# Patient Record
Sex: MALE | Race: BLACK OR AFRICAN AMERICAN | Employment: OTHER | URBAN - METROPOLITAN AREA
[De-identification: names, ages, dates, MRNs, and addresses within clinical notes are randomized per-mention and may not be internally consistent; named-entity substitution may affect disease eponyms.]

---

## 2021-02-13 ENCOUNTER — HOSPITAL ENCOUNTER (EMERGENCY)
Age: 66
Discharge: HOME | End: 2021-02-13
Attending: EMERGENCY MEDICINE | Admitting: EMERGENCY MEDICINE
Payer: MEDICARE

## 2021-02-13 ENCOUNTER — APPOINTMENT (OUTPATIENT)
Dept: GENERAL RADIOLOGY | Age: 66
End: 2021-02-13
Attending: PHYSICIAN ASSISTANT
Payer: MEDICARE

## 2021-02-13 VITALS
TEMPERATURE: 98.3 F | HEIGHT: 68 IN | OXYGEN SATURATION: 98 % | HEART RATE: 92 BPM | DIASTOLIC BLOOD PRESSURE: 112 MMHG | RESPIRATION RATE: 18 BRPM | SYSTOLIC BLOOD PRESSURE: 157 MMHG | WEIGHT: 165 LBS | BODY MASS INDEX: 25.01 KG/M2

## 2021-02-13 DIAGNOSIS — S22.32XA CLOSED FRACTURE OF ONE RIB OF LEFT SIDE, INITIAL ENCOUNTER: Primary | ICD-10-CM

## 2021-02-13 DIAGNOSIS — S20.212A CONTUSION OF LEFT CHEST WALL, INITIAL ENCOUNTER: ICD-10-CM

## 2021-02-13 DIAGNOSIS — R73.9 HYPERGLYCEMIA: ICD-10-CM

## 2021-02-13 DIAGNOSIS — I10 HYPERTENSION, UNSPECIFIED TYPE: ICD-10-CM

## 2021-02-13 LAB
ALBUMIN SERPL-MCNC: 3.6 G/DL (ref 3.5–5)
ALBUMIN/GLOB SERPL: 0.8 {RATIO} (ref 1.1–2.2)
ALP SERPL-CCNC: 129 U/L (ref 45–117)
ALT SERPL-CCNC: 26 U/L (ref 12–78)
ANION GAP SERPL CALC-SCNC: 9 MMOL/L (ref 5–15)
AST SERPL-CCNC: 16 U/L (ref 15–37)
BASOPHILS # BLD: 0.1 K/UL (ref 0–0.1)
BASOPHILS NFR BLD: 1 % (ref 0–1)
BILIRUB SERPL-MCNC: 0.5 MG/DL (ref 0.2–1)
BUN SERPL-MCNC: 9 MG/DL (ref 6–20)
BUN/CREAT SERPL: 7 (ref 12–20)
CALCIUM SERPL-MCNC: 9.7 MG/DL (ref 8.5–10.1)
CHLORIDE SERPL-SCNC: 102 MMOL/L (ref 97–108)
CO2 SERPL-SCNC: 29 MMOL/L (ref 21–32)
CREAT SERPL-MCNC: 1.28 MG/DL (ref 0.7–1.3)
DIFFERENTIAL METHOD BLD: ABNORMAL
EOSINOPHIL # BLD: 0 K/UL (ref 0–0.4)
EOSINOPHIL NFR BLD: 0 % (ref 0–7)
ERYTHROCYTE [DISTWIDTH] IN BLOOD BY AUTOMATED COUNT: 13.2 % (ref 11.5–14.5)
GLOBULIN SER CALC-MCNC: 4.6 G/DL (ref 2–4)
GLUCOSE SERPL-MCNC: 227 MG/DL (ref 65–100)
HCT VFR BLD AUTO: 51.8 % (ref 36.6–50.3)
HGB BLD-MCNC: 17.7 G/DL (ref 12.1–17)
IMM GRANULOCYTES # BLD AUTO: 0 K/UL (ref 0–0.04)
IMM GRANULOCYTES NFR BLD AUTO: 0 % (ref 0–0.5)
LYMPHOCYTES # BLD: 1 K/UL (ref 0.8–3.5)
LYMPHOCYTES NFR BLD: 10 % (ref 12–49)
MCH RBC QN AUTO: 31.2 PG (ref 26–34)
MCHC RBC AUTO-ENTMCNC: 34.2 G/DL (ref 30–36.5)
MCV RBC AUTO: 91.2 FL (ref 80–99)
MONOCYTES # BLD: 0.4 K/UL (ref 0–1)
MONOCYTES NFR BLD: 4 % (ref 5–13)
NEUTS SEG # BLD: 9 K/UL (ref 1.8–8)
NEUTS SEG NFR BLD: 85 % (ref 32–75)
NRBC # BLD: 0 K/UL (ref 0–0.01)
NRBC BLD-RTO: 0 PER 100 WBC
PLATELET # BLD AUTO: 261 K/UL (ref 150–400)
PMV BLD AUTO: 11.4 FL (ref 8.9–12.9)
POTASSIUM SERPL-SCNC: 3.9 MMOL/L (ref 3.5–5.1)
PROT SERPL-MCNC: 8.2 G/DL (ref 6.4–8.2)
RBC # BLD AUTO: 5.68 M/UL (ref 4.1–5.7)
SODIUM SERPL-SCNC: 140 MMOL/L (ref 136–145)
TROPONIN I SERPL-MCNC: 0.05 NG/ML
TROPONIN I SERPL-MCNC: 0.06 NG/ML
WBC # BLD AUTO: 10.5 K/UL (ref 4.1–11.1)

## 2021-02-13 PROCEDURE — 77030027138 HC INCENT SPIROMETER -A

## 2021-02-13 PROCEDURE — 36415 COLL VENOUS BLD VENIPUNCTURE: CPT

## 2021-02-13 PROCEDURE — 99285 EMERGENCY DEPT VISIT HI MDM: CPT

## 2021-02-13 PROCEDURE — 93005 ELECTROCARDIOGRAM TRACING: CPT

## 2021-02-13 PROCEDURE — 85025 COMPLETE CBC W/AUTO DIFF WBC: CPT

## 2021-02-13 PROCEDURE — 71101 X-RAY EXAM UNILAT RIBS/CHEST: CPT

## 2021-02-13 PROCEDURE — 74011250637 HC RX REV CODE- 250/637: Performed by: PHYSICIAN ASSISTANT

## 2021-02-13 PROCEDURE — 80053 COMPREHEN METABOLIC PANEL: CPT

## 2021-02-13 PROCEDURE — 84484 ASSAY OF TROPONIN QUANT: CPT

## 2021-02-13 RX ORDER — HYDROCODONE BITARTRATE AND ACETAMINOPHEN 5; 325 MG/1; MG/1
1 TABLET ORAL
Status: COMPLETED | OUTPATIENT
Start: 2021-02-13 | End: 2021-02-13

## 2021-02-13 RX ORDER — ASPIRIN 325 MG
325 TABLET ORAL
Status: COMPLETED | OUTPATIENT
Start: 2021-02-13 | End: 2021-02-13

## 2021-02-13 RX ADMIN — HYDROCODONE BITARTRATE AND ACETAMINOPHEN 1 TABLET: 5; 325 TABLET ORAL at 13:03

## 2021-02-13 RX ADMIN — ASPIRIN 325 MG: 325 TABLET ORAL at 15:31

## 2021-02-13 NOTE — PROGRESS NOTES
CM was consulted and opened case to establish pcp and get a cardiology follow up. Offices are closed today. Updated RN that CM will follow up with patient on Monday.      Renetta Brantley, MELCHOR/MELISSA  [phone]

## 2021-02-13 NOTE — ED NOTES
Patient here with c/o fall with pain to left side chest and ribs. Patient states that he slipped on some ice and snow and when he landed on the ground that he landed on his left side. Patient states concern, saying \"I had pain right over where my heart is! \"  Patient denies LOC with fall. Patient denies SOB, denies dizziness or lightheadedness. Patient states that he had one other fall within the last 12 months, stating that during the summer he was mowing grass in the heat and got too hot but states that he felt better after he sat down cooled off and drank some water, with no injury. Emergency Department Nursing Plan of Care       The Nursing Plan of Care is developed from the Nursing assessment and Emergency Department Attending provider initial evaluation. The plan of care may be reviewed in the ED Provider note.     The Plan of Care was developed with the following considerations:   Patient / Family readiness to learn indicated by:verbalized understanding  Persons(s) to be included in education: patient  Barriers to Learning/Limitations:No    Signed     Oz Ambrocio RN    2/13/2021   1:23 PM

## 2021-02-15 ENCOUNTER — HOSPITAL ENCOUNTER (EMERGENCY)
Age: 66
Discharge: HOME | End: 2021-02-15
Attending: EMERGENCY MEDICINE
Payer: MEDICARE

## 2021-02-15 VITALS
DIASTOLIC BLOOD PRESSURE: 88 MMHG | BODY MASS INDEX: 25.01 KG/M2 | HEART RATE: 82 BPM | OXYGEN SATURATION: 98 % | SYSTOLIC BLOOD PRESSURE: 187 MMHG | TEMPERATURE: 98.7 F | WEIGHT: 165 LBS | HEIGHT: 68 IN | RESPIRATION RATE: 16 BRPM

## 2021-02-15 DIAGNOSIS — S22.32XS CLOSED FRACTURE OF ONE RIB OF LEFT SIDE, SEQUELA: Primary | ICD-10-CM

## 2021-02-15 LAB
ATRIAL RATE: 84 BPM
ATRIAL RATE: 88 BPM
CALCULATED P AXIS, ECG09: 66 DEGREES
CALCULATED P AXIS, ECG09: 66 DEGREES
CALCULATED R AXIS, ECG10: 56 DEGREES
CALCULATED R AXIS, ECG10: 63 DEGREES
CALCULATED T AXIS, ECG11: -142 DEGREES
CALCULATED T AXIS, ECG11: -175 DEGREES
DIAGNOSIS, 93000: NORMAL
DIAGNOSIS, 93000: NORMAL
P-R INTERVAL, ECG05: 166 MS
P-R INTERVAL, ECG05: 166 MS
Q-T INTERVAL, ECG07: 360 MS
Q-T INTERVAL, ECG07: 372 MS
QRS DURATION, ECG06: 76 MS
QRS DURATION, ECG06: 80 MS
QTC CALCULATION (BEZET), ECG08: 435 MS
QTC CALCULATION (BEZET), ECG08: 439 MS
VENTRICULAR RATE, ECG03: 84 BPM
VENTRICULAR RATE, ECG03: 88 BPM

## 2021-02-15 PROCEDURE — 74011250637 HC RX REV CODE- 250/637: Performed by: PHYSICIAN ASSISTANT

## 2021-02-15 PROCEDURE — 99283 EMERGENCY DEPT VISIT LOW MDM: CPT

## 2021-02-15 RX ORDER — IBUPROFEN 800 MG/1
800 TABLET ORAL
Qty: 20 TAB | Refills: 0 | Status: SHIPPED | OUTPATIENT
Start: 2021-02-15 | End: 2021-02-22

## 2021-02-15 RX ORDER — HYDROCODONE BITARTRATE AND ACETAMINOPHEN 5; 325 MG/1; MG/1
1 TABLET ORAL
Status: COMPLETED | OUTPATIENT
Start: 2021-02-15 | End: 2021-02-15

## 2021-02-15 RX ORDER — HYDROCODONE BITARTRATE AND ACETAMINOPHEN 5; 325 MG/1; MG/1
1 TABLET ORAL
Qty: 12 TAB | Refills: 0 | Status: SHIPPED | OUTPATIENT
Start: 2021-02-15 | End: 2021-02-18

## 2021-02-15 RX ADMIN — HYDROCODONE BITARTRATE AND ACETAMINOPHEN 1 TABLET: 5; 325 TABLET ORAL at 09:57

## 2021-02-15 NOTE — ED TRIAGE NOTES
Pt evaluated here for fractured left rib 2 days ago, reports they did not give him any pain medication at discharge.

## 2021-02-15 NOTE — ED PROVIDER NOTES
EMERGENCY DEPARTMENT HISTORY AND PHYSICAL EXAM    Date: 2/15/2021  Patient Name: Charu Nelson. History of Presenting Illness     Chief Complaint   Patient presents with    Rib Injury     left         History Provided By: Patient    HPI: Charu Rincon is a 76 y.o. male with No significant past medical history who presents with continued left-sided rib and chest pain. Patient was seen here 2 days ago after a fall on the ice and diagnosed with a left rib fracture. Patient was not given any pain medicine upon discharge and states that he is having continued pain worsening with cough and deep inspiration. Patient denies any new symptoms just continued pain. There are no alleviating factors reported. PCP: None    Current Outpatient Medications   Medication Sig Dispense Refill    HYDROcodone-acetaminophen (Norco) 5-325 mg per tablet Take 1 Tab by mouth every six (6) hours as needed for Pain for up to 3 days. Max Daily Amount: 4 Tabs. 12 Tab 0    ibuprofen (MOTRIN) 800 mg tablet Take 1 Tab by mouth every eight (8) hours as needed for Pain for up to 7 days. 20 Tab 0       Past History     Past Medical History:  History reviewed. No pertinent past medical history. Past Surgical History:  No past surgical history on file. Family History:  History reviewed. No pertinent family history. Social History:  Social History     Tobacco Use    Smoking status: Current Every Day Smoker    Smokeless tobacco: Never Used   Substance Use Topics    Alcohol use: Not Currently    Drug use: Never       Allergies:  No Known Allergies      Review of Systems   Review of Systems   Constitutional: Negative for chills and fever. Respiratory: Positive for cough. Negative for shortness of breath, wheezing and stridor. Cardiovascular: Positive for chest pain ( rib pain). Gastrointestinal: Negative for nausea and vomiting. Allergic/Immunologic: Negative for immunocompromised state.    Neurological: Negative for speech difficulty and weakness. All other systems reviewed and are negative. Physical Exam     Vitals:    02/15/21 0947   BP: (!) 187/88   Pulse: 82   Resp: 16   Temp: 98.7 °F (37.1 °C)   SpO2: 98%   Weight: 74.8 kg (165 lb)   Height: 5' 8\" (1.727 m)     Physical Exam  Vitals signs and nursing note reviewed. Constitutional:       General: He is not in acute distress. Appearance: He is well-developed. HENT:      Head: Normocephalic and atraumatic. Mouth/Throat:      Pharynx: No oropharyngeal exudate. Eyes:      Conjunctiva/sclera: Conjunctivae normal.   Cardiovascular:      Rate and Rhythm: Normal rate and regular rhythm. Heart sounds: Normal heart sounds. Pulmonary:      Effort: Pulmonary effort is normal. No respiratory distress. Breath sounds: Normal breath sounds. No wheezing or rales. Chest:      Chest wall: Tenderness (along L chest wall and rib cage) present. Musculoskeletal: Normal range of motion. Skin:     General: Skin is warm and dry. Neurological:      Mental Status: He is alert and oriented to person, place, and time. Diagnostic Study Results     Labs -   No results found for this or any previous visit (from the past 12 hour(s)). Radiologic Studies -   No orders to display     CT Results  (Last 48 hours)    None        CXR Results  (Last 48 hours)    None            Medical Decision Making   I am the first provider for this patient. I reviewed the vital signs, available nursing notes, past medical history, past surgical history, family history and social history. Vital Signs-Reviewed the patient's vital signs. Records Reviewed: Nursing Notes, Old Medical Records and Previous Radiology Studies    Disposition:  Discharged    DISCHARGE NOTE:   9:50 AM      Care plan outlined and precautions discussed. Patient has no new complaints, changes, or physical findings. All medications were reviewed with the patient; will d/c home.  All of pt's questions and concerns were addressed. Patient was instructed and agrees to follow up with PCP as needed, as well as to return to the ED upon further deterioration. Patient is ready to go home. Follow-up Information     Follow up With Specialties Details Why Contact Info    [facility]  In 1 week As needed [address], [id number] [address] [place] [id number] [phone]    [facility] EMERGENCY DEPT Emergency Medicine  If symptoms worsen Nixon Maldonado          Current Discharge Medication List      START taking these medications    Details   HYDROcodone-acetaminophen (Norco) 5-325 mg per tablet Take 1 Tab by mouth every six (6) hours as needed for Pain for up to 3 days. Max Daily Amount: 4 Tabs. Qty: 12 Tab, Refills: 0    Associated Diagnoses: Closed fracture of one rib of left side, sequela      ibuprofen (MOTRIN) 800 mg tablet Take 1 Tab by mouth every eight (8) hours as needed for Pain for up to 7 days. Qty: 20 Tab, Refills: 0             Provider Notes (Medical Decision Making):   Patient presents with recently diagnosed with rib fracture but not given any pain meds upon discharge. Patient has no new complaints just continued pain so we will give pain medicine now in the ED as well as send some to the pharmacy for relief at home. Procedures:  Procedures    Please note that this dictation was completed with Dragon, computer voice recognition software. Quite often unanticipated grammatical, syntax, homophones, and other interpretive errors are inadvertently transcribed by the computer software. Please disregard these errors. Additionally, please excuse any errors that have escaped final proofreading. Diagnosis     Clinical Impression:   1.  Closed fracture of one rib of left side, sequela

## 2021-02-25 ENCOUNTER — OFFICE VISIT (OUTPATIENT)
Dept: INTERNAL MEDICINE CLINIC | Age: 66
End: 2021-02-25
Payer: MEDICARE

## 2021-02-25 VITALS
WEIGHT: 161 LBS | DIASTOLIC BLOOD PRESSURE: 98 MMHG | OXYGEN SATURATION: 98 % | HEIGHT: 68 IN | TEMPERATURE: 98.5 F | RESPIRATION RATE: 18 BRPM | BODY MASS INDEX: 24.4 KG/M2 | HEART RATE: 89 BPM | SYSTOLIC BLOOD PRESSURE: 198 MMHG

## 2021-02-25 DIAGNOSIS — I10 ESSENTIAL HYPERTENSION: ICD-10-CM

## 2021-02-25 DIAGNOSIS — E11.65 TYPE 2 DIABETES MELLITUS WITH HYPERGLYCEMIA, WITHOUT LONG-TERM CURRENT USE OF INSULIN (HCC): ICD-10-CM

## 2021-02-25 DIAGNOSIS — S22.32XA CLOSED FRACTURE OF ONE RIB OF LEFT SIDE, INITIAL ENCOUNTER: ICD-10-CM

## 2021-02-25 DIAGNOSIS — R35.1 NOCTURIA: ICD-10-CM

## 2021-02-25 DIAGNOSIS — Z11.59 SCREENING FOR VIRAL DISEASE: ICD-10-CM

## 2021-02-25 DIAGNOSIS — Z12.11 COLON CANCER SCREENING: ICD-10-CM

## 2021-02-25 DIAGNOSIS — Z23 ENCOUNTER FOR IMMUNIZATION: ICD-10-CM

## 2021-02-25 DIAGNOSIS — Z76.89 ESTABLISHING CARE WITH NEW DOCTOR, ENCOUNTER FOR: Primary | ICD-10-CM

## 2021-02-25 DIAGNOSIS — Z23 NEEDS FLU SHOT: ICD-10-CM

## 2021-02-25 LAB — HBA1C MFR BLD HPLC: 9.2 %

## 2021-02-25 PROCEDURE — 83036 HEMOGLOBIN GLYCOSYLATED A1C: CPT | Performed by: NURSE PRACTITIONER

## 2021-02-25 PROCEDURE — G8432 DEP SCR NOT DOC, RNG: HCPCS | Performed by: NURSE PRACTITIONER

## 2021-02-25 PROCEDURE — 2022F DILAT RTA XM EVC RTNOPTHY: CPT | Performed by: NURSE PRACTITIONER

## 2021-02-25 PROCEDURE — 90732 PPSV23 VACC 2 YRS+ SUBQ/IM: CPT | Performed by: NURSE PRACTITIONER

## 2021-02-25 PROCEDURE — G8420 CALC BMI NORM PARAMETERS: HCPCS | Performed by: NURSE PRACTITIONER

## 2021-02-25 PROCEDURE — G8536 NO DOC ELDER MAL SCRN: HCPCS | Performed by: NURSE PRACTITIONER

## 2021-02-25 PROCEDURE — 99204 OFFICE O/P NEW MOD 45 MIN: CPT | Performed by: NURSE PRACTITIONER

## 2021-02-25 PROCEDURE — G8753 SYS BP > OR = 140: HCPCS | Performed by: NURSE PRACTITIONER

## 2021-02-25 PROCEDURE — 90694 VACC AIIV4 NO PRSRV 0.5ML IM: CPT | Performed by: NURSE PRACTITIONER

## 2021-02-25 PROCEDURE — 1101F PT FALLS ASSESS-DOCD LE1/YR: CPT | Performed by: NURSE PRACTITIONER

## 2021-02-25 PROCEDURE — 3046F HEMOGLOBIN A1C LEVEL >9.0%: CPT | Performed by: NURSE PRACTITIONER

## 2021-02-25 PROCEDURE — G8427 DOCREV CUR MEDS BY ELIG CLIN: HCPCS | Performed by: NURSE PRACTITIONER

## 2021-02-25 PROCEDURE — 3017F COLORECTAL CA SCREEN DOC REV: CPT | Performed by: NURSE PRACTITIONER

## 2021-02-25 PROCEDURE — G0008 ADMIN INFLUENZA VIRUS VAC: HCPCS | Performed by: NURSE PRACTITIONER

## 2021-02-25 PROCEDURE — G0009 ADMIN PNEUMOCOCCAL VACCINE: HCPCS | Performed by: NURSE PRACTITIONER

## 2021-02-25 PROCEDURE — G8755 DIAS BP > OR = 90: HCPCS | Performed by: NURSE PRACTITIONER

## 2021-02-25 RX ORDER — METFORMIN HYDROCHLORIDE 500 MG/1
500 TABLET ORAL 2 TIMES DAILY WITH MEALS
Qty: 60 TAB | Refills: 0 | Status: SHIPPED | OUTPATIENT
Start: 2021-02-25 | End: 2021-03-18 | Stop reason: SDUPTHER

## 2021-02-25 RX ORDER — HYDROCODONE BITARTRATE AND ACETAMINOPHEN 5; 325 MG/1; MG/1
TABLET ORAL
COMMUNITY
End: 2021-02-25 | Stop reason: SDUPTHER

## 2021-02-25 RX ORDER — LOSARTAN POTASSIUM 50 MG/1
50 TABLET ORAL DAILY
Qty: 90 TAB | Refills: 0 | Status: SHIPPED | OUTPATIENT
Start: 2021-02-25 | End: 2021-03-18 | Stop reason: SDUPTHER

## 2021-02-25 RX ORDER — IBUPROFEN 800 MG/1
TABLET ORAL
COMMUNITY
End: 2021-02-25 | Stop reason: SDUPTHER

## 2021-02-25 RX ORDER — IBUPROFEN 800 MG/1
800 TABLET ORAL
Qty: 30 TAB | Refills: 0 | Status: SHIPPED | OUTPATIENT
Start: 2021-02-25 | End: 2021-03-18 | Stop reason: SDUPTHER

## 2021-02-25 RX ORDER — IBUPROFEN 800 MG/1
TABLET ORAL
Status: CANCELLED | OUTPATIENT
Start: 2021-02-25

## 2021-02-25 RX ORDER — HYDROCODONE BITARTRATE AND ACETAMINOPHEN 5; 325 MG/1; MG/1
1 TABLET ORAL
Qty: 6 TAB | Refills: 0 | Status: SHIPPED | OUTPATIENT
Start: 2021-02-25 | End: 2021-02-28

## 2021-02-25 RX ORDER — HYDROCODONE BITARTRATE AND ACETAMINOPHEN 5; 325 MG/1; MG/1
TABLET ORAL
Status: CANCELLED | OUTPATIENT
Start: 2021-02-25

## 2021-02-25 NOTE — PROGRESS NOTES
Chief Complaint   Patient presents with    ED Follow-up     [facility], rib injury, HTN    Medication Refill       1. Have you been to the ER, urgent care clinic since your last visit? Hospitalized since your last visit? Yes When: 02/13/2021 Where: [facility] Reason for visit: HTN, rib fracture    2. Have you seen or consulted any other health care providers outside of the [address] [place] [address] since your last visit? Include any pap smears or colon screening.  No

## 2021-02-25 NOTE — PATIENT INSTRUCTIONS
DASH Diet: Care Instructions  Your Care Instructions    The DASH diet is an eating plan that can help lower your blood pressure. DASH stands for Dietary Approaches to Stop Hypertension. Hypertension is high blood pressure. The DASH diet focuses on eating foods that are high in calcium, potassium, and magnesium. These nutrients can lower blood pressure. The foods that are highest in these nutrients are fruits, vegetables, low-fat dairy products, nuts, seeds, and legumes. But taking calcium, potassium, and magnesium supplements instead of eating foods that are high in those nutrients does not have the same effect. The DASH diet also includes whole grains, fish, and poultry. The DASH diet is one of several lifestyle changes your doctor may recommend to lower your high blood pressure. Your doctor may also want you to decrease the amount of sodium in your diet. Lowering sodium while following the DASH diet can lower blood pressure even further than just the DASH diet alone. Follow-up care is a key part of your treatment and safety. Be sure to make and go to all appointments, and call your doctor if you are having problems. It's also a good idea to know your test results and keep a list of the medicines you take. How can you care for yourself at home? Following the DASH diet  · Eat 4 to 5 servings of fruit each day. A serving is 1 medium-sized piece of fruit, ½ cup chopped or canned fruit, 1/4 cup dried fruit, or 4 ounces (½ cup) of fruit juice. Choose fruit more often than fruit juice. · Eat 4 to 5 servings of vegetables each day. A serving is 1 cup of lettuce or raw leafy vegetables, ½ cup of chopped or cooked vegetables, or 4 ounces (½ cup) of vegetable juice. Choose vegetables more often than vegetable juice. · Get 2 to 3 servings of low-fat and fat-free dairy each day. A serving is 8 ounces of milk, 1 cup of yogurt, or 1 ½ ounces of cheese. · Eat 6 to 8 servings of grains each day.  A serving is 1 slice of bread, 1 ounce of dry cereal, or ½ cup of cooked rice, pasta, or cooked cereal. Try to choose whole-grain products as much as possible. · Limit lean meat, poultry, and fish to 2 servings each day. A serving is 3 ounces, about the size of a deck of cards. · Eat 4 to 5 servings of nuts, seeds, and legumes (cooked dried beans, lentils, and split peas) each week. A serving is 1/3 cup of nuts, 2 tablespoons of seeds, or ½ cup of cooked beans or peas. · Limit fats and oils to 2 to 3 servings each day. A serving is 1 teaspoon of vegetable oil or 2 tablespoons of salad dressing. · Limit sweets and added sugars to 5 servings or less a week. A serving is 1 tablespoon jelly or jam, ½ cup sorbet, or 1 cup of lemonade. · Eat less than 2,300 milligrams (mg) of sodium a day. If you limit your sodium to 1,500 mg a day, you can lower your blood pressure even more. Tips for success  · Start small. Do not try to make dramatic changes to your diet all at once. You might feel that you are missing out on your favorite foods and then be more likely to not follow the plan. Make small changes, and stick with them. Once those changes become habit, add a few more changes. · Try some of the following:  ? Make it a goal to eat a fruit or vegetable at every meal and at snacks. This will make it easy to get the recommended amount of fruits and vegetables each day. ? Try yogurt topped with fruit and nuts for a snack or healthy dessert. ? Add lettuce, tomato, cucumber, and onion to sandwiches. ? Combine a ready-made pizza crust with low-fat mozzarella cheese and lots of vegetable toppings. Try using tomatoes, squash, spinach, broccoli, carrots, cauliflower, and onions. ? Have a variety of cut-up vegetables with a low-fat dip as an appetizer instead of chips and dip. ? Sprinkle sunflower seeds or chopped almonds over salads. Or try adding chopped walnuts or almonds to cooked vegetables.   ? Try some vegetarian meals using beans and peas. Add garbanzo or kidney beans to salads. Make burritos and tacos with mashed salcedo beans or black beans. Where can you learn more? Go to http://www.[employer].com/  Enter H967 in the search box to learn more about \"DASH Diet: Care Instructions. \"  Current as of: December 16, 2019               Content Version: 12.6  © 0361-3441 [employer]. Care instructions adapted under license by [employer] (which disclaims liability or warranty for this information). If you have questions about a medical condition or this instruction, always ask your healthcare professional. [address] any warranty or liability for your use of this information. Nutrition Tips for Diabetes: After Your Visit  Your Care Instructions  A healthy diet is important to manage diabetes. It helps you lose weight (if you need to) and keep it off. It gives you the nutrition and energy your body needs and helps prevent heart disease. But a diet for diabetes does not mean that you have to eat special foods. You can eat what your family eats, including occasional sweets and other favorites. But you do have to pay attention to how often you eat and how much you eat of certain foods. The right plan for you will give you meals that help you keep your blood sugar at healthy levels. Try to eat a variety of foods and to spread carbohydrate throughout the day. Carbohydrate raises blood sugar higher and more quickly than any other nutrient does. Carbohydrate is found in sugar, breads and cereals, fruit, starchy vegetables such as potatoes and corn, and milk and yogurt. You may want to work with a dietitian or diabetes educator to help you plan meals and snacks. A dietitian or diabetes educator also can help you lose weight if that is one of your goals. The following tips can help you enjoy your meals and stay healthy. Follow-up care is a key part of your treatment and safety.  Be sure to make and go to all appointments, and call your doctor if you are having problems. Its also a good idea to know your test results and keep a list of the medicines you take. How can you care for yourself at home? · Learn which foods have carbohydrate and how much carbohydrate to eat. A dietitian or diabetes educator can help you learn to keep track of how much carbohydrate you eat. · Spread carbohydrate throughout the day. Eat some carbohydrate at all meals, but do not eat too much at any one time. · Plan meals to include food from all the food groups. These are the food groups and some example portion sizes:  ¨ Grains: 1 slice of bread (1 ounce), ½ cup of cooked cereal, and 1/3 cup of cooked pasta or rice. These have about 15 grams of carbohydrate in a serving. Choose whole grains such as whole wheat bread or crackers, oatmeal, and brown rice more often than refined grains. ¨ Fruit: 1 small fresh fruit, such as an apple or orange; ½ of a banana; ½ cup of chopped, cooked, or canned fruit; ½ cup of fruit juice; 1 cup of melon or raspberries; and 2 tablespoons of dried fruit. These have about 15 grams of carbohydrate in a serving. ¨ Dairy: 1 cup of nonfat or low-fat milk and 2/3 cup of plain yogurt. These have about 15 grams of carbohydrate in a serving. ¨ Protein foods: Beef, chicken, turkey, fish, eggs, tofu, cheese, cottage cheese, and peanut butter. A serving size of meat is 3 ounces, which is about the size of a deck of cards. Examples of meat substitute serving sizes (equal to 1 ounce of meat) are 1/4 cup of cottage cheese, 1 egg, 1 tablespoon of peanut butter, and ½ cup of tofu. These have very little or no carbohydrate per serving. ¨ Vegetables: Starchy vegetables such as ½ cup of cooked dried beans, peas, potatoes, or corn have about 15 grams of carbohydrate.  Nonstarchy vegetables have very little carbohydrate, such as 1 cup of raw leafy vegetables (such as spinach), ½ cup of other vegetables (cooked or chopped), and 3/4 cup of vegetable juice. · Use the plate format to plan meals. It is a good, quick way to make sure that you have a balanced meal. It also helps you spread carbohydrate throughout the day. You divide your plate by types of foods. Put vegetables on half the plate, meat or meat substitutes on one-quarter of the plate, and a grain or starchy vegetable (such as brown rice or a potato) in the final quarter of the plate. To this you can add a small piece of fruit and 1 cup of milk or yogurt, depending on how much carbohydrate you are supposed to eat at a meal.  · Talk to your dietitian or diabetes educator about ways to add limited amounts of sweets into your meal plan. You can eat these foods now and then, as long as you include the amount of carbohydrate they have in your daily carbohydrate allowance. · If you drink alcohol, limit it to no more than 1 drink a day for women and 2 drinks a day for men. If you are pregnant, no amount of alcohol is known to be safe. · Protein, fat, and fiber do not raise blood sugar as much as carbohydrate does. If you eat a lot of these nutrients in a meal, your blood sugar will rise more slowly than it would otherwise. · Limit saturated fats, such as those from meat and dairy products. Try to replace it with monounsaturated fat, such as olive oil. This is a healthier choice because people who have diabetes are at higher-than-average risk of heart disease. But use a modest amount of olive oil. A tablespoon of olive oil has 14 grams of fat and 120 calories. · Exercise lowers blood sugar. If you take insulin by shots or pump, you can use less than you would if you were not exercising. Keep in mind that timing matters. If you exercise within 1 hour after a meal, your body may need less insulin for that meal than it would if you exercised 3 hours after the meal. Test your blood sugar to find out how exercise affects your need for insulin.   · Exercise on most days of the week. Aim for at least 30 minutes. Exercise helps you stay at a healthy weight and helps your body use insulin. Walking is an easy way to get exercise. Gradually increase the amount you walk every day. You also may want to swim, bike, or do other activities. When you eat out  · Learn to estimate the serving sizes of foods that have carbohydrate. If you measure food at home, it will be easier to estimate the amount in a serving of restaurant food. · If the meal you order has too much carbohydrate (such as potatoes, corn, or baked beans), ask to have a low-carbohydrate food instead. Ask for a salad or green vegetables. · If you use insulin, check your blood sugar before and after eating out to help you plan how much to eat in the future. · If you eat more carbohydrate at a meal than you had planned, take a walk or do other exercise. This will help lower your blood sugar. Where can you learn more? Go to [employer]  Enter V468 in the search box to learn more about \"Nutrition Tips for Diabetes: After Your Visit. \"   © 0445-5377 [employer]. Care instructions adapted under license by [facility] (which disclaims liability or warranty for this information). This care instruction is for use with your licensed healthcare professional. If you have questions about a medical condition or this instruction, always ask your healthcare professional. [address] any warranty or liability for your use of this information. Content Version: 07.6.986923; Current as of: June 4, 2014              Vaccine Information Statement    Influenza (Flu) Vaccine (Inactivated or Recombinant): What You Need to Know    Many Vaccine Information Statements are available in Vietnamese and other languages. See www.immunize.org/vis  Hojas de información sobre vacunas están disponibles en español y en muchos otros idiomas. Visite www.immunize.org/vis    1.  Why get vaccinated? Influenza vaccine can prevent influenza (flu). Flu is a contagious disease that spreads around the [place] every year, usually between October and May. Anyone can get the flu, but it is more dangerous for some people. Infants and young children, people 72years of age and older, pregnant women, and people with certain health conditions or a weakened immune system are at greatest risk of flu complications. Pneumonia, bronchitis, sinus infections and ear infections are examples of flu-related complications. If you have a medical condition, such as heart disease, cancer or diabetes, flu can make it worse. Flu can cause fever and chills, sore throat, muscle aches, fatigue, cough, headache, and runny or stuffy nose. Some people may have vomiting and diarrhea, though this is more common in children than adults. Each year thousands of people in the [facility] die from flu, and many more are hospitalized. Flu vaccine prevents millions of illnesses and flu-related visits to the doctor each year. 2. Influenza vaccines     CDC recommends everyone 10months of age and older get vaccinated every flu season. Children 6 months through 6years of age may need 2 doses during a single flu season. Everyone else needs only 1 dose each flu season. It takes about 2 weeks for protection to develop after vaccination. There are many flu viruses, and they are always changing. Each year a new flu vaccine is made to protect against three or four viruses that are likely to cause disease in the upcoming flu season. Even when the vaccine doesnt exactly match these viruses, it may still provide some protection. Influenza vaccine does not cause flu. Influenza vaccine may be given at the same time as other vaccines.     3. Talk with your health care provider    Tell your vaccine provider if the person getting the vaccine:   Has had an allergic reaction after a previous dose of influenza vaccine, or has any severe, life-threatening allergies.  Has ever had Guillain-Barré Syndrome (also called GBS). In some cases, your health care provider may decide to postpone influenza vaccination to a future visit. People with minor illnesses, such as a cold, may be vaccinated. People who are moderately or severely ill should usually wait until they recover before getting influenza vaccine. Your health care provider can give you more information. 4. Risks of a reaction     Soreness, redness, and swelling where shot is given, fever, muscle aches, and headache can happen after influenza vaccine.  There may be a very small increased risk of Guillain-Barré Syndrome (GBS) after inactivated influenza vaccine (the flu shot). Rexene Sinks children who get the flu shot along with pneumococcal vaccine (PCV13), and/or DTaP vaccine at the same time might be slightly more likely to have a seizure caused by fever. Tell your health care provider if a child who is getting flu vaccine has ever had a seizure. People sometimes faint after medical procedures, including vaccination. Tell your provider if you feel dizzy or have vision changes or ringing in the ears. As with any medicine, there is a very remote chance of a vaccine causing a severe allergic reaction, other serious injury, or death. 5. What if there is a serious problem? An allergic reaction could occur after the vaccinated person leaves the clinic. If you see signs of a severe allergic reaction (hives, swelling of the face and throat, difficulty breathing, a fast heartbeat, dizziness, or weakness), call 9-1-1 and get the person to the nearest hospital.    For other signs that concern you, call your health care provider. Adverse reactions should be reported to the Vaccine Adverse Event Reporting System (VAERS). Your health care provider will usually file this report, or you can do it yourself. Visit the VAERS website at www.vaers. hhs.gov or call 0-047-218-849-491-9935. [facility] is only for reporting reactions, and [facility] staff do not give medical advice. 6. The National Vaccine Injury Compensation Program    The [facility] Vaccine Injury Compensation Program (VICP) is a federal program that was created to compensate people who may have been injured by certain vaccines. Visit the VICP website at www.[facility].gov/vaccinecompensation or call 1-597.317.9087 to learn about the program and about filing a claim. There is a time limit to file a claim for compensation. 7. How can I learn more?  Ask your health care provider.  Call your local or state health department.  Contact the Centers for Disease Control and Prevention (CDC):  - Call 4-146.766.7754 (1-800-CDC-INFO) or  - Visit CDCs influenza website at www.cdc.gov/flu    Vaccine Information Statement (Interim)  Inactivated Influenza Vaccine   8/15/2019  [id number]   Department of Health and Human Services  Centers for Disease Control and Prevention    Office Use Only      Vaccine Information Statement    Pneumococcal Polysaccharide Vaccine (PPSV23): What You Need to Know    Many Vaccine Information Statements are available in [place] and other languages. See www.immunize.org/vis  Hojas de información sobre vacunas están disponibles en español y en muchos otros idiomas. Visite www.immunize.org/vis    1. Why get vaccinated? Pneumococcal polysaccharide vaccine (PPSV23) can prevent pneumococcal disease. Pneumococcal disease refers to any illness caused by pneumococcal bacteria. These bacteria can cause many types of illnesses, including pneumonia, which is an infection of the lungs. Pneumococcal bacteria are one of the most common causes of pneumonia.       Besides pneumonia, pneumococcal bacteria can also cause:   Ear infections   Sinus infections   Meningitis (infection of the tissue covering the brain and spinal cord)   Bacteremia (bloodstream infection)    Anyone can get pneumococcal disease, but children under 3years of age, people with certain medical conditions, adults 72 years or older, and cigarette smokers are at the highest risk. Most pneumococcal infections are mild. However, some can result in long-term problems, such as brain damage or hearing loss. Meningitis, bacteremia, and pneumonia caused by pneumococcal disease can be fatal.     2. PPSV23     PPSV23 protects against 23 types of bacteria that cause pneumococcal disease. PPSV23 is recommended for:   All adults 72 years or older,   Anyone 2 years or older with certain medical conditions that can lead to an increased risk for pneumococcal disease. Most people need only one dose of PPSV23. A second dose of PPSV23, and another type of pneumococcal vaccine called PCV13, are recommended for certain high-risk groups. Your health care provider can give you more information. People 65 years or older should get a dose of PPSV23 even if they have already gotten one or more doses of the vaccine before they turned 72.    3. Talk with your health care provider    Tell your vaccine provider if the person getting the vaccine:   Has had an allergic reaction after a previous dose of PPSV23, or has any severe, life-threatening allergies. In some cases, your health care provider may decide to postpone PPSV23 vaccination to a future visit. People with minor illnesses, such as a cold, may be vaccinated. People who are moderately or severely ill should usually wait until they recover before getting PPSV23. Your health care provider can give you more information. 4. Risks of a vaccine reaction     Redness or pain where the shot is given, feeling tired, fever, or muscle aches can happen after PPSV23. People sometimes faint after medical procedures, including vaccination. Tell your provider if you feel dizzy or have vision changes or ringing in the ears.     As with any medicine, there is a very remote chance of a vaccine causing a severe allergic reaction, other serious injury, or death. 5. What if there is a serious problem? An allergic reaction could occur after the vaccinated person leaves the clinic. If you see signs of a severe allergic reaction (hives, swelling of the face and throat, difficulty breathing, a fast heartbeat, dizziness, or weakness), call 9-1-1 and get the person to the nearest hospital.    For other signs that concern you, call your health care provider. Adverse reactions should be reported to the Vaccine Adverse Event Reporting System (VAERS). Your health care provider will usually file this report, or you can do it yourself. Visit the VAERS website at www.vaers. [facility].gov or call 9-905.769.1099. VAERS is only for reporting reactions, and VAERS staff do not give medical advice. 6. How can I learn more?  Ask your health care provider.  Call your local or state health department.    Contact the Centers for Disease Control and Prevention (CDC):  - Call 7-415.665.4546 (1-800-CDC-INFO) or  - Visit CDCs website at www.cdc.gov/vaccines    Vaccine Information Statement   PPSV23   10/30/2019    [facility] and [facility] for Disease Control and Prevention    Office Use Only

## 2021-02-25 NOTE — PROGRESS NOTES
Subjective: (As above and below)     Chief Complaint   Patient presents with    ED Follow-up     [address], rib injury, HTN    Medication Refill     Mary Alvarez is a 76y.o. year old male who presents to [facility] care and ED fu    Has not had a check up in many years    ED fu:  Presented to ED on   He slipped down icy steps    IMPRESSION  Left fifth rib fracture. He reports some improvement but still residual pain. No sob    Elevated glucose on ED lab work  Has polyuria but no other s/s  No known hx of DM  Diet is poor    HTN: no known hx, asymptomatic    Smoker: 1/2 PPD, not ready to quit    Colonoscopy: never done, no known fam hx of colon ca  No bowel concerns      Reviewed PmHx, RxHx, FmHx, SocHx, AllgHx and updated in chart. History reviewed. No pertinent family history. History reviewed. No pertinent past medical history. Social History     Socioeconomic History    Marital status:      Spouse name: Not on file    Number of children: Not on file    Years of education: Not on file    Highest education level: Not on file   Tobacco Use    Smoking status: Current Every Day Smoker     Packs/day: 0.50     Types: Cigarettes    Smokeless tobacco: Never Used   Substance and Sexual Activity    Alcohol use: Not Currently    Drug use: Never    Sexual activity: Not Currently     Partners: Female          Current Outpatient Medications   Medication Sig    losartan (COZAAR) 50 mg tablet Take 1 Tab by mouth daily. For blood pressure    ibuprofen (MOTRIN) 800 mg tablet Take 1 Tab by mouth every eight (8) hours as needed for Pain. With food    HYDROcodone-acetaminophen (NORCO) 5-325 mg per tablet Take 1 Tab by mouth two (2) times daily as needed for Pain for up to 3 days. Max Daily Amount: 2 Tabs. For severe pain only     No current facility-administered medications for this visit. Review of Systems:   Constitutional:    Negative for fever and chills, negative diaphoresis.    HEENT: Negative for neck pain and stiffness. Eyes:                  Negative for visual disturbance, itching, redness or discharge. Respiratory:        Negative for cough and shortness of breath. Cardiovascular:  Negative for chest pain and palpitations. Gastrointestinal: Negative for nausea, vomiting, abdominal pain, diarrhea or constipation. Genitourinary:     Negative for dysuria and frequency. +void 2-3 x per night no other prostatic s/s  Musculoskeletal: Negative for falls, tenderness and swelling. Skin:                    Negative for rash, masses or lesions. Neurological:       Negative for dizzyness, seizure, loss of consciousness, weakness and numbness. Objective:     Vitals:    02/25/21 1347   BP: (!) 198/98   Pulse: 89   Resp: 18   Temp: 98.5 °F (36.9 °C)   TempSrc: Temporal   SpO2: 98%   Weight: 161 lb (73 kg)   Height: 5' 8\" (1.727 m)       Results for orders placed or performed during the hospital encounter of 02/13/21   TROPONIN I   Result Value Ref Range    Troponin-I, Qt. 0.05 (H) <0.05 ng/mL   CBC WITH AUTOMATED DIFF   Result Value Ref Range    WBC 10.5 4.1 - 11.1 K/uL    RBC 5.68 4.10 - 5.70 M/uL    HGB 17.7 (H) 12.1 - 17.0 g/dL    HCT 51.8 (H) 36.6 - 50.3 %    MCV 91.2 80.0 - 99.0 FL    MCH 31.2 26.0 - 34.0 PG    MCHC 34.2 30.0 - 36.5 g/dL    RDW 13.2 11.5 - 14.5 %    PLATELET 704 733 - 877 K/uL    MPV 11.4 8.9 - 12.9 FL    NRBC 0.0 0  WBC    ABSOLUTE NRBC 0.00 0.00 - 0.01 K/uL    NEUTROPHILS 85 (H) 32 - 75 %    LYMPHOCYTES 10 (L) 12 - 49 %    MONOCYTES 4 (L) 5 - 13 %    EOSINOPHILS 0 0 - 7 %    BASOPHILS 1 0 - 1 %    IMMATURE GRANULOCYTES 0 0.0 - 0.5 %    ABS. NEUTROPHILS 9.0 (H) 1.8 - 8.0 K/UL    ABS. LYMPHOCYTES 1.0 0.8 - 3.5 K/UL    ABS. MONOCYTES 0.4 0.0 - 1.0 K/UL    ABS. EOSINOPHILS 0.0 0.0 - 0.4 K/UL    ABS. BASOPHILS 0.1 0.0 - 0.1 K/UL    ABS. IMM.  GRANS. 0.0 0.00 - 0.04 K/UL    DF AUTOMATED     METABOLIC PANEL, COMPREHENSIVE   Result Value Ref Range    Sodium 140 136 - 145 mmol/L    Potassium 3.9 3.5 - 5.1 mmol/L    Chloride 102 97 - 108 mmol/L    CO2 29 21 - 32 mmol/L    Anion gap 9 5 - 15 mmol/L    Glucose 227 (H) 65 - 100 mg/dL    BUN 9 6 - 20 MG/DL    Creatinine 1.28 0.70 - 1.30 MG/DL    BUN/Creatinine ratio 7 (L) 12 - 20      GFR est AA >60 >60 ml/min/1.73m2    GFR est non-AA 56 (L) >60 ml/min/1.73m2    Calcium 9.7 8.5 - 10.1 MG/DL    Bilirubin, total 0.5 0.2 - 1.0 MG/DL    ALT (SGPT) 26 12 - 78 U/L    AST (SGOT) 16 15 - 37 U/L    Alk.  phosphatase 129 (H) 45 - 117 U/L    Protein, total 8.2 6.4 - 8.2 g/dL    Albumin 3.6 3.5 - 5.0 g/dL    Globulin 4.6 (H) 2.0 - 4.0 g/dL    A-G Ratio 0.8 (L) 1.1 - 2.2     TROPONIN I   Result Value Ref Range    Troponin-I, Qt. 0.06 (H) <0.05 ng/mL   EKG, 12 LEAD, INITIAL   Result Value Ref Range    Ventricular Rate 88 BPM    Atrial Rate 88 BPM    P-R Interval 166 ms    QRS Duration 76 ms    Q-T Interval 360 ms    QTC Calculation (Bezet) 435 ms    Calculated P Axis 66 degrees    Calculated R Axis 56 degrees    Calculated T Axis -175 degrees    Diagnosis       Normal sinus rhythm  Right atrial enlargement  Left ventricular hypertrophy with repolarization abnormality  Abnormal ECG  No previous ECGs available  Confirmed by Marie Chou M.D., Klever Santos ([id number]) on 2/15/2021 7:32:29 AM     EKG, 12 LEAD, SUBSEQUENT   Result Value Ref Range    Ventricular Rate 84 BPM    Atrial Rate 84 BPM    P-R Interval 166 ms    QRS Duration 80 ms    Q-T Interval 372 ms    QTC Calculation (Bezet) 439 ms    Calculated P Axis 66 degrees    Calculated R Axis 63 degrees    Calculated T Axis -142 degrees    Diagnosis       Sinus rhythm with premature atrial complexes  Left ventricular hypertrophy with repolarization abnormality  Abnormal ECG  When compared with ECG of 13-FEB-2021 12:04,  MANUAL COMPARISON REQUIRED, DATA IS UNCONFIRMED  Confirmed by Marie Chou M.D., Klever Santos ([id number]) on 2/15/2021 7:34:09 AM       Results for orders placed or performed in visit on 02/25/21   AMB POC HEMOGLOBIN A1C   Result Value Ref Range    Hemoglobin A1c (POC) 9.2 %       Gen: Oriented to person, place and time and well-developed, well-nourished and in no distress. HEENT:    Head: normocephalic and atraumatic. Eyes:  EOM are normal. Pupils equal and round. Neck:  Normal range of motion. Neck supple. Cardiovascular: normal rate, regular rhythm and normal heart sounds. Pulmonary/Chest:  Effort normal and breath sounds normal.  No respiratory distress. No wheezes, no rales. Abdominal: soft, normal  bowel sounds. Musculoskeletal:  No edema, no tenderness. No calf tenderness or edema. Neurological:  Alert, oriented to person, place and time. Skin: skin is warm and dry. Assessment/ Plan:     Follow-up and Dispositions    · Return in about 3 weeks (around 3/18/2021) for bp w/ me. New dx of HTN and DM2 discussed diet, meds  Needs further reinforcement ,close fu  Will increase metformin at next visit        1. Establishing care with new doctor, encounter for      2. Essential hypertension  -losartan 50mg  - LIPID PANEL    3. Type 2 diabetes mellitus with hyperglycemia, without long-term current use of insulin (HCC)    - AMB POC HEMOGLOBIN A1C  - LIPID PANEL  - metFORMIN (GLUCOPHAGE) 500 mg tablet; Take 1 Tab by mouth two (2) times daily (with meals). For sugar  Dispense: 60 Tab; Refill: 0    4. Nocturia    - PSA W/ REFLX FREE PSA    5. Encounter for immunization    - PNEUMOCOCCAL POLYSACCHARIDE VACCINE, 23-VALENT, ADULT OR IMMUNOSUPPRESSED PT DOSE,    6. Colon cancer screening  Discussed test  - COLOGUARD TEST (FECAL DNA COLORECTAL CANCER SCREENING)    7. Closed fracture of one rib of left side, initial encounter  [place] norco for SEVERE pain, discussed bracing rib while coughing etc  - ibuprofen (MOTRIN) 800 mg tablet; Take 1 Tab by mouth every eight (8) hours as needed for Pain. With food  Dispense: 30 Tab;  Refill: 0  - HYDROcodone-acetaminophen (NORCO) 5-325 mg per tablet; Take 1 Tab by mouth two (2) times daily as needed for Pain for up to 3 days. Max Daily Amount: 2 Tabs. For severe pain only  Dispense: 6 Tab; Refill: 0    8. Screening for viral disease    - HEPATITIS C QT BY PCR WITH REFLEX GENOTYPE    9. Needs flu shot    - FLU (FLUAD QUAD INFLUENZA VACCINE,QUAD,ADJUVANTED)        I have discussed the diagnosis with the patient and the intended plan as seen in the above orders. The patient has received an after-visit summary and questions were answered concerning future plans. Pt conveyed understanding of plan. Medication Side Effects and Warnings were discussed with patient: yes  Patient Labs were reviewed: yes  Patient Past Records were reviewed:  yes    Gregorio Gabriel.  Tae Machado NP

## 2021-02-26 PROBLEM — I10 ESSENTIAL HYPERTENSION: Status: ACTIVE | Noted: 2021-02-26

## 2021-02-26 PROBLEM — E11.9 TYPE 2 DIABETES MELLITUS, WITHOUT LONG-TERM CURRENT USE OF INSULIN (HCC): Status: ACTIVE | Noted: 2021-02-26

## 2021-03-11 ENCOUNTER — TELEPHONE (OUTPATIENT)
Dept: INTERNAL MEDICINE CLINIC | Age: 66
End: 2021-03-11

## 2021-03-11 DIAGNOSIS — R19.5 POSITIVE COLORECTAL CANCER SCREENING USING COLOGUARD TEST: Primary | ICD-10-CM

## 2021-03-11 NOTE — TELEPHONE ENCOUNTER
concepcion   Reviewed cologuard results and importance of following up for colonoscopy  Pt verb understanding  Request GI referral mailed

## 2021-03-18 ENCOUNTER — OFFICE VISIT (OUTPATIENT)
Dept: INTERNAL MEDICINE CLINIC | Age: 66
End: 2021-03-18
Payer: MEDICARE

## 2021-03-18 VITALS
SYSTOLIC BLOOD PRESSURE: 190 MMHG | HEIGHT: 68 IN | RESPIRATION RATE: 18 BRPM | BODY MASS INDEX: 24.86 KG/M2 | HEART RATE: 78 BPM | DIASTOLIC BLOOD PRESSURE: 97 MMHG | TEMPERATURE: 98.4 F | WEIGHT: 164 LBS | OXYGEN SATURATION: 98 %

## 2021-03-18 DIAGNOSIS — R19.5 POSITIVE COLORECTAL CANCER SCREENING USING COLOGUARD TEST: ICD-10-CM

## 2021-03-18 DIAGNOSIS — I10 ESSENTIAL HYPERTENSION: Primary | ICD-10-CM

## 2021-03-18 DIAGNOSIS — E11.65 TYPE 2 DIABETES MELLITUS WITH HYPERGLYCEMIA, WITHOUT LONG-TERM CURRENT USE OF INSULIN (HCC): ICD-10-CM

## 2021-03-18 DIAGNOSIS — S22.32XA CLOSED FRACTURE OF ONE RIB OF LEFT SIDE, INITIAL ENCOUNTER: ICD-10-CM

## 2021-03-18 DIAGNOSIS — R31.9 HEMATURIA, UNSPECIFIED TYPE: ICD-10-CM

## 2021-03-18 LAB
ALBUMIN UR QL STRIP: 150 MG/L
BILIRUB UR QL STRIP: NEGATIVE
CREATININE, URINE POC: 200 MG/DL
GLUCOSE POC: 160 MG/DL
GLUCOSE UR-MCNC: NEGATIVE MG/DL
KETONES P FAST UR STRIP-MCNC: NEGATIVE MG/DL
MICROALBUMIN/CREAT RATIO POC: >300 MG/G
PH UR STRIP: 6.5 [PH] (ref 4.6–8)
PROT UR QL STRIP: NORMAL
SP GR UR STRIP: 1.03 (ref 1–1.03)
UA UROBILINOGEN AMB POC: NORMAL (ref 0.2–1)
URINALYSIS CLARITY POC: CLEAR
URINALYSIS COLOR POC: YELLOW
URINE BLOOD POC: NORMAL
URINE LEUKOCYTES POC: NEGATIVE
URINE NITRITES POC: NEGATIVE

## 2021-03-18 PROCEDURE — G8432 DEP SCR NOT DOC, RNG: HCPCS | Performed by: NURSE PRACTITIONER

## 2021-03-18 PROCEDURE — G8420 CALC BMI NORM PARAMETERS: HCPCS | Performed by: NURSE PRACTITIONER

## 2021-03-18 PROCEDURE — 3046F HEMOGLOBIN A1C LEVEL >9.0%: CPT | Performed by: NURSE PRACTITIONER

## 2021-03-18 PROCEDURE — 82044 UR ALBUMIN SEMIQUANTITATIVE: CPT | Performed by: NURSE PRACTITIONER

## 2021-03-18 PROCEDURE — 3017F COLORECTAL CA SCREEN DOC REV: CPT | Performed by: NURSE PRACTITIONER

## 2021-03-18 PROCEDURE — 99214 OFFICE O/P EST MOD 30 MIN: CPT | Performed by: NURSE PRACTITIONER

## 2021-03-18 PROCEDURE — G8755 DIAS BP > OR = 90: HCPCS | Performed by: NURSE PRACTITIONER

## 2021-03-18 PROCEDURE — 2022F DILAT RTA XM EVC RTNOPTHY: CPT | Performed by: NURSE PRACTITIONER

## 2021-03-18 PROCEDURE — 81003 URINALYSIS AUTO W/O SCOPE: CPT | Performed by: NURSE PRACTITIONER

## 2021-03-18 PROCEDURE — G8753 SYS BP > OR = 140: HCPCS | Performed by: NURSE PRACTITIONER

## 2021-03-18 PROCEDURE — 1101F PT FALLS ASSESS-DOCD LE1/YR: CPT | Performed by: NURSE PRACTITIONER

## 2021-03-18 PROCEDURE — 82962 GLUCOSE BLOOD TEST: CPT | Performed by: NURSE PRACTITIONER

## 2021-03-18 PROCEDURE — G8427 DOCREV CUR MEDS BY ELIG CLIN: HCPCS | Performed by: NURSE PRACTITIONER

## 2021-03-18 PROCEDURE — G8536 NO DOC ELDER MAL SCRN: HCPCS | Performed by: NURSE PRACTITIONER

## 2021-03-18 RX ORDER — METFORMIN HYDROCHLORIDE 1000 MG/1
1000 TABLET ORAL 2 TIMES DAILY WITH MEALS
Qty: 180 TAB | Refills: 0 | Status: ON HOLD | OUTPATIENT
Start: 2021-03-18 | End: 2021-05-14

## 2021-03-18 RX ORDER — LOSARTAN POTASSIUM 100 MG/1
100 TABLET ORAL DAILY
Qty: 90 TAB | Refills: 0 | Status: SHIPPED | OUTPATIENT
Start: 2021-03-18 | End: 2021-06-02

## 2021-03-18 RX ORDER — AMLODIPINE BESYLATE 10 MG/1
10 TABLET ORAL DAILY
Qty: 90 TAB | Refills: 0 | Status: SHIPPED | OUTPATIENT
Start: 2021-03-18 | End: 2021-06-02

## 2021-03-18 RX ORDER — IBUPROFEN 800 MG/1
800 TABLET ORAL
Qty: 30 TAB | Refills: 0 | Status: ON HOLD | OUTPATIENT
Start: 2021-03-18 | End: 2021-05-14

## 2021-03-18 NOTE — PROGRESS NOTES
Subjective: (As above and below)     Chief Complaint   Patient presents with    Follow-up     3 week BP     Guille Arroyo. is a 76y.o. year old male who presents for     Hypertension ROS:  taking medications as instructed, no medication side effects noted, no TIAs, no chest pain on exertion, no dyspnea on exertion, no swelling of ankles  Diet remains poor- ate chick-samira-a yesterday -friend chicken sandwich and nuggets    BP Readings from Last 3 Encounters:   03/18/21 (!) 190/97   02/25/21 (!) 198/98   02/15/21 (!) 187/88     Diabetic Review of Systems - medication compliance: noncompliant some of the time, diabetic diet compliance non compliant most of the time, home glucose monitoring: is not performed. Him and his wife go out to eat fast food often  There seems to be a learning barrier about diet, healthy choices    Positive cologuard: he has not yet called GI    Smoking: continues,  Not ready to quit    Recent rib fracture: feeling almost all better      Reviewed PmHx, RxHx, FmHx, SocHx, AllgHx and updated in chart. History reviewed. No pertinent family history. History reviewed. No pertinent past medical history. Social History     Socioeconomic History    Marital status:      Spouse name: Not on file    Number of children: Not on file    Years of education: Not on file    Highest education level: Not on file   Tobacco Use    Smoking status: Current Every Day Smoker     Packs/day: 0.50     Types: Cigarettes    Smokeless tobacco: Never Used   Substance and Sexual Activity    Alcohol use: Not Currently    Drug use: Never    Sexual activity: Not Currently     Partners: Female          Current Outpatient Medications   Medication Sig    ibuprofen (MOTRIN) 800 mg tablet Take 1 Tab by mouth every eight (8) hours as needed for Pain. With food    losartan (COZAAR) 100 mg tablet Take 1 Tab by mouth daily. For blood pressure    amLODIPine (NORVASC) 10 mg tablet Take 1 Tab by mouth daily. For blood pressure    metFORMIN (GLUCOPHAGE) 1,000 mg tablet Take 1 Tab by mouth two (2) times daily (with meals). For sugar     No current facility-administered medications for this visit. Review of Systems:   Constitutional:    Negative for fever and chills, negative diaphoresis. HEENT:              Negative for neck pain and stiffness. Eyes:                  Negative for visual disturbance, itching, redness or discharge. Respiratory:        Negative for cough and shortness of breath. Cardiovascular:  Negative for chest pain and palpitations. Gastrointestinal: Negative for nausea, vomiting, abdominal pain, diarrhea or constipation. Genitourinary:     Negative for dysuria and frequency. Musculoskeletal: Negative for falls, tenderness and swelling. Skin:                    Negative for rash, masses or lesions. Neurological:       Negative for dizzyness, seizure, loss of consciousness, weakness and numbness.      Objective:     Vitals:    03/18/21 0940   BP: (!) 190/97   Pulse: 78   Resp: 18   Temp: 98.4 °F (36.9 °C)   TempSrc: Temporal   SpO2: 98%   Weight: 164 lb (74.4 kg)   Height: 5' 8\" (1.727 m)     Results for orders placed or performed in visit on 03/18/21   AMB POC GLUCOSE BLOOD, BY GLUCOSE MONITORING DEVICE   Result Value Ref Range    Glucose  mg/dL   AMB POC URINALYSIS DIP STICK AUTO W/O MICRO   Result Value Ref Range    Color (UA POC) Yellow     Clarity (UA POC) Clear     Glucose (UA POC) Negative Negative    Bilirubin (UA POC) Negative Negative    Ketones (UA POC) Negative Negative    Specific gravity (UA POC) 1.030 1.001 - 1.035    Blood (UA POC) 2+ Negative    pH (UA POC) 6.5 4.6 - 8.0    Protein (UA POC) 3+ Negative    Urobilinogen (UA POC) 1 mg/dL 0.2 - 1    Nitrites (UA POC) Negative Negative    Leukocyte esterase (UA POC) Negative Negative   AMB POC URINE, MICROALBUMIN, SEMIQUANT (3 RESULTS)   Result Value Ref Range    ALBUMIN, URINE  Negative mg/L    CREATININE, URINE  mg/dL    Microalbumin/creat ratio (POC) >300 <30 MG/G           Physical Examination: General appearance - alert, well appearing, and in no distress  Mental status - alert, oriented to person, place, and time, normal mood, behavior, speech, dress, motor activity, and thought processes  Chest - clear to auscultation, no wheezes, rales or rhonchi, symmetric air entry  Heart - normal rate, regular rhythm, normal S1, S2, no murmurs, rubs, clicks or gallops  Extremities - no pedal edema noted      Assessment/ Plan:     Follow-up and Dispositions    · Return in about 3 weeks (around 4/8/2021) for bp w/ me. Next visit: repeat   1. Closed fracture of one rib of left side, initial encounter    - ibuprofen (MOTRIN) 800 mg tablet; Take 1 Tab by mouth every eight (8) hours as needed for Pain. With food  Dispense: 30 Tab; Refill: 0    2. Essential hypertension  med increase  - AMB POC URINALYSIS DIP STICK AUTO W/O MICRO  - losartan (COZAAR) 100 mg tablet; Take 1 Tab by mouth daily. For blood pressure  Dispense: 90 Tab; Refill: 0    3. Type 2 diabetes mellitus with hyperglycemia, without long-term current use of insulin (HCC)  Discussed diet  - AMB POC GLUCOSE BLOOD, BY GLUCOSE MONITORING DEVICE  - AMB POC URINE, MICROALBUMIN, SEMIQUANT (3 RESULTS)  - metFORMIN (GLUCOPHAGE) 1,000 mg tablet; Take 1 Tab by mouth two (2) times daily (with meals). For sugar  Dispense: 180 Tab; Refill: 0  - REFERRAL TO OPTOMETRY    4. Positive colorectal cancer screening using Cologuard test  Gave him # again for GI discussed importance of follow up on this    5. Hematuria, unspecified type  Repeat at next visit  - CULTURE, URINE; Future        I have discussed the diagnosis with the patient and the intended plan as seen in the above orders. The patient has received an after-visit summary and questions were answered concerning future plans. Pt conveyed understanding of plan.       Medication Side Effects and Warnings were discussed with patient: yes  Patient Labs were reviewed: yes  Patient Past Records were reviewed:  yes    Jose M Cuba.  Poonam Love NP

## 2021-03-18 NOTE — PROGRESS NOTES
Chief Complaint   Patient presents with    Follow-up     3 week BP       1. Have you been to the ER, urgent care clinic since your last visit? Hospitalized since your last visit? No    2. Have you seen or consulted any other health care providers outside of the [address] [place] [address] since your last visit? Include any pap smears or colon screening.  No

## 2021-03-20 LAB
BACTERIA SPEC CULT: NORMAL
SERVICE CMNT-IMP: NORMAL

## 2021-04-20 ENCOUNTER — TELEPHONE (OUTPATIENT)
Dept: INTERNAL MEDICINE CLINIC | Age: 66
End: 2021-04-20

## 2021-04-20 ENCOUNTER — OFFICE VISIT (OUTPATIENT)
Dept: INTERNAL MEDICINE CLINIC | Age: 66
End: 2021-04-20
Payer: MEDICARE

## 2021-04-20 VITALS
TEMPERATURE: 98.7 F | HEART RATE: 93 BPM | BODY MASS INDEX: 23.49 KG/M2 | DIASTOLIC BLOOD PRESSURE: 92 MMHG | OXYGEN SATURATION: 99 % | HEIGHT: 68 IN | SYSTOLIC BLOOD PRESSURE: 176 MMHG | WEIGHT: 155 LBS | RESPIRATION RATE: 18 BRPM

## 2021-04-20 DIAGNOSIS — R35.1 NOCTURIA: ICD-10-CM

## 2021-04-20 DIAGNOSIS — E11.65 TYPE 2 DIABETES MELLITUS WITH HYPERGLYCEMIA, WITHOUT LONG-TERM CURRENT USE OF INSULIN (HCC): ICD-10-CM

## 2021-04-20 DIAGNOSIS — Z11.59 SCREENING FOR VIRAL DISEASE: ICD-10-CM

## 2021-04-20 DIAGNOSIS — I10 ESSENTIAL HYPERTENSION: ICD-10-CM

## 2021-04-20 DIAGNOSIS — Z00.00 MEDICARE ANNUAL WELLNESS VISIT, SUBSEQUENT: Primary | ICD-10-CM

## 2021-04-20 DIAGNOSIS — F17.200 SMOKER: ICD-10-CM

## 2021-04-20 DIAGNOSIS — E78.2 MIXED HYPERLIPIDEMIA: ICD-10-CM

## 2021-04-20 LAB
CHOLEST SERPL-MCNC: 216 MG/DL
GLUCOSE POC: 154 MG/DL
HBA1C MFR BLD HPLC: 7.4 %
HDLC SERPL-MCNC: 44 MG/DL
LDL CHOLESTEROL POC: 143 MG/DL
NON-HDL GOAL (POC): 172
TCHOL/HDL RATIO (POC): 3.3
TRIGL SERPL-MCNC: 149 MG/DL

## 2021-04-20 PROCEDURE — 83036 HEMOGLOBIN GLYCOSYLATED A1C: CPT | Performed by: NURSE PRACTITIONER

## 2021-04-20 PROCEDURE — G0439 PPPS, SUBSEQ VISIT: HCPCS | Performed by: NURSE PRACTITIONER

## 2021-04-20 PROCEDURE — 80061 LIPID PANEL: CPT | Performed by: NURSE PRACTITIONER

## 2021-04-20 PROCEDURE — 99214 OFFICE O/P EST MOD 30 MIN: CPT | Performed by: NURSE PRACTITIONER

## 2021-04-20 PROCEDURE — G8753 SYS BP > OR = 140: HCPCS | Performed by: NURSE PRACTITIONER

## 2021-04-20 PROCEDURE — G8427 DOCREV CUR MEDS BY ELIG CLIN: HCPCS | Performed by: NURSE PRACTITIONER

## 2021-04-20 PROCEDURE — G8432 DEP SCR NOT DOC, RNG: HCPCS | Performed by: NURSE PRACTITIONER

## 2021-04-20 PROCEDURE — G8536 NO DOC ELDER MAL SCRN: HCPCS | Performed by: NURSE PRACTITIONER

## 2021-04-20 PROCEDURE — 82962 GLUCOSE BLOOD TEST: CPT | Performed by: NURSE PRACTITIONER

## 2021-04-20 PROCEDURE — G8420 CALC BMI NORM PARAMETERS: HCPCS | Performed by: NURSE PRACTITIONER

## 2021-04-20 PROCEDURE — 2022F DILAT RTA XM EVC RTNOPTHY: CPT | Performed by: NURSE PRACTITIONER

## 2021-04-20 PROCEDURE — 3017F COLORECTAL CA SCREEN DOC REV: CPT | Performed by: NURSE PRACTITIONER

## 2021-04-20 PROCEDURE — G8755 DIAS BP > OR = 90: HCPCS | Performed by: NURSE PRACTITIONER

## 2021-04-20 PROCEDURE — 1101F PT FALLS ASSESS-DOCD LE1/YR: CPT | Performed by: NURSE PRACTITIONER

## 2021-04-20 RX ORDER — ATORVASTATIN CALCIUM 40 MG/1
40 TABLET, FILM COATED ORAL
Qty: 90 TAB | Refills: 1 | Status: SHIPPED | OUTPATIENT
Start: 2021-04-20 | End: 2021-05-18

## 2021-04-20 NOTE — PROGRESS NOTES
Subjective: (As above and below)     Chief Complaint   Patient presents with   [facility]. is a 76y.o. year old male who presents for AWV and    Diabetic Review of Systems - medication compliance: compliant all of the time, diabetic diet compliance: noncompliant much of the time, home glucose monitoring: is not performed. He drinks sprite, fried chicken, hi-c  Due for eye exam    Hypertension ROS: he thinks he is only one BP meds  no medication side effects noted, no TIAs, no chest pain on exertion, no dyspnea on exertion, no swelling of ankles      BP Readings from Last 3 Encounters:   04/20/21 (!) 176/92   03/18/21 (!) 190/97   02/25/21 (!) 198/98       Smoker: continues, not ready to quit        Reviewed PmHx, RxHx, FmHx, SocHx, AllgHx and updated in chart. History reviewed. No pertinent family history. History reviewed. No pertinent past medical history. Social History     Socioeconomic History    Marital status:      Spouse name: Not on file    Number of children: Not on file    Years of education: Not on file    Highest education level: Not on file   Tobacco Use    Smoking status: Current Every Day Smoker     Packs/day: 0.50     Types: Cigarettes    Smokeless tobacco: Never Used   Substance and Sexual Activity    Alcohol use: Not Currently    Drug use: Never    Sexual activity: Not Currently     Partners: Female          Current Outpatient Medications   Medication Sig    ibuprofen (MOTRIN) 800 mg tablet Take 1 Tab by mouth every eight (8) hours as needed for Pain. With food    losartan (COZAAR) 100 mg tablet Take 1 Tab by mouth daily. For blood pressure    amLODIPine (NORVASC) 10 mg tablet Take 1 Tab by mouth daily. For blood pressure    metFORMIN (GLUCOPHAGE) 1,000 mg tablet Take 1 Tab by mouth two (2) times daily (with meals). For sugar     No current facility-administered medications for this visit.         Review of Systems:   Constitutional:    Negative for fever and chills, negative diaphoresis. HEENT:              Negative for neck pain and stiffness. Eyes:                  Negative for visual disturbance, itching, redness or discharge. Respiratory:        Negative for cough and shortness of breath. Cardiovascular:  Negative for chest pain and palpitations. Gastrointestinal: Negative for nausea, vomiting, abdominal pain, diarrhea or constipation. Genitourinary:     Negative for dysuria and frequency. Musculoskeletal: Negative for falls, tenderness and swelling. Skin:                    Negative for rash, masses or lesions. Neurological:       Negative for dizzyness, seizure, loss of consciousness, weakness and numbness. Objective:     Vitals:    04/20/21 1025   BP: (!) 176/92   Pulse: 93   Resp: 18   Temp: 98.7 °F (37.1 °C)   TempSrc: Temporal   SpO2: 99%   Weight: 155 lb (70.3 kg)   Height: 5' 8\" (1.727 m)       Results for orders placed or performed in visit on 04/20/21   AMB POC GLUCOSE BLOOD, BY GLUCOSE MONITORING DEVICE   Result Value Ref Range    Glucose  MG/DL   AMB POC HEMOGLOBIN A1C   Result Value Ref Range    Hemoglobin A1c (POC) 7.4 %   AMB POC LIPID PROFILE   Result Value Ref Range    Cholesterol (POC) 216     Triglycerides (POC) 149     HDL Cholesterol (POC) 44     LDL Cholesterol (POC) 143 MG/DL    Non-HDL Goal (POC) 172     TChol/HDL Ratio (POC) 3.3        Gen: Oriented to person, place and time and well-developed, well-nourished and in no distress. HEENT:    Head: normocephalic and atraumatic. Eyes:  EOM are normal. Pupils equal and round. Neck:  Normal range of motion. Neck supple. Cardiovascular: normal rate, regular rhythm and normal heart sounds. Pulmonary/Chest:  Effort normal and breath sounds normal.  No respiratory distress. No wheezes, no rales. Abdominal: soft, normal  bowel sounds. Musculoskeletal:  No edema, no tenderness. No calf tenderness or edema.    Neurological:  Alert, oriented to person, place and time. Skin: skin is warm and dry. Assessment/ Plan:       1. Medicare annual wellness visit, subsequent      2. Type 2 diabetes mellitus with hyperglycemia, without long-term current use of insulin ([facility] [address]  Improving! Discussed simple diet changes to work on  [employer] statin  - AMB POC GLUCOSE BLOOD, BY GLUCOSE MONITORING DEVICE  - AMB POC HEMOGLOBIN A1C  - AMB POC LIPID PROFILE  -  DIABETES FOOT EXAM  - REFERRAL TO OPTOMETRY  - METABOLIC PANEL, BASIC; Future    3. Essential hypertension  Uncontrolled, he does not know if he has all meds  - METABOLIC PANEL, BASIC; Future    4. Smoker  Discussed importance of quitting    5. Screening for viral disease    - HEPATITIS C QT BY PCR WITH REFLEX GENOTYPE; Future    6. Nocturia    - PSA W/ REFLX FREE PSA; Future        I have discussed the diagnosis with the patient and the intended plan as seen in the above orders. The patient has received an after-visit summary and questions were answered concerning future plans. Pt conveyed understanding of plan. Medication Side Effects and Warnings were discussed with patient: yes  Patient Labs were reviewed: yes  Patient Past Records were reviewed:  yes    Ananya Ingram. Corinne Rollins NP     This is the Subsequent Medicare Annual Wellness Exam, performed 12 months or more after the Initial AWV or the last Subsequent AWV    I have reviewed the patient's medical history in detail and updated the computerized patient record. Assessment/Plan   Education and counseling provided:  Are appropriate based on today's review and evaluation    1. Medicare annual wellness visit, subsequent  2. Type 2 diabetes mellitus with hyperglycemia, without long-term current use of insulin ([facility])  -     AMB POC GLUCOSE BLOOD, BY GLUCOSE MONITORING DEVICE  -     AMB POC HEMOGLOBIN A1C  -     AMB POC LIPID PROFILE  -      DIABETES FOOT EXAM  3. Essential hypertension  4.  Smoker       Depression Risk Factor Screening     3 most recent PHQ Screens 2/25/2021   Little interest or pleasure in doing things Several days   Feeling down, depressed, irritable, or hopeless Several days   Total Score PHQ 2 2       Alcohol Risk Screen    Do you average more than 1 drink per night or more than 7 drinks a week: No    In the past three months have you have had more than 4 drinks containing alcohol on one occasion: No        Functional Ability and Level of Safety    Hearing: Hearing is good. Activities of Daily Living: The home contains: no safety equipment. Patient does total self care      Ambulation: with no difficulty     Fall Risk:  Fall Risk Assessment, last 12 mths 2/25/2021   Able to walk? Yes   Fall in past 12 months? 1   Do you feel unsteady? 1   Are you worried about falling 1   Is the gait abnormal? 1   Number of falls in past 12 months 2   Fall with injury? 1      Abuse Screen:  Patient is not abused       Cognitive Screening    Has your family/caregiver stated any concerns about your memory: no     Cognitive Screening: Normal - based on H&P    Health Maintenance Due     Health Maintenance Due   Topic Date Due    Hepatitis C Screening  Never done    Eye Exam Retinal or Dilated  Never done    Lipid Screen  Never done    COVID-19 Vaccine (1) Never done    DTaP/Tdap/Td series (1 - Tdap) Never done    Shingrix Vaccine Age 50> (1 of 2) Never done    Medicare Yearly Exam  Never done       Patient Care Team   Patient Care Team:  Latosha Dee, NP as PCP - General (Nurse Practitioner)  Latosha Dee NP as PCP - [facility] Empaneled Provider    History     Patient Active Problem List   Diagnosis Code    Type 2 diabetes mellitus, without long-term current use of insulin ([facility] [address] E11.9    Essential hypertension I10    Positive colorectal cancer screening using Cologuard test R19.5     History reviewed. No pertinent past medical history. History reviewed. No pertinent surgical history.   Current Outpatient Medications Medication Sig Dispense Refill    ibuprofen (MOTRIN) 800 mg tablet Take 1 Tab by mouth every eight (8) hours as needed for Pain. With food 30 Tab 0    losartan (COZAAR) 100 mg tablet Take 1 Tab by mouth daily. For blood pressure 90 Tab 0    amLODIPine (NORVASC) 10 mg tablet Take 1 Tab by mouth daily. For blood pressure 90 Tab 0    metFORMIN (GLUCOPHAGE) 1,000 mg tablet Take 1 Tab by mouth two (2) times daily (with meals). For sugar 180 Tab 0     No Known Allergies    History reviewed. No pertinent family history. Social History     Tobacco Use    Smoking status: Current Every Day Smoker     Packs/day: 0.50     Types: Cigarettes    Smokeless tobacco: Never Used   Substance Use Topics    Alcohol use: Not Currently         Isatu Amin, NP

## 2021-04-20 NOTE — Clinical Note
Please call him to verify he has both amlodipine and losartan  Also after he left I saw his cholesterol which is quite high so a cholesterol med was also sent

## 2021-04-20 NOTE — PROGRESS NOTES
Chief Complaint   Patient presents with    Follow-up       1. Have you been to the ER, urgent care clinic since your last visit? Hospitalized since your last visit? No    2. Have you seen or consulted any other health care providers outside of the [address] [place] [address] since your last visit? Include any pap smears or colon screening.  No

## 2021-04-21 NOTE — TELEPHONE ENCOUNTER
Jacqueline Rahman., NP/telephone  Received:  Today  Message Contents   Coco Campa, Clair [facility] Front [facility]             General Message/Vendor Calls     Caller's first and last name:       Reason for call: Requested a call back       Callback required yes/no and why: Yes       Best contact number(s): [phone]       Details to clarify the request: Patient stated he was told by the nurse to call back with the name of the medication he's taken Mariano and Potassium, Losartan, Besylate.           Clair Campa

## 2021-04-21 NOTE — TELEPHONE ENCOUNTER
Pt states he found medications and he does have right meds but he is in bed and unable to read them off to me at this time. Pt states he will call back when he has assistance.

## 2021-04-23 RX ORDER — HYDROCHLOROTHIAZIDE 12.5 MG/1
12.5 TABLET ORAL DAILY
Qty: 90 TAB | Refills: 0 | Status: SHIPPED | OUTPATIENT
Start: 2021-04-23 | End: 2021-06-02

## 2021-05-11 ENCOUNTER — CLINICAL SUPPORT (OUTPATIENT)
Dept: INTERNAL MEDICINE CLINIC | Age: 66
End: 2021-05-11

## 2021-05-11 VITALS
HEIGHT: 68 IN | OXYGEN SATURATION: 100 % | RESPIRATION RATE: 18 BRPM | WEIGHT: 151 LBS | DIASTOLIC BLOOD PRESSURE: 83 MMHG | HEART RATE: 79 BPM | SYSTOLIC BLOOD PRESSURE: 160 MMHG | TEMPERATURE: 98.3 F | BODY MASS INDEX: 22.88 KG/M2

## 2021-05-11 DIAGNOSIS — Z01.30 BP CHECK: Primary | ICD-10-CM

## 2021-05-11 NOTE — PROGRESS NOTES
Chief Complaint   Patient presents with    Blood Pressure Check       Visit Vitals  BP (!) 160/83 (BP 1 Location: Left arm, BP Patient Position: Sitting, BP Cuff Size: Adult)   Pulse 79   Temp 98.3 °F (36.8 °C) (Temporal)   Resp 18   Ht 5' 8\" (1.727 m)   Wt 151 lb (68.5 kg)   SpO2 100%   BMI 22.96 kg/m²       Provider notified. Pt was not taking Losartan, pt will start losartan in addition to amlodipine and hctz f/u in 4 weeks.

## 2021-05-13 ENCOUNTER — APPOINTMENT (OUTPATIENT)
Dept: CT IMAGING | Age: 66
DRG: 065 | End: 2021-05-13
Attending: NURSE PRACTITIONER
Payer: MEDICARE

## 2021-05-13 ENCOUNTER — TELEPHONE (OUTPATIENT)
Dept: INTERNAL MEDICINE CLINIC | Age: 66
End: 2021-05-13

## 2021-05-13 ENCOUNTER — HOSPITAL ENCOUNTER (INPATIENT)
Age: 66
LOS: 2 days | Discharge: NURSING, REHAB OR LONG-TERM CARE | DRG: 065 | End: 2021-05-18
Attending: EMERGENCY MEDICINE | Admitting: STUDENT IN AN ORGANIZED HEALTH CARE EDUCATION/TRAINING PROGRAM
Payer: MEDICARE

## 2021-05-13 DIAGNOSIS — I10 HYPERTENSION, UNSPECIFIED TYPE: ICD-10-CM

## 2021-05-13 DIAGNOSIS — R29.898 WEAKNESS OF RIGHT LOWER EXTREMITY: ICD-10-CM

## 2021-05-13 DIAGNOSIS — M54.16 LUMBAR RADICULOPATHY: Primary | ICD-10-CM

## 2021-05-13 PROBLEM — G45.9 TIA (TRANSIENT ISCHEMIC ATTACK): Status: ACTIVE | Noted: 2021-05-13

## 2021-05-13 LAB
ALBUMIN SERPL-MCNC: 3 G/DL (ref 3.5–5)
ALBUMIN/GLOB SERPL: 0.7 {RATIO} (ref 1.1–2.2)
ALP SERPL-CCNC: 110 U/L (ref 45–117)
ALT SERPL-CCNC: 20 U/L (ref 12–78)
ANION GAP SERPL CALC-SCNC: 7 MMOL/L (ref 5–15)
APPEARANCE UR: CLEAR
AST SERPL-CCNC: 18 U/L (ref 15–37)
BACTERIA URNS QL MICRO: NEGATIVE /HPF
BASOPHILS # BLD: 0.1 K/UL (ref 0–0.1)
BASOPHILS NFR BLD: 1 % (ref 0–1)
BILIRUB SERPL-MCNC: 0.4 MG/DL (ref 0.2–1)
BILIRUB UR QL: NEGATIVE
BUN SERPL-MCNC: 15 MG/DL (ref 6–20)
BUN/CREAT SERPL: 10 (ref 12–20)
CALCIUM SERPL-MCNC: 8.9 MG/DL (ref 8.5–10.1)
CHLORIDE SERPL-SCNC: 100 MMOL/L (ref 97–108)
CK SERPL-CCNC: 123 U/L (ref 39–308)
CO2 SERPL-SCNC: 32 MMOL/L (ref 21–32)
COLOR UR: ABNORMAL
CREAT SERPL-MCNC: 1.56 MG/DL (ref 0.7–1.3)
D DIMER PPP FEU-MCNC: 0.37 MG/L FEU (ref 0–0.65)
DIFFERENTIAL METHOD BLD: NORMAL
EOSINOPHIL # BLD: 0.1 K/UL (ref 0–0.4)
EOSINOPHIL NFR BLD: 2 % (ref 0–7)
EPITH CASTS URNS QL MICRO: ABNORMAL /LPF
ERYTHROCYTE [DISTWIDTH] IN BLOOD BY AUTOMATED COUNT: 12.5 % (ref 11.5–14.5)
EST. AVERAGE GLUCOSE BLD GHB EST-MCNC: 140 MG/DL
GLOBULIN SER CALC-MCNC: 4.3 G/DL (ref 2–4)
GLUCOSE BLD STRIP.AUTO-MCNC: 120 MG/DL (ref 65–117)
GLUCOSE BLD STRIP.AUTO-MCNC: 147 MG/DL (ref 65–117)
GLUCOSE SERPL-MCNC: 148 MG/DL (ref 65–100)
GLUCOSE UR STRIP.AUTO-MCNC: NEGATIVE MG/DL
HBA1C MFR BLD: 6.5 % (ref 4–5.6)
HCT VFR BLD AUTO: 39.8 % (ref 36.6–50.3)
HGB BLD-MCNC: 14.2 G/DL (ref 12.1–17)
HGB UR QL STRIP: ABNORMAL
IMM GRANULOCYTES # BLD AUTO: 0 K/UL (ref 0–0.04)
IMM GRANULOCYTES NFR BLD AUTO: 0 % (ref 0–0.5)
KETONES UR QL STRIP.AUTO: NEGATIVE MG/DL
LEUKOCYTE ESTERASE UR QL STRIP.AUTO: NEGATIVE
LYMPHOCYTES # BLD: 2.4 K/UL (ref 0.8–3.5)
LYMPHOCYTES NFR BLD: 35 % (ref 12–49)
MAGNESIUM SERPL-MCNC: 1.7 MG/DL (ref 1.6–2.4)
MCH RBC QN AUTO: 31.8 PG (ref 26–34)
MCHC RBC AUTO-ENTMCNC: 35.7 G/DL (ref 30–36.5)
MCV RBC AUTO: 89 FL (ref 80–99)
MONOCYTES # BLD: 0.5 K/UL (ref 0–1)
MONOCYTES NFR BLD: 7 % (ref 5–13)
NEUTS SEG # BLD: 3.8 K/UL (ref 1.8–8)
NEUTS SEG NFR BLD: 55 % (ref 32–75)
NITRITE UR QL STRIP.AUTO: NEGATIVE
NRBC # BLD: 0 K/UL (ref 0–0.01)
NRBC BLD-RTO: 0 PER 100 WBC
PH UR STRIP: 5.5 [PH] (ref 5–8)
PLATELET # BLD AUTO: 300 K/UL (ref 150–400)
PMV BLD AUTO: 10.6 FL (ref 8.9–12.9)
POTASSIUM SERPL-SCNC: 3 MMOL/L (ref 3.5–5.1)
PROT SERPL-MCNC: 7.3 G/DL (ref 6.4–8.2)
PROT UR STRIP-MCNC: 100 MG/DL
RBC # BLD AUTO: 4.47 M/UL (ref 4.1–5.7)
RBC #/AREA URNS HPF: ABNORMAL /HPF (ref 0–5)
SERVICE CMNT-IMP: ABNORMAL
SERVICE CMNT-IMP: ABNORMAL
SODIUM SERPL-SCNC: 139 MMOL/L (ref 136–145)
SP GR UR REFRACTOMETRY: 1.01 (ref 1–1.03)
TROPONIN I SERPL-MCNC: <0.05 NG/ML
TROPONIN I SERPL-MCNC: <0.05 NG/ML
TSH SERPL DL<=0.05 MIU/L-ACNC: 1.5 UIU/ML (ref 0.36–3.74)
UA: UC IF INDICATED,UAUC: ABNORMAL
UROBILINOGEN UR QL STRIP.AUTO: 1 EU/DL (ref 0.2–1)
WBC # BLD AUTO: 6.9 K/UL (ref 4.1–11.1)
WBC URNS QL MICRO: ABNORMAL /HPF (ref 0–4)

## 2021-05-13 PROCEDURE — 70450 CT HEAD/BRAIN W/O DYE: CPT

## 2021-05-13 PROCEDURE — 74011250636 HC RX REV CODE- 250/636: Performed by: STUDENT IN AN ORGANIZED HEALTH CARE EDUCATION/TRAINING PROGRAM

## 2021-05-13 PROCEDURE — 81001 URINALYSIS AUTO W/SCOPE: CPT

## 2021-05-13 PROCEDURE — 84443 ASSAY THYROID STIM HORMONE: CPT

## 2021-05-13 PROCEDURE — 82962 GLUCOSE BLOOD TEST: CPT

## 2021-05-13 PROCEDURE — 72131 CT LUMBAR SPINE W/O DYE: CPT

## 2021-05-13 PROCEDURE — 99218 HC RM OBSERVATION: CPT

## 2021-05-13 PROCEDURE — 82746 ASSAY OF FOLIC ACID SERUM: CPT

## 2021-05-13 PROCEDURE — 74011250637 HC RX REV CODE- 250/637: Performed by: NURSE PRACTITIONER

## 2021-05-13 PROCEDURE — 74011250637 HC RX REV CODE- 250/637: Performed by: INTERNAL MEDICINE

## 2021-05-13 PROCEDURE — 85025 COMPLETE CBC W/AUTO DIFF WBC: CPT

## 2021-05-13 PROCEDURE — 93005 ELECTROCARDIOGRAM TRACING: CPT

## 2021-05-13 PROCEDURE — 74011250637 HC RX REV CODE- 250/637: Performed by: STUDENT IN AN ORGANIZED HEALTH CARE EDUCATION/TRAINING PROGRAM

## 2021-05-13 PROCEDURE — 36415 COLL VENOUS BLD VENIPUNCTURE: CPT

## 2021-05-13 PROCEDURE — 99285 EMERGENCY DEPT VISIT HI MDM: CPT

## 2021-05-13 PROCEDURE — 85379 FIBRIN DEGRADATION QUANT: CPT

## 2021-05-13 PROCEDURE — 82607 VITAMIN B-12: CPT

## 2021-05-13 PROCEDURE — 83735 ASSAY OF MAGNESIUM: CPT

## 2021-05-13 PROCEDURE — 83036 HEMOGLOBIN GLYCOSYLATED A1C: CPT

## 2021-05-13 PROCEDURE — 84484 ASSAY OF TROPONIN QUANT: CPT

## 2021-05-13 PROCEDURE — 82550 ASSAY OF CK (CPK): CPT

## 2021-05-13 PROCEDURE — 80053 COMPREHEN METABOLIC PANEL: CPT

## 2021-05-13 RX ORDER — SODIUM CHLORIDE 0.9 % (FLUSH) 0.9 %
5-40 SYRINGE (ML) INJECTION AS NEEDED
Status: DISCONTINUED | OUTPATIENT
Start: 2021-05-13 | End: 2021-05-18 | Stop reason: HOSPADM

## 2021-05-13 RX ORDER — ACETAMINOPHEN 325 MG/1
650 TABLET ORAL
Status: DISCONTINUED | OUTPATIENT
Start: 2021-05-13 | End: 2021-05-18 | Stop reason: HOSPADM

## 2021-05-13 RX ORDER — ACETAMINOPHEN 650 MG/1
650 SUPPOSITORY RECTAL
Status: DISCONTINUED | OUTPATIENT
Start: 2021-05-13 | End: 2021-05-18 | Stop reason: HOSPADM

## 2021-05-13 RX ORDER — POTASSIUM CHLORIDE 750 MG/1
40 TABLET, FILM COATED, EXTENDED RELEASE ORAL
Status: COMPLETED | OUTPATIENT
Start: 2021-05-13 | End: 2021-05-13

## 2021-05-13 RX ORDER — GUAIFENESIN 100 MG/5ML
81 LIQUID (ML) ORAL DAILY
Status: DISCONTINUED | OUTPATIENT
Start: 2021-05-14 | End: 2021-05-13

## 2021-05-13 RX ORDER — SODIUM CHLORIDE 9 MG/ML
75 INJECTION, SOLUTION INTRAVENOUS CONTINUOUS
Status: DISPENSED | OUTPATIENT
Start: 2021-05-13 | End: 2021-05-14

## 2021-05-13 RX ORDER — GUAIFENESIN 100 MG/5ML
162 LIQUID (ML) ORAL
Status: COMPLETED | OUTPATIENT
Start: 2021-05-13 | End: 2021-05-13

## 2021-05-13 RX ORDER — DEXTROSE 50 % IN WATER (D50W) INTRAVENOUS SYRINGE
25-50 AS NEEDED
Status: DISCONTINUED | OUTPATIENT
Start: 2021-05-13 | End: 2021-05-18 | Stop reason: HOSPADM

## 2021-05-13 RX ORDER — ENOXAPARIN SODIUM 100 MG/ML
40 INJECTION SUBCUTANEOUS DAILY
Status: DISCONTINUED | OUTPATIENT
Start: 2021-05-14 | End: 2021-05-18 | Stop reason: HOSPADM

## 2021-05-13 RX ORDER — ACETAMINOPHEN 650 MG/1
650 SUPPOSITORY RECTAL
Status: DISCONTINUED | OUTPATIENT
Start: 2021-05-13 | End: 2021-05-13

## 2021-05-13 RX ORDER — INSULIN LISPRO 100 [IU]/ML
1-10 INJECTION, SOLUTION INTRAVENOUS; SUBCUTANEOUS
Status: DISCONTINUED | OUTPATIENT
Start: 2021-05-13 | End: 2021-05-18 | Stop reason: HOSPADM

## 2021-05-13 RX ORDER — PROMETHAZINE HYDROCHLORIDE 25 MG/1
12.5 TABLET ORAL
Status: DISCONTINUED | OUTPATIENT
Start: 2021-05-13 | End: 2021-05-18 | Stop reason: HOSPADM

## 2021-05-13 RX ORDER — MAGNESIUM SULFATE 100 %
4 CRYSTALS MISCELLANEOUS AS NEEDED
Status: DISCONTINUED | OUTPATIENT
Start: 2021-05-13 | End: 2021-05-18 | Stop reason: HOSPADM

## 2021-05-13 RX ORDER — ONDANSETRON 2 MG/ML
4 INJECTION INTRAMUSCULAR; INTRAVENOUS
Status: DISCONTINUED | OUTPATIENT
Start: 2021-05-13 | End: 2021-05-18 | Stop reason: HOSPADM

## 2021-05-13 RX ORDER — GUAIFENESIN 100 MG/5ML
81 LIQUID (ML) ORAL DAILY
Status: DISCONTINUED | OUTPATIENT
Start: 2021-05-13 | End: 2021-05-13

## 2021-05-13 RX ORDER — POLYETHYLENE GLYCOL 3350 17 G/17G
17 POWDER, FOR SOLUTION ORAL DAILY PRN
Status: DISCONTINUED | OUTPATIENT
Start: 2021-05-13 | End: 2021-05-18 | Stop reason: HOSPADM

## 2021-05-13 RX ORDER — ATORVASTATIN CALCIUM 40 MG/1
40 TABLET, FILM COATED ORAL
Status: DISCONTINUED | OUTPATIENT
Start: 2021-05-13 | End: 2021-05-14

## 2021-05-13 RX ORDER — SODIUM CHLORIDE 0.9 % (FLUSH) 0.9 %
5-40 SYRINGE (ML) INJECTION EVERY 8 HOURS
Status: DISCONTINUED | OUTPATIENT
Start: 2021-05-13 | End: 2021-05-18 | Stop reason: HOSPADM

## 2021-05-13 RX ORDER — ENOXAPARIN SODIUM 100 MG/ML
40 INJECTION SUBCUTANEOUS EVERY 24 HOURS
Status: DISCONTINUED | OUTPATIENT
Start: 2021-05-13 | End: 2021-05-13

## 2021-05-13 RX ORDER — GUAIFENESIN 100 MG/5ML
81 LIQUID (ML) ORAL DAILY
Status: DISCONTINUED | OUTPATIENT
Start: 2021-05-14 | End: 2021-05-18 | Stop reason: HOSPADM

## 2021-05-13 RX ORDER — ACETAMINOPHEN 325 MG/1
650 TABLET ORAL
Status: DISCONTINUED | OUTPATIENT
Start: 2021-05-13 | End: 2021-05-13

## 2021-05-13 RX ADMIN — ACETAMINOPHEN 650 MG: 325 TABLET ORAL at 21:41

## 2021-05-13 RX ADMIN — Medication 10 ML: at 21:42

## 2021-05-13 RX ADMIN — POTASSIUM CHLORIDE 40 MEQ: 750 TABLET, EXTENDED RELEASE ORAL at 17:24

## 2021-05-13 RX ADMIN — ASPIRIN 81 MG CHEWABLE TABLET 162 MG: 81 TABLET CHEWABLE at 20:16

## 2021-05-13 RX ADMIN — SODIUM CHLORIDE 75 ML/HR: 9 INJECTION, SOLUTION INTRAVENOUS at 19:42

## 2021-05-13 RX ADMIN — Medication 10 ML: at 19:42

## 2021-05-13 RX ADMIN — ATORVASTATIN CALCIUM 40 MG: 40 TABLET, FILM COATED ORAL at 21:41

## 2021-05-13 NOTE — TELEPHONE ENCOUNTER
Pt wants a return call . He is having trouble with his bowels, right hand weakness and back pain.   Pt # [phone]

## 2021-05-13 NOTE — ED NOTES
Attempted to call report on pt.  Informed that accepting RN is in another pt room and will call back

## 2021-05-13 NOTE — TELEPHONE ENCOUNTER
Informed pt of provider's response, pt understood.  Pt will be going to [facility]

## 2021-05-13 NOTE — ED TRIAGE NOTES
C\o increased balance issues with difficulty ambulating since Wednesday with increased back pain. Pt reports right hand numbness as well. Pt has equal  and a steady gait.

## 2021-05-13 NOTE — ED PROVIDER NOTES
EMERGENCY DEPARTMENT HISTORY AND PHYSICAL EXAM      Date: 5/13/2021  Patient Name: Cesar Galdamez. History of Presenting Illness     Chief Complaint   Patient presents with    Back Pain    Hand Pain       History Provided By: Patient      Additional History (Context): Cesar Rivera is a 76 y.o. male with HTN, DM, hypercholesterolemia. who presents with back and hand pain. Onset yesterday morning. Patient states he noticed numbness and tingling in his right hand in addition to bilateral feet. Patient reports a heavy sensation to right upper and right lower extremity. Patient also reports symptoms associated with feeling off balance. Reports lower back pain with radiation into right hip and buttocks. Denies bowel or urinary incontinence. Denies history of fall, back injury, arthritis, spinal stenosis or herniated disc. Reports symptoms worsen and informed by family member today to come for evaluation. Symptoms have now been present greater than 24 hours. Denies history of stroke. Denies slurred speech, facial changes, headache, nausea, vomiting, chest pain.     PCP: Gibson Ganser., MIKAELA    Current Facility-Administered Medications   Medication Dose Route Frequency Provider Last Rate Last Admin    sodium chloride (NS) flush 5-40 mL  5-40 mL IntraVENous Q8H Mohamud Little MD   10 mL at 05/13/21 1942    sodium chloride (NS) flush 5-40 mL  5-40 mL IntraVENous PRN Adelia Bailey MD        polyethylene glycol (MIRALAX) packet 17 g  17 g Oral DAILY PRN Adelia Bailey MD        promethazine (PHENERGAN) tablet 12.5 mg  12.5 mg Oral Q6H PRN Adelia Bailey MD        Or    [facility] 4 mg  4 mg IntraVENous Q6H PRN Adelia Bailey MD        [START ON 5/14/2021] enoxaparin (LOVENOX) injection 40 mg  40 mg SubCUTAneous DAILY Adelia Bailey MD        acetaminophen (TYLENOL) tablet 650 mg  650 mg Oral Q4H PRN Adelia Bailey MD        Or    acetaminophen (TYLENOL) solution 650 mg  650 mg Per NG tube Q4H PRN Washington Blake MD        Or   [employer] acetaminophen (TYLENOL) suppository 650 mg  650 mg Rectal Q4H PRN Washington Blake MD        0.9% sodium chloride infusion  75 mL/hr IntraVENous CONTINUOUS Washington Blake MD 75 mL/hr at 05/13/21 1942 75 mL/hr at 05/13/21 1942    glucose chewable tablet 16 g  4 Tab Oral PRN Washington Blake MD        dextrose (D50W) injection syrg 12.5-25 g  25-50 mL IntraVENous PRN Washington Blake MD        glucagon (GLUCAGEN) injection 1 mg  1 mg IntraMUSCular PRN Washington Blake MD        insulin lispro (HUMALOG) injection 1-10 Units  1-10 Units SubCUTAneous AC&HS Washington Blake MD        atorvastatin (LIPITOR) tablet 40 mg  40 mg Oral QHS Washington Blake MD        aspirin chewable tablet 162 mg  162 mg Oral NOW Skyla Robledo MD        [START ON 5/14/2021] aspirin chewable tablet 81 mg  81 mg Oral DAILY Skyla Robledo MD           Past History     Past Medical History:  Past Medical History:   Diagnosis Date    Diabetes ([address]     Fractured rib 03/2021    from a fall per pt on 5/11/2021    High cholesterol     Hypertension     per pt on 5/11/2021       Past Surgical History:  Past Surgical History:   Procedure Laterality Date    HX ENDOSCOPY      per pt on 5/11/2021       Family History:  History reviewed. No pertinent family history. Social History:  Social History     Tobacco Use    Smoking status: Current Every Day Smoker     Packs/day: 0.50     Types: Cigarettes    Smokeless tobacco: Never Used   Substance Use Topics    Alcohol use: Yes     Alcohol/week: 1.0 standard drinks     Types: 1 Cans of beer per week     Comment: rarely    Drug use: Not Currently       Allergies:  No Known Allergies      Review of Systems   Review of Systems   Constitutional: Negative for chills and fever. HENT: Negative for congestion, rhinorrhea and sore throat. Respiratory: Negative for cough and shortness of breath.     Cardiovascular: Negative for chest pain and leg swelling. Gastrointestinal: Negative for abdominal pain, constipation, diarrhea, nausea and vomiting. Genitourinary: Negative for dysuria, frequency and urgency. Musculoskeletal: Positive for arthralgias and back pain. Negative for gait problem, joint swelling and neck pain. Skin: Negative for wound. Neurological: Positive for dizziness, weakness and numbness. Negative for tremors, syncope, facial asymmetry and headaches. All other systems reviewed and are negative. Physical Exam     Vitals:    05/13/21 1730 05/13/21 1800 05/13/21 1846 05/13/21 1936   BP: (!) 135/59 (!) 147/66 139/64 130/65   Pulse: 67 65 70 66   Resp: 14 15 16 16   Temp:   98.2 °F (36.8 °C) 98.3 °F (36.8 °C)   SpO2: 100% 97% 100% 97%   Weight:       Height:         Physical Exam  Vitals signs and nursing note reviewed. Constitutional:       General: He is not in acute distress. Appearance: He is well-developed. He is not ill-appearing. HENT:      Head: Normocephalic and atraumatic. Right Ear: Tympanic membrane, ear canal and external ear normal.      Left Ear: Tympanic membrane, ear canal and external ear normal.      Nose: Nose normal.      Mouth/Throat:      Mouth: Mucous membranes are moist.      Pharynx: Oropharynx is clear. No oropharyngeal exudate or posterior oropharyngeal erythema. Eyes:      Extraocular Movements: Extraocular movements intact. Conjunctiva/sclera: Conjunctivae normal.      Pupils: Pupils are equal, round, and reactive to light. Neck:      Musculoskeletal: Normal range of motion and neck supple. Cardiovascular:      Rate and Rhythm: Normal rate and regular rhythm. Pulses: Normal pulses. Heart sounds: Normal heart sounds. Pulmonary:      Effort: Pulmonary effort is normal.      Breath sounds: Normal breath sounds. Abdominal:      General: Bowel sounds are normal. There is no distension. Palpations: Abdomen is soft.       Tenderness: There is no abdominal tenderness. There is no guarding or rebound. Musculoskeletal:      Right wrist: Normal.      Left wrist: Normal.      Cervical back: Normal.      Thoracic back: Normal.      Lumbar back: He exhibits decreased range of motion, tenderness and pain. He exhibits no swelling and no deformity. Back:       Right hand: Normal.      Left hand: Normal.   Lymphadenopathy:      Cervical: No cervical adenopathy. Skin:     General: Skin is warm and dry. Neurological:      Mental Status: He is alert and oriented to person, place, and time. GCS: GCS eye subscore is 4. GCS verbal subscore is 5. GCS motor subscore is 6. Cranial Nerves: Cranial nerves are intact. No cranial nerve deficit. Sensory: Sensation is intact. Motor: Pronator drift (RUE and RLE ) present. No tremor, atrophy, abnormal muscle tone or seizure activity. Coordination: Coordination is intact. Gait: Gait is intact. Psychiatric:         Thought Content:  Thought content normal.           Diagnostic Study Results     Labs -     Recent Results (from the past 12 hour(s))   EKG, 12 LEAD, INITIAL    Collection Time: 05/13/21  3:07 PM   Result Value Ref Range    Ventricular Rate 72 BPM    Atrial Rate 72 BPM    P-R Interval 170 ms    QRS Duration 82 ms    Q-T Interval 378 ms    QTC Calculation (Bezet) 413 ms    Calculated P Axis 70 degrees    Calculated R Axis 65 degrees    Calculated T Axis -152 degrees    Diagnosis       Normal sinus rhythm  Left ventricular hypertrophy with repolarization abnormality  Abnormal ECG  When compared with ECG of 13-FEB-2021 16:08,  premature atrial complexes are no longer present     GLUCOSE, POC    Collection Time: 05/13/21  3:09 PM   Result Value Ref Range    Glucose (POC) 147 (H) 65 - 117 mg/dL    Performed by [facility] EDT    METABOLIC PANEL, COMPREHENSIVE    Collection Time: 05/13/21  3:13 PM   Result Value Ref Range    Sodium 139 136 - 145 mmol/L    Potassium 3.0 (L) 3.5 - 5.1 mmol/L    Chloride 100 97 - 108 mmol/L    CO2 32 21 - 32 mmol/L    Anion gap 7 5 - 15 mmol/L    Glucose 148 (H) 65 - 100 mg/dL    BUN 15 6 - 20 MG/DL    Creatinine 1.56 (H) 0.70 - 1.30 MG/DL    BUN/Creatinine ratio 10 (L) 12 - 20      GFR est AA 54 (L) >60 ml/min/1.73m2    GFR est non-AA 45 (L) >60 ml/min/1.73m2    Calcium 8.9 8.5 - 10.1 MG/DL    Bilirubin, total 0.4 0.2 - 1.0 MG/DL    ALT (SGPT) 20 12 - 78 U/L    AST (SGOT) 18 15 - 37 U/L    Alk. phosphatase 110 45 - 117 U/L    Protein, total 7.3 6.4 - 8.2 g/dL    Albumin 3.0 (L) 3.5 - 5.0 g/dL    Globulin 4.3 (H) 2.0 - 4.0 g/dL    A-G Ratio 0.7 (L) 1.1 - 2.2     CBC WITH AUTOMATED DIFF    Collection Time: 05/13/21  3:13 PM   Result Value Ref Range    WBC 6.9 4.1 - 11.1 K/uL    RBC 4.47 4.10 - 5.70 M/uL    HGB 14.2 12.1 - 17.0 g/dL    HCT 39.8 36.6 - 50.3 %    MCV 89.0 80.0 - 99.0 FL    MCH 31.8 26.0 - 34.0 PG    MCHC 35.7 30.0 - 36.5 g/dL    RDW 12.5 11.5 - 14.5 %    PLATELET 091 284 - 246 K/uL    MPV 10.6 8.9 - 12.9 FL    NRBC 0.0 0  WBC    ABSOLUTE NRBC 0.00 0.00 - 0.01 K/uL    NEUTROPHILS 55 32 - 75 %    LYMPHOCYTES 35 12 - 49 %    MONOCYTES 7 5 - 13 %    EOSINOPHILS 2 0 - 7 %    BASOPHILS 1 0 - 1 %    IMMATURE GRANULOCYTES 0 0.0 - 0.5 %    ABS. NEUTROPHILS 3.8 1.8 - 8.0 K/UL    ABS. LYMPHOCYTES 2.4 0.8 - 3.5 K/UL    ABS. MONOCYTES 0.5 0.0 - 1.0 K/UL    ABS. EOSINOPHILS 0.1 0.0 - 0.4 K/UL    ABS. BASOPHILS 0.1 0.0 - 0.1 K/UL    ABS. IMM.  GRANS. 0.0 0.00 - 0.04 K/UL    DF AUTOMATED     D DIMER    Collection Time: 05/13/21  3:13 PM   Result Value Ref Range    D-dimer 0.37 0.00 - 0.65 mg/L FEU   TROPONIN I    Collection Time: 05/13/21  3:13 PM   Result Value Ref Range    Troponin-I, Qt. <0.05 <0.05 ng/mL   CK W/ REFLX CKMB    Collection Time: 05/13/21  3:13 PM   Result Value Ref Range     39 - 308 U/L   MAGNESIUM    Collection Time: 05/13/21  3:13 PM   Result Value Ref Range    Magnesium 1.7 1.6 - 2.4 mg/dL   TSH 3RD GENERATION    Collection Time: 05/13/21  3:13 PM   Result Value Ref Range    TSH 1.50 0.36 - 3.74 uIU/mL   URINALYSIS W/ REFLEX CULTURE    Collection Time: 05/13/21  6:29 PM    Specimen: Urine   Result Value Ref Range    Color YELLOW/STRAW      Appearance CLEAR CLEAR      Specific gravity 1.015 1.003 - 1.030      pH (UA) 5.5 5.0 - 8.0      Protein 100 (A) NEG mg/dL    Glucose Negative NEG mg/dL    Ketone Negative NEG mg/dL    Bilirubin Negative NEG      Blood TRACE (A) NEG      Urobilinogen 1.0 0.2 - 1.0 EU/dL    Nitrites Negative NEG      Leukocyte Esterase Negative NEG      WBC 0-4 0 - 4 /hpf    RBC 0-5 0 - 5 /hpf    Epithelial cells FEW FEW /lpf    Bacteria Negative NEG /hpf    UA:UC IF INDICATED CULTURE NOT INDICATED BY UA RESULT CNI     EKG, 12 LEAD, SUBSEQUENT    Collection Time: 05/13/21  7:14 PM   Result Value Ref Range    Ventricular Rate 68 BPM    Atrial Rate 68 BPM    P-R Interval 166 ms    QRS Duration 84 ms    Q-T Interval 400 ms    QTC Calculation (Bezet) 425 ms    Calculated P Axis 53 degrees    Calculated R Axis 17 degrees    Calculated T Axis 148 degrees    Diagnosis       Normal sinus rhythm  Left ventricular hypertrophy with repolarization abnormality  Cannot rule out Inferior infarct , new  Anterior injury pattern  ** ACUTE MI **  When compared with ECG of 13-MAY-2021 15:07,  Acute Inferior infarct is now present     TROPONIN I    Collection Time: 05/13/21  7:16 PM   Result Value Ref Range    Troponin-I, Qt. <0.05 <0.05 ng/mL       Radiologic Studies -   CT SPINE LUMB WO CONT   Final Result   No significant findings. Disc bulges L3-S1 with disc space narrowing   L4-5. Multilevel facet arthropathy. CT HEAD WO CONT   Final Result   There are slight changes small vessel disease periventricular white matter which   are age-indeterminate on the left and could be further assessed with MR as is   clinically indicated. . No definite hemorrhage or mass is identified. Suresh Angry       MRI BRAIN WO CONT    (Results Pending)   MRI LUMB SPINE WO CONT    (Results Pending)     CT Results  (Last 48 hours)               05/13/21 1602  CT SPINE LUMB WO CONT Final result    Impression:  No significant findings. Disc bulges L3-S1 with disc space narrowing   L4-5. Multilevel facet arthropathy. Narrative:  EXAM: CT SPINE LUMB WO CONT       INDICATION: low back pain with neuropathy       COMPARISON: None. TECHNIQUE:   Unenhanced multislice helical CT of the lumbar spine was performed   in the axial plane. Coronal and sagittal reconstructions were obtained. CT   dose reduction was achieved through use of a standardized protocol tailored for   this examination and automatic exposure control for dose modulation. FINDINGS:               T12-L1: The spinal canal and neural foramina are widely patent. L1-L2: The spinal canal and neural foramina are widely patent. L2-L3: The spinal canal and neural foramina are widely patent. L3-L4: Mild diffuse disc bulge and mild ligamentum flavum hypertrophy. Right   greater than left facet arthropathy. Mild left foraminal narrowing. L4-L5: Mild diffuse disc bulge and disc space narrowing. L5-S1: Right facet arthropathy. Mild diffuse disc bulge. 05/13/21 1601  CT HEAD WO CONT Final result    Impression:  There are slight changes small vessel disease periventricular white matter which   are age-indeterminate on the left and could be further assessed with MR as is   clinically indicated. . No definite hemorrhage or mass is identified. .       Narrative:  EXAM: CT HEAD WO CONT       INDICATION: Extremity weakness and balance issues       COMPARISON: None. CONTRAST: None. TECHNIQUE: Unenhanced CT of the head was performed using 5 mm images. Brain and   bone windows were generated. Coronal and sagittal reformats.  CT dose reduction   was achieved through use of a standardized protocol tailored for this   examination and automatic exposure control for dose modulation. FINDINGS:   The ventricles and sulci are normal in size, shape and configuration. . There is   no significant white matter disease. There is no intracranial hemorrhage,   extra-axial collection, or mass effect. The basilar cisterns are open. No CT   evidence of acute infarct. The ventricles and sulci are within normal limits. There are slight changes small vessel disease periventricular white matter. No   hemorrhage mass or acute infarction is identified. There is a right pelvic nodule posteriorly most likely a sebaceous cyst.       The bone windows demonstrate no abnormalities. There is mucosal thickening right   sphenoid sinus. CXR Results  (Last 48 hours)    None            Medical Decision Making   I am the first provider for this patient. I reviewed the vital signs, available nursing notes, past medical history, past surgical history, family history and social history. Vital Signs-Reviewed the patient's vital signs. Records Reviewed: Nursing Notes, Old Medical Records, Previous Radiology Studies and Previous Laboratory Studies     77-year-old male with complaints of paresthesias in addition to being off balance exhibiting pronator drift and weakness to the right upper and right lower extremity. There is tenderness to the left lumbar spine and paraspinous region. High suspicion for possible CVA versus TIA. Due to symptom onset greater than 24 hours codeNeuro not activated. However will obtain labs in addition to CT of head. ED Course:   ED Course as of May 13 1958   Thu May 13, 2021   1630 Discussed case with neurologist Dr Yvette Guzman whom agrees with admission for further evaluation of extremity weakness with MRI/MRA and he will follow up during inpatient stay. Discussed case with hospitalist Dr Odette De Los Santos whom will assess pt for admission. Discussed plan with pt and his family.      [NA]      ED Course User Index  [NA] Ashlie Cronin NP Disposition:  Admit     Follow-up Information    None         Current Discharge Medication List          Provider Notes (Medical Decision Making):   DDX: CVA, TIA, lumbar radiculopathy, herniated disc, spinal stenosis         Procedures:  Procedures    Core Measures:    Critical Care Time:   CRITICAL CARE NOTE :    7:58 PM      IMPENDING DETERIORATION -CNS    ASSOCIATED RISK FACTORS - CNS Decompensation    MANAGEMENT- Bedside Assessment    INTERPRETATION -  CT Scan, ECG and Blood Pressure    INTERVENTIONS - Neurologic interventions     CASE REVIEW - Hospitalist/Intensivist    TREATMENT RESPONSE -Stable    PERFORMED BY - Self        NOTES   :      I have spent 60 minutes of critical care time involved in lab review, consultations with specialist, family decision- making, bedside attention and documentation. During this entire length of time I was immediately available to the patient . Ashlie Vicente NP                                                Diagnosis     Clinical Impression:   1. Lumbar radiculopathy    2. Weakness of right lower extremity    3.  Hypertension, unspecified type

## 2021-05-13 NOTE — PROGRESS NOTES
Lissett Yancey) Consult Dr. Bettye Vogel for diabetic diet  1910) Consult Dr. Bettye Vogel, ST elevation on 5 lead. Order for troponin and repeat EKG  1825) Message to tele-hospitalist- pt ST elevation in V1 and V2 lead. Pt not reporting any chest pain. Drawing second troponin now  1920) Bedside shift change report given to Mary Ann Sarabia RN (oncoming nurse) by Pema Price RN (offgoing nurse). Report included the following information SBAR, Kardex, MAR, Recent Results and Cardiac Rhythm NSR, ST elevation. Pt R leg and R arm drift. Pt previous L eye blurriness  1946) Discuss with tele-hospitalist Dr. Lois Mckeon; order to consult cardiology, order for aspirin  2008) Discuss with tele-hospitalist Dr. Lois Mckeon, cardiologist notified.

## 2021-05-13 NOTE — ED NOTES
TRANSFER - OUT REPORT:    Verbal report given to 2[address] SONG Deras RN(name) on [address]  being transferred to Med Surg(unit) for routine progression of care       Report consisted of patients Situation, Background, Assessment and   Recommendations(SBAR). Information from the following report(s) SBAR and ED Summary was reviewed with the receiving nurse. Lines:   Peripheral IV 05/13/21 Left Antecubital (Active)   Site Assessment Clean, dry, & intact 05/13/21 1512   Phlebitis Assessment 0 05/13/21 1512   Infiltration Assessment 0 05/13/21 1512   Dressing Type Tape;Transparent 05/13/21 1512   Hub Color/Line Status Pink;Flushed;Patent 05/13/21 1512   Action Taken Blood drawn 05/13/21 1512        Opportunity for questions and clarification was provided.       Patient transported with:   Registered Nurse

## 2021-05-13 NOTE — TELEPHONE ENCOUNTER
Pt c/o low back pain, right hand weakness, and numbness at the bottom of feet. Pt states symptoms started yesterday but has became worse this morning. Pt also reports shooting pain in feet.

## 2021-05-13 NOTE — H&P
Hospitalist Admission Note    NAME: Sabrina Amado. :  1955   MRN:  [id number]   Room Number: XV36/11  @ [facility]     Date/Time:  2021 5:21 PM    Patient PCP: Pia Keating., NP  ______________________________________________________________________  Given the patient's current clinical presentation, I have a high level of concern for decompensation if discharged from the emergency department. Complex decision making was performed, which includes reviewing the patient's available past medical records, laboratory results, and x-ray films. My assessment of this patient's clinical condition and my plan of care is as follows. Assessment / Plan: Active Problems:    TIA (transient ischemic attack) (2021)      # Gait instability   # LE weakness w/ tingling   -symptoms > 24 hours per patient   - suspect TIA vs subacute stroke  - CTH neg for acute change   - CT spine w/ Disc bulges L3-S1 with disc space narrowing L4-5. Multilevel facet arthropathy. -EKG reviewed independently normal sinus rhythm  -tele admission  -permissive HTN  -ASA  -High dose statin  -Fasting lipid panel, Hba1c and TSH   -neurology consulted  -echo, MRI head and carotid US   -dysphagia screening  - PT/OT eval  - Check B12  - MRI lumbar spine    # T2 DM   - A1c 7.4 -    - Metformin at home   - Lispro correctional scale, FSG AC HS  - Consistent carb diet, hypoglycemia protocol.      # Hypokalemia   - k 3.0   - repleted and recheck in the am     # Hypertension   - Amlodipine, HCTZ and losartan at home   - Hold in the setting of permissive HTN     # HENRRY   - Scr 1.56 ( baseline 1.17)   - IV hydration   - I/O   - avoid nephrotoxic medication   - renally dose medication     #Tobacco use  -Half pack a day for 16 years  - Patient was counseled extensively on the need to abstain from tobacco, its addictive tendencies, its deleterious effects on the lungs, cardiovascular  as well as its financial & social sequelae            Body mass index is 23.11 kg/m². Code Status: Full   Surrogate Decision Maker:  Ailyn Fernandez Spouse [phone]         DVT Prophylaxis: Lovenox  GI Prophylaxis: not indicated  Baseline:  Walks by himself         Subjective:   CHIEF COMPLAINT: LE weakness     HISTORY OF PRESENT ILLNESS:     Mart Officer is a 76 y.o.  male with PMH of above mention problem who presents to ED with c/o  Unsteady gait and LE weakness / tingling since this am. Patient was seen normally yesterday. Patient further stated he has been having progressive sharp pain in bilateral lower extremity for couple of months and has been progressively getting worse. Patient states that he had a history of fall and feels like that since then he has been getting more weak in his lower extremity. Patient denied any saddle anesthesia bowel or bladder incontinence. Patient was smoking half pack a day for past 10 years. Denied any difficulty with speech vision upper extremity    We were asked to admit for work up and evaluation of the above problems. Past Medical History:   Diagnosis Date    Diabetes ([facility] [address]     Fractured rib 03/2021    from a fall per pt on 5/11/2021    High cholesterol     Hypertension     per pt on 5/11/2021        Past Surgical History:   Procedure Laterality Date    HX ENDOSCOPY      per pt on 5/11/2021       Social History     Tobacco Use    Smoking status: Current Every Day Smoker     Packs/day: 0.50     Types: Cigarettes    Smokeless tobacco: Never Used   Substance Use Topics    Alcohol use: Yes     Alcohol/week: 1.0 standard drinks     Types: 1 Cans of beer per week     Comment: rarely        History reviewed. No pertinent family history. No Known Allergies     Prior to Admission medications    Medication Sig Start Date End Date Taking? Authorizing Provider   hydroCHLOROthiazide (HYDRODIURIL) 12.5 mg tablet Take 1 Tab by mouth daily.  For blood pressure 4/23/21   Marianne HAMMOND NP   atorvastatin (LIPITOR) 40 mg tablet Take 1 Tab by mouth nightly. For cholesterol 4/20/21   Marianne HAMMOND NP   ibuprofen (MOTRIN) 800 mg tablet Take 1 Tab by mouth every eight (8) hours as needed for Pain. With food 3/18/21   Scottie Liriano NP   losartan (COZAAR) 100 mg tablet Take 1 Tab by mouth daily. For blood pressure 3/18/21   Marianne HAMMOND NP   amLODIPine (NORVASC) 10 mg tablet Take 1 Tab by mouth daily. For blood pressure 3/18/21   Marianne HAMMOND NP   metFORMIN (GLUCOPHAGE) 1,000 mg tablet Take 1 Tab by mouth two (2) times daily (with meals). For sugar 3/18/21   Scottie Liriano NP       REVIEW OF SYSTEMS:     I am not able to complete the review of systems because:    The patient is intubated and sedated    The patient has altered mental status due to his acute medical problems    The patient has baseline aphasia from prior stroke(s)    The patient has baseline dementia and is not reliable historian    The patient is in acute medical distress and unable to provide information           Total of 12 systems reviewed as follows:       POSITIVE= underlined text  Negative = text not underlined  General:  fever, chills, sweats, generalized weakness, weight loss/gain,      loss of appetite   Eyes:    blurred vision, eye pain, loss of vision, double vision  ENT:    rhinorrhea, pharyngitis   Respiratory:   cough, sputum production, SOB, ARELLANO, wheezing, pleuritic pain   Cardiology:   chest pain, palpitations, orthopnea, PND, edema, syncope   Gastrointestinal:  abdominal pain , N/V, diarrhea, dysphagia, constipation, bleeding   Genitourinary:  frequency, urgency, dysuria, hematuria, incontinence   Muskuloskeletal :  arthralgia, myalgia, back pain  Hematology:  easy bruising, nose or gum bleeding, lymphadenopathy   Dermatological: rash, ulceration, pruritis, color change / jaundice  Endocrine:   hot flashes or polydipsia   Neurological: headache, dizziness, confusion, focal weakness, paresthesia,     Speech difficulties, memory loss, gait difficulty  Psychological: Feelings of anxiety, depression, agitation    Objective:   VITALS:    Visit Vitals  BP (!) 145/69   Pulse 71   Temp 97.4 °F (36.3 °C)   Resp 17   Ht 5' 8\" (1.727 m)   Wt 68.9 kg (152 lb)   SpO2 99%   BMI 23.11 kg/m²       PHYSICAL EXAM:    General:    Alert, cooperative, no distress, appears stated age. HEENT: Atraumatic, anicteric sclerae, pink conjunctivae     No oral ulcers, mucosa moist, throat clear, dentition fair  Neck:  Supple, symmetrical,  thyroid: non tender  Lungs:   Clear to auscultation bilaterally. No Wheezing or Rhonchi. No rales. Chest wall:  No tenderness  No Accessory muscle use. Heart:   Regular  rhythm,  No  murmur   No edema  Abdomen:   Soft, non-tender. Not distended. Bowel sounds normal  Extremities: No cyanosis. No clubbing,      Skin turgor normal, Capillary refill normal, Radial dial pulse 2+  Skin:     Not pale. Not Jaundiced  No rashes   Psych:  Good insight. Not depressed. Not anxious or agitated. Neurologic: EOMs intact. No facial asymmetry. No aphasia or slurred speech. B// UE 5/5 and UE b/l 4/5 , Sensation grossly intact.  Alert and oriented X 4.     ______________________________________________________________________    Care Plan discussed with:  Patient/Family    Expected  Disposition:  SNF/LTC  ________________________________________________________________________  TOTAL TIME:  28 Minutes    Critical Care Provided     Minutes non procedure based      Comments    x Reviewed previous records   >50% of visit spent in counseling and coordination of care x Discussion with patient and/or family and questions answered       ________________________________________________________________________  Signed: Antonia Navarrete, MD    Procedures: see electronic medical records for all procedures/Xrays and details which were not copied into this note but were reviewed prior to creation of Plan. LAB DATA REVIEWED:    Recent Results (from the past 24 hour(s))   EKG, 12 LEAD, INITIAL    Collection Time: 05/13/21  3:07 PM   Result Value Ref Range    Ventricular Rate 72 BPM    Atrial Rate 72 BPM    P-R Interval 170 ms    QRS Duration 82 ms    Q-T Interval 378 ms    QTC Calculation (Bezet) 413 ms    Calculated P Axis 70 degrees    Calculated R Axis 65 degrees    Calculated T Axis -152 degrees    Diagnosis       Normal sinus rhythm  Left ventricular hypertrophy with repolarization abnormality  Abnormal ECG  When compared with ECG of 13-FEB-2021 16:08,  premature atrial complexes are no longer present     GLUCOSE, POC    Collection Time: 05/13/21  3:09 PM   Result Value Ref Range    Glucose (POC) 147 (H) 65 - 117 mg/dL    Performed by Kevin BELTRÁN    METABOLIC PANEL, COMPREHENSIVE    Collection Time: 05/13/21  3:13 PM   Result Value Ref Range    Sodium 139 136 - 145 mmol/L    Potassium 3.0 (L) 3.5 - 5.1 mmol/L    Chloride 100 97 - 108 mmol/L    CO2 32 21 - 32 mmol/L    Anion gap 7 5 - 15 mmol/L    Glucose 148 (H) 65 - 100 mg/dL    BUN 15 6 - 20 MG/DL    Creatinine 1.56 (H) 0.70 - 1.30 MG/DL    BUN/Creatinine ratio 10 (L) 12 - 20      GFR est AA 54 (L) >60 ml/min/1.73m2    GFR est non-AA 45 (L) >60 ml/min/1.73m2    Calcium 8.9 8.5 - 10.1 MG/DL    Bilirubin, total 0.4 0.2 - 1.0 MG/DL    ALT (SGPT) 20 12 - 78 U/L    AST (SGOT) 18 15 - 37 U/L    Alk.  phosphatase 110 45 - 117 U/L    Protein, total 7.3 6.4 - 8.2 g/dL    Albumin 3.0 (L) 3.5 - 5.0 g/dL    Globulin 4.3 (H) 2.0 - 4.0 g/dL    A-G Ratio 0.7 (L) 1.1 - 2.2     CBC WITH AUTOMATED DIFF    Collection Time: 05/13/21  3:13 PM   Result Value Ref Range    WBC 6.9 4.1 - 11.1 K/uL    RBC 4.47 4.10 - 5.70 M/uL    HGB 14.2 12.1 - 17.0 g/dL    HCT 39.8 36.6 - 50.3 %    MCV 89.0 80.0 - 99.0 FL    MCH 31.8 26.0 - 34.0 PG    MCHC 35.7 30.0 - 36.5 g/dL    RDW 12.5 11.5 - 14.5 %    PLATELET 315 976 - 213 K/uL    MPV 10.6 8.9 - 12.9 FL    NRBC 0.0 0  WBC    ABSOLUTE NRBC 0.00 0.00 - 0.01 K/uL    NEUTROPHILS 55 32 - 75 %    LYMPHOCYTES 35 12 - 49 %    MONOCYTES 7 5 - 13 %    EOSINOPHILS 2 0 - 7 %    BASOPHILS 1 0 - 1 %    IMMATURE GRANULOCYTES 0 0.0 - 0.5 %    ABS. NEUTROPHILS 3.8 1.8 - 8.0 K/UL    ABS. LYMPHOCYTES 2.4 0.8 - 3.5 K/UL    ABS. MONOCYTES 0.5 0.0 - 1.0 K/UL    ABS. EOSINOPHILS 0.1 0.0 - 0.4 K/UL    ABS. BASOPHILS 0.1 0.0 - 0.1 K/UL    ABS. IMM.  GRANS. 0.0 0.00 - 0.04 K/UL    DF AUTOMATED     D DIMER    Collection Time: 05/13/21  3:13 PM   Result Value Ref Range    D-dimer 0.37 0.00 - 0.65 mg/L FEU   TROPONIN I    Collection Time: 05/13/21  3:13 PM   Result Value Ref Range    Troponin-I, Qt. <0.05 <0.05 ng/mL   CK W/ REFLX CKMB    Collection Time: 05/13/21  3:13 PM   Result Value Ref Range     39 - 308 U/L

## 2021-05-13 NOTE — ED NOTES
Informed that accepting RN was ready for report. When this RN called back, informed that RN is off the floor and will call back.

## 2021-05-13 NOTE — PROGRESS NOTES
Advance Care Planning (ACP) Provider Note - Comprehensive     Date of ACP Conversation: 05/13/21  Persons included in Conversation:  patient and family  Length of ACP Conversation in minutes:  16 minutes    Authorized Decision Maker (if patient is incapable of making informed decisions): This person is:    Prabha Childs [phone]             General ACP for ALL Patients with Decision Making Capacity:   Importance of advance care planning, including choosing a healthcare agent to communicate patient's healthcare decisions if patient lost the ability to make decisions, such as after a sudden illness or accident  Understanding of the healthcare agent role was assessed and information provided  Exploration of values, goals, and preferences if recovery is not expected, even with continued medical treatment in the event of: Imminent death  Severe, permanent brain injury        For Serious or Chronic Illness:  Understanding of medical condition    Understanding of CPR, goals and expected outcomes, benefits and burdens discussed. Information on CPR success rates provided (e.g. for CPR in hospital, survival to d/c at two weeks is 22%, for chronically ill or elderly/frail survival is less than 3%); Individual asked to communicate understanding of information in his/her own words.     Interventions Provided:  Recommended completion of Advance Directive form after review of ACP materials and conversation with prospective healthcare agent

## 2021-05-13 NOTE — ED NOTES
Pt presents to ED ambulatory with unsteady gait complaining of generalized weakness and being more off balance than usual. Pt and family member at bedside report that symptoms started early yesterday morning. Pt denies changes in bowel or bladder habits. Pt reports his hand and feet feel \"heavy. \" Pt is alert and oriented x 4, RR even and unlabored, skin is warm and dry. Assessment completed and pt updated on plan of care. Call bell in reach. Emergency Department Nursing Plan of Care       The Nursing Plan of Care is developed from the Nursing assessment and Emergency Department Attending provider initial evaluation. The plan of care may be reviewed in the ED Provider note.     The Plan of Care was developed with the following considerations:   Patient / Family readiness to learn indicated by:verbalized understanding  Persons(s) to be included in education: patient  Barriers to Learning/Limitations:No    Signed     Sammy Galindo RN    5/13/2021   4:08 PM

## 2021-05-13 NOTE — PROGRESS NOTES
.. TRANSFER - IN REPORT:    Verbal report received from Ramy Harper RN(name) on [facility].  being received from ED (unit) for routine progression of care      Report consisted of patients Situation, Background, Assessment and   Recommendations(SBAR). Information from the following report(s) SBAR, Kardex, MAR, Accordion and Recent Results was reviewed with the receiving nurse. Opportunity for questions and clarification was provided. Assessment completed upon patients arrival to unit and care assumed.

## 2021-05-14 ENCOUNTER — APPOINTMENT (OUTPATIENT)
Dept: NON INVASIVE DIAGNOSTICS | Age: 66
DRG: 065 | End: 2021-05-14
Attending: STUDENT IN AN ORGANIZED HEALTH CARE EDUCATION/TRAINING PROGRAM
Payer: MEDICARE

## 2021-05-14 ENCOUNTER — TELEPHONE (OUTPATIENT)
Dept: NEUROLOGY | Age: 66
End: 2021-05-14

## 2021-05-14 ENCOUNTER — APPOINTMENT (OUTPATIENT)
Dept: VASCULAR SURGERY | Age: 66
DRG: 065 | End: 2021-05-14
Attending: STUDENT IN AN ORGANIZED HEALTH CARE EDUCATION/TRAINING PROGRAM
Payer: MEDICARE

## 2021-05-14 ENCOUNTER — APPOINTMENT (OUTPATIENT)
Dept: MRI IMAGING | Age: 66
DRG: 065 | End: 2021-05-14
Attending: STUDENT IN AN ORGANIZED HEALTH CARE EDUCATION/TRAINING PROGRAM
Payer: MEDICARE

## 2021-05-14 LAB
ANION GAP SERPL CALC-SCNC: 8 MMOL/L (ref 5–15)
ATRIAL RATE: 68 BPM
ATRIAL RATE: 72 BPM
AV R PG: 74.54 MMHG
BUN SERPL-MCNC: 16 MG/DL (ref 6–20)
BUN/CREAT SERPL: 12 (ref 12–20)
CALCIUM SERPL-MCNC: 8.4 MG/DL (ref 8.5–10.1)
CALCULATED P AXIS, ECG09: 53 DEGREES
CALCULATED P AXIS, ECG09: 70 DEGREES
CALCULATED R AXIS, ECG10: 17 DEGREES
CALCULATED R AXIS, ECG10: 65 DEGREES
CALCULATED T AXIS, ECG11: -152 DEGREES
CALCULATED T AXIS, ECG11: 148 DEGREES
CHLORIDE SERPL-SCNC: 106 MMOL/L (ref 97–108)
CHOLEST SERPL-MCNC: 113 MG/DL
CK SERPL-CCNC: 118 U/L (ref 39–308)
CO2 SERPL-SCNC: 28 MMOL/L (ref 21–32)
CREAT SERPL-MCNC: 1.35 MG/DL (ref 0.7–1.3)
DIAGNOSIS, 93000: NORMAL
DIAGNOSIS, 93000: NORMAL
ECHO AO ROOT DIAM: 2.42 CM
ECHO AR MAX VEL PISA: 431.67 CM/S
ECHO AV AREA PLAN: 1.82 CM2
ECHO AV REGURGITANT PHT: 492.48 MS
ECHO EST RA PRESSURE: 5 MMHG
ECHO LA AREA 4C: 19.26 CM2
ECHO LA MAJOR AXIS: 3.17 CM
ECHO LA MINOR AXIS: 1.74 CM
ECHO LA VOL 4C: 51.02 ML (ref 18–58)
ECHO LA VOLUME INDEX A4C: 28 ML/M2 (ref 16–28)
ECHO LV EDV A4C: 157.5 ML
ECHO LV EDV INDEX A4C: 86.4 ML/M2
ECHO LV EJECTION FRACTION A4C: 72 PERCENT
ECHO LV ESV A4C: 44.34 ML
ECHO LV ESV INDEX A4C: 24.3 ML/M2
ECHO LV INTERNAL DIMENSION DIASTOLIC: 5.13 CM (ref 4.2–5.9)
ECHO LV INTERNAL DIMENSION SYSTOLIC: 3.57 CM
ECHO LV IVSD: 1.1 CM (ref 0.6–1)
ECHO LV MASS 2D: 249.2 G (ref 88–224)
ECHO LV MASS INDEX 2D: 136.7 G/M2 (ref 49–115)
ECHO LV POSTERIOR WALL DIASTOLIC: 1.34 CM (ref 0.6–1)
ECHO LVOT DIAM: 1.84 CM
ECHO LVOT PEAK GRADIENT: 3.26 MMHG
ECHO LVOT PEAK VELOCITY: 90.28 CM/S
ECHO MV A VELOCITY: 65.15 CM/S
ECHO MV AREA PHT: 2.65 CM2
ECHO MV AREA PHT: 5.2 CM2
ECHO MV AREA PLAN: 3.59 CM2
ECHO MV E DECELERATION TIME (DT): 145.8 MS
ECHO MV E VELOCITY: 36.53 CM/S
ECHO MV E/A RATIO: 0.56
ECHO MV MAX VELOCITY: 84.24 CM/S
ECHO MV MEAN GRADIENT: 1.05 MMHG
ECHO MV PEAK GRADIENT: 2.84 MMHG
ECHO MV PRESSURE HALF TIME (PHT): 42.28 MS
ECHO MV PRESSURE HALF TIME (PHT): 82.94 MS
ECHO MV VTI: 35.04 CM
ECHO PV PEAK INSTANTANEOUS GRADIENT SYSTOLIC: 3.45 MMHG
ECHO PV REGURGITANT MAX VELOCITY: 92.89 CM/S
ECHO RA AREA 4C: 13.73 CM2
ECHO RIGHT VENTRICULAR SYSTOLIC PRESSURE (RVSP): 24.34 MMHG
ECHO TV REGURGITANT MAX VELOCITY: 218.99 CM/S
ECHO TV REGURGITANT MAX VELOCITY: 496.76 CM/S
ECHO TV REGURGITANT PEAK GRADIENT: 19.34 MMHG
EST. AVERAGE GLUCOSE BLD GHB EST-MCNC: 143 MG/DL
FOLATE SERPL-MCNC: 8.2 NG/ML (ref 5–21)
GLUCOSE BLD STRIP.AUTO-MCNC: 108 MG/DL (ref 65–117)
GLUCOSE BLD STRIP.AUTO-MCNC: 112 MG/DL (ref 65–117)
GLUCOSE BLD STRIP.AUTO-MCNC: 143 MG/DL (ref 65–117)
GLUCOSE BLD STRIP.AUTO-MCNC: 95 MG/DL (ref 65–117)
GLUCOSE SERPL-MCNC: 88 MG/DL (ref 65–100)
HBA1C MFR BLD: 6.6 % (ref 4–5.6)
HDLC SERPL-MCNC: 38 MG/DL
HDLC SERPL: 3 {RATIO} (ref 0–5)
LDLC SERPL CALC-MCNC: 58.4 MG/DL (ref 0–100)
LEFT CCA DIST DIAS: 23 CM/S
LEFT CCA DIST SYS: 120.6 CM/S
LEFT CCA PROX DIAS: 30 CM/S
LEFT CCA PROX SYS: 164.7 CM/S
LEFT ECA DIAS: 11.4 CM/S
LEFT ECA SYS: 104.3 CM/S
LEFT ICA DIST DIAS: 20.7 CM/S
LEFT ICA DIST SYS: 53.1 CM/S
LEFT ICA MID DIAS: 18.6 CM/S
LEFT ICA MID SYS: 60.6 CM/S
LEFT ICA PROX DIAS: 18 CM/S
LEFT ICA PROX SYS: 55.5 CM/S
LEFT ICA/CCA SYS: 0.5
P-R INTERVAL, ECG05: 166 MS
P-R INTERVAL, ECG05: 170 MS
POTASSIUM SERPL-SCNC: 3.2 MMOL/L (ref 3.5–5.1)
Q-T INTERVAL, ECG07: 378 MS
Q-T INTERVAL, ECG07: 400 MS
QRS DURATION, ECG06: 82 MS
QRS DURATION, ECG06: 84 MS
QTC CALCULATION (BEZET), ECG08: 413 MS
QTC CALCULATION (BEZET), ECG08: 425 MS
RIGHT CCA DIST DIAS: 14.4 CM/S
RIGHT CCA DIST SYS: 116.2 CM/S
RIGHT CCA PROX DIAS: 15.9 CM/S
RIGHT CCA PROX SYS: 119.1 CM/S
RIGHT ECA DIAS: 10.9 CM/S
RIGHT ECA SYS: 167 CM/S
RIGHT ICA DIST DIAS: 16.4 CM/S
RIGHT ICA DIST SYS: 59.2 CM/S
RIGHT ICA MID DIAS: 24.1 CM/S
RIGHT ICA MID SYS: 77.9 CM/S
RIGHT ICA PROX DIAS: 18.6 CM/S
RIGHT ICA PROX SYS: 69.1 CM/S
RIGHT ICA/CCA SYS: 0.7
RIGHT VERTEBRAL DIAS: 9.8 CM/S
RIGHT VERTEBRAL SYS: 68.1 CM/S
SERVICE CMNT-IMP: ABNORMAL
SERVICE CMNT-IMP: NORMAL
SODIUM SERPL-SCNC: 142 MMOL/L (ref 136–145)
TRIGL SERPL-MCNC: 83 MG/DL (ref ?–150)
TROPONIN I SERPL-MCNC: <0.05 NG/ML
TROPONIN I SERPL-MCNC: <0.05 NG/ML
VENTRICULAR RATE, ECG03: 68 BPM
VENTRICULAR RATE, ECG03: 72 BPM
VIT B12 SERPL-MCNC: 340 PG/ML (ref 193–986)
VLDLC SERPL CALC-MCNC: 16.6 MG/DL

## 2021-05-14 PROCEDURE — 80048 BASIC METABOLIC PNL TOTAL CA: CPT

## 2021-05-14 PROCEDURE — 82085 ASSAY OF ALDOLASE: CPT

## 2021-05-14 PROCEDURE — 82962 GLUCOSE BLOOD TEST: CPT

## 2021-05-14 PROCEDURE — 96372 THER/PROPH/DIAG INJ SC/IM: CPT

## 2021-05-14 PROCEDURE — 97161 PT EVAL LOW COMPLEX 20 MIN: CPT | Performed by: PHYSICAL THERAPIST

## 2021-05-14 PROCEDURE — 86038 ANTINUCLEAR ANTIBODIES: CPT

## 2021-05-14 PROCEDURE — 36415 COLL VENOUS BLD VENIPUNCTURE: CPT

## 2021-05-14 PROCEDURE — 93880 EXTRACRANIAL BILAT STUDY: CPT

## 2021-05-14 PROCEDURE — 70551 MRI BRAIN STEM W/O DYE: CPT

## 2021-05-14 PROCEDURE — 74011250636 HC RX REV CODE- 250/636: Performed by: STUDENT IN AN ORGANIZED HEALTH CARE EDUCATION/TRAINING PROGRAM

## 2021-05-14 PROCEDURE — 82306 VITAMIN D 25 HYDROXY: CPT

## 2021-05-14 PROCEDURE — 82164 ANGIOTENSIN I ENZYME TEST: CPT

## 2021-05-14 PROCEDURE — 82550 ASSAY OF CK (CPK): CPT

## 2021-05-14 PROCEDURE — 99218 HC RM OBSERVATION: CPT

## 2021-05-14 PROCEDURE — 96374 THER/PROPH/DIAG INJ IV PUSH: CPT

## 2021-05-14 PROCEDURE — 97116 GAIT TRAINING THERAPY: CPT | Performed by: PHYSICAL THERAPIST

## 2021-05-14 PROCEDURE — 74011636637 HC RX REV CODE- 636/637: Performed by: STUDENT IN AN ORGANIZED HEALTH CARE EDUCATION/TRAINING PROGRAM

## 2021-05-14 PROCEDURE — 80061 LIPID PANEL: CPT

## 2021-05-14 PROCEDURE — 83036 HEMOGLOBIN GLYCOSYLATED A1C: CPT

## 2021-05-14 PROCEDURE — 86592 SYPHILIS TEST NON-TREP QUAL: CPT

## 2021-05-14 PROCEDURE — 74011250637 HC RX REV CODE- 250/637: Performed by: INTERNAL MEDICINE

## 2021-05-14 PROCEDURE — 99223 1ST HOSP IP/OBS HIGH 75: CPT | Performed by: PSYCHIATRY & NEUROLOGY

## 2021-05-14 PROCEDURE — 92523 SPEECH SOUND LANG COMPREHEN: CPT | Performed by: SPEECH-LANGUAGE PATHOLOGIST

## 2021-05-14 PROCEDURE — 72148 MRI LUMBAR SPINE W/O DYE: CPT

## 2021-05-14 PROCEDURE — 84484 ASSAY OF TROPONIN QUANT: CPT

## 2021-05-14 PROCEDURE — 74011250637 HC RX REV CODE- 250/637: Performed by: STUDENT IN AN ORGANIZED HEALTH CARE EDUCATION/TRAINING PROGRAM

## 2021-05-14 RX ORDER — LANOLIN ALCOHOL/MO/W.PET/CERES
400 CREAM (GRAM) TOPICAL
Status: COMPLETED | OUTPATIENT
Start: 2021-05-14 | End: 2021-05-14

## 2021-05-14 RX ORDER — AMLODIPINE BESYLATE 5 MG/1
10 TABLET ORAL DAILY
Status: DISCONTINUED | OUTPATIENT
Start: 2021-05-14 | End: 2021-05-18 | Stop reason: HOSPADM

## 2021-05-14 RX ORDER — SODIUM CHLORIDE 0.9 % (FLUSH) 0.9 %
10 SYRINGE (ML) INJECTION AS NEEDED
Status: DISCONTINUED | OUTPATIENT
Start: 2021-05-14 | End: 2021-05-18 | Stop reason: HOSPADM

## 2021-05-14 RX ORDER — ATORVASTATIN CALCIUM 40 MG/1
80 TABLET, FILM COATED ORAL
Status: DISCONTINUED | OUTPATIENT
Start: 2021-05-14 | End: 2021-05-18 | Stop reason: HOSPADM

## 2021-05-14 RX ORDER — METFORMIN HYDROCHLORIDE 1000 MG/1
1000 TABLET ORAL DAILY
COMMUNITY
End: 2021-06-02

## 2021-05-14 RX ADMIN — POTASSIUM BICARBONATE 40 MEQ: 782 TABLET, EFFERVESCENT ORAL at 13:58

## 2021-05-14 RX ADMIN — Medication 10 ML: at 12:45

## 2021-05-14 RX ADMIN — Medication 10 ML: at 12:47

## 2021-05-14 RX ADMIN — AMLODIPINE BESYLATE 10 MG: 5 TABLET ORAL at 15:52

## 2021-05-14 RX ADMIN — Medication 10 ML: at 12:35

## 2021-05-14 RX ADMIN — ENOXAPARIN SODIUM 40 MG: 40 INJECTION SUBCUTANEOUS at 08:33

## 2021-05-14 RX ADMIN — Medication 400 MG: at 13:58

## 2021-05-14 RX ADMIN — POLYETHYLENE GLYCOL 3350 17 G: 17 POWDER, FOR SOLUTION ORAL at 21:04

## 2021-05-14 RX ADMIN — Medication 10 ML: at 04:51

## 2021-05-14 RX ADMIN — Medication 10 ML: at 21:04

## 2021-05-14 RX ADMIN — Medication 10 ML: at 14:00

## 2021-05-14 RX ADMIN — ATORVASTATIN CALCIUM 80 MG: 40 TABLET, FILM COATED ORAL at 21:04

## 2021-05-14 RX ADMIN — ASPIRIN 81 MG CHEWABLE TABLET 81 MG: 81 TABLET CHEWABLE at 08:33

## 2021-05-14 RX ADMIN — INSULIN LISPRO 2 UNITS: 100 INJECTION, SOLUTION INTRAVENOUS; SUBCUTANEOUS at 17:26

## 2021-05-14 RX ADMIN — Medication 10 ML: at 08:10

## 2021-05-14 NOTE — TELEPHONE ENCOUNTER
Please see this patient at some point today during your rounds.   Thanks, appointment made with Dr. Eligio Sosa in two weeks

## 2021-05-14 NOTE — PROGRESS NOTES
Pt up to the BR. Very unsteady gait and dragging his left foot. And pt also requested to stop IV fluids for few hours to sleep . Pt eating and drinking fine. Will restart in few hours.

## 2021-05-14 NOTE — PROGRESS NOTES
Tele Cross cover     Pt newly admitted to 208 for stroke rule-out, is showing ST elevation in V1 and V2. New EKG done, and drawing new troponin now. Pt does not report any chest pain Any recommended orders? Plan , chart was reviewed , case was discussed with the nurse , patient denies any chest pain , EKG show ? ST changes V1,V2 , ASA was order , cardiology was consulted, check serial troponin .

## 2021-05-14 NOTE — PROGRESS NOTES
Consulted Cardiology and neuro as per Dr. Chi Cornell. EKG and trponin performed by [facility].

## 2021-05-14 NOTE — PROGRESS NOTES
@08:02, Dr. Maryam Astorga was contacted via [employer] for a consult and called back to tell Hospitalist can call him on his cell phone if they have questions. And message was relayed to Dr. Radha Miller.

## 2021-05-14 NOTE — PROGRESS NOTES
CM assisting for the purpose of follow-up appointments only. Neurology: 5/27/21 @ 0900. PCP: 6/14/21 @ 0900. MELISSA Dejesus to finalize discharge planning.     [address] [place] [address]  [phone]

## 2021-05-14 NOTE — PROGRESS NOTES
Problem: Communication Impaired (Adult)  Goal: *Acute Goals and Plan of Care (Insert Text)  Description: Speech Therapy Goals  Initiated 5/14/2021  WITHIN 7 DAYS, PATIENT WILL  1. Complete confrontation naming 90% correct and within 5 seconds per items  2. Complete divergent tasks 8 items/minute  3. Learn 2 strategies for fluency and demo 1 with min cues    Outcome: Progressing Towards Goal       SPEECH LANGUAGE PATHOLOGY EVALUATION  Patient: Irene Cartagena (67 y.o. male)  Date: 5/14/2021  Primary Diagnosis: TIA (transient ischemic attack) [G45.9]        Precautions:       ASSESSMENT :  Based on the objective data described below, the patient presents with mild anomia, mild dysfluency, ? Some apraxia errors after TIA. He reports dysfluency  is new and that his family reports slurring though he is less aware of it than she is. He is pleasant and motivated. Inconsistent awareness of errors    Patient will benefit from skilled intervention to address the above impairments. Patients rehabilitation potential is considered to be Good     PLAN :  Recommendations and Planned Interventions:  SLP tx as above both here and at next level of care  Frequency/Duration: Patient will be followed by speech-language pathology 2 times a week to address goals. Discharge Recommendations: Outpatient and To Be Determined     SUBJECTIVE:   Patient stated My wife says it sounds different.     OBJECTIVE:     Past Medical History:   Diagnosis Date    Diabetes ([address]     Fractured rib 03/2021    from a fall per pt on 5/11/2021    High cholesterol     Hypertension     per pt on 5/11/2021     Past Surgical History:   Procedure Laterality Date    HX ENDOSCOPY      per pt on 5/11/2021     Prior Level of Function/Home Situation:   Home Situation  Home Environment: Private residence  One/Two Story Residence: One story  Living Alone: No  Mental Status:  Neurologic State: Alert  Orientation Level: Oriented X4  Cognition: Follows commands  Perception: Appears intact  Perseveration: No perseveration noted  Safety/Judgement: Awareness of environment  Motor Speech:  Oral-Motor Structure/Motor Speech  Face: No impairment  Apraxic Characteristics: Abnormal prosodic features;Marked difficulty initiating speech; Awareness of errors(dysfluency)  Dysarthric Characteristics: Blended word boundaries  Intelligibility: Impaired  Word Intelligibility (%): 90 %  Sentence Intelligibility (%): 80 %  Language Comprehension and Expression:  Auditory Comprehension  Auditory Impairment: No (no difficulty with complex yes/no or 3 step commands)  Verbal Expression  Initiation: No impairment  Automatic Speech Task: No impairment  Repetition: No impairment(no wrd errors but some dysfluency noted)  Naming: Impaired(Able to name items 90% but needed extra time for about 33% of these. Also did not always use most common word for items, like \"fabric top\" for shirt.)  Confrontation (%): 80 %(but needed extra time)  Divergent (%): (50)  Naming cueing type: Verbal  Sentence Completion: No impairment  Sentence Formulation: (dysfluencies, hesitation, but able to express basic needs)  Sentence forumlation cueing type: (extra time)  Conversation: (dysfluency which is new per patinet)  Speech Characteristics: Circumlocutions  Effective Techniques: Provide extra time;Remove environmental distractions  Overall Impairment: Mild                                                    NOMS: expression 5    Pain:  Pain Scale 1: Numeric (0 - 10)  Pain Intensity 1: 0       After treatment:   Patient left in no apparent distress in bed and Call bell within reach    COMMUNICATION/EDUCATION:   Patient was educated regarding his deficit(s) of aphasia as this relates to his diagnosis of TIA. He demonstrated Good understanding as evidenced by his responses.     The patient's plan of care including recommendations, planned interventions, and recommended diet changes were discussed with: Registered nurse. Patient/family agree to work toward stated goals and plan of care.     Thank you for this referral.  Aneudy Cruz SLP  Time Calculation: 14 mins

## 2021-05-14 NOTE — PROGRESS NOTES
Hospitalist Progress Note    NAME: Reena Diaz. :  1955   MRN:  [id number]   Room Number:  837/50  @ [facility]       Interim Hospital Summary: 76 y.o. male whom presented on 2021 with      Assessment / Plan:      #Acute left parietal white matter infarct   # Gait instability d/t No 1   # LE weakness w/ tingling No 1  -symptoms > 24 hours per patient   [facility] score 4 ( not a candidate for DAPT as in point trial [facility] was < 3)   - CTH neg for acute change   - CT spine w/ Disc bulges L3-S1 with disc space narrowing L4-5. Multilevel facet arthropathy  - MRI Acute left parietal white matter infarct. No hemorrhage or mass effect.  Moderate nonspecific white matter changes, remote right parietal white matter  Infarcts.  - MRI lumbar spine: Mild multilevel degenerative change with no more than mild foraminal narrowing as detailed by level above  -EKG reviewed independently normal sinus rhythm  -tele admission  -ASA 81 mg   -High dose statin  - LDL 58 , A1c 6.6 and TSH 1.50   -neurology consulted  - TTE w/o shunt   - US carotid < 50 % stenosis B/l   - PT/OT evaling  -  B12 340     # EKG changes in telemetry    - D/w Dr Davion Macdonald Cardiology, No Changes in 12 lead  - Trop neg   - No further w/u needed at this time      # T2 DM   - A1c 7.4 -    - Metformin at home   - Lispro correctional scale, FSG AC HS  - Consistent carb diet, hypoglycemia protocol.      # Hypokalemia   - k 3.0 > 3.2   - replete     # Hypertension   - Amlodipine, HCTZ and losartan at home   - Hold in the setting of permissive HTN      # HENRRY resolving   - Scr 1.56 > 1.35 ( baseline 1.17)   - IV hydration   - I/O   - avoid nephrotoxic medication   - renally dose medication      #Tobacco use  -Half pack a day for 16 years  - Patient was counseled extensively on the need to abstain from tobacco, its addictive tendencies, its deleterious effects on the lungs, cardiovascular  as well as its financial & social sequelae                 Body mass index is 23.11 kg/m². Code Status: Full   Surrogate Decision Maker:  Shaquille Maldonado Spouse [phone]            DVT Prophylaxis: Lovenox  GI Prophylaxis: not indicated  Baseline:  Walks by himself            Subjective:     Chief Complaint / Reason for Physician Visit  Slurred speech w/ R side weakness   Discussed with RN events overnight. Review of Systems:  No fevers, chills, appetite change, cough, sputum production, shortness of breath, dyspnea on exertion, nausea, vomitting, diarrhea, constipation, chest pain, leg edema, abdominal pain, joint pain, rash, itching. Tolerating PT/OT. Tolerating diet. Objective:     VITALS:   Last 24hrs VS reviewed since prior progress note. Most recent are:  Patient Vitals for the past 24 hrs:   Temp Pulse Resp BP SpO2   05/14/21 0759 97.7 °F (36.5 °C) (!) 59 16 (!) 140/63 100 %   05/14/21 0447 98 °F (36.7 °C) 65 18 131/62 96 %   05/14/21 0059 97.8 °F (36.6 °C) 68 16 124/63 95 %   05/13/21 1936 98.3 °F (36.8 °C) 66 16 130/65 97 %   05/13/21 1846 98.2 °F (36.8 °C) 70 16 139/64 100 %   05/13/21 1800 -- 65 15 (!) 147/66 97 %   05/13/21 1730 -- 67 14 (!) 135/59 100 %   05/13/21 1700 -- 71 17 (!) 145/69 99 %   05/13/21 1630 -- 67 16 (!) 129/57 98 %   05/13/21 1600 -- -- -- (!) 139/56 --   05/13/21 1430 97.4 °F (36.3 °C) 88 18 116/75 100 %     No intake or output data in the 24 hours ending 05/14/21 0925     PHYSICAL EXAM:  General: WD, WN. Alert, cooperative, no acute distress    EENT:  EOMI. Anicteric sclerae. MMM  Resp:  CTA bilaterally, no wheezing or rales. No accessory muscle use  CV:  Regular  rhythm,  normal S1/S2, no murmurs rubs gallops, No edema  GI:  Soft, Non distended, Non tender. +Bowel sounds  Neurologic:  Alert and oriented X 3, some slurring speech, RUE/ RLE 4/5 and LUE/LLE 5/ 5  Psych:   Good insight. Not anxious nor agitated  Skin:  No rashes.   No jaundice    Reviewed most current lab test results and cultures YES  Reviewed most current radiology test results   YES  Review and summation of old records today    NO  Reviewed patient's current orders and MAR    YES  PMH/SH reviewed - no change compared to H&P  ________________________________________________________________________  Care Plan discussed with:    Comments   Patient x    Family      RN x    Care Manager x    Consultant  x Neurology                     x Multidiciplinary team rounds were held today with , nursing, pharmacist and clinical coordinator. Patient's plan of care was discussed; medications were reviewed and discharge planning was addressed. ________________________________________________________________________  Total NON critical care TIME:  25   Minutes    Total CRITICAL CARE TIME Spent:   Minutes non procedure based      Comments   >50% of visit spent in counseling and coordination of care x    ________________________________________________________________________  Tricia Chadwick MD     Procedures: see electronic medical records for all procedures/Xrays and details which were not copied into this note but were reviewed prior to creation of Plan. LABS:  I reviewed today's most current labs and imaging studies.   Pertinent labs include:  Recent Labs     05/13/21  1513   WBC 6.9   HGB 14.2   HCT 39.8        Recent Labs     05/14/21  0602 05/13/21  1513    139   K 3.2* 3.0*    100   CO2 28 32   GLU 88 148*   BUN 16 15   CREA 1.35* 1.56*   CA 8.4* 8.9   MG  --  1.7   ALB  --  3.0*   TBILI  --  0.4   ALT  --  20       Signed: Tricia Chadwick MD

## 2021-05-14 NOTE — CONSULTS
NEUROLOGY CONSULTATION    DATE OF CONSULTATION: 5/14/2021    CONSULTED BY: ER provider-Ashlie Vicente    REASON FOR CONSULT: CVA      HISTORY OF PRESENT ILLNESS  Kamron Serna is a 76 y.o. right handed black male with history of diabetes mellitus, dyslipidemia, hypertension, status post fall with rib fracture on 5/11/2021, who presented to the emergency room with right-sided weakness numbness and tingling sensation of the right side of the body and bilateral lower extremity. Patient says he woke up on the day of presentation and noticed that his right side was weak and he was having difficulty walking, the numbness and tingling sensation of the lower extremity appear to get worse. On questioning patient, he says he has been having numbness and tingling sensation of the lower extremity and his leg has been somewhat unsteady. He says he had to come to the hospital because of the weakness of the right upper extremity with numbness and tingling sensation. He denies headaches, dysphagia, or odynophagia. Patient denies loss of consciousness.   Initial CT scan of the head obtained reviewed was unremarkable for any bleed or acute event  ROS   Review of Systems - General ROS: positive for  - fatigue, night sweats, sleep disturbance, and weight loss  Psychological ROS: positive for - anxiety and sleep disturbances  Ophthalmic ROS: positive for - blurry vision and decreased vision  ENT ROS: positive for - tinnitus, vertigo, and visual changes  Allergy and Immunology ROS: negative  Hematological and Lymphatic ROS: negative  Endocrine ROS: negative  Respiratory ROS: no cough, shortness of breath, or wheezing  Cardiovascular ROS: no chest pain or dyspnea on exertion  Gastrointestinal ROS: no abdominal pain, change in bowel habits, or black or bloody stools  Genito-Urinary ROS: no dysuria, trouble voiding, or hematuria  Musculoskeletal ROS: negative  positive for - gait disturbance, joint pain, and muscular weakness  Neurological ROS: positive for - dizziness, gait disturbance, impaired coordination/balance, numbness/tingling, visual changes, and weakness  Dermatological ROS: negative     PMH  Past Medical History:   Diagnosis Date    Diabetes ([address]     Fractured rib 03/2021    from a fall per pt on 5/11/2021    High cholesterol     Hypertension     per pt on 5/11/2021       [facility]  Social History     Socioeconomic History    Marital status:      Spouse name: Not on file    Number of children: Not on file    Years of education: Not on file    Highest education level: Not on file   Tobacco Use    Smoking status: Current Every Day Smoker     Packs/day: 0.50     Types: Cigarettes    Smokeless tobacco: Never Used   Substance and Sexual Activity    Alcohol use: Yes     Alcohol/week: 1.0 standard drinks     Types: 1 Cans of beer per week     Comment: rarely    Drug use: Not Currently    Sexual activity: Not Currently     Partners: Female     Birth control/protection: None       FH  History reviewed. No pertinent family history.     ALLERGIES  No Known Allergies    CURRENT MEDS  Current Facility-Administered Medications   Medication Dose Route Frequency Provider Last Rate Last Admin    potassium bicarb-citric acid (EFFER-K) tablet 40 mEq  40 mEq Oral NOW Paul Watters MD        magnesium oxide (MAG-OX) tablet 400 mg  400 mg Oral NOW Paul Watters MD        sodium chloride (NS) flush 5-40 mL  5-40 mL IntraVENous Q8H Mohamud Spicer MD   10 mL at 05/14/21 0810    sodium chloride (NS) flush 5-40 mL  5-40 mL IntraVENous PRN Paul Watters MD        polyethylene glycol (MIRALAX) packet 17 g  17 g Oral DAILY PRN Paul Watters MD        promethazine (PHENERGAN) tablet 12.5 mg  12.5 mg Oral Q6H PRN Paul Watters MD        Or    ondansetron (ZOFRAN) injection 4 mg  4 mg IntraVENous Q6H PRN Paul Watters MD        enoxaparin (LOVENOX) injection 40 mg  40 mg SubCUTAneous DAILY Paul Watters MD   40 mg at 05/14/21 0695    acetaminophen (TYLENOL) tablet 650 mg  650 mg Oral Q4H PRN Damaris Vizcaino MD   650 mg at 05/13/21 2141    Or    acetaminophen (TYLENOL) solution 650 mg  650 mg Per NG tube Q4H PRN Damaris Vizcaino MD        Or    acetaminophen (TYLENOL) suppository 650 mg  650 mg Rectal Q4H PRN Damaris Vizcaino MD        0.9% sodium chloride infusion  75 mL/hr IntraVENous CONTINUOUS Damaris Vizcaino MD 75 mL/hr at 05/14/21 0451 75 mL/hr at 05/14/21 0451    glucose chewable tablet 16 g  4 Tab Oral PRN Damaris Vizcaino MD        dextrose (D50W) injection syrg 12.5-25 g  25-50 mL IntraVENous PRN Damaris Vizcaino MD        glucagon (GLUCAGEN) injection 1 mg  1 mg IntraMUSCular PRN Damaris Vizcaino MD        insulin lispro (HUMALOG) injection 1-10 Units  1-10 Units SubCUTAneous AC&HS Damaris Vizcaino MD   Stopped at 05/13/21 2200    atorvastatin (LIPITOR) tablet 40 mg  40 mg Oral QHS Damaris Vizcaino MD   40 mg at 05/13/21 2141    aspirin chewable tablet 81 mg  81 mg Oral DAILY Mauri Clemons MD   81 mg at 05/14/21 0833       ROS  As per HPI      CLINICAL DATA REVIEW  IMAGING: (I personally reviewed these images in PACS )    LABS  Recent Results (from the past 48 hour(s))   EKG, 12 LEAD, INITIAL    Collection Time: 05/13/21  3:07 PM   Result Value Ref Range    Ventricular Rate 72 BPM    Atrial Rate 72 BPM    P-R Interval 170 ms    QRS Duration 82 ms    Q-T Interval 378 ms    QTC Calculation (Bezet) 413 ms    Calculated P Axis 70 degrees    Calculated R Axis 65 degrees    Calculated T Axis -152 degrees    Diagnosis       Normal sinus rhythm  Left ventricular hypertrophy with repolarization abnormality  Abnormal ECG  When compared with ECG of 13-FEB-2021 16:08,  premature atrial complexes are no longer present  Confirmed by Piper Johnson M.D., Les Beau ([id number]) on 5/14/2021 8:13:08 AM     GLUCOSE, POC    Collection Time: 05/13/21  3:09 PM   Result Value Ref Range    Glucose (POC) 147 (H) 65 - 117 mg/dL    Performed by Corazon Brower EDT    METABOLIC PANEL, COMPREHENSIVE    Collection Time: 05/13/21  3:13 PM   Result Value Ref Range    Sodium 139 136 - 145 mmol/L    Potassium 3.0 (L) 3.5 - 5.1 mmol/L    Chloride 100 97 - 108 mmol/L    CO2 32 21 - 32 mmol/L    Anion gap 7 5 - 15 mmol/L    Glucose 148 (H) 65 - 100 mg/dL    BUN 15 6 - 20 MG/DL    Creatinine 1.56 (H) 0.70 - 1.30 MG/DL    BUN/Creatinine ratio 10 (L) 12 - 20      GFR est AA 54 (L) >60 ml/min/1.73m2    GFR est non-AA 45 (L) >60 ml/min/1.73m2    Calcium 8.9 8.5 - 10.1 MG/DL    Bilirubin, total 0.4 0.2 - 1.0 MG/DL    ALT (SGPT) 20 12 - 78 U/L    AST (SGOT) 18 15 - 37 U/L    Alk. phosphatase 110 45 - 117 U/L    Protein, total 7.3 6.4 - 8.2 g/dL    Albumin 3.0 (L) 3.5 - 5.0 g/dL    Globulin 4.3 (H) 2.0 - 4.0 g/dL    A-G Ratio 0.7 (L) 1.1 - 2.2     CBC WITH AUTOMATED DIFF    Collection Time: 05/13/21  3:13 PM   Result Value Ref Range    WBC 6.9 4.1 - 11.1 K/uL    RBC 4.47 4.10 - 5.70 M/uL    HGB 14.2 12.1 - 17.0 g/dL    HCT 39.8 36.6 - 50.3 %    MCV 89.0 80.0 - 99.0 FL    MCH 31.8 26.0 - 34.0 PG    MCHC 35.7 30.0 - 36.5 g/dL    RDW 12.5 11.5 - 14.5 %    PLATELET 216 373 - 223 K/uL    MPV 10.6 8.9 - 12.9 FL    NRBC 0.0 0  WBC    ABSOLUTE NRBC 0.00 0.00 - 0.01 K/uL    NEUTROPHILS 55 32 - 75 %    LYMPHOCYTES 35 12 - 49 %    MONOCYTES 7 5 - 13 %    EOSINOPHILS 2 0 - 7 %    BASOPHILS 1 0 - 1 %    IMMATURE GRANULOCYTES 0 0.0 - 0.5 %    ABS. NEUTROPHILS 3.8 1.8 - 8.0 K/UL    ABS. LYMPHOCYTES 2.4 0.8 - 3.5 K/UL    ABS. MONOCYTES 0.5 0.0 - 1.0 K/UL    ABS. EOSINOPHILS 0.1 0.0 - 0.4 K/UL    ABS. BASOPHILS 0.1 0.0 - 0.1 K/UL    ABS. IMM.  GRANS. 0.0 0.00 - 0.04 K/UL    DF AUTOMATED     D DIMER    Collection Time: 05/13/21  3:13 PM   Result Value Ref Range    D-dimer 0.37 0.00 - 0.65 mg/L FEU   TROPONIN I    Collection Time: 05/13/21  3:13 PM   Result Value Ref Range    Troponin-I, Qt. <0.05 <0.05 ng/mL   CK W/ REFLX CKMB    Collection Time: 05/13/21  3:13 PM   Result Value Ref Range     39 - 308 U/L   HEMOGLOBIN A1C WITH EAG    Collection Time: 05/13/21  3:13 PM   Result Value Ref Range    Hemoglobin A1c 6.5 (H) 4.0 - 5.6 %    Est. average glucose 140 mg/dL   MAGNESIUM    Collection Time: 05/13/21  3:13 PM   Result Value Ref Range    Magnesium 1.7 1.6 - 2.4 mg/dL   VITAMIN B12    Collection Time: 05/13/21  3:13 PM   Result Value Ref Range    Vitamin B12 340 193 - 986 pg/mL   FOLATE    Collection Time: 05/13/21  3:13 PM   Result Value Ref Range    Folate 8.2 5.0 - 21.0 ng/mL   TSH 3RD GENERATION    Collection Time: 05/13/21  3:13 PM   Result Value Ref Range    TSH 1.50 0.36 - 3.74 uIU/mL   URINALYSIS W/ REFLEX CULTURE    Collection Time: 05/13/21  6:29 PM    Specimen: Urine   Result Value Ref Range    Color YELLOW/STRAW      Appearance CLEAR CLEAR      Specific gravity 1.015 1.003 - 1.030      pH (UA) 5.5 5.0 - 8.0      Protein 100 (A) NEG mg/dL    Glucose Negative NEG mg/dL    Ketone Negative NEG mg/dL    Bilirubin Negative NEG      Blood TRACE (A) NEG      Urobilinogen 1.0 0.2 - 1.0 EU/dL    Nitrites Negative NEG      Leukocyte Esterase Negative NEG      WBC 0-4 0 - 4 /hpf    RBC 0-5 0 - 5 /hpf    Epithelial cells FEW FEW /lpf    Bacteria Negative NEG /hpf    UA:UC IF INDICATED CULTURE NOT INDICATED BY UA RESULT CNI     EKG, 12 LEAD, SUBSEQUENT    Collection Time: 05/13/21  7:14 PM   Result Value Ref Range    Ventricular Rate 68 BPM    Atrial Rate 68 BPM    P-R Interval 166 ms    QRS Duration 84 ms    Q-T Interval 400 ms    QTC Calculation (Bezet) 425 ms    Calculated P Axis 53 degrees    Calculated R Axis 17 degrees    Calculated T Axis 148 degrees    Diagnosis       Normal sinus rhythm  Left ventricular hypertrophy with repolarization abnormality  When compared with ECG of 13-MAY-2021 15:07,    Confirmed by Albert Robertson M.D., Abelardo Angles ([id number]) on 5/14/2021 8:16:17 AM     TROPONIN I    Collection Time: 05/13/21  7:16 PM   Result Value Ref Range    Troponin-I, Qt. <0.05 <0.05 ng/mL   GLUCOSE, POC Collection Time: 05/13/21  9:31 PM   Result Value Ref Range    Glucose (POC) 120 (H) 65 - 117 mg/dL    Performed by Aram Macon (PCT)    TROPONIN I    Collection Time: 05/14/21 12:40 AM   Result Value Ref Range    Troponin-I, Qt. <0.05 <0.05 ng/mL   LIPID PANEL    Collection Time: 05/14/21  6:02 AM   Result Value Ref Range    Cholesterol, total 113 <200 MG/DL    Triglyceride 83 <150 MG/DL    HDL Cholesterol 38 MG/DL    LDL, calculated 58.4 0 - 100 MG/DL    VLDL, calculated 16.6 MG/DL    CHOL/HDL Ratio 3.0 0.0 - 5.0     TROPONIN I    Collection Time: 05/14/21  6:02 AM   Result Value Ref Range    Troponin-I, Qt. <0.05 <5.86 ng/mL   METABOLIC PANEL, BASIC    Collection Time: 05/14/21  6:02 AM   Result Value Ref Range    Sodium 142 136 - 145 mmol/L    Potassium 3.2 (L) 3.5 - 5.1 mmol/L    Chloride 106 97 - 108 mmol/L    CO2 28 21 - 32 mmol/L    Anion gap 8 5 - 15 mmol/L    Glucose 88 65 - 100 mg/dL    BUN 16 6 - 20 MG/DL    Creatinine 1.35 (H) 0.70 - 1.30 MG/DL    BUN/Creatinine ratio 12 12 - 20      GFR est AA >60 >60 ml/min/1.73m2    GFR est non-AA 53 (L) >60 ml/min/1.73m2    Calcium 8.4 (L) 8.5 - 10.1 MG/DL   GLUCOSE, POC    Collection Time: 05/14/21  7:39 AM   Result Value Ref Range    Glucose (POC) 112 65 - 117 mg/dL    Performed by [address]  Visit Vitals  BP (!) 140/63   Pulse (!) 59   Temp 97.7 °F (36.5 °C)   Resp 16   Ht 5' 8\" (1.727 m)   Wt 152 lb 11.2 oz (69.3 kg) Comment: pt wearing cloths and shoes   SpO2 100%   BMI 23.22 kg/m²     General:  Alert, cooperative, no distress. Head:  Normocephalic, without obvious abnormality, atraumatic. Eyes:  Conjunctivae/corneas clear. Pupils equal, round, reactive to light. Extraocular movements intact, VFF, NO papilledema   Lungs:  Heart:   Non labored breathing  Regular rate and rhythm, no carotid bruits   Abdomen:   Soft, non-distended   Extremities: Extremities normal, atraumatic, no cyanosis or edema.    Pulses: 2+ and symmetric all extremities. Skin: Skin color, texture, turgor normal. No rashes or lesions. Neurologic:  Gen: Attention normal             Language: naming, repetition, fluency normal             Memory: intact recent and remote memory  Cranial Nerves:  I: smell Not tested   II: visual fields Full to confrontation   II: pupils Equal, round, reactive to light   II: optic disc No papilledema   III,VII: ptosis none   III,IV,VI: extraocular muscles  Full ROM   V: mastication normal   V: facial light touch sensation  normal   VII: facial muscle function   symmetric   VIII: hearing symmetric   IX: soft palate elevation  normal   XI: trapezius strength  5/5   XI: sternocleidomastoid strength 5/5   XI: neck flexion strength  5/5   XII: tongue  midline     Motor: normal bulk and tone, no tremor              Strength: 4/5 right upper and lower extremity, 4/5 left lower extremity, 5-/5 left upper extremity. Sensory: Decreased sensation to LT, PP, vibration, and temperature right upper and lower extremity, left lower extremity  Coordination: FTN and HTS abnormal Rhomberg positive  Gait: Unsteady gait including tandem   Reflexes: 2+ throughout  Plantar: Mute     IMPRESSION:  This is a 69-year-old right-handed black man who is being evaluated for right-sided weakness and numbness and tingling sensation  We are dealing with most likely CVA involving the subcortical  Paresthesia  Neuropathy  Gait disorder      RECOMMENDATIONS:  MRI of the brain without gadolinium  MRA neck and brain  Aspirin 325 mg p.o. daily  Lipitor 20 mg p.o. nightly  TTE  Telemetry  Permissive HTN (SBP<180/<100)  Stroke labs (HgbA1c, TSH, lipid panel)  VTE prophylaxis:   Discussed the risk factors for stroke including cardiac, metabolic, genetic, endocrine and life style. Discussed the importance of identifying these risk factors and addressing the reversible risk factors. Discussed the preventive medications for stroke the benefits and risks of each.  Medications discussed and all questions answered. Thank you very much for this consultation.          Mimi Piña MD

## 2021-05-14 NOTE — PROGRESS NOTES
[facility] Admission Pharmacy Medication Reconciliation     Information obtained from: Patient  RxQuery data available1: Yes     Comments/recommendations:    1) Medication reconciliation performed with patient via telephone. Patients states last dose of all home medications was yesterday 5/13/21. Patient states he takes all his home medications once daily in the morning. 2) Medication changes (since last review): Added  None  Removed  None  Adjusted  Metformin-adjusted to once daily     3) The [place] Prescription Monitoring Program () was accessed to determine fill history of any controlled medications. Last filled hydrocodone/APAP 5-325 on 2/25/21.    1RxQuery pharmacy benefit data reflects medications filled and processed through the patient's insurance, however                this data does NOT capture whether the medication was picked up or is currently being taken by the patient. Patient allergies: Allergies as of 05/13/2021    (No Known Allergies)     Prior to Admission Medications   Prescriptions Last Dose Informant Patient Reported? Taking? amLODIPine (NORVASC) 10 mg tablet 5/13/2021 at Unknown time Self No Yes   Sig: Take 1 Tab by mouth daily. For blood pressure   atorvastatin (LIPITOR) 40 mg tablet 5/13/2021 at Unknown time Self No Yes   Sig: Take 1 Tab by mouth nightly. For cholesterol   hydroCHLOROthiazide (HYDRODIURIL) 12.5 mg tablet 5/13/2021 at Unknown time Self No Yes   Sig: Take 1 Tab by mouth daily. For blood pressure   losartan (COZAAR) 100 mg tablet 5/13/2021 at Unknown time Self No Yes   Sig: Take 1 Tab by mouth daily. For blood pressure   metFORMIN (GLUCOPHAGE) 1,000 mg tablet   Yes Yes   Sig: Take 1,000 mg by mouth daily.       Facility-Administered Medications: None       Thank you,     Debra Marcos, PharmD   Contact: [phone]

## 2021-05-14 NOTE — PROGRESS NOTES
0710) Bedside shift change report given to [facility], RN (oncoming nurse) by Radha Berrios RN (offgoing nurse). Report included the following information SBAR, Kardex and MAR.   4286) MRI plan for 0900  0740) Dr. Enio Duncan at bedside. 0802) pt drift on R arm and R leg. Pt report numbness R hand and bottoms of both feet. Pt history blurry vision L eye  0950) Pt downstairs to MRI  1350) Discuss with sister, pt colonscopy Dr. Jean Ritter May 20th,; Covid screening scheduled for Sunday (4 days before exam)   1945) Bedside shift change report given to Farzana Sanchez RN (oncoming nurse) by [facility], RN (offgoing nurse). Report included the following information SBAR, Kardex and MAR.

## 2021-05-14 NOTE — PROGRESS NOTES
Problem: Mobility Impaired (Adult and Pediatric)  Goal: *Acute Goals and Plan of Care (Insert Text)  Description:   FUNCTIONAL STATUS PRIOR TO ADMISSION: Patient was independent and active without use of DME.    HOME SUPPORT PRIOR TO ADMISSION: The patient lived with his wife and sister (split time between homes) but did not require assist. Both home are one story with 3-4 steps to enter and handrail. Physical Therapy Goals  Initiated 5/14/2021  1. Patient will move from supine to sit and sit to supine  in bed with supervision/set-up within 7 day(s). 2.  Patient will transfer from bed to chair and chair to bed with contact guard assist using the least restrictive device within 7 day(s). 3.  Patient will perform sit to stand with contact guard assist within 7 day(s). 4.  Patient will ambulate with minimal assistance for 100 feet with the least restrictive device within 7 day(s). 5.  Patient will improve Mak Balance score by 7 points within 7 days. Outcome: Not Met    PHYSICAL THERAPY EVALUATION- NEURO POPULATION  Patient: Milton Rain. (67 y.o. male)  Date: 5/14/2021  Primary Diagnosis: TIA (transient ischemic attack) [G45.9]        Precautions: fall risk         ASSESSMENT  Based on the objective data described below, the patient presents with RUE/RLE weakness, decreased RUE/RLE sensation, impaired vision and speech, decreased balance, and limited mobility. MRI positive for acute left parietal CVA. Patient previously independent with mobility and ADLs. Patient very pleasant and agreeable to therapy. Patient performed bed mobility with minimal assistance. He stood with minimal assistance, and transferred to chair with moderate assist. Patient ambulated 15 feet x2 with rolling walker and moderate assistance. Patient required one seated rest break in between trials. Patient able to clear RLE with increased manual and verbal cues for weight shifting.  Patient is an excellent candidate for inpatient rehab and will be able to tolerate 3 hours of intensive rehab per day. Current Level of Function Impacting Discharge (mobility/balance): min-mod A    Functional Outcome Measure: The patient scored Total: 8/56 on the [facility] Assessment which is indicative of high fall risk. Patient will benefit from skilled therapy intervention to address the above noted impairments. PLAN :  Recommendations and Planned Interventions: bed mobility training, transfer training, gait training, therapeutic exercises, neuromuscular re-education, patient and family training/education, and therapeutic activities      Frequency/Duration: Patient will be followed by physical therapy:  5 times a week to address goals. Recommendation for discharge: (in order for the patient to meet his/her long term goals)  Therapy 3 hours per day 5-7 days per week    This discharge recommendation:  Has been made in collaboration with the attending provider and/or case management    IF patient discharges home will need the following DME: to be determined (TBD)         SUBJECTIVE:   Patient stated It feels a little easier moving around now than it did earlier but it's not my normal.    OBJECTIVE DATA SUMMARY:   HISTORY:    Past Medical History:   Diagnosis Date    Diabetes ([facility] [address]     Fractured rib 03/2021    from a fall per pt on 5/11/2021    High cholesterol     Hypertension     per pt on 5/11/2021     Past Surgical History:   Procedure Laterality Date    HX ENDOSCOPY      per pt on 5/11/2021       Home Situation  Home Environment: Private residence  One/Two Story Residence: One story  Living Alone: No    EXAMINATION/PRESENTATION/DECISION MAKING:   Critical Behavior:  Neurologic State: Alert  Orientation Level: Oriented X4  Cognition: Follows commands     Hearing:   Auditory  Auditory Impairment: None    Range Of Motion:  AROM: Generally decreased, functional    PROM: Within functional limits    Strength:    Strength: Generally decreased, functional(right UE/LE weaker than left)     Tone & Sensation:   Tone: Abnormal  Sensation: Impaired(RUE/RLE impaired)     Coordination:  Coordination: Generally decreased, functional(mildly impaired)  Alt palms and toe taps WNL    Functional Mobility:  Bed Mobility:     Supine to Sit: Minimum assistance;Assist x1  Sit to Supine: Minimum assistance;Assist x1  Scooting: Contact guard assistance;Assist x1    Transfers:  Sit to Stand: Minimum assistance;Assist x1  Stand to Sit: Minimum assistance;Assist x1  Bed to Chair: Moderate assistance;Assist x1     Balance:   Sitting: Impaired; Without support  Sitting - Static: Good (unsupported)  Sitting - Dynamic: Fair (occasional)  Standing: Impaired; Without support  Standing - Static: Fair  Standing - Dynamic : Fair;Poor    Ambulation/Gait Training:  Distance (ft): 15 Feet (ft)(X2)  Assistive Device: Gait belt;Walker, rolling  Ambulation - Level of Assistance:  Moderate assistance;Assist x1  Gait Description (WDL): Exceptions to WDL  Gait Abnormalities: Decreased step clearance;Trunk sway increased  Base of Support: Narrowed  Stance: Right decreased  Speed/Marie: Slow  Step Length: Right shortened;Left shortened  Swing Pattern: Right asymmetrical      Functional Measure  Mak Balance Test:    Sitting to Standin  Standing Unsupported: 1  Sitting with Back Unsupported: 3  Standing to Sittin  Transfers: 1  Standing Unsupported with Eyes Closed: 0  Standing Unsupported with Feet Together: 0  Reach Forward with Outstretched Arm: 1   Object: 1  Turn to Look Over Shoulders: 0  Turn 360 Degrees: 0  Alternate Foot on Step/Stool: 0  Standing Unsupported One Foot in Front: 0  Stand on One Le  Total:          56=Maximum possible score;   0-20=High fall risk  21-40=Moderate fall risk   41-56=Low fall risk       Based on the above components, the patient evaluation is determined to be of the following complexity level: HIGH     Pain Rating:  No pain    Activity Tolerance:   Fair      After treatment patient left in no apparent distress:   Supine in bed, Call bell within reach, Bed / chair alarm activated, and Side rails x 3    COMMUNICATION/EDUCATION:   The patients plan of care was discussed with: Registered nurse. Patient and/or family was verbally educated on the BE FAST acronym for signs/symptoms of CVA and TIA. BE FAST was written on patient's communication board  for visual education and reinforcement. All questions answered with patient indicating good understanding. Fall prevention education was provided and the patient/caregiver indicated understanding., Patient/family have participated as able in goal setting and plan of care. , and Patient/family agree to work toward stated goals and plan of care.     Thank you for this referral.  Osvaldo Dodson, PT   Time Calculation: 25 mins

## 2021-05-14 NOTE — PROGRESS NOTES
..Stroke Education provided to patient and the following topics were discussed    1. Patients personal risk factors for stroke are hypertension, smoking, family history, diabetes mellitus and prior stroke    2. Warning signs of Stroke:        * Sudden numbness or weakness of the face, arm or leg, especially on one side of          The body            * Sudden confusion, trouble speaking or understanding        * Sudden trouble seeing in one or both eyes        * Sudden trouble walking, dizziness, loss of balance or coordination        * Sudden severe headache with no known cause      3. Importance of activation Emergency Medical Services ( 9-1-1 ) immediately if experience any warning signs of stroke. 4. Be sure and schedule a follow-up appointment with your primary care doctor or any specialists as instructed. 5. You must take medicine every day to treat your risk factors for stroke. Be sure to take your medicines exactly as your doctor tells you: no more, no less. Know what your medicines are for , what they do. Anti-thrombotics /anticoagulants can help prevent strokes. You are taking the following medicine(s)  aspirin     6. Smoking and second-hand smoke greatly increase your risk of stroke, cardiovascular disease and death. Smoking history cigarettes, 4 per day for \"a while\"    7. Information provided was Stroke Handouts    8. Documentation of teaching completed in Patient Education Activity and on Care Plan with teaching response noted?   yes

## 2021-05-15 LAB
25(OH)D3 SERPL-MCNC: 9.2 NG/ML (ref 30–100)
ACE SERPL-CCNC: 25 U/L (ref 14–82)
ALDOLASE SERPL-CCNC: 3.2 U/L (ref 3.3–10.3)
ANA SER QL: NEGATIVE
ANION GAP SERPL CALC-SCNC: 8 MMOL/L (ref 5–15)
BUN SERPL-MCNC: 15 MG/DL (ref 6–20)
BUN/CREAT SERPL: 13 (ref 12–20)
CALCIUM SERPL-MCNC: 8.5 MG/DL (ref 8.5–10.1)
CHLORIDE SERPL-SCNC: 105 MMOL/L (ref 97–108)
CO2 SERPL-SCNC: 29 MMOL/L (ref 21–32)
CREAT SERPL-MCNC: 1.16 MG/DL (ref 0.7–1.3)
CRP SERPL HS-MCNC: 2.4 MG/L
ERYTHROCYTE [SEDIMENTATION RATE] IN BLOOD: 49 MM/HR (ref 0–20)
GLUCOSE BLD STRIP.AUTO-MCNC: 109 MG/DL (ref 65–117)
GLUCOSE BLD STRIP.AUTO-MCNC: 118 MG/DL (ref 65–117)
GLUCOSE BLD STRIP.AUTO-MCNC: 135 MG/DL (ref 65–117)
GLUCOSE BLD STRIP.AUTO-MCNC: 146 MG/DL (ref 65–117)
GLUCOSE SERPL-MCNC: 108 MG/DL (ref 65–100)
HCYS SERPL-SCNC: 15.2 UMOL/L (ref 3.7–13.9)
POTASSIUM SERPL-SCNC: 3.6 MMOL/L (ref 3.5–5.1)
SERVICE CMNT-IMP: ABNORMAL
SERVICE CMNT-IMP: NORMAL
SODIUM SERPL-SCNC: 142 MMOL/L (ref 136–145)

## 2021-05-15 PROCEDURE — 85652 RBC SED RATE AUTOMATED: CPT

## 2021-05-15 PROCEDURE — 82962 GLUCOSE BLOOD TEST: CPT

## 2021-05-15 PROCEDURE — 36415 COLL VENOUS BLD VENIPUNCTURE: CPT

## 2021-05-15 PROCEDURE — 97535 SELF CARE MNGMENT TRAINING: CPT | Performed by: OCCUPATIONAL THERAPIST

## 2021-05-15 PROCEDURE — 99218 HC RM OBSERVATION: CPT

## 2021-05-15 PROCEDURE — 80048 BASIC METABOLIC PNL TOTAL CA: CPT

## 2021-05-15 PROCEDURE — 96372 THER/PROPH/DIAG INJ SC/IM: CPT

## 2021-05-15 PROCEDURE — 74011250637 HC RX REV CODE- 250/637: Performed by: STUDENT IN AN ORGANIZED HEALTH CARE EDUCATION/TRAINING PROGRAM

## 2021-05-15 PROCEDURE — 74011250637 HC RX REV CODE- 250/637: Performed by: INTERNAL MEDICINE

## 2021-05-15 PROCEDURE — 83090 ASSAY OF HOMOCYSTEINE: CPT

## 2021-05-15 PROCEDURE — 86141 C-REACTIVE PROTEIN HS: CPT

## 2021-05-15 PROCEDURE — 74011250636 HC RX REV CODE- 250/636: Performed by: STUDENT IN AN ORGANIZED HEALTH CARE EDUCATION/TRAINING PROGRAM

## 2021-05-15 PROCEDURE — 97165 OT EVAL LOW COMPLEX 30 MIN: CPT | Performed by: OCCUPATIONAL THERAPIST

## 2021-05-15 RX ORDER — ERGOCALCIFEROL 1.25 MG/1
50000 CAPSULE ORAL
Status: DISCONTINUED | OUTPATIENT
Start: 2021-05-15 | End: 2021-05-18 | Stop reason: HOSPADM

## 2021-05-15 RX ORDER — LOSARTAN POTASSIUM 50 MG/1
50 TABLET ORAL DAILY
Status: DISCONTINUED | OUTPATIENT
Start: 2021-05-15 | End: 2021-05-17

## 2021-05-15 RX ADMIN — AMLODIPINE BESYLATE 10 MG: 5 TABLET ORAL at 08:19

## 2021-05-15 RX ADMIN — POLYETHYLENE GLYCOL 3350 17 G: 17 POWDER, FOR SOLUTION ORAL at 15:13

## 2021-05-15 RX ADMIN — ACETAMINOPHEN 650 MG: 325 TABLET ORAL at 15:13

## 2021-05-15 RX ADMIN — Medication 10 ML: at 21:28

## 2021-05-15 RX ADMIN — LOSARTAN POTASSIUM 50 MG: 50 TABLET, FILM COATED ORAL at 08:20

## 2021-05-15 RX ADMIN — ERGOCALCIFEROL 50000 UNITS: 1.25 CAPSULE ORAL at 08:21

## 2021-05-15 RX ADMIN — ENOXAPARIN SODIUM 40 MG: 40 INJECTION SUBCUTANEOUS at 08:20

## 2021-05-15 RX ADMIN — ATORVASTATIN CALCIUM 80 MG: 40 TABLET, FILM COATED ORAL at 21:28

## 2021-05-15 RX ADMIN — ASPIRIN 81 MG CHEWABLE TABLET 81 MG: 81 TABLET CHEWABLE at 08:20

## 2021-05-15 NOTE — PROGRESS NOTES
Hospitalist Progress Note    NAME: Reena Diaz. :  1955   MRN:  [id number]   Room Number:  241/40  @ [facility]       Interim Hospital Summary: 76 y.o. male whom presented on 2021 with      Assessment / Plan:          #Acute left parietal white matter infarct   # Gait instability d/t No 1   # LE weakness w/ tingling d/t No 1  -symptoms > 24 hours per patient   [facility] score 4 ( not a candidate for DAPT as in point trial [facility] used was < 3)   - CTH neg for acute change   - CT spine w/ Disc bulges L3-S1 with disc space narrowing L4-5. Multilevel facet arthropathy  - MRI Acute left parietal white matter infarct. No hemorrhage or mass effect.  Moderate nonspecific white matter changes, remote right parietal white matter  Infarcts.  - MRI lumbar spine: Mild multilevel degenerative change with no more than mild foraminal narrowing as detailed by level above  -EKG reviewed independently normal sinus rhythm  -ASA 81 mg   -High dose statin   - LDL 58 , A1c 6.6 and TSH 1.50   -neurology consulted  - TTE w/o shunt   - US carotid < 50 % stenosis B/l   - PT/OT recommended rehab   -  B12 340      # EKG changes in telemetry    - D/w Dr Davion Macdonald Cardiology, No Changes in 12 lead  - Trop neg   - No further w/u needed at this time      # T2 DM   - A1c 7.4 -    - Metformin at home   - Lispro correctional scale, FSG AC HS  - Consistent carb diet, hypoglycemia protocol.      # Hypokalemia resolved   - k 3.0 > 3.2 > 3.6   - replete     # Hypertension   - Amlodipine, HCTZ and losartan at home   - amlodipine and losartan resumed      # HENRRY resolved   - Scr 1.56 > 1.35 > 1.16 ( baseline 1.17)   - IV hydration   - I/O   - avoid nephrotoxic medication   - renally dose medication      #Tobacco use  -Half pack a day for 16 years  - Patient was counseled extensively on the need to abstain from tobacco, its addictive tendencies, its deleterious effects on the lungs, cardiovascular  as well as its financial & social sequelae                 Body mass index is 23.11 kg/m². Code Status: Full   Surrogate Decision Maker:  Shaquille Maldonado Spouse [phone]            DVT Prophylaxis: Lovenox  GI Prophylaxis: not indicated  Baseline:  Walks by himself             Subjective:     Chief Complaint / Reason for Physician Visit  Some numbness on right lips  Discussed with RN events overnight. Review of Systems:  No fevers, chills, appetite change, cough, sputum production, shortness of breath, dyspnea on exertion, nausea, vomitting, diarrhea, constipation, chest pain, leg edema, abdominal pain, joint pain, rash, itching. Tolerating PT/OT. Tolerating diet. Objective:     VITALS:   Last 24hrs VS reviewed since prior progress note. Most recent are:  Patient Vitals for the past 24 hrs:   Temp Pulse Resp BP SpO2   05/15/21 0400 97.9 °F (36.6 °C) (!) 58 18 139/89 100 %   05/15/21 0000 98 °F (36.7 °C) 70 20 (!) 149/78 100 %   05/14/21 2000 98.5 °F (36.9 °C) 68 18 135/66 100 %   05/14/21 1546 98.5 °F (36.9 °C) 60 16 (!) 156/72 96 %   05/14/21 1250 (!) 96 °F (35.6 °C) 66 20 (!) 161/82 98 %       Intake/Output Summary (Last 24 hours) at 5/15/2021 0852  Last data filed at 5/15/2021 5827  Gross per 24 hour   Intake --   Output 595 ml   Net -595 ml        PHYSICAL EXAM:  General: WD, WN. Alert, cooperative, no acute distress    EENT:  EOMI. Anicteric sclerae. MMM  Resp:  CTA bilaterally, no wheezing or rales. No accessory muscle use  CV:  Regular  rhythm,  normal S1/S2, no murmurs rubs gallops, No edema  GI:  Soft, Non distended, Non tender. +Bowel sounds  Neurologic:  Alert and oriented X 3, normal speech,   Psych:   Good insight. Not anxious nor agitated  Skin:  No rashes.   No jaundice    Reviewed most current lab test results and cultures  YES  Reviewed most current radiology test results   YES  Review and summation of old records today    NO  Reviewed patient's current orders and MAR    YES  PMH/SH reviewed - no change compared to H&P  ________________________________________________________________________  Care Plan discussed with:    Comments   Patient x    Family      RN x    Care Manager x    Consultant                       x Multidiciplinary team rounds were held today with , nursing, pharmacist and clinical coordinator. Patient's plan of care was discussed; medications were reviewed and discharge planning was addressed. ________________________________________________________________________  Total NON critical care TIME:  25   Minutes    Total CRITICAL CARE TIME Spent:   Minutes non procedure based      Comments   >50% of visit spent in counseling and coordination of care x    ________________________________________________________________________  Terrance Modi MD     Procedures: see electronic medical records for all procedures/Xrays and details which were not copied into this note but were reviewed prior to creation of Plan. LABS:  I reviewed today's most current labs and imaging studies.   Pertinent labs include:  Recent Labs     05/13/21  1513   WBC 6.9   HGB 14.2   HCT 39.8        Recent Labs     05/15/21  0336 05/14/21  0602 05/13/21  1513    142 139   K 3.6 3.2* 3.0*    106 100   CO2 29 28 32   * 88 148*   BUN 15 16 15   CREA 1.16 1.35* 1.56*   CA 8.5 8.4* 8.9   MG  --   --  1.7   ALB  --   --  3.0*   TBILI  --   --  0.4   ALT  --   --  20       Signed: Terrance Modi MD

## 2021-05-15 NOTE — PROGRESS NOTES
Problem: Self Care Deficits Care Plan (Adult)  Goal: *Acute Goals and Plan of Care (Insert Text)  Description: FUNCTIONAL STATUS PRIOR TO ADMISSION: Patient was independent and active without use of DME. He does not drive. HOME SUPPORT: Wife and sister available to assist    Occupational Therapy Goals  Initiated 5/15/2021  1. Patient will perform grooming standing at the sink with supervision/set-up within 7 day(s). 2.  Patient will perform bathing with supervision/set-up within 7 day(s). 3.  Patient will perform lower body dressing with supervision/set-up within 7 day(s). 4.  Patient will perform toilet transfers with supervision/set-up within 7 day(s). 5.  Patient will perform all aspects of toileting with supervision within 7 day(s). 6.  Patient will improve their Fugl Dutta score by 5 points in prep for ADLs within 7 days. Outcome: Progressing Towards Goal    OCCUPATIONAL THERAPY EVALUATION  Patient: Kaleb Moss (67 y.o. male)  Date: 5/15/2021  Primary Diagnosis: TIA (transient ischemic attack) [G45.9]        Precautions: Fall       ASSESSMENT  Based on the objective data described below, the patient presents with deficits in self-care, primarily due to impaired R dominant UE coordination/function, impaired balance (sitting and standing), decreased activity tolerance, and decreased safety. Patient presents with an intermittent R lean, both when sitting and standing. He is typically able to self-correct with cues, but occasionally requires Min A to correct, especially during dynamic tasks. Unable to formally assess vision due to decreased visual attention, but he may be having increased difficulty on the right side of his visual field. Patient is functioning well below his baseline level and will benefit from skilled OT treatment to maximize independence and safety in ADL. He is an excellent candidate for inpatient rehab. Current Level of Function Impacting Discharge (ADLs/self-care):  Mod A to SBA    Functional Outcome Measure: The patient scored Total A-D  Total A-D (Motor Function): 60/66 on the Fugl-Dutta Assessment which is indicative of mild impairment in upper extremity functional status. Other factors to consider for discharge: below baseline     Patient will benefit from skilled therapy intervention to address the above noted impairments. PLAN :  Recommendations and Planned Interventions: self care training, functional mobility training, therapeutic exercise, balance training, visual/perceptual training, therapeutic activities, endurance activities, neuromuscular re-education, patient education, home safety training, and family training/education    Frequency/Duration: Patient will be followed by occupational therapy 4 times a week to address goals. Recommendation for discharge: (in order for the patient to meet his/her long term goals)  Therapy 3 hours per day 5-7 days per week    This discharge recommendation:  A follow-up discussion with the attending provider and/or case management is planned    IF patient discharges home will need the following DME: Defer to rehab facility       SUBJECTIVE:   Patient stated I'm pretty good.     OBJECTIVE DATA SUMMARY:   HISTORY:   Past Medical History:   Diagnosis Date    Diabetes ([facility] [address]     Fractured rib 03/2021    from a fall per pt on 5/11/2021    High cholesterol     Hypertension     per pt on 5/11/2021     Past Surgical History:   Procedure Laterality Date    HX ENDOSCOPY      per pt on 5/11/2021     Expanded or extensive additional review of patient history:     Home Situation  Home Environment: Private residence  One/Two Story Residence: One story  Living Alone: No    Hand dominance: Right    EXAMINATION OF PERFORMANCE DEFICITS:  Cognitive/Behavioral Status:     Orientation Level: Oriented X4  Cognition: Follows commands;Poor safety awareness  Perception: Cues to maintain midline in sitting;Cues to maintain midline in standing  Perseveration: No perseveration noted  Safety/Judgement: Decreased awareness of need for safety    Hearing: Auditory  Auditory Impairment: None    Vision/Perceptual:    Unable to formally assess due to decreased visual attention                                Range of Motion:  AROM: Generally decreased, functional                         Strength:  Strength: Generally decreased, functional                Coordination:     Fine Motor Skills-Upper: Left Intact; Right Impaired    Gross Motor Skills-Upper: Left Intact; Right Impaired    Tone & Sensation:  Tone: Abnormal  Sensation: Impaired                      Balance:  Sitting: Impaired  Sitting - Static: Good (unsupported)  Sitting - Dynamic: Fair (occasional)  Standing: Impaired  Standing - Static: Fair  Standing - Dynamic : Fair;Constant support    Functional Mobility and Transfers for ADLs:  Bed Mobility:  Supine to Sit: Contact guard assistance  Sit to Supine: Minimum assistance(for R LE)  Scooting: Contact guard assistance    Transfers:  Sit to Stand: Contact guard assistance;Minimum assistance(varies)  Stand to Sit: Contact guard assistance  Bed to Chair: Minimum assistance  Toilet Transfer : Minimum assistance    ADL Assessment:  Feeding: Setup(assistance cutting and opening containers)    Oral Facial Hygiene/Grooming: Minimum assistance    Bathing: Minimum assistance    Upper Body Dressing: Supervision    Lower Body Dressing: Moderate assistance    Toileting: Moderate assistance                ADL Intervention and task modifications:  Patient instructed and indicated understanding the benefits of maintaining activity tolerance, functional mobility, and independence with self care tasks during acute stay  to ensure safe return home and to baseline.  Encouraged patient to increase frequency and duration OOB, be out of bed for all meals, perform daily ADLs (as approved by RN/MD regarding bathing etc), and performing functional mobility to/from bathroom (with assistance). Pt educated on safe transfer techniques, with specific emphasis on proper hand placement to push up from seated surface rather than attempt to pull self up, fully positioning self in-front of desired seated location, feeling chair on back of legs and reaching back with 1-2 UE to slowly lower self to seated position. Cognitive Retraining  Safety/Judgement: Decreased awareness of need for safety    Functional Measure:  Fugl-Dutta Assessment of Motor Recovery after Stroke:   Reflex Activity  Flexors/Biceps/Fingers: Can be elicited  Extensors/Triceps: Can be elicited  Reflex Subtotal: 4    Volitional Movement Within Synergies  Shoulder Retraction: Full  Shoulder Elevation: Full  Shoulder Abduction (90 degrees): Full  Shoulder External Rotation: Full  Elbow Flexion: Full  Forearm Supination: Full  Shoulder Adduction/Internal Rotation: Full  Elbow Extension: Full  Forearm Pronation: Full  Subtotal: 18    Volitional Movement Mixing Synergies  Hand to Lumbar Spine: Full  Shoulder Flexion (0-90 degrees): Full  Pronation-Supination: Full  Subtotal: 6    Volitional Movement With Little or No Synergy  Shoulder Abduction (0-90 degrees): Full  Shoulder Flexion ( degrees): Full  Pronation/Supination: Full  Subtotal : 6    Normal Reflex Activity  Biceps, Triceps, Finger Flexors:  Full  Subtotal : 2    Upper Extremity Total   Upper Extremity Total: 36    Wrist  Stability at 15 Degree Dorsiflexion: Full  Repeated Dorsiflexion/ Volar Flexion: Full  Stability at 15 Degree Dorsiflexion: Full  Repeated Dorsiflexion/ Volar Flexion: Full  Circumduction: Partial  Wrist Total: 9    Hand  Mass Flexion: Full  Mass Extension: Full  Grasp A: Partial  Grasp B: Full  Grasp C: Partial  Grasp D: Full  Grasp E: Full  Hand Total: 12    Coordination/Speed  Tremor: None  Dysmetria: Marked  Time: 2-5s  Coordination/Speed Total : 3    Total A-D  Total A-D (Motor Function): 60/66     This is a reliable/valid measure of arm function after a neurological event. It has established value to characterize functional status and for measuring spontaneous and therapy-induced recovery; tests proximal and distal motor functions. Fugl-Dutta Assessment - UE scores recorded between five and 30 days post neurologic event can be used to predict UE recovery at six months post neurologic event. Severe = 0-21 points   Moderately Severe = 22-33 points   Moderate = 34-47 points   Mild = 48-66 points  ESTEVAN Villalpando, MARTHA Patiño, & PEG Starr (1992). Measurement of motor recovery after stroke: Outcome assessment and sample size requirements.  Stroke, 23, pp. 1039-3355.   ------------------------------------------------------------------------------------------------------------------------------------------------------------------  MCID:  Stroke:   Stan Gee et al, 2001; n = 171; mean age 79 (6) years; assessed within 16 (12) days of stroke, Acute Stroke)  FMA Motor Scores from Admission to Discharge   10 point increase in FMA Upper Extremity = 1.5 change in discharge FIM   10 point increase in FMA Lower Extremity = 1.9 change in discharge FIM  MDC:   Stroke:   Sonido Angulo et al, 2008, n = 14, mean age = 59.9 (14.6) years, assessed on average 14 (6.5) months post stroke, Chronic Stroke)   FMA = 5.2 points for the Upper Extremity portion of the assessment     Occupational Therapy Evaluation Charge Determination   History Examination Decision-Making   LOW Complexity : Brief history review  HIGH Complexity : 5 or more performance deficits relating to physical, cognitive , or psychosocial skils that result in activity limitations and / or participation restrictions LOW Complexity : No comorbidities that affect functional and no verbal or physical assistance needed to complete eval tasks       Based on the above components, the patient evaluation is determined to be of the following complexity level: LOW   Pain Rating:  No complaints    Activity Tolerance:   Fair    After treatment patient left in no apparent distress:    Supine in bed and Call bell within reach    COMMUNICATION/EDUCATION:   The patients plan of care was discussed with: Registered nurse. This patients plan of care is appropriate for delegation to [facility].     Thank you for this referral.  Zachary Alcantara, OTR/L  Time Calculation: 26 mins

## 2021-05-15 NOTE — PROGRESS NOTES
Cardiac Monitoring on 5.14.21  from [id number]    Tele Strip on 5.14.21 at 1900 - SR with ST depression. Day shift RN stated that Cardio is aware of the depression and r/o ischemia. Night RN advised.                Tele Strip on 5.15.21 at 0300 - SR with ST depression

## 2021-05-15 NOTE — PROGRESS NOTES
1930-  shift change report given to Herb Henson RN (oncoming nurse) by Navin Torres RN  (offgoing nurse). Report included the following information SBAR, Kardex and MAR. Patient does not call out for assistance despite repeat instructions. Bed alarm on at all times. High risk for falls. Patient sits on side of the bed and uses urinal, needs assistance, body drifts to right side. BG- 95. HS snacks provided per request.     Patient pulled IV out notes \"It was itching. \"  IV not replaced at this time patient not getting any IV meds. Rested most of shift. No complaints. Labs drawn this am.    0730  shift change report given to , Brandy Presley (oncoming nurse) by Herb Henson RN (offgoing nurse). Report included the following information SBAR, Kardex and MAR .

## 2021-05-15 NOTE — PROGRESS NOTES
Problem: Falls - Risk of  Goal: *Absence of Falls  Description: Document Katy Delgado Fall Risk and appropriate interventions in the flowsheet.   Outcome: Progressing Towards Goal  Note: Fall Risk Interventions:  Mobility Interventions: Bed/chair exit alarm         Medication Interventions: Bed/chair exit alarm    Elimination Interventions: Bed/chair exit alarm    History of Falls Interventions: Bed/chair exit alarm         Problem: TIA/CVA Stroke: 0-24 hours  Goal: Activity/Safety  Outcome: Progressing Towards Goal

## 2021-05-15 NOTE — PROGRESS NOTES
Bedside report received from [facility], pt resting comfortable in bed, awake, no pain or distress noted. \    0900-Pt vitals stable with the exception of elevated systolic pressure. Pt blood sugar stable no correction needed. Pt consumed breakfast 80%; 220 ml fluid consumed neur checks completed    1130-Pt blood sugar stable no correction needed    1217-neuro check 3    1335-Pt consuming lunch, encouraged and educated on diet, and lifestyle change,  pt visiting with sister. 1452-Pt c/o back pain 3/10, no tenderness, no redness noted. Pt states pain usually occurs d/t lack of movement. Prn tylenol 650 mg administered. 1500-pt states no bowel movement x3 days, prn given for mild constipation, fluids encouraged. Pt resting in bed comfortable at this. 1533-Pt vitals stable, pt eating crackers, and consuming fluids no pain or distress noted at this time. 1730-Pt blood sugar stable no correction needed, new IV access placed, pt consumed dinner 60% consumjed fluids 50 ml    Bedside and Verbal shift change report given to Colt Vance rn  (oncoming nurse) by Arlyn Eisenberg (offgoing nurse). Report included the following information SBAR, Intake/Output, MAR, Recent Results and Med Rec Status.

## 2021-05-16 ENCOUNTER — HOSPITAL ENCOUNTER (OUTPATIENT)
Dept: PREADMISSION TESTING | Age: 66
Discharge: HOME | End: 2021-05-16

## 2021-05-16 PROBLEM — I63.9 STROKE (HCC): Status: ACTIVE | Noted: 2021-05-16

## 2021-05-16 LAB
GLUCOSE BLD STRIP.AUTO-MCNC: 111 MG/DL (ref 65–117)
GLUCOSE BLD STRIP.AUTO-MCNC: 138 MG/DL (ref 65–117)
GLUCOSE BLD STRIP.AUTO-MCNC: 88 MG/DL (ref 65–117)
GLUCOSE BLD STRIP.AUTO-MCNC: 90 MG/DL (ref 65–117)
RPR SER QL: NONREACTIVE
SERVICE CMNT-IMP: ABNORMAL
SERVICE CMNT-IMP: NORMAL

## 2021-05-16 PROCEDURE — 74011250637 HC RX REV CODE- 250/637: Performed by: STUDENT IN AN ORGANIZED HEALTH CARE EDUCATION/TRAINING PROGRAM

## 2021-05-16 PROCEDURE — 99218 HC RM OBSERVATION: CPT

## 2021-05-16 PROCEDURE — 65270000032 HC RM SEMIPRIVATE

## 2021-05-16 PROCEDURE — 82962 GLUCOSE BLOOD TEST: CPT

## 2021-05-16 PROCEDURE — 74011250637 HC RX REV CODE- 250/637: Performed by: INTERNAL MEDICINE

## 2021-05-16 PROCEDURE — 96372 THER/PROPH/DIAG INJ SC/IM: CPT

## 2021-05-16 PROCEDURE — 97116 GAIT TRAINING THERAPY: CPT | Performed by: PHYSICAL THERAPIST

## 2021-05-16 PROCEDURE — 74011250636 HC RX REV CODE- 250/636: Performed by: STUDENT IN AN ORGANIZED HEALTH CARE EDUCATION/TRAINING PROGRAM

## 2021-05-16 RX ADMIN — ATORVASTATIN CALCIUM 80 MG: 40 TABLET, FILM COATED ORAL at 21:53

## 2021-05-16 RX ADMIN — Medication 10 ML: at 21:54

## 2021-05-16 RX ADMIN — Medication 10 ML: at 16:01

## 2021-05-16 RX ADMIN — AMLODIPINE BESYLATE 10 MG: 5 TABLET ORAL at 08:09

## 2021-05-16 RX ADMIN — ASPIRIN 81 MG CHEWABLE TABLET 81 MG: 81 TABLET CHEWABLE at 08:09

## 2021-05-16 RX ADMIN — Medication 10 ML: at 06:16

## 2021-05-16 RX ADMIN — ENOXAPARIN SODIUM 40 MG: 40 INJECTION SUBCUTANEOUS at 08:10

## 2021-05-16 RX ADMIN — POLYETHYLENE GLYCOL 3350 17 G: 17 POWDER, FOR SOLUTION ORAL at 08:45

## 2021-05-16 RX ADMIN — LOSARTAN POTASSIUM 50 MG: 50 TABLET, FILM COATED ORAL at 08:09

## 2021-05-16 NOTE — PROGRESS NOTES
Problem: Mobility Impaired (Adult and Pediatric)  Goal: *Acute Goals and Plan of Care (Insert Text)  Description:   FUNCTIONAL STATUS PRIOR TO ADMISSION: Patient was independent and active without use of DME.    HOME SUPPORT PRIOR TO ADMISSION: The patient lived with his wife and sister (split time between homes) but did not require assist. Both home are one story with 3-4 steps to enter and handrail. Physical Therapy Goals  Initiated 5/14/2021  1. Patient will move from supine to sit and sit to supine  in bed with supervision/set-up within 7 day(s). 2.  Patient will transfer from bed to chair and chair to bed with contact guard assist using the least restrictive device within 7 day(s). 3.  Patient will perform sit to stand with contact guard assist within 7 day(s). 4.  Patient will ambulate with minimal assistance for 100 feet with the least restrictive device within 7 day(s). 5.  Patient will improve Mak Balance score by 7 points within 7 days. Outcome: Progressing Towards Goal    PHYSICAL THERAPY TREATMENT  Patient: Sabrina Banks (67 y.o. male)  Date: 5/16/2021  Diagnosis: TIA (transient ischemic attack) [G45.9] <principal problem not specified>       Precautions:    Chart, physical therapy assessment, plan of care and goals were reviewed. ASSESSMENT  Patient continues with skilled PT services and is progressing towards goals. Patient continues to present with decreased RUE/RLE strength, balance, activity tolerance, and overall functional mobility. Impaired vision and speech noted. Patient performed sit to stand transfer with minimal to moderate assistance due to posterior lean at times. Patient required frequent cues for hand placement with transfers. He ambulated 25 feet x2 with walker and moderate assistance. One seated rest break in between gait trials. Patient required verbal and manual cues for sequencing of steps with walker and weight shifting to improve balance and safety. Patient is an excellent candidate for inpatient rehab as he would be able to tolerate 3 hours of intensive therapy per day in order to return to independent prior level of function. Current Level of Function Impacting Discharge (mobility/balance): min-mod A           PLAN :  Patient continues to benefit from skilled intervention to address the above impairments. Continue treatment per established plan of care. to address goals. Recommendation for discharge: (in order for the patient to meet his/her long term goals)  Therapy 3 hours per day 5-7 days per week    This discharge recommendation:  Has been made in collaboration with the attending provider and/or case management    IF patient discharges home will need the following DME: to be determined (TBD)       SUBJECTIVE:   Patient stated It's still hard to move around.     OBJECTIVE DATA SUMMARY:   Critical Behavior:  Neurologic State: Alert  Orientation Level: Oriented to person, Oriented to place, Oriented to time  Cognition: Follows commands  Safety/Judgement: Decreased awareness of need for safety    Functional Mobility Training:  Bed Mobility:     Supine to Sit: Contact guard assistance  Sit to Supine: Minimum assistance  Scooting: Contact guard assistance        Transfers:  Sit to Stand: Minimum assistance; Moderate assistance;Assist x1  Stand to Sit: Moderate assistance;Assist x1        Bed to Chair: Moderate assistance;Assist x1    Balance:  Sitting: Impaired  Sitting - Static: Good (unsupported)  Sitting - Dynamic: Fair (occasional)  Standing: Impaired; With support  Standing - Static: Fair;Constant support  Standing - Dynamic : Fair;Constant support    Ambulation/Gait Training:  Distance (ft): 25 Feet (ft)(X2)  Assistive Device: Gait belt;Walker, rolling  Ambulation - Level of Assistance:  Moderate assistance;Assist x1    Gait Abnormalities: Decreased step clearance    Base of Support: Narrowed     Speed/Marie: Slow  Step Length: Right shortened;Left shortened  Swing Pattern: Right asymmetrical    Pain Rating:  No pain    Activity Tolerance:   Fair    After treatment patient left in no apparent distress:   Supine in bed, Call bell within reach, Bed / chair alarm activated, and Side rails x 3    COMMUNICATION/COLLABORATION:   The patients plan of care was discussed with: Registered nurse.      Vadim Vidales, PT   Time Calculation: 25 mins

## 2021-05-16 NOTE — PROGRESS NOTES
Comprehensive Nutrition Assessment    Type and Reason for Visit: (P) Initial    Nutrition Recommendations/Plan: Diet as tolerated. Nutrition Assessment:  (P) Pt admitted with TIA, gain instability, HENRRY resoloved. Hx notable for T2DM (6.6), HTN, tobacco abuse. Estimated Daily Nutrient Needs:  Energy (kcal): (P) 1847; Weight Used for Energy Requirements: (P) Current  Protein (g): (P) 69.2(~1k/kg);  Weight Used for Protein Requirements: (P) Current  Fluid (ml/day): (P) 1850; Method Used for Fluid Requirements: (P) 1 ml/kcal      Nutrition Related Findings:         Wounds:    (P) None       Current Nutrition Therapies:  DIET DIABETIC CONSISTENT CARB Regular    Anthropometric Measures:  Height:  (P) 5' 8\" (172.7 cm)  Current Body Wt:  (P) 69.3 kg (152 lb 11.2 oz)   Admission Body Wt:       Usual Body Wt:        Ideal Body Wt:  (P) 154 lbs:  (P) 99.2 %   Adjusted Body Weight:   ; Weight Adjustment for: (P) No adjustment   Adjusted BMI:       BMI Category:  (P) Normal weight (BMI 22.0-24.9) age over 72       Nutrition Diagnosis:   (P) Not ready for diet/lifestyle change, Limited adherence to nutrition-related recommendations related to (P) acute injury/trauma, food and nutrition related knowledge deficit as evidenced by      Nutrition Interventions:   Food and/or Nutrient Delivery: (P) Continue current diet  Nutrition Education and Counseling: (P) Education not indicated  Coordination of Nutrition Care: (P) Continue to monitor while inpatient    Goals:  (P) PO intake > 75% most meals next 4-7 days       Nutrition Monitoring and Evaluation:   Behavioral-Environmental Outcomes: (P) Readiness for change  Food/Nutrient Intake Outcomes: (P) Food and nutrient intake  Physical Signs/Symptoms Outcomes: (P) Meal time behavior    Discharge Planning:    (P) Continue current diet     Electronically signed by Janay Velasquez, PhD, RD, [facility] on 5/16/2021 at 10:36 AM    Contact: [phone]

## 2021-05-16 NOTE — PROGRESS NOTES
Bedside shift report given to Yodit Low, RN by Brooklyn Mason, RN. Report including the following: SBAR, MAR, Kardex, and Cardiac status of SB when sleeping & NSR w/ ST depression. 0735 BG 90, no coverage needed at this time     0803 VS stable, morning assessment complete. Pt stated pain 5/10 in back, denied medication. Pt accepted all morning meds. Pt educated on calling w/ call light before going to bathroom. Bed alarm on. Bed locked & low, call light & phone within reach. 0845 Miralax admin per pt request & no BM since 5/11 as stated by pt.     1015 Pt up to bathroom w/ 2 assist. Complete linen change & bed bath done    1129 Pt sleeping quietly in bed. No signs of distress     1151 BG 88, juice provided to pt. No coverage needed. VS stable    1224 Pt sitting up eating lunch, Wife visiting at this time    [phone] Pt up to bathroom w/ 2 person assist    [phone] Pt sleeping quietly in bed, no signs of distress. 1445 Pt up to bathroom w/ 2 person assist. Pt brought a diet pepsi per request.     1559 VS stable, reassessment complete. Pt stated no pain. No requests at this time    1638 , no coverage needed at this time    1720 Pt set up w/ dinner tray. Vanilla ice cream and diet pepsi brought per pt request    Bedside shift report given to Brooklyn Mason, RN by Yodit Low, RN. Report including the following: SBAR, MAR, Kardex, and Cardiac status of SB when sleeping & NSR w/ ST depression.

## 2021-05-16 NOTE — PROGRESS NOTES
Problem: Falls - Risk of  Goal: *Absence of Falls  Description: Document Kinjal Vazquez Fall Risk and appropriate interventions in the flowsheet.   5/16/2021 1547 by FINESSE Brand  Outcome: Progressing Towards Goal  Note: Fall Risk Interventions:  Mobility Interventions: Bed/chair exit alarm         Medication Interventions: Bed/chair exit alarm, Teach patient to arise slowly    Elimination Interventions: Bed/chair exit alarm, Call light in reach    History of Falls Interventions: Bed/chair exit alarm      5/16/2021 1542 by FINESSE Brand  Note: Fall Risk Interventions:  Mobility Interventions: Bed/chair exit alarm         Medication Interventions: Bed/chair exit alarm, Teach patient to arise slowly    Elimination Interventions: Bed/chair exit alarm, Call light in reach    History of Falls Interventions: Bed/chair exit alarm         Problem: Patient Education: Go to Patient Education Activity  Goal: Patient/Family Education  Outcome: Progressing Towards Goal

## 2021-05-16 NOTE — PROGRESS NOTES
Hospitalist Progress Note    NAME: Kaleb Moss :  1955   MRN:  [id number]   Room Number:  491/42  @ [facility]       Interim Hospital Summary: 76 y.o. male whom presented on 2021 with      Assessment / Plan:    Anticipated discharge date :   Anticipated disposition : SNF  Barriers to discharge : placement       #Acute left parietal white matter infarct   # Gait instability d/t No 1   # LE weakness w/ tingling d/t No 1  -symptoms > 24 hours per patient   [facility] score 4 ( not a candidate for DAPT as in point trial [facility] used was < 3)   - CTH neg for acute change   - CT spine w/ Disc bulges L3-S1 with disc space narrowing L4-5. Multilevel facet arthropathy  - MRI Acute left parietal white matter infarct. No hemorrhage or mass effect.  Moderate nonspecific white matter changes, remote right parietal white matter  Infarcts.  - MRI lumbar spine: Mild multilevel degenerative change with no more than mild foraminal narrowing as detailed by level above  -EKG reviewed independently normal sinus rhythm  -ASA 81 mg   -High dose statin   - LDL 58 , A1c 6.6 and TSH 1.50   -neurology consulted  - TTE w/o shunt   - US carotid < 50 % stenosis B/l   - PT/OT recommended rehab   -  B12 340      # EKG changes in telemetry    - D/w Dr [facility], No Changes in 12 lead  - Trop neg   - No further w/u needed at this time      # T2 DM   - A1c 7.4 -    - Metformin at home   - Lispro correctional scale, FSG AC HS  - Consistent carb diet, hypoglycemia protocol.      # Hypokalemia resolved   - k 3.0 > 3.2 > 3.6   - repleted     # Hypertension   - Amlodipine, HCTZ and losartan at home   - amlodipine and losartan resumed      # HENRRY resolved   - Scr 1.56 > 1.35 > 1.16 ( baseline 1.17)     #Tobacco use  -Half pack a day for 16 years  - Patient was counseled extensively on the need to abstain from tobacco, its addictive tendencies, its deleterious effects on the lungs, cardiovascular  as well as its financial & social sequelae                 Body mass index is 23.11 kg/m². Code Status: Full   Surrogate Decision Maker:  Shaquille Maldonado Spouse [phone]            DVT Prophylaxis: Lovenox  GI Prophylaxis: not indicated  Baseline:  Walks by himself            Subjective:     Chief Complaint / Reason for Physician Visit  Andrea Peacock Discussed with RN events overnight. Review of Systems:  No fevers, chills, appetite change, cough, sputum production, shortness of breath, dyspnea on exertion, nausea, vomitting, diarrhea, constipation, chest pain, leg edema, abdominal pain, joint pain, rash, itching. Tolerating PT/OT. Tolerating diet. Objective:     VITALS:   Last 24hrs VS reviewed since prior progress note. Most recent are:  Patient Vitals for the past 24 hrs:   Temp Pulse Resp BP SpO2   05/16/21 0803 98.1 °F (36.7 °C) 67 18 123/67 99 %   05/16/21 0419 98.3 °F (36.8 °C) 62 18 (!) 141/67 98 %   05/15/21 2333 98.7 °F (37.1 °C) 69 18 137/64 98 %   05/15/21 1931 98.1 °F (36.7 °C) 60 16 (!) 122/51 98 %   05/15/21 1532 98.5 °F (36.9 °C) 66 17 133/75 97 %   05/15/21 1217 98.3 °F (36.8 °C) 78 17 (!) 145/63 99 %       Intake/Output Summary (Last 24 hours) at 5/16/2021 0940  Last data filed at 5/16/2021 0845  Gross per 24 hour   Intake 540 ml   Output 270 ml   Net 270 ml        PHYSICAL EXAM:  General: WD, WN. Alert, cooperative, no acute distress    EENT:  EOMI. Anicteric sclerae. MMM  Resp:  CTA bilaterally, no wheezing or rales. No accessory muscle use  CV:  Regular  rhythm,  normal S1/S2, no murmurs rubs gallops, No edema  GI:  Soft, Non distended, Non tender. +Bowel sounds  Neurologic:  Alert and oriented X 3, slight dysarthria     Psych:   Good insight. Not anxious nor agitated  Skin:  No rashes.   No jaundice    Reviewed most current lab test results and cultures  YES  Reviewed most current radiology test results   YES  Review and summation of old records today    NO  Reviewed patient's current orders and MAR    YES  PMH/SH reviewed - no change compared to H&P  ________________________________________________________________________  Care Plan discussed with:    Comments   Patient x    Family      RN x    Care Manager     Consultant                        Multidiciplinary team rounds were held today with , nursing, pharmacist and clinical coordinator. Patient's plan of care was discussed; medications were reviewed and discharge planning was addressed. ________________________________________________________________________  Total NON critical care TIME:  20   Minutes    Total CRITICAL CARE TIME Spent:   Minutes non procedure based      Comments   >50% of visit spent in counseling and coordination of care x    ________________________________________________________________________  Kieran Michele MD     Procedures: see electronic medical records for all procedures/Xrays and details which were not copied into this note but were reviewed prior to creation of Plan. LABS:  I reviewed today's most current labs and imaging studies.   Pertinent labs include:  Recent Labs     05/13/21  1513   WBC 6.9   HGB 14.2   HCT 39.8        Recent Labs     05/15/21  0336 05/14/21  0602 05/13/21  1513    142 139   K 3.6 3.2* 3.0*    106 100   CO2 29 28 32   * 88 148*   BUN 15 16 15   CREA 1.16 1.35* 1.56*   CA 8.5 8.4* 8.9   MG  --   --  1.7   ALB  --   --  3.0*   TBILI  --   --  0.4   ALT  --   --  20       Signed: Kieran Michele MD

## 2021-05-16 NOTE — PROGRESS NOTES
Bedside shift change report given to [facility] (oncoming nurse) by Sheri Escobar (offgoing nurse). Report included the following information SBAR, Kardex, Intake/Output, MAR, Recent Results and Cardiac Rhythm SB,SR w/ ST depression.

## 2021-05-17 LAB
FLUAV RNA SPEC QL NAA+PROBE: NOT DETECTED
FLUBV RNA SPEC QL NAA+PROBE: NOT DETECTED
GLUCOSE BLD STRIP.AUTO-MCNC: 138 MG/DL (ref 65–117)
GLUCOSE BLD STRIP.AUTO-MCNC: 141 MG/DL (ref 65–117)
GLUCOSE BLD STRIP.AUTO-MCNC: 160 MG/DL (ref 65–117)
GLUCOSE BLD STRIP.AUTO-MCNC: 87 MG/DL (ref 65–117)
SARS-COV-2, COV2: NOT DETECTED
SERVICE CMNT-IMP: ABNORMAL
SERVICE CMNT-IMP: NORMAL

## 2021-05-17 PROCEDURE — 97535 SELF CARE MNGMENT TRAINING: CPT

## 2021-05-17 PROCEDURE — 87636 SARSCOV2 & INF A&B AMP PRB: CPT

## 2021-05-17 PROCEDURE — 74011636637 HC RX REV CODE- 636/637: Performed by: STUDENT IN AN ORGANIZED HEALTH CARE EDUCATION/TRAINING PROGRAM

## 2021-05-17 PROCEDURE — 65270000032 HC RM SEMIPRIVATE

## 2021-05-17 PROCEDURE — 82962 GLUCOSE BLOOD TEST: CPT

## 2021-05-17 PROCEDURE — 97530 THERAPEUTIC ACTIVITIES: CPT

## 2021-05-17 PROCEDURE — 74011250637 HC RX REV CODE- 250/637: Performed by: INTERNAL MEDICINE

## 2021-05-17 PROCEDURE — 97116 GAIT TRAINING THERAPY: CPT | Performed by: PHYSICAL THERAPIST

## 2021-05-17 PROCEDURE — 97112 NEUROMUSCULAR REEDUCATION: CPT

## 2021-05-17 PROCEDURE — 74011250636 HC RX REV CODE- 250/636: Performed by: STUDENT IN AN ORGANIZED HEALTH CARE EDUCATION/TRAINING PROGRAM

## 2021-05-17 PROCEDURE — 74011250637 HC RX REV CODE- 250/637: Performed by: STUDENT IN AN ORGANIZED HEALTH CARE EDUCATION/TRAINING PROGRAM

## 2021-05-17 PROCEDURE — 97112 NEUROMUSCULAR REEDUCATION: CPT | Performed by: PHYSICAL THERAPIST

## 2021-05-17 RX ORDER — LOSARTAN POTASSIUM 50 MG/1
100 TABLET ORAL DAILY
Status: DISCONTINUED | OUTPATIENT
Start: 2021-05-17 | End: 2021-05-17

## 2021-05-17 RX ORDER — LOSARTAN POTASSIUM 50 MG/1
100 TABLET ORAL DAILY
Status: DISCONTINUED | OUTPATIENT
Start: 2021-05-18 | End: 2021-05-18 | Stop reason: HOSPADM

## 2021-05-17 RX ORDER — LOSARTAN POTASSIUM 50 MG/1
50 TABLET ORAL
Status: COMPLETED | OUTPATIENT
Start: 2021-05-17 | End: 2021-05-17

## 2021-05-17 RX ADMIN — ACETAMINOPHEN 650 MG: 325 TABLET ORAL at 05:03

## 2021-05-17 RX ADMIN — AMLODIPINE BESYLATE 10 MG: 5 TABLET ORAL at 08:43

## 2021-05-17 RX ADMIN — LOSARTAN POTASSIUM 50 MG: 50 TABLET, FILM COATED ORAL at 08:43

## 2021-05-17 RX ADMIN — ATORVASTATIN CALCIUM 80 MG: 40 TABLET, FILM COATED ORAL at 22:02

## 2021-05-17 RX ADMIN — Medication 10 ML: at 17:15

## 2021-05-17 RX ADMIN — Medication 10 ML: at 22:03

## 2021-05-17 RX ADMIN — INSULIN LISPRO 2 UNITS: 100 INJECTION, SOLUTION INTRAVENOUS; SUBCUTANEOUS at 17:15

## 2021-05-17 RX ADMIN — LOSARTAN POTASSIUM 50 MG: 50 TABLET, FILM COATED ORAL at 09:12

## 2021-05-17 RX ADMIN — ENOXAPARIN SODIUM 40 MG: 40 INJECTION SUBCUTANEOUS at 08:44

## 2021-05-17 RX ADMIN — ASPIRIN 81 MG CHEWABLE TABLET 81 MG: 81 TABLET CHEWABLE at 08:43

## 2021-05-17 RX ADMIN — Medication 10 ML: at 05:06

## 2021-05-17 NOTE — PROGRESS NOTES
Problem: Self Care Deficits Care Plan (Adult)  Goal: *Acute Goals and Plan of Care (Insert Text)  Description: FUNCTIONAL STATUS PRIOR TO ADMISSION: Patient was independent and active without use of DME. He does not drive. HOME SUPPORT: Wife and sister available to assist    Occupational Therapy Goals  Initiated 5/15/2021  1. Patient will perform grooming standing at the sink with supervision/set-up within 7 day(s). 2.  Patient will perform bathing with supervision/set-up within 7 day(s). 3.  Patient will perform lower body dressing with supervision/set-up within 7 day(s). 4.  Patient will perform toilet transfers with supervision/set-up within 7 day(s). 5.  Patient will perform all aspects of toileting with supervision within 7 day(s). 6.  Patient will improve their Fugl Dutta score by 5 points in prep for ADLs within 7 days. Outcome: Progressing Towards Goal     OCCUPATIONAL THERAPY TREATMENT  Patient: Karen Alejandre (67 y.o. male)  Date: 5/17/2021  Diagnosis: TIA (transient ischemic attack) [G45.9]  Stroke ([facility] [address] [I63.9] <principal problem not specified>       Precautions:    Chart, occupational therapy assessment, plan of care, and goals were reviewed. ASSESSMENT  Patient continues with skilled OT services and is progressing towards goals. Patient continues to present with decreased strength in R UE/LE but is noted to have improvement in strength overall. He requires up to min A to stand at sink for standing grooming tasks and is able to mobilize from bathroom to chair with rolling walker in prep for self-feeding. Vision assessed, noted to track without difficulty, but he does reporting blurry vision in L eye which has been present prior to arrival. Pt participates in coordination tasks and is receptive to education regarding challenged use of R UE to increase neuro-recovery. Continue to recommend IPR at discharge.     Current Level of Function Impacting Discharge (ADLs): overall min to mod A for ADLs and mobility    Other factors to consider for discharge: Acute CVA; independent PLOF         PLAN :  Patient continues to benefit from skilled intervention to address the above impairments. Continue treatment per established plan of care to address goals. Recommend with staff: mobility to bathroom with 1 person assist and gait belt; ADLs with assistance; OOB to chair for meals    Recommend next OT session: continue with R UE NMR; ADLs as able; standing balance/tolerance for ADLs    Recommendation for discharge: (in order for the patient to meet his/her long term goals)  Therapy 3 hours per day 5-7 days per week    This discharge recommendation:  Has been made in collaboration with the attending provider and/or case management    IF patient discharges home will need the following DME: TBD       SUBJECTIVE:   Patient stated If I close my R eye, my L eye is blurry.     OBJECTIVE DATA SUMMARY:   Cognitive/Behavioral Status:  Neurologic State: Alert  Orientation Level: Oriented to person;Oriented to place  Cognition: Follows commands  Perception: Appears intact  Perseveration: No perseveration noted  Safety/Judgement: Awareness of environment    Functional Mobility and Transfers for ADLs:  Transfers:  Sit to Stand: Minimum assistance;Assist x1  Bed to Chair: Minimum assistance;Assist x1;Additional time; Adaptive equipment    Balance:  Sitting: Without support  Sitting - Static: Good (unsupported)  Sitting - Dynamic: Good (unsupported)  Standing: Impaired; With support  Standing - Static: Fair;Constant support  Standing - Dynamic : Fair;Constant support    ADL Intervention:  Grooming  Grooming Assistance: Minimum assistance(for standing at sink)  Position Performed: Standing(at sink with rolling walker)  Washing Hands: Minimum assistance    Lower Body Dressing Assistance  Socks: Minimum assistance  Leg Crossed Method Used: Yes  Position Performed: Seated edge of bed  Cues: Don;Tactile cues provided;Verbal cues provided;Visual cues provided;Physical assistance    Cognitive Retraining  Safety/Judgement: Awareness of environment    Neuro Re-Education:  Educated patient on elements of neuro-plasticity and importance of intensity and repetition and participating with therapy in order to maximize return of function to R UE. Patient verbalizes understanding of same. Instructed on safe positioning for R UE while in bed, using pillows and towels in order to provide proximal support as well as increase proprioceptive input through R UE. Instructed to use towel roll to create slight extension in wrist with gentle extension in fingers to prevent flexion contracture. Education provided on self AROM to R UE and importance of maintaining visual attention to UE throughout movements. Pain:  Pt reporting minimal pain    Activity Tolerance:   Good    After treatment patient left in no apparent distress:   Sitting in chair, Call bell within reach and Bed / chair alarm activated    COMMUNICATION/COLLABORATION:   The patients plan of care was discussed with: Physical therapist and Registered nurse.      [facility],   Time Calculation: 24 mins

## 2021-05-17 NOTE — PROGRESS NOTES
Physician Progress Note      PATIENT:               Shaista Suarez  CSN #:                  [id number]  :                       1955  ADMIT DATE:       2021 2:31 PM  [address] DATE:  RESPONDING  PROVIDER #:        Tanisha Garcia MD          QUERY TEXT:    Pt admitted with Acute left parietal white matter infarct. Pt noted to have LE weakness w/ tingling and gait instability. If possible, please document in progress notes and discharge summary if you are evaluating and /or treating any of the following: The medical record reflects the following:  Risk Factors: Hx: HTN / DM / High Cholesterol / Smoker /  Clinical Indicators: 97.4 88 18 116/75 100%. ...Na+139. Nicholes Coca Nicholes Coca K+3.0.. Nicholes Coca Nicholes Coca Gluc: 148. Nicholes Coca Nicholes Coca Bun/Cr:151.56 ^ 16/1.35 ^ 15/1.16. ... Nicholes Coca CT HEAD: NO definite hemorrhage or mass. .. Nicholes Coca CT SPINE LUMB: NO Sig. Findings. ... Nicholes Coca MRI BRAIN: Acute left parietal white matter infarct. .. Nicholes Coca Nicholes Coca NO HEMORRHAGE or MASS EFFECT. .. MRI LUMB SPINE: Mild multi-level degenerative change w/ no more than mild foraminal narrowing . ... Nicholes Coca DUPLEC CAROTID B/L:The right CCA has mild and heterogeneous plaque. There is mild stenosis in the right ICA (<50%) and at the proximal segment. The right ICA has heterogeneous plaque. The right ECA is patent. The right vertebral is antegrade; The left CCA has mild and heterogeneous plaque. There is mild stenosis in the left ICA (<50%) and at the proximal segment . The left ICA has heterogeneous plaque. The left ECA is patent. The left vertebral is antegrade. Treatment: CT HEAD; MRI BRAIN; DUPLEX CAROTID B/L; CT SPINE LUMB; MRI LUMB SPINE; Tele Admit; Permissive HTN; ASA;  High Dose Statin; Neuro Consult; Echo; Dysphagia screening; PT/OT eval; Check B12    Thank you,    Katrin Rico  [facility]  [phone]  Options provided:  -- Acute left parietal white matter infarct symptoms due to Cerebral Artery Occlusion/Stenosis (without Infarction)  -- Acute left parietal white matter infarct symptoms due to *** (specify type of Arrhythmia)  -- Acute left parietal white matter infarct  symptoms due to Dehydration  -- Acute left parietal white matter infarct  symptoms due to *** (specify electrolyte abnormality)  -- Other - I will add my own diagnosis  -- Disagree - Not applicable / Not valid  -- Disagree - Clinically unable to determine / Unknown  -- Refer to Clinical Documentation Reviewer    PROVIDER RESPONSE TEXT:    Acute parietal whiter matter infarct due atherosclerosis    Query created by: John Mahan on 5/17/2021 11:52 AM      Electronically signed by:  Ben Lagunas MD 5/17/2021 11:58 AM

## 2021-05-17 NOTE — PROGRESS NOTES
Hospitalist Progress Note    NAME: Malina Suarez. :  1955   MRN:  [id number]   Room Number:  587/69  @ [facility]       Interim Hospital Summary: 76 y.o. male whom presented on 2021 with      Assessment / Plan:      Anticipated discharge date :   Anticipated disposition : SNF  Barriers to discharge : placement         #Acute left parietal white matter infarct   # Gait instability d/t No 1   # LE weakness w/ tingling d/t No 1  -symptoms > 24 hours per patient   [facility] score 4 ( not a candidate for DAPT as in point trial [facility] used was < 3)   - CTH neg for acute change   - CT spine w/ Disc bulges L3-S1 with disc space narrowing L4-5. Multilevel facet arthropathy  - MRI Acute left parietal white matter infarct. No hemorrhage or mass effect.  Moderate nonspecific white matter changes, remote right parietal white matter  Infarcts.  - MRI lumbar spine: Mild multilevel degenerative change with no more than mild foraminal narrowing as detailed by level above  -EKG reviewed independently normal sinus rhythm  -ASA 81 mg   -High dose statin   - LDL 58 , A1c 6.6 and TSH 1.50   -neurology consulted  - TTE w/o shunt   - US carotid < 50 % stenosis B/l   - PT/OT recommended rehab   -  B12 340      # EKG changes in telemetry    - D/w Dr Grant Garcia Cardiology, No Changes in 12 lead  - Trop neg   - No further w/u needed at this time      # T2 DM   - A1c 7.4 -    - Metformin at home   - Lispro correctional scale, FSG AC HS  - Consistent carb diet, hypoglycemia protocol.      # Hypokalemia resolved   - k 3.0 > 3.2 > 3.6   - repleted     # Hypertension   - Amlodipine, HCTZ and losartan at home   - amlodipine and losartan resumed      # HENRRY resolved   - Scr 1.56 > 1.35 > 1.16 ( baseline 1.17)      #Tobacco use  -Half pack a day for 16 years  - Patient was counseled extensively on the need to abstain from tobacco, its addictive tendencies, its deleterious effects on the lungs, cardiovascular  as well as its financial & social sequelae                 Body mass index is 23.11 kg/m². Code Status: Full   Surrogate Decision Maker:  Shaquille Maldonado Spouse [phone]            DVT Prophylaxis: Lovenox  GI Prophylaxis: not indicated  Baseline:  Walks by himself               Subjective:     Chief Complaint / Reason for Physician Visit  Anisa Amin  Discussed with RN events overnight. Review of Systems:  No fevers, chills, appetite change, cough, sputum production, shortness of breath, dyspnea on exertion, nausea, vomitting, diarrhea, constipation, chest pain, leg edema, abdominal pain, joint pain, rash, itching. Tolerating PT/OT. Tolerating diet. Objective:     VITALS:   Last 24hrs VS reviewed since prior progress note. Most recent are:  Patient Vitals for the past 24 hrs:   Temp Pulse Resp BP SpO2   05/17/21 0750 97.9 °F (36.6 °C) 70 18 134/69 100 %   05/17/21 0211 98.3 °F (36.8 °C) 81 18 (!) 149/73 100 %   05/16/21 1936 98.1 °F (36.7 °C) (!) 57 18 (!) 130/50 100 %   05/16/21 1559 97.6 °F (36.4 °C) 63 18 131/64 100 %   05/16/21 1158 97.9 °F (36.6 °C) 64 15 132/68 96 %       Intake/Output Summary (Last 24 hours) at 5/17/2021 0850  Last data filed at 5/17/2021 0525  Gross per 24 hour   Intake 240 ml   Output 400 ml   Net -160 ml        PHYSICAL EXAM:  General: WD, WN. Alert, cooperative, no acute distress    EENT:  EOMI. Anicteric sclerae. MMM  Resp:  CTA bilaterally, no wheezing or rales. No accessory muscle use  CV:  Regular  rhythm,  normal S1/S2, no murmurs rubs gallops, No edema  GI:  Soft, Non distended, Non tender. +Bowel sounds  Neurologic:  Alert and oriented X 3, normal speech,   Psych:   Good insight. Not anxious nor agitated  Skin:  No rashes.   No jaundice    Reviewed most current lab test results and cultures  YES  Reviewed most current radiology test results   YES  Review and summation of old records today    NO  Reviewed patient's current orders and MAR    YES  PMH/SH reviewed - no change compared to H&P  ________________________________________________________________________  Care Plan discussed with:    Comments   Patient x    Family      RN x    Care Manager x    Consultant                       x Multidiciplinary team rounds were held today with , nursing, pharmacist and clinical coordinator. Patient's plan of care was discussed; medications were reviewed and discharge planning was addressed. ________________________________________________________________________  Total NON critical care TIME:  20  Minutes    Total CRITICAL CARE TIME Spent:   Minutes non procedure based      Comments   >50% of visit spent in counseling and coordination of care x    ________________________________________________________________________  Deepak Childress MD     Procedures: see electronic medical records for all procedures/Xrays and details which were not copied into this note but were reviewed prior to creation of Plan. LABS:  I reviewed today's most current labs and imaging studies. Pertinent labs include:  No results for input(s): WBC, HGB, HCT, PLT, HGBEXT, HCTEXT, PLTEXT in the last 72 hours.   Recent Labs     05/15/21  0336      K 3.6      CO2 29   *   BUN 15   CREA 1.16   CA 8.5       Signed: Deepak Childress MD

## 2021-05-17 NOTE — PROGRESS NOTES
Bedside shift change report given to [facility] (oncoming nurse) by Melva Mcnamara (offgoing nurse). Report included the following information SBAR, Kardex, Intake/Output, MAR and Recent Results.

## 2021-05-17 NOTE — PROGRESS NOTES
Bedside shift report given to Antonio Layton RN by Lilly Connelly RN. Report included the following: SBAR, MAR, Kardex, and Recent Results. 0750 VS stable, morning assessment complete. Pt stated no pain. Bed alarm on. Bed locked & low, call light & phone in reach. 0754 BG 87, no coverage needed    0843 Pt accepted all morning meds. Pt working with PT, up sitting in chair. Set up w/ breakfast tray    0940 Pt transferred back to bed w/ 1 assist per pt request. Diet pepsi brought    1038 Covid swab completed & sent to lab    1119 , no coverage needed    1315 Pt at bedside using urinal, voided 175 mL of donnell colored urine     1502 Pt at bedside using urinal, voided 125mL of clear, yellow urine. 1546 VS stable, reassessment complete. Pt stated no pain. 1626 Pt up at bedside to use urinal. Voided 125ml of clear, yellow urine. Brief & bedpad changed    1715 , 2 units of coverage admin. Pt set up w/ dinner tray    Bedside shift report given to Alok Holloway RN by Antonio Layton, RN. Report included the following: SBAR, MAR, Kardex, and Recent Results.

## 2021-05-17 NOTE — PROGRESS NOTES
Problem: Mobility Impaired (Adult and Pediatric)  Goal: *Acute Goals and Plan of Care (Insert Text)  Description:   FUNCTIONAL STATUS PRIOR TO ADMISSION: Patient was independent and active without use of DME.    HOME SUPPORT PRIOR TO ADMISSION: The patient lived with his wife and sister (split time between homes) but did not require assist. Both home are one story with 3-4 steps to enter and handrail. Physical Therapy Goals  Initiated 5/14/2021  1. Patient will move from supine to sit and sit to supine  in bed with supervision/set-up within 7 day(s). 2.  Patient will transfer from bed to chair and chair to bed with contact guard assist using the least restrictive device within 7 day(s). 3.  Patient will perform sit to stand with contact guard assist within 7 day(s). 4.  Patient will ambulate with minimal assistance for 100 feet with the least restrictive device within 7 day(s). 5.  Patient will improve Mak Balance score by 7 points within 7 days. Outcome: Progressing Towards Goal    PHYSICAL THERAPY TREATMENT  Patient: Caleb Wynn (67 y.o. male)  Date: 5/17/2021  Diagnosis: TIA (transient ischemic attack) [G45.9]  Stroke ([facility] [I63.9] <principal problem not specified>       Precautions:    Chart, physical therapy assessment, plan of care and goals were reviewed. ASSESSMENT  Patient continues with skilled PT services and is progressing towards goals. Patient continues to demonstrate good participation throughout session. Improved activity tolerance noted. Patient able to stand with minimal assist, but continues to require moderate assistance for stand to sit transfer due to right knee buckling. Patient ambulated 30 feet x2 with walker and moderate assistance. Patient required one seated rest break. Patient required moderate manual cues at RLE to improve balance and control with ambulation.      Current Level of Function Impacting Discharge (mobility/balance): min to mod A    Other factors to consider for discharge: acute CVA; previously independent with mobility         PLAN :  Patient continues to benefit from skilled intervention to address the above impairments. Continue treatment per established plan of care. to address goals. Recommendation for discharge: (in order for the patient to meet his/her long term goals)  Therapy 3 hours per day 5-7 days per week    This discharge recommendation:  Has been made in collaboration with the attending provider and/or case management    IF patient discharges home will need the following DME: to be determined (TBD)       SUBJECTIVE:   Patient stated I feel a little better.     OBJECTIVE DATA SUMMARY:   Critical Behavior:  Neurologic State: Alert  Orientation Level: Oriented to person, Oriented to place  Cognition: Follows commands  Safety/Judgement: Awareness of environment    Functional Mobility Training:  Bed Mobility:     Supine to Sit: Contact guard assistance  Sit to Supine: Minimum assistance  Scooting: Contact guard assistance    Transfers:  Sit to Stand: Minimum assistance;Assist x1  Stand to Sit: Moderate assistance;Assist x1      Bed to Chair: Minimum assistance; Moderate assistance;Assist x1    Balance:  Sitting: Without support  Sitting - Static: Good (unsupported)  Sitting - Dynamic: Good (unsupported)  Standing: Impaired; With support  Standing - Static: Constant support; Fair  Standing - Dynamic : Constant support; Fair    Ambulation/Gait Training:  Distance (ft): 30 Feet (ft)(x2)  Assistive Device: Gait belt;Walker, rolling  Ambulation - Level of Assistance:  Moderate assistance;Assist x1  Gait Abnormalities: Decreased step clearance  Base of Support: Narrowed  Speed/Marie: Slow  Step Length: Left shortened;Right shortened  Swing Pattern: Right asymmetrical      Neuromuscular Reeducation:    -Moderate manual cues to facilitate chest and trunk to improve standing balance   -Moderate manual cues to facilitate RLE to improve control and mobility   -Static standing balance without UE support x 30 seconds MIN A   -Static standing balance without UE support and eyes closed x 10 seconds MIN A   -Standing right knee flexion into extension with manual and verbal cues to avoid hyperextension 2x8 reps   -Bridges with maximal manual cues to facilitate RLE  x10 reps   -Quad sets 10 reps with 5 sec holds; minimal manual and verbal cues     Pain Rating:  No pain    Activity Tolerance:   Good    After treatment patient left in no apparent distress:   Supine in bed, Call bell within reach, Bed / chair alarm activated, and Side rails x 3    COMMUNICATION/COLLABORATION:   The patients plan of care was discussed with: Registered nurse.      Adolfo Carlson, PT   Time Calculation: 46 mins

## 2021-05-17 NOTE — PROGRESS NOTES
KOBY  Readmission score=11%  Met with patient and his wife to discuss d/c plans. Recommendation for SNF care. Patient signed Freedom of Choice form. Gave patient and wife list of SNF. After discussion, patient/wife choose [facility] SN[facility]. Pre authorization process started- CM called [facility] at [phone] to get preauthorization for SNF stay at [facility]. Reference number is [id number]. Planning ongoing.   JESSICA Knight/MELISSA  [phone]

## 2021-05-17 NOTE — PROGRESS NOTES
Problem: Diabetes Self-Management  Goal: *Incorporating nutritional management into lifestyle  Description: Describe effect of type, amount and timing of food on blood glucose; list 3 methods for planning meals. Outcome: Progressing Towards Goal     Problem: Falls - Risk of  Goal: *Absence of Falls  Description: Document Zelalem Rogers Fall Risk and appropriate interventions in the flowsheet.   Outcome: Progressing Towards Goal  Note: Fall Risk Interventions:  Mobility Interventions: Bed/chair exit alarm         Medication Interventions: Bed/chair exit alarm, Teach patient to arise slowly    Elimination Interventions: Bed/chair exit alarm, Call light in reach    History of Falls Interventions: Bed/chair exit alarm, Door open when patient unattended

## 2021-05-18 VITALS
BODY MASS INDEX: 23.14 KG/M2 | DIASTOLIC BLOOD PRESSURE: 71 MMHG | WEIGHT: 152.7 LBS | OXYGEN SATURATION: 100 % | SYSTOLIC BLOOD PRESSURE: 143 MMHG | HEART RATE: 74 BPM | HEIGHT: 68 IN | RESPIRATION RATE: 17 BRPM | TEMPERATURE: 98.3 F

## 2021-05-18 LAB
GLUCOSE BLD STRIP.AUTO-MCNC: 104 MG/DL (ref 65–117)
GLUCOSE BLD STRIP.AUTO-MCNC: 114 MG/DL (ref 65–117)
GLUCOSE BLD STRIP.AUTO-MCNC: 122 MG/DL (ref 65–117)
SERVICE CMNT-IMP: ABNORMAL
SERVICE CMNT-IMP: NORMAL
SERVICE CMNT-IMP: NORMAL

## 2021-05-18 PROCEDURE — 74011250636 HC RX REV CODE- 250/636: Performed by: STUDENT IN AN ORGANIZED HEALTH CARE EDUCATION/TRAINING PROGRAM

## 2021-05-18 PROCEDURE — 74011250637 HC RX REV CODE- 250/637: Performed by: STUDENT IN AN ORGANIZED HEALTH CARE EDUCATION/TRAINING PROGRAM

## 2021-05-18 PROCEDURE — 74011250637 HC RX REV CODE- 250/637: Performed by: INTERNAL MEDICINE

## 2021-05-18 PROCEDURE — 82962 GLUCOSE BLOOD TEST: CPT

## 2021-05-18 RX ORDER — GUAIFENESIN 100 MG/5ML
81 LIQUID (ML) ORAL DAILY
Qty: 30 TAB | Refills: 1 | Status: SHIPPED
Start: 2021-05-19 | End: 2021-06-14 | Stop reason: SDUPTHER

## 2021-05-18 RX ORDER — ERGOCALCIFEROL 1.25 MG/1
50000 CAPSULE ORAL
Qty: 14 CAP | Refills: 0 | Status: SHIPPED
Start: 2021-05-22 | End: 2021-06-14 | Stop reason: SDUPTHER

## 2021-05-18 RX ORDER — ATORVASTATIN CALCIUM 80 MG/1
80 TABLET, FILM COATED ORAL
Qty: 30 TAB | Refills: 1 | Status: SHIPPED
Start: 2021-05-18 | End: 2021-06-14 | Stop reason: SDUPTHER

## 2021-05-18 RX ADMIN — AMLODIPINE BESYLATE 10 MG: 5 TABLET ORAL at 08:03

## 2021-05-18 RX ADMIN — Medication 10 ML: at 05:48

## 2021-05-18 RX ADMIN — ENOXAPARIN SODIUM 40 MG: 40 INJECTION SUBCUTANEOUS at 08:03

## 2021-05-18 RX ADMIN — ASPIRIN 81 MG CHEWABLE TABLET 81 MG: 81 TABLET CHEWABLE at 08:03

## 2021-05-18 RX ADMIN — LOSARTAN POTASSIUM 100 MG: 50 TABLET, FILM COATED ORAL at 08:03

## 2021-05-18 NOTE — PROGRESS NOTES
Bedside shift report given to Paco Zimmer RN by Nicole Ellsworth RN. Report included the following: SBAR, MAR, Kardex, Intake/Output, and Recent Results    Pt in bed watching TV.  BA is on

## 2021-05-18 NOTE — DISCHARGE SUMMARY
Hospitalist Discharge Summary     Patient ID:  Alex Barnett  [id number]  76 y.o.  1955    PCP on record: Sapna Recio NP    Admit date: 5/13/2021  Discharge date and time: 5/18/2021      Admission Diagnoses: TIA (transient ischemic attack) [G45.9]  Stroke [facility]) [I63.9]    Discharge Diagnoses: Active Problems:    TIA (transient ischemic attack) (5/13/2021)      Stroke ([address] (5/16/2021)           Hospital Course:     #Acute left parietal white matter infarct d/t atherosclerosis   # Gait instability d/t No 1   # LE weakness w/ tingling d/t No 1  -symptoms > 24 hours per patient   [facility] score 4 ( not a candidate for DAPT as in point trial [facility] used was < 3)   - CTH neg for acute change   - CT spine w/ Disc bulges L3-S1 with disc space narrowing L4-5. Multilevel facet arthropathy  - MRI Acute left parietal white matter infarct. No hemorrhage or mass effect.  Moderate nonspecific white matter changes, remote right parietal white matter  Infarcts.  - MRI lumbar spine: Mild multilevel degenerative change with no more than mild foraminal narrowing as detailed by level above  -EKG reviewed independently normal sinus rhythm  -ASA 81 mg   -High dose statin   -LDL 58 , A1c 6.6 and TSH 1.50   -Neurology consulted and case discussed w/ Dr Deedee Sow   -TTE w/o shunt   -US carotid < 50 % stenosis B/l   - PT/OT recommended rehab   -  B12 340      # EKG changes in telemetry 5/13   - D/w Dr Jose M Shipley Cardiology, No Changes in 12 lead  - Trop neg   - No further w/u needed at this time      # T2 DM   - A1c 7.4 - 4/20 /2021   - Metformin at home       # Hypokalemia resolved   - k 3.0 > 3.2 > 3.6   - repleted     # Hypertension   - Amlodipine, HCTZ and losartan at home        # HENRRY resolved   - Scr 1.56 > 1.35 > 1.16 ( baseline 1.17)      #Tobacco use  -Half pack a day for 16 years  - Patient was counseled extensively on the need to abstain from tobacco, its addictive tendencies, its deleterious effects on the lungs, cardiovascular  as well as its financial & social sequelae              CONSULTATIONS:  IP CONSULT TO NEUROLOGY    Excerpted HPI from H&P of Saintclair Abbott, MD:  Britt Killian is a 76 y.o.  male with PMH of above mention problem who presents to ED with c/o  Unsteady gait and LE weakness / tingling since this am. Patient was seen normally yesterday. Patient further stated he has been having progressive sharp pain in bilateral lower extremity for couple of months and has been progressively getting worse. Patient states that he had a history of fall and feels like that since then he has been getting more weak in his lower extremity. Patient denied any saddle anesthesia bowel or bladder incontinence. Patient was smoking half pack a day for past 10 years. Denied any difficulty with speech vision upper extremity    ______________________________________________________________________  DISCHARGE SUMMARY/HOSPITAL COURSE:  for full details see H&P, daily progress notes, labs, consult notes. _______________________________________________________________________  Patient seen and examined by me on discharge day. Pertinent Findings:  Gen:    Not in distress  Chest: Clear lungs  CVS:   Regular rhythm. No edema  Abd:  Soft, not distended, not tender  Neuro:  Alert with good insight. Oriented to person, place, and time   _______________________________________________________________________  DISCHARGE MEDICATIONS:   Current Discharge Medication List      START taking these medications    Details   aspirin 81 mg chewable tablet Take 1 Tab by mouth daily. Qty: 30 Tab, Refills: 1  Start date: 5/19/2021      ergocalciferol (ERGOCALCIFEROL) 1,250 mcg (50,000 unit) capsule Take 1 Cap by mouth every seven (7) days.   Qty: 14 Cap, Refills: 0  Start date: 5/22/2021         CONTINUE these medications which have CHANGED    Details   atorvastatin (LIPITOR) 80 mg tablet Take 1 Tab by mouth nightly. Qty: 30 Tab, Refills: 1  Start date: 5/18/2021         CONTINUE these medications which have NOT CHANGED    Details   metFORMIN (GLUCOPHAGE) 1,000 mg tablet Take 1,000 mg by mouth daily. hydroCHLOROthiazide (HYDRODIURIL) 12.5 mg tablet Take 1 Tab by mouth daily. For blood pressure  Qty: 90 Tab, Refills: 0      losartan (COZAAR) 100 mg tablet Take 1 Tab by mouth daily. For blood pressure  Qty: 90 Tab, Refills: 0    Comments: New dose  Associated Diagnoses: Essential hypertension      amLODIPine (NORVASC) 10 mg tablet Take 1 Tab by mouth daily. For blood pressure  Qty: 90 Tab, Refills: 0             My Recommended Diet, Activity, Wound Care, and follow-up labs are listed in the patient's Discharge Insturctions which I have personally completed and reviewed. _______________________________________________________________________  DISPOSITION:     Home with Family:    Home with HH/PT/OT/RN:    SNF/LTC: x   JENNIFER:    OTHER:        Condition at Discharge:  Stable  _______________________________________________________________________  Follow up with:   PCP : Jacqueline Malave NP  Follow-up Information     Follow up With Specialties Details Why Contact Info    Jacqueline Malave NP Nurse Practitioner On 6/14/2021 Your appointment time is 0900, Bring a MASK, Bring  INS card, picture ID,and discharge papers, Please keep this appointment, Please arrive 15 mins early [address]      Rehan Hernandez MD Neurology On 5/27/2021 Your appointment time is 0900, Bring a MASK, Please arrive 15 mins early, Office will call with appointment date and time. , Please keep this appointment [facility] [phone]                Total time in minutes spent coordinating this discharge (includes going over instructions, follow-up, prescriptions, and preparing report for sign off to her PCP) :  35 minutes    Signed:  Rylan Calixto Milagro Caldera MD

## 2021-05-18 NOTE — ADT AUTH CERT NOTES
Comments  Comment     Last edited by  on  at    Patient Demographics    Patient Name   Murphy Schwartz. MICHELLE   [id number] Sex   Male    1955 Address   [place] [id number] Phone   [phone] [facility]) *Preferred*   [phone] (Mobile)   Patient Demographics    Patient Name   Murphy Schwartz. MICHELLE   [id number] Sex   Male    1955 Address   [place] [id number] Phone   [phone] (Home) *Preferred*   [phone] (Mobile)   CSN:   [id number]   Admit Date: Admit Time Room Bed   May 13, 2021  2:31 PM 78 [phone]   Attending Providers    Provider Pager From To   Rafa Duque MD  21   Michele Rabago MD  21    Emergency Contact(s)    Name [address] [place] [address] [phone]     Samira Harris Sister [phone]   Utilization Reviews       Additional Clinical Requested- by Armand Brittle, RN       Review Entered Review Status   2021 10:35 In Primary      Criteria Review        PT-  ASSESSMENT  Patient continues with skilled PT services and is progressing towards goals. Patient continues to demonstrate good participation throughout session. Improved activity tolerance noted. Patient able to stand with minimal assist, but continues to require moderate assistance for stand to sit transfer due to right knee buckling. Patient ambulated 30 feet x2 with walker and moderate assistance. Patient required one seated rest break. Patient required moderate manual cues at E to improve balance and control with ambulation.      Current Level of Function Impacting Discharge (mobility/balance): min to mod A     Other factors to consider for discharge: acute CVA; previously independent with mobility          PLAN :  Patient continues to benefit from skilled intervention to address the above impairments.  Continue treatment per established plan of care.   to address goals.     Recommendation for discharge: (in order for the patient to meet his/her long term goals)  Therapy 3 hours per day 5-7 days per week      OT-  ASSESSMENT  Patient continues with skilled OT services and is progressing towards goals. Patient continues to present with decreased strength in R UE/LE but is noted to have improvement in strength overall. He requires up to min A to stand at sink for standing grooming tasks and is able to mobilize from bathroom to chair with rolling walker in prep for self-feeding. Vision assessed, noted to track without difficulty, but he does reporting blurry vision in L eye which has been present prior to arrival. Pt participates in coordination tasks and is receptive to education regarding challenged use of R UE to increase neuro-recovery. Continue to recommend IPR at discharge.     Current Level of Function Impacting Discharge (ADLs): overall min to mod A for ADLs and mobility     Other factors to consider for discharge: Acute CVA; independent PLOF          PLAN :  Patient continues to benefit from skilled intervention to address the above impairments.  Continue treatment per established plan of care to address goals.     Recommend with staff: mobility to bathroom with 1 person assist and gait belt; ADLs with assistance; OOB to chair for meals     Recommend next OT session: continue with R UE NMR; ADLs as able; standing balance/tolerance for ADLs     Recommendation for discharge: (in order for the patient to meet his/her long term goals)  Therapy 3 hours per day 5-7 days per week      CM-  KOBY  Readmission score=11%  Met with patient and his wife to discuss d/c plans.  Recommendation for SNF care.  Patient in agreement with plan.  Pre authorization process started by MELISSA with a refference number of [id number].   Patient signed Freedom of Choice form.      Stroke: Ischemic - Care Day 2 (5/17/2021) by Perley Phoenix, RN       Review Entered Review Status   5/18/2021 10:28 Completed    Criteria Review      Care Day: 2 Care Date: 5/17/2021 Level of Care: Inpatient Floor    Guideline Day 2    Level Of Care    (X) Stroke unit, ICU, telemetry, or floor    5/18/2021 10:28:03 EDT by Lorri Guillaume    Clinical Status    (X) * Hemodynamic stability    5/18/2021 10:28:03 EDT by Luke Olvera    97.2 °F (36.2 °C) 67 174/78  18 95 % Room air    (X) * Mental status at baseline or stable    5/18/2021 10:28:03 EDT by Breana Abler and oriented X 3, normal speech    (X) * Neurologic deficits absent or stable    5/18/2021 10:28:03 EDT by Luke Prince      Stable    (X) * Unimpaired swallowing    (X) * Up to chair    5/18/2021 10:28:03 EDT by Roxane Rojo Pt working with PT, up sitting in chair    (X) * Feeding with or without assistance    Activity    (X) * Up to chair    5/18/2021 10:28:03 EDT by Roxane Rojo Pt working with PT, up sitting in chair    Routes    (X) * Oral hydration, medications    5/18/2021 10:28:03 EDT by Luke Prince      Oral hydration and pO Meds allowed    (X) * Advance diet as tolerated    5/18/2021 10:28:03 EDT by Luke Prince      Diet Diabetic Consist Carb Reg Ordered    Interventions    (X) Neurologic checks    5/18/2021 10:28:03 EDT by Roxane Rojo Neuro/vascular checks per unit routine ordered    ( ) Possible oxygen    5/18/2021 10:28:03 EDT by Roxane Rojo on RA    (X) Possible physical therapy    5/18/2021 10:28:03 EDT by Roxane Rojo Pt working with PT, up sitting in chair    (X) Possible occupational and speech therapy    5/18/2021 10:28:03 EDT by Luann Obrien and Tx    Medications    (X) Antithrombotic therapy    5/18/2021 10:28:03 EDT by Luke Prince      aspirin 81mg PO Daily, Lovenox 40mg SC Daily    (X) Blood pressure control    5/18/2021 10:28:03 EDT by Luke Prince      Cozaar 50mg PO Daily  Norvasc 10mg PO Daily    * Milestone   Additional Notes   Day 5: 5/17          IM-      Subjective:       Chief Complaint / Reason for Physician Visit   Nila Palomares  Discussed with RN events overnight.        Review of Systems:   No fevers, chills, appetite change, cough, sputum production, shortness of breath, dyspnea on exertion, nausea, vomitting, diarrhea, constipation, chest pain, leg edema, abdominal pain, joint pain, rash, itching. Tolerating PT/OT. Tolerating diet. PHYSICAL EXAM:   General:          WD, WN. Alert, cooperative, no acute distress     EENT:              EOMI. Anicteric sclerae. MMM   Resp:               CTA bilaterally, no wheezing or rales.  No accessory muscle use   CV:                  Regular  rhythm,  normal S1/S2, no murmurs rubs gallops, No edema   GI:                   Soft, Non distended, Non tender.  +Bowel sounds   Neurologic:       Alert and oriented X 3, normal speech,    Psych:   Good insight. Not anxious nor agitated   Skin:                No rashes.  No jaundice      Labs: Glucose 114      IM-      Assessment / Plan:           Anticipated discharge date : 5/17   Anticipated disposition : SNF   Barriers to discharge : placement            #Acute left parietal white matter infarct    # Gait instability d/t No 1    # LE weakness w/ tingling d/t No 1   -symptoms > 24 hours per patient    [facility] score 4 ( not a candidate for DAPT as in point trial [facility] used was < 3)    - CTH neg for acute change    - CT spine w/ Disc bulges L3-S1 with disc space narrowing L4-5. Multilevel facet arthropathy   - MRI Acute left parietal white matter infarct. No hemorrhage or mass effect.  Moderate nonspecific white matter changes, remote right parietal white matter   Infarcts.   - MRI lumbar spine: Mild multilevel degenerative change with no more than mild foraminal narrowing as detailed by level above   -EKG reviewed independently normal sinus rhythm   -ASA 81 mg    -High dose statin    - LDL 58 , A1c 6.6 and TSH 1.50    -neurology consulted   - TTE w/o shunt    - US carotid < 50 % stenosis B/l    - PT/OT recommended rehab    -  B12 340        # EKG changes in telemetry 5/13    - D/w Dr Johnie Nunez Cardiology, No Changes in 12 lead   - Trop neg    - No further w/u needed at this time        # T2 DM    - A1c 7.4 - 4/20 /2021    - Metformin at home    - Lispro correctional scale, FSG AC HS   - Consistent carb diet, hypoglycemia protocol.        # Hypokalemia resolved    - k 3.0 > 3.2 > 3.6    - repleted       # Hypertension    - Amlodipine, HCTZ and losartan at home    - amlodipine and losartan resumed        # HENRRY resolved    - Scr 1.56 > 1.35 > 1.16 ( baseline 1.17)        #Tobacco use   -Half pack a day for 16 years   - Patient was counseled extensively on the need to abstain from tobacco, its addictive tendencies, its deleterious effects on the lungs, cardiovascular  as well as its financial & social sequelae               Stroke: Ischemic - Care Day 1 (5/16/2021) by Sally He RN       Review Entered Review Status   5/17/2021 15:22 Completed      Criteria Review      Care Day: 1 Care Date: 5/16/2021 Level of Care: Inpatient Floor    Guideline Day 1    Level Of Care    (X) Stroke unit, ICU, [C] telemetry, or floor    5/17/2021 15:22:28 EDT by Miguel Pat [id number] bed    Clinical Status    (X) * Clinical Indications met [D]    5/17/2021 15:22:28 EDT by Sally He      Acute ischemic stroke    (X) Cerebral hemorrhage excluded    5/17/2021 15:22:28 EDT by Sally He      MRI Brain - No hemorrhage    Activity    ( ) Bed rest    5/17/2021 15:22:28 EDT by Sally He      Activity as lexi w/assist; OT/PT    Routes    ( ) IV fluids, medications    5/17/2021 15:22:28 EDT by Sally He      Lipitor 80mg qhs, SSI qid    Interventions    (X) Neurologic checks    5/17/2021 15:22:28 EDT by Sally He      neuro checks per unit routine    (X) Cardiac monitoring [F]    (X) Imaging studies (ie, brain, intracranial vascular) [G] 5/17/2021 15:22:28 EDT by Barbara Loyola MRI Brain on 5/14: Acute left parietal white matter infarct. No hemorrhage or mass effect    (X) Swallowing evaluation    5/17/2021 15:22:28 EDT by Barbara Loyola completed    Medications    (X) Blood pressure control as indicated    5/17/2021 15:22:28 EDT by Karyle Larsen      Norvasc 10mg qd, Cozaar 50mg qd    (X) Possible antithrombotic therapy [I]    5/17/2021 15:22:28 EDT by Karyle Larsen      ASA 81mg qd, Lovenox 40mg sc qd    * Milestone   Additional Notes   5/16/21  Status upgraded to [address] [place] [address]        98.1-67-18, 123/67, 99% ra        poc glu 13--345        Orders: Diabetic diet, poc glu qid, I/O         HOSPITALIST      No fevers, chills, appetite change, cough, sputum production, shortness of breath, dyspnea on exertion, nausea, vomitting, diarrhea, constipation, chest pain, leg edema, abdominal pain, joint pain, rash, itching. Tolerating PT/OT. Tolerating diet      PHYSICAL EXAM:   General:          WD, WN. Alert, cooperative, no acute distress     EENT:              EOMI. Anicteric sclerae.  MMM   Resp:               CTA bilaterally, no wheezing or rales.  No accessory muscle use   CV:                  Regular  rhythm,  normal S1/S2, no murmurs rubs gallops, No edema   GI:                   Soft, Non distended, Non tender.  +Bowel sounds   Neurologic:       Alert and oriented X 3, slight dysarthria      Psych:   Good insight. Not anxious nor agitated   Skin:                No rashes.  No jaundice      Assessment / Plan:       Anticipated discharge date : 5/17   Anticipated disposition : SNF   Barriers to discharge : placement            #Acute left parietal white matter infarct    # Gait instability d/t No 1    # LE weakness w/ tingling d/t No 1   -symptoms > 24 hours per patient    [facility] TITI score 4 ( not a candidate for DAPT as in point trial [facility] used was < 3)    - CTH neg for acute change    - CT spine w/ Disc bulges L3-S1 with disc space narrowing L4-5. Multilevel facet arthropathy   - MRI Acute left parietal white matter infarct. No hemorrhage or mass effect. Moderate nonspecific white matter changes, remote right parietal white matter   Infarcts.   - MRI lumbar spine: Mild multilevel degenerative change with no more than mild foraminal narrowing as detailed by level above   -EKG reviewed independently normal sinus rhythm   -ASA 81 mg    -High dose statin    - LDL 58 , A1c 6.6 and TSH 1.50    -neurology consulted   - TTE w/o shunt    - US carotid < 50 % stenosis B/l    - PT/OT recommended rehab    -  B12 340        # EKG changes in telemetry 5/13    - D/w Dr Camacho Jacome Cardiology, No Changes in 12 lead   - Trop neg    - No further w/u needed at this time        # T2 DM    - A1c 7.4 - 4/20 /2021    - Metformin at home    - Lispro correctional scale, FSG AC HS   - Consistent carb diet, hypoglycemia protocol.        # Hypokalemia resolved    - k 3.0 > 3.2 > 3.6    - repleted       # Hypertension    - Amlodipine, HCTZ and losartan at home    - amlodipine and losartan resumed        # HENRRY resolved    - Scr 1.56 > 1.35 > 1.16 ( baseline 1.17)        #Tobacco use   -Half pack a day for 16 years   - Patient was counseled extensively on the need to abstain from tobacco, its addictive tendencies, its deleterious effects on the lungs, cardiovascular  as well as its financial & social sequelae                    Stroke: Ischemic - Clinical Indications for Admission to Inpatient Care by Cassidy Love RN       Review Entered Review Status   5/17/2021 15:13 Completed      Criteria Review      Clinical Indications for Admission to Inpatient Care    Most Recent : Cassidy Love Most Recent Date: 5/17/2021 15:13:13 EDT    (X) Admission is indicated for  1 or more  of the following  (1) (2) (3) (4) (5):       (X) Acute ischemic stroke [A] [B]       5/17/2021 15:13:13 EDT by Cassidy Love         MRI - Acute left parietal white matter infarct.  No hemorrhage or mass effect      5/15 PA rec by Nancy Goodman       Review Entered Review Status   5/15/2021 19:17 In Primary      Criteria Review   slr obs to in    We recommend that the following pt's hospitalization under OBSERVATION status be changed to INPATIENT status. Name Trevon Maradiaga.  1955   [facility]# [id number]  Insurance [employer]      Clinical summary 75 y/o gentleman with a PMH significant for Hypertension and DM admitted with acute ischemic left parietal infarct with residual right hemiparesis necessitating management with monitoring for dysrhythmias and ischemia, falls precautions, neurovascular checks, complete CVA evaluation, aspirin and statin therapies, optimization of BS control, therapy assessments and Neurology's recommendations. Vitals /65 HR 66/min. Labs and Imaging MRI brain: Acute left parietal white matter infarct. UM criteria applies Yes  Intensity of service Patient's presentation, findings and management meet INPATIENT status criteria.       This chart was reviewed at 4:58 PM 5/15/2021    Vlad Royal, [address] [place] [address]   Cell: [phone]

## 2021-05-18 NOTE — PROGRESS NOTES
Pharmacist Discharge Medication Reconciliation    Discharge Provider:  Chi Cornell     Discharge Medications:      My Medications        START taking these medications        Instructions Each Dose to Equal Morning Noon Evening Bedtime   aspirin 81 mg chewable tablet  Start taking on: May 19, 2021    Your last dose was: Your next dose is: Take 1 Tab by mouth daily. 81 mg                 ergocalciferol 1,250 mcg (50,000 unit) capsule  Commonly known as: ERGOCALCIFEROL  Start taking on: May 22, 2021    Your last dose was: Your next dose is: Take 1 Cap by mouth every seven (7) days. 50,000 Units                        CHANGE how you take these medications        Instructions Each Dose to Equal Morning Noon Evening Bedtime   atorvastatin 80 mg tablet  Commonly known as: LIPITOR  What changed:   medication strength  how much to take  when to take this  additional instructions    Your last dose was: Your next dose is: Take 1 Tab by mouth nightly. 80 mg                        CONTINUE taking these medications        Instructions Each Dose to Equal Morning Noon Evening Bedtime   amLODIPine 10 mg tablet  Commonly known as: NORVASC    Your last dose was: Your next dose is: Take 1 Tab by mouth daily. For blood pressure   10 mg                 hydroCHLOROthiazide 12.5 mg tablet  Commonly known as: HYDRODIURIL    Your last dose was: Your next dose is: Take 1 Tab by mouth daily. For blood pressure   12.5 mg                 losartan 100 mg tablet  Commonly known as: COZAAR    Your last dose was: Your next dose is: Take 1 Tab by mouth daily. For blood pressure   100 mg                 metFORMIN 1,000 mg tablet  Commonly known as: GLUCOPHAGE    Your last dose was: Your next dose is: Take 1,000 mg by mouth daily.    1,000 mg                           Where to Get Your Medications        Information on where to get these meds will be given to you by the nurse or doctor.     Ask your nurse or doctor about these medications  aspirin 81 mg chewable tablet  atorvastatin 80 mg tablet  ergocalciferol 1,250 mcg (50,000 unit) capsule              The patient's chart, MAR, and AVS were reviewed by   Cheikh Mi, Maryana Villanueva,   Contact: [phone]

## 2021-05-18 NOTE — PROGRESS NOTES
KOBY  Readmission score-11%  CM received call from [facility] that patient has been approved for SNF stay at [facility]. [facility] authorization number is [id number]. CM called Surjit Osorio at [address] to discuss transfer. [facility] will be able to accept patient today. He will be going into Room 120B. CM called [facility] [phone] to arrange stretcher transport. CM faxed PCS letter to [facility] at [phone]. Time of  is 4pm.  Nursing notified. CM spoke with patient and called patient's wife Terrie Weinstein [phone]) to discuss plan. Both in agreement. CM gave Mrs. Elinor Reed the address and telephone number for [facility])  CM met with patient and discussed IM letter. Patient stated understanding and signed letter. Copy given to patient, noted on Documentation List and letter placed on patient's chart. Care Management Interventions  PCP Verified by CM:  Yes  Mode of Transport at Discharge: BLS  Transition of Care Consult (CM Consult): Discharge Planning, SNF  Physical Therapy Consult: Yes  Occupational Therapy Consult: Yes  Current Support Network: Lives with Spouse  Confirm Follow Up Transport: Other (see comment)([facility])  The Plan for Transition of Care is Related to the Following Treatment Goals : Transition to SNF [facility])  The Patient and/or Patient Representative was Provided with a Choice of Provider and Agrees with the Discharge Plan?: Yes  Name of the Patient Representative Who was Provided with a Choice of Provider and Agrees with the Discharge Plan: Patient  Freedom of Choice List was Provided with Basic Dialogue that Supports the Patient's Individualized Plan of Care/Goals, Treatment Preferences and Shares the Quality Data Associated with the Providers?: Yes  Discharge Location  Discharge Placement: Skilled nursing facility      Follow up with [facility] on 5/18/2021   Specialty: Skilled [address] [facility] [id number] [phone]   You will be going to [facility] for skilled rehabilitation services. Follow up with Camilo Monsivais MD on 5/27/2021   Specialty: Neurology   [facility] [address]   Your appointment time is 0900, Bring a MASK, Please arrive 15 mins early, Office will call with appointment date and time. , Please keep this appointment     Follow up with Emil Corrales.  Joaquín Davis NP on 6/14/2021   Specialty: Nurse Practitioner   [address] [facility] [address]   [phone]   Your appointment time is 0900, Bring a MASK, Bring  INS card, picture ID,and discharge papers, Please keep this appointment, Please arrive 15 mins early      JESSICA Butler/MELISSA  [phone]

## 2021-05-18 NOTE — PROGRESS NOTES
Transition of Care Plan to SNF/Rehab    SNF/Rehab Transition:  Patient has been accepted to [facility] and meets criteria for admission. [facility] at [employer].    Communication to Patient/Family:  Met with patient and wife, Olayinka Dominique (identified care giver) and they are agreeable to the transition plan. Communication to SNF/Rehab:  Bedside RN,Sylvie has been notified to update the transition plan to the facility and call report (phone number [phone]). Discharge information has been updated on the AVS.     Discharge instructions to be fax'd to facility at [facility] # [phone]). Patient has been identified as part of the N/ABCPI-A Program.  For Care Coordination associated with that Bundle Program, please contact N/A  Bundle information has been communication to N/A. Nursing Please include all hard scripts for controlled substances, med rec and dc summary, and AVS in packet.      Reviewed and confirmed with facility,Karina, can manage the patient care needs for the following:     Musa with (X) only those applicable:    Medication:  [x]  Medications will be available at the facility  []  IV Antibiotics N/A  []  Controlled Substance - hard copy to be sent with patient   []  Weekly Labs   Documents:  [] Hard RX  [x] MAR  [] Kardex  [x] AVS  [x]Transfer Summary  [x]Discharge   Equipment:  []  CPAP/BiPAP  []  Wound Vacuum  []  Adame or Urinary Device  []  PICC/Central Line  []  Nebulizer  []  Ventilator   Treatment:  []Isolation (for MRSA, VRE, etc.)  []Surgical Drain Management  []Tracheostomy Care  []Dressing Changes  []Dialysis with transportation and chair time N/A.  []PEG Care  []Oxygen  []Daily Weights for Heart Failure   Dietary:  []Any diet limitations  []Tube Feedings   []Total Parenteral Management (TPN)   Eligible for Medicaid Long Term Services and Supports  Yes:  [] Eligible for medical assistance or will become eligible within 180 days and UAI completed. [] Provider/Patient and/or support system has requested screening. [] UAI copy provided to patient or responsible party, to facility  [x] UAI unavailable at discharge will send once processed to SNF provider. [] UAI unavailable at discharged mailed to patient  No:   [] Private pay and is not financially eligible for Medicaid within the next 180 days. [] Reside out-of-state.   [] A residents of a state owned/operated facility that is licensed  by [facility] and Developmental Services or [facility]  [] Enrollment in [facility] hospice services  [] [address] [place] [address] [facility]  [] Patient /Family declines to have screening completed or provide financial information for screening     Financial Resources:  Medicaid    [] Initiated and application pending   [] Full coverage     Advanced Care Plan:  []Surrogate Decision Maker of Care  []POA  []Communicated Code Status FULL (DDNR\", \"Full\")    Other

## 2021-05-18 NOTE — PROGRESS NOTES
0733-Bedside report received from [facility], pt resting comfortable, sleeping in bed. No pain or distress noted at this time. 0833-Pt alert and oriented x 3. Pt vitals and blood sugar stable no correction needed, this morning. Pt consumed breakfast 70%, fluids 200ml and voided 100 ml in urine. Lungs clear diminished in bases. Pt using walker sit in chair beside bed, pt initiates walking in room, encouragement given    1025-Pt requested to lay down in bed, used urinal independently, output 200 ml straw, urine no odor noted. 1107-[facility] to verify transport   '  1205-Blood sugar stable no correction needed. Pt consuming lunch 50%    1245-Pt resting in bed semi fowlers 30 degrees, pt eyes are closed, resting comfortable at this time. 1420-pt sleeping    1522- pt in restroom, small bowel movement x1    1608-Pt verbalized understanding of transporting to a facility, IV access removed, and intact, education completed including TIA/Stroke pathway, medication compliance follow up care. All belongs are currently with patient. 1620-TRANSFER - OUT REPORT:    Verbal report given to CLAIRE RN(name) on [address].  being transferred to [facility] (unit) for routine progression of care       Report consisted of patients Situation, Background, Assessment and   Recommendations(SBAR). Information from the following report(s) SBAR, Intake/Output, MAR, Recent Results and Med Rec Status was reviewed with the receiving nurse. Lines: none       Opportunity for questions and clarification was provided. Patient transported with: [facility] services.  [phone] 9907- blood sugar stable no insulin needed    1735-pt left with transport

## 2021-05-18 NOTE — PROGRESS NOTES
Problem: Falls - Risk of  Goal: *Absence of Falls  Description: Document Meryl Chapinn Fall Risk and appropriate interventions in the flowsheet.   Outcome: Progressing Towards Goal  Note: Fall Risk Interventions:  Mobility Interventions: Bed/chair exit alarm         Medication Interventions: Bed/chair exit alarm    Elimination Interventions: Bed/chair exit alarm    History of Falls Interventions: Bed/chair exit alarm         Problem: Patient Education: Go to Patient Education Activity  Goal: Patient/Family Education  Outcome: Progressing Towards Goal

## 2021-05-20 ENCOUNTER — TELEPHONE (OUTPATIENT)
Dept: CASE MANAGEMENT | Age: 66
End: 2021-05-20

## 2021-05-20 NOTE — TELEPHONE ENCOUNTER
CM called patient for the purpose of follow up to inpatient admission to check on environmental challenges/medications/appointment follow up/and questions/concerns. The call was answered by:    1. CM called Karina spoke with patient RN states transfer went well, received discharge paperwork and patient is adjusting well. 2. CM attempt to call patient wife Vanna Job [phone] Cm left VM to return call to discuss patient discharge to SNF and s/s of stroke.     Recognize signs and symptoms of STROKE:  F-face looks uneven   A-arms unable to move or move unevenly  S-speech slurred or non-existent  T-time-call 911 as soon as signs and symptoms begin    [address] [place] [address] Jenniffer Javed

## 2021-06-01 DIAGNOSIS — E11.65 TYPE 2 DIABETES MELLITUS WITH HYPERGLYCEMIA (HCC): ICD-10-CM

## 2021-06-01 DIAGNOSIS — I10 ESSENTIAL HYPERTENSION: ICD-10-CM

## 2021-06-02 RX ORDER — AMLODIPINE BESYLATE 10 MG/1
TABLET ORAL
Qty: 90 TABLET | Refills: 0 | Status: SHIPPED | OUTPATIENT
Start: 2021-06-02 | End: 2021-07-16 | Stop reason: SDUPTHER

## 2021-06-02 RX ORDER — LOSARTAN POTASSIUM 100 MG/1
TABLET ORAL
Qty: 90 TABLET | Refills: 0 | Status: SHIPPED | OUTPATIENT
Start: 2021-06-02 | End: 2021-07-22 | Stop reason: SDUPTHER

## 2021-06-02 RX ORDER — METFORMIN HYDROCHLORIDE 1000 MG/1
TABLET ORAL
Qty: 180 TABLET | Refills: 0 | Status: SHIPPED | OUTPATIENT
Start: 2021-06-02 | End: 2021-09-03

## 2021-06-02 RX ORDER — HYDROCHLOROTHIAZIDE 12.5 MG/1
TABLET ORAL
Qty: 90 TABLET | Refills: 0 | Status: SHIPPED | OUTPATIENT
Start: 2021-06-02 | End: 2021-10-04

## 2021-06-03 ENCOUNTER — OFFICE VISIT (OUTPATIENT)
Dept: NEUROLOGY | Age: 66
End: 2021-06-03
Payer: MEDICARE

## 2021-06-03 VITALS
SYSTOLIC BLOOD PRESSURE: 120 MMHG | WEIGHT: 144.3 LBS | TEMPERATURE: 97.2 F | HEART RATE: 94 BPM | BODY MASS INDEX: 21.94 KG/M2 | OXYGEN SATURATION: 98 % | DIASTOLIC BLOOD PRESSURE: 68 MMHG | RESPIRATION RATE: 18 BRPM

## 2021-06-03 DIAGNOSIS — R20.2 PARESTHESIA: ICD-10-CM

## 2021-06-03 DIAGNOSIS — R29.6 FREQUENT FALLS: ICD-10-CM

## 2021-06-03 DIAGNOSIS — I63.89 PARIETAL LOBE INFARCTION (HCC): Primary | ICD-10-CM

## 2021-06-03 DIAGNOSIS — R26.9 GAIT DISORDER: ICD-10-CM

## 2021-06-03 DIAGNOSIS — I67.82 CEREBRAL ISCHEMIA: ICD-10-CM

## 2021-06-03 DIAGNOSIS — E72.11 HOMOCYSTINEMIA (HCC): ICD-10-CM

## 2021-06-03 PROCEDURE — 1111F DSCHRG MED/CURRENT MED MERGE: CPT | Performed by: PSYCHIATRY & NEUROLOGY

## 2021-06-03 PROCEDURE — G8754 DIAS BP LESS 90: HCPCS | Performed by: PSYCHIATRY & NEUROLOGY

## 2021-06-03 PROCEDURE — G8432 DEP SCR NOT DOC, RNG: HCPCS | Performed by: PSYCHIATRY & NEUROLOGY

## 2021-06-03 PROCEDURE — G8536 NO DOC ELDER MAL SCRN: HCPCS | Performed by: PSYCHIATRY & NEUROLOGY

## 2021-06-03 PROCEDURE — 1101F PT FALLS ASSESS-DOCD LE1/YR: CPT | Performed by: PSYCHIATRY & NEUROLOGY

## 2021-06-03 PROCEDURE — G8420 CALC BMI NORM PARAMETERS: HCPCS | Performed by: PSYCHIATRY & NEUROLOGY

## 2021-06-03 PROCEDURE — 99215 OFFICE O/P EST HI 40 MIN: CPT | Performed by: PSYCHIATRY & NEUROLOGY

## 2021-06-03 PROCEDURE — G8427 DOCREV CUR MEDS BY ELIG CLIN: HCPCS | Performed by: PSYCHIATRY & NEUROLOGY

## 2021-06-03 PROCEDURE — 3017F COLORECTAL CA SCREEN DOC REV: CPT | Performed by: PSYCHIATRY & NEUROLOGY

## 2021-06-03 PROCEDURE — G8752 SYS BP LESS 140: HCPCS | Performed by: PSYCHIATRY & NEUROLOGY

## 2021-06-03 RX ORDER — POLYETHYLENE GLYCOL 3350 17 G/17G
17 POWDER, FOR SOLUTION ORAL AS NEEDED
COMMUNITY
End: 2022-08-01 | Stop reason: SDUPTHER

## 2021-06-03 NOTE — PROGRESS NOTES
Neurology Consult Note      HISTORY PROVIDED BY: patient    Chief Complaint:   Chief Complaint   Patient presents with    New Patient      Subjective:    Barrie Ivey is a 76 y.o. right handed male who presents in consultation for CVA. , Numbness and tingling sensation, status post hospital discharge. This is a 62-year-old right-handed black man with history of diabetes melitis, status post fall with fractured rib, dyslipidemia, hypertension, who was recently discharged from the hospital for numbness or tingling sensation of the lower extremity and gait difficulty. MRI of the brain showed acute subcortical left parietal infarct . Patient was said to be in his baseline of health until the day of admission when he woke up and noticed that his lower extremity was weak with tingling sensation of the both legs and right upper extremity. He was finding difficult  to ambulate. Patient is somewhat a poor historian tingling sensation and numbness of the lower extremity must have been going on for quite a while. Today patient says he is still having difficulty ambulating, he says sometimes cannot feel his legs. Patient was to continue with outpatient physical therapy after discharge from the hospital, however, that the patient did not follow through with it.   I will refer the patient back to physical therapy  Review of Systems - General ROS: positive for  - fatigue, sleep disturbance and weight loss  Psychological ROS: positive for - anxiety and sleep disturbances  Ophthalmic ROS: positive for - blurry vision and decreased vision  ENT ROS: positive for - tinnitus, vertigo and visual changes  Allergy and Immunology ROS: negative  Hematological and Lymphatic ROS: negative  Endocrine ROS: negative  Respiratory ROS: no cough, shortness of breath, or wheezing  Cardiovascular ROS: no chest pain or dyspnea on exertion  Gastrointestinal ROS: no abdominal pain, change in bowel habits, or black or bloody stools  Genito-Urinary ROS: no dysuria, trouble voiding, or hematuria  Musculoskeletal ROS: positive for - gait disturbance, joint pain, joint stiffness and muscle pain  Neurological ROS: positive for - dizziness, gait disturbance, impaired coordination/balance, numbness/tingling and weakness  Dermatological ROS: negative  Past Medical History:   Diagnosis Date    Diabetes ([facility]     Fractured rib 03/2021    from a fall per pt on 5/11/2021    High cholesterol     Hypertension     per pt on 5/11/2021      Past Surgical History:   Procedure Laterality Date    HX ENDOSCOPY      per pt on 5/11/2021      Social History     Socioeconomic History    Marital status:      Spouse name: Not on file    Number of children: Not on file    Years of education: Not on file    Highest education level: Not on file   Occupational History    Not on file   Tobacco Use    Smoking status: Current Every Day Smoker     Packs/day: 0.50     Types: Cigarettes    Smokeless tobacco: Never Used   Vaping Use    Vaping Use: Never used   Substance and Sexual Activity    Alcohol use: Yes     Alcohol/week: 1.0 standard drinks     Types: 1 Cans of beer per week     Comment: rarely    Drug use: Not Currently    Sexual activity: Not Currently     Partners: Female     Birth control/protection: None   Other Topics Concern    Not on file   Social History Narrative    Not on file     Social Determinants of Health     Financial Resource Strain:     Difficulty of Paying Living Expenses:    Food Insecurity:     Worried About Running Out of Food in the Last Year:     [address] in the Last Year:    Transportation Needs:     Lack of Transportation (Medical):      Lack of Transportation (Non-Medical):    Physical Activity:     Days of Exercise per Week:     Minutes of Exercise per Session:    Stress:     Feeling of Stress :    Social Connections:     Frequency of Communication with Friends and Family:     Frequency of Social Gatherings with Friends and Family:     Attends Jew Services:     Active Member of Clubs or Organizations:     Attends Club or Organization Meetings:     Marital Status:    Intimate Partner Violence:     Fear of Current or Ex-Partner:     Emotionally Abused:     Physically Abused:     Sexually Abused:      No family history on file. Objective:   ROS  As per HPI  No Known Allergies     Meds:  Outpatient Medications Prior to Visit   Medication Sig Dispense Refill    polyethylene glycol (MIRALAX) 17 gram/dose powder Take 17 g by mouth as needed for Constipation.  hydroCHLOROthiazide (HYDRODIURIL) 12.5 mg tablet TAKE 1 TABLET BY MOUTH EVERY DAY FOR BLOOD PRESSURE 90 Tablet 0    metFORMIN (GLUCOPHAGE) 1,000 mg tablet TAKE 1 TAB BY MOUTH TWO (2) TIMES DAILY (WITH MEALS). FOR SUGAR 180 Tablet 0    losartan (COZAAR) 100 mg tablet TAKE 1 TABLET BY MOUTH EVERY DAY FOR BLOOD PRESSURE 90 Tablet 0    amLODIPine (NORVASC) 10 mg tablet TAKE 1 TABLET BY MOUTH EVERY DAY FOR BLOOD PRESSURE 90 Tablet 0    atorvastatin (LIPITOR) 80 mg tablet Take 1 Tab by mouth nightly. 30 Tab 1    aspirin 81 mg chewable tablet Take 1 Tab by mouth daily. 30 Tab 1    ergocalciferol (ERGOCALCIFEROL) 1,250 mcg (50,000 unit) capsule Take 1 Cap by mouth every seven (7) days. 14 Cap 0     No facility-administered medications prior to visit. Imaging:  MRI Results (most recent):  Results from Hospital Encounter encounter on 05/13/21    MRI LUMB SPINE WO CONT    Narrative  EXAM: MRI LUMB SPINE WO CONT    INDICATION: Low back pain; No chronic LBP duration >= 3 months; Neurologic  deficit, non-traumatic; LE numbness/paresthesia; Increasing LE  numbness/paresthesia; No known/automatically detected potential  contraindications to imaging. Exam: MRI of the lumbar spine. Sequences include sagittal and axial T1 and  T2-weighted images. Sagittal STIR. There is patient motion artifact    Comparisons: 5/13/2021    Contrast: None.     Findings: Alignment is normal. Endplate degenerative change without fracture or  marrow replacement. Cord terminus is within normal limits. Paraspinous soft  tissues are within normal limits. T12-L1: No stenosis    L1-L2: No stenosis    L2-L3: No stenosis    L3-L4: Mild desiccation of this disc with slight bulge. Mild facet hypertrophy. Minimal indentation of the thecal sac and slight narrowing of the foramen    L4-L5: Mild desiccation of this disc with slight bulge asymmetric to the right  with facet arthropathy. There is mild narrowing of the canal with mild right  foraminal narrowing    L5-S1: Mild bilateral facet arthropathy. 2 mm synovial cyst off the left facet. Mild left foraminal narrowing    Impression  1. Mild multilevel degenerative change with no more than mild foraminal  narrowing as detailed by level above     CT Results (most recent):  Results from Hospital Encounter encounter on 05/13/21    CT SPINE LUMB WO CONT    Narrative  EXAM: CT SPINE LUMB WO CONT    INDICATION: low back pain with neuropathy    COMPARISON: None. TECHNIQUE:   Unenhanced multislice helical CT of the lumbar spine was performed  in the axial plane. Coronal and sagittal reconstructions were obtained. CT  dose reduction was achieved through use of a standardized protocol tailored for  this examination and automatic exposure control for dose modulation. FINDINGS:        T12-L1: The spinal canal and neural foramina are widely patent. L1-L2: The spinal canal and neural foramina are widely patent. L2-L3: The spinal canal and neural foramina are widely patent. L3-L4: Mild diffuse disc bulge and mild ligamentum flavum hypertrophy. Right  greater than left facet arthropathy. Mild left foraminal narrowing. L4-L5: Mild diffuse disc bulge and disc space narrowing. L5-S1: Right facet arthropathy. Mild diffuse disc bulge. Impression  No significant findings. Disc bulges L3-S1 with disc space narrowing  L4-5.  Multilevel facet arthropathy. Reviewed records in [facility]oro system and [employer] tab today    Lab Review   Results for orders placed or performed during the hospital encounter of 21/65/11   METABOLIC PANEL, COMPREHENSIVE   Result Value Ref Range    Sodium 139 136 - 145 mmol/L    Potassium 3.0 (L) 3.5 - 5.1 mmol/L    Chloride 100 97 - 108 mmol/L    CO2 32 21 - 32 mmol/L    Anion gap 7 5 - 15 mmol/L    Glucose 148 (H) 65 - 100 mg/dL    BUN 15 6 - 20 MG/DL    Creatinine 1.56 (H) 0.70 - 1.30 MG/DL    BUN/Creatinine ratio 10 (L) 12 - 20      GFR est AA 54 (L) >60 ml/min/1.73m2    GFR est non-AA 45 (L) >60 ml/min/1.73m2    Calcium 8.9 8.5 - 10.1 MG/DL    Bilirubin, total 0.4 0.2 - 1.0 MG/DL    ALT (SGPT) 20 12 - 78 U/L    AST (SGOT) 18 15 - 37 U/L    Alk. phosphatase 110 45 - 117 U/L    Protein, total 7.3 6.4 - 8.2 g/dL    Albumin 3.0 (L) 3.5 - 5.0 g/dL    Globulin 4.3 (H) 2.0 - 4.0 g/dL    A-G Ratio 0.7 (L) 1.1 - 2.2     CBC WITH AUTOMATED DIFF   Result Value Ref Range    WBC 6.9 4.1 - 11.1 K/uL    RBC 4.47 4.10 - 5.70 M/uL    HGB 14.2 12.1 - 17.0 g/dL    HCT 39.8 36.6 - 50.3 %    MCV 89.0 80.0 - 99.0 FL    MCH 31.8 26.0 - 34.0 PG    MCHC 35.7 30.0 - 36.5 g/dL    RDW 12.5 11.5 - 14.5 %    PLATELET 241 351 - 822 K/uL    MPV 10.6 8.9 - 12.9 FL    NRBC 0.0 0  WBC    ABSOLUTE NRBC 0.00 0.00 - 0.01 K/uL    NEUTROPHILS 55 32 - 75 %    LYMPHOCYTES 35 12 - 49 %    MONOCYTES 7 5 - 13 %    EOSINOPHILS 2 0 - 7 %    BASOPHILS 1 0 - 1 %    IMMATURE GRANULOCYTES 0 0.0 - 0.5 %    ABS. NEUTROPHILS 3.8 1.8 - 8.0 K/UL    ABS. LYMPHOCYTES 2.4 0.8 - 3.5 K/UL    ABS. MONOCYTES 0.5 0.0 - 1.0 K/UL    ABS. EOSINOPHILS 0.1 0.0 - 0.4 K/UL    ABS. BASOPHILS 0.1 0.0 - 0.1 K/UL    ABS. IMM.  GRANS. 0.0 0.00 - 0.04 K/UL    DF AUTOMATED     D DIMER   Result Value Ref Range    D-dimer 0.37 0.00 - 0.65 mg/L FEU   TROPONIN I   Result Value Ref Range    Troponin-I, Qt. <0.05 <0.05 ng/mL   CK W/ REFLX CKMB   Result Value Ref Range     39 - 308 U/L   URINALYSIS W/ REFLEX CULTURE    Specimen: Urine   Result Value Ref Range    Color YELLOW/STRAW      Appearance CLEAR CLEAR      Specific gravity 1.015 1.003 - 1.030      pH (UA) 5.5 5.0 - 8.0      Protein 100 (A) NEG mg/dL    Glucose Negative NEG mg/dL    Ketone Negative NEG mg/dL    Bilirubin Negative NEG      Blood TRACE (A) NEG      Urobilinogen 1.0 0.2 - 1.0 EU/dL    Nitrites Negative NEG      Leukocyte Esterase Negative NEG      WBC 0-4 0 - 4 /hpf    RBC 0-5 0 - 5 /hpf    Epithelial cells FEW FEW /lpf    Bacteria Negative NEG /hpf    UA:UC IF INDICATED CULTURE NOT INDICATED BY UA RESULT CNI     HEMOGLOBIN A1C WITH EAG   Result Value Ref Range    Hemoglobin A1c 6.5 (H) 4.0 - 5.6 %    Est. average glucose 140 mg/dL   MAGNESIUM   Result Value Ref Range    Magnesium 1.7 1.6 - 2.4 mg/dL   VITAMIN B12   Result Value Ref Range    Vitamin B12 340 193 - 986 pg/mL   FOLATE   Result Value Ref Range    Folate 8.2 5.0 - 21.0 ng/mL   TSH 3RD GENERATION   Result Value Ref Range    TSH 1.50 0.36 - 3.74 uIU/mL   TROPONIN I   Result Value Ref Range    Troponin-I, Qt. <0.05 <0.05 ng/mL   LIPID PANEL   Result Value Ref Range    Cholesterol, total 113 <200 MG/DL    Triglyceride 83 <150 MG/DL    HDL Cholesterol 38 MG/DL    LDL, calculated 58.4 0 - 100 MG/DL    VLDL, calculated 16.6 MG/DL    CHOL/HDL Ratio 3.0 0.0 - 5.0     HEMOGLOBIN A1C WITH EAG   Result Value Ref Range    Hemoglobin A1c 6.6 (H) 4.0 - 5.6 %    Est. average glucose 143 mg/dL   TROPONIN I   Result Value Ref Range    Troponin-I, Qt. <0.05 <0.05 ng/mL   TROPONIN I   Result Value Ref Range    Troponin-I, Qt. <0.05 <7.13 ng/mL   METABOLIC PANEL, BASIC   Result Value Ref Range    Sodium 142 136 - 145 mmol/L    Potassium 3.2 (L) 3.5 - 5.1 mmol/L    Chloride 106 97 - 108 mmol/L    CO2 28 21 - 32 mmol/L    Anion gap 8 5 - 15 mmol/L    Glucose 88 65 - 100 mg/dL    BUN 16 6 - 20 MG/DL    Creatinine 1.35 (H) 0.70 - 1.30 MG/DL    BUN/Creatinine ratio 12 12 - 20      GFR est AA >60 >60 ml/min/1.73m2    GFR est non-AA 53 (L) >60 ml/min/1.73m2    Calcium 8.4 (L) 8.5 - 10.1 MG/DL   VITAMIN D, 25 HYDROXY   Result Value Ref Range    Vitamin D 25-Hydroxy 9.2 (L) 30 - 100 ng/mL   RPR   Result Value Ref Range    RPR NONREACTIVE NR     ANGIOTENSIN CONVERTING ENZYME   Result Value Ref Range    Angiotensin Converting Enzyme (ACE) 25 14 - 82 U/L   AISHA, DIRECT, W/REFLEX   Result Value Ref Range    AISHA, Direct Negative Negative     ALDOLASE   Result Value Ref Range    Aldolase 3.2 (L) 3.3 - 10.3 U/L   CK   Result Value Ref Range     39 - 794 U/L   METABOLIC PANEL, BASIC   Result Value Ref Range    Sodium 142 136 - 145 mmol/L    Potassium 3.6 3.5 - 5.1 mmol/L    Chloride 105 97 - 108 mmol/L    CO2 29 21 - 32 mmol/L    Anion gap 8 5 - 15 mmol/L    Glucose 108 (H) 65 - 100 mg/dL    BUN 15 6 - 20 MG/DL    Creatinine 1.16 0.70 - 1.30 MG/DL    BUN/Creatinine ratio 13 12 - 20      GFR est AA >60 >60 ml/min/1.73m2    GFR est non-AA >60 >60 ml/min/1.73m2    Calcium 8.5 8.5 - 10.1 MG/DL   CRP, HIGH SENSITIVITY   Result Value Ref Range    CRP, High sensitivity 2.4 mg/L   HOMOCYSTEINE, PLASMA   Result Value Ref Range    Homocysteine, plasma 15.2 (H) 3.7 - 13.9 umol/L   SED RATE (ESR)   Result Value Ref Range    Sed rate, automated 49 (H) 0 - 20 mm/hr   COVID-19 WITH INFLUENZA A/B   Result Value Ref Range    SARS-CoV-2 Not detected NOTD      Influenza A by PCR Not detected NOTD      Influenza B by PCR Not detected NOTD     GLUCOSE, POC   Result Value Ref Range    Glucose (POC) 147 (H) 65 - 117 mg/dL    Performed by Jamie BELTRÁN    GLUCOSE, POC   Result Value Ref Range    Glucose (POC) 120 (H) 65 - 117 mg/dL    Performed by RUPERT LINDQUIST (PCT)    GLUCOSE, POC   Result Value Ref Range    Glucose (POC) 112 65 - 117 mg/dL    Performed by [facility]    GLUCOSE, POC   Result Value Ref Range    Glucose (POC) 108 65 - 117 mg/dL    Performed by [facility]    GLUCOSE, POC   Result Value Ref Range    Glucose (POC) 143 (H) 65 - 117 mg/dL    Performed by Richard Vazquez    GLUCOSE, POC   Result Value Ref Range    Glucose (POC) 95 65 - 117 mg/dL    Performed by [employer] [facility] Shabnam PCT    GLUCOSE, POC   Result Value Ref Range    Glucose (POC) 109 65 - 117 mg/dL    Performed by [employer] PCT    GLUCOSE, POC   Result Value Ref Range    Glucose (POC) 135 (H) 65 - 117 mg/dL    Performed by Janel [employer]agle [employer]    GLUCOSE, POC   Result Value Ref Range    Glucose (POC) 118 (H) 65 - 117 mg/dL    Performed by [facility]. PCT    GLUCOSE, POC   Result Value Ref Range    Glucose (POC) 146 (H) 65 - 117 mg/dL    Performed by [facility] Shabnam PCT    GLUCOSE, POC   Result Value Ref Range    Glucose (POC) 90 65 - 117 mg/dL    Performed by [facility]    GLUCOSE, POC   Result Value Ref Range    Glucose (POC) 88 65 - 117 mg/dL    Performed by [facility]    GLUCOSE, POC   Result Value Ref Range    Glucose (POC) 111 65 - 117 mg/dL    Performed by Joshua Mcintosh  PCT    GLUCOSE, POC   Result Value Ref Range    Glucose (POC) 138 (H) 65 - 117 mg/dL    Performed by Salina Puente    GLUCOSE, POC   Result Value Ref Range    Glucose (POC) 87 65 - 117 mg/dL    Performed by Treasure Flavin (PCT)    GLUCOSE, POC   Result Value Ref Range    Glucose (POC) 138 (H) 65 - 117 mg/dL    Performed by Treasure Flavin (PCT)    GLUCOSE, POC   Result Value Ref Range    Glucose (POC) 160 (H) 65 - 117 mg/dL    Performed by RUPERT LINDQUIST (PCT)    GLUCOSE, POC   Result Value Ref Range    Glucose (POC) 141 (H) 65 - 117 mg/dL    Performed by Sylwia Colindres    GLUCOSE, POC   Result Value Ref Range    Glucose (POC) 114 65 - 117 mg/dL    Performed by Treasure Flavin (PCT)    GLUCOSE, POC   Result Value Ref Range    Glucose (POC) 104 65 - 117 mg/dL    Performed by Treasure Flavin (PCT)    GLUCOSE, POC   Result Value Ref Range    Glucose (POC) 122 (H) 65 - 117 mg/dL    Performed by Charise Aase    EKG, 12 LEAD, INITIAL   Result Value Ref Range Ventricular Rate 72 BPM    Atrial Rate 72 BPM    P-R Interval 170 ms    QRS Duration 82 ms    Q-T Interval 378 ms    QTC Calculation (Bezet) 413 ms    Calculated P Axis 70 degrees    Calculated R Axis 65 degrees    Calculated T Axis -152 degrees    Diagnosis       Normal sinus rhythm  Left ventricular hypertrophy with repolarization abnormality  Abnormal ECG  When compared with ECG of 13-FEB-2021 16:08,  premature atrial complexes are no longer present  Confirmed by Juarez Lucio M.D., Gabi Jordan ([id number]) on 5/14/2021 8:13:08 AM     EKG, 12 LEAD, SUBSEQUENT   Result Value Ref Range    Ventricular Rate 68 BPM    Atrial Rate 68 BPM    P-R Interval 166 ms    QRS Duration 84 ms    Q-T Interval 400 ms    QTC Calculation (Bezet) 425 ms    Calculated P Axis 53 degrees    Calculated R Axis 17 degrees    Calculated T Axis 148 degrees    Diagnosis       Normal sinus rhythm  Left ventricular hypertrophy with repolarization abnormality  When compared with ECG of 13-MAY-2021 15:07,    Confirmed by Juarez Lucio M.D., Gabi Jordan ([id number]) on 5/14/2021 8:16:17 AM     DUPLEX CAROTID BILATERAL   Result Value Ref Range    Right cca dist sys 116.2 cm/s    Right CCA dist cota 14.4 cm/s    Right CCA prox sys 119.1 cm/s    Right CCA prox cota 15.9 cm/s    Right ICA dist sys 59.2 cm/s    Right ICA dist cota 16.4 cm/s    Right ICA mid sys 77.9 cm/s    Right ICA mid cota 24.1 cm/s    Right ICA prox sys 69.1 cm/s    Right ICA prox cota 18.6 cm/s    Right vertebral sys 68.1 cm/s    RIGHT VERTEBRAL ARTERY D 9.80 cm/s    Right ICA/CCA sys 0.7     Left CCA dist sys 120.6 cm/s    Left CCA dist ctoa 23.0 cm/s    Left CCA prox sys 164.7 cm/s    Left CCA prox cota 30.0 cm/s    Left ICA dist sys 53.1 cm/s    Left ICA dist cota 20.7 cm/s    Left ICA mid sys 60.6 cm/s    Left ICA mid cota 18.6 cm/s    Left ICA prox sys 55.5 cm/s    Left ICA prox cota 18.0 cm/s    Left ICA/CCA sys 0.50     Right eca sys 167.0 cm/s    Left ECA sys 104.3 cm/s    RIGHT EXTERNAL CAROTID ARTERY D 10.9 cm/s    LEFT EXTERNAL CAROTID ARTERY D 11.4 cm/s   ECHO ADULT COMPLETE   Result Value Ref Range    IVSd 1.10 (A) 0.60 - 1.00 cm    LVIDd 5.13 4.20 - 5.90 cm    LVIDs 3.57 cm    LVOT d 1.84 cm    LVPWd 1.34 (A) 0.60 - 1.00 cm    LV Ejection Fraction MOD 4C 72 percent    LV ED Vol A4C 157.50 mL    LV ES Vol A4C 44.34 mL    LVOT Peak Gradient 3.26 mmHg    LVOT Peak Velocity 90.28 cm/s    RVSP 24.34 mmHg    Left Atrium Major Axis 3.17 cm    LA Area 4C 19.26 cm2    LA Vol 4C 51.02 18.00 - 58.00 mL    Right Atrial Area 4C 13.73 cm2    Est. RA Pressure 5.00 mmHg    Aortic Valve Area by Planimetry 1.82 cm2    AV R PG 74.54 mmHg    Aortic Regurgitant Pressure Half-time 492.48 ms    AR Max Mac 431.67 cm/s    Mitral Valve Area by Planimetry 3.59 cm2    MV A Mac 65.15 cm/s    Mitral Valve E Wave Deceleration Time 145.80 ms    MV E Mac 36.53 cm/s    Mitral Valve Pressure Half-time 42.28 ms    MVA (PHT) 5.20 cm2    TR Max Velocity 496.76 cm/s    MV Peak Gradient 2.84 mmHg    MV Mean Gradient 1.05 mmHg    Mitral Valve Pressure Half-time 82.94 ms    Mitral Valve Max Velocity 84.24 cm/s    Mitral Valve Annulus Velocity Time Integral 35.04 cm    MVA (PHT) 2.65 cm2    Pulmonic Valve Systolic Peak Instantaneous Gradient 3.45 mmHg    Pulmonic Regurgitant End Max Velocity 92.89 cm/s    Triscuspid Valve Regurgitation Peak Gradient 19.34 mmHg    TR Max Velocity 218.99 cm/s    Ao Root D 2.42 cm    MV E/A 0.56     LV Mass .2 88.0 - 224.0 g    LV Mass AL Index 136.7 49.0 - 115.0 g/m2    Left Atrium Minor Axis 1.74 cm    LA Vol Index 28.00 16.00 - 28.00 ml/m2    LVED Vol Index A4C 86.4 mL/m2    LVES Vol Index A4C 24.3 mL/m2        Exam:  Visit Vitals  /68   Pulse 94   Temp 97.2 °F (36.2 °C)   Resp 18   Wt 144 lb 4.8 oz (65.5 kg)   SpO2 98%   BMI 21.94 kg/m²     General:  Alert, cooperative, no distress. Head:  Normocephalic, without obvious abnormality, atraumatic.    Respiratory:  Heart:   Non labored breathing  Regular rate and rhythm, no murmurs   Neck:   2+ carotids, no bruits   Extremities: Warm, no cyanosis or edema. Pulses: 2+ radial pulses. Neurologic:  MS: Alert and oriented x 4, speech intact. Language intact, able to name, repeat, and follow all commands. Attention and fund of knowledge appropriate. Recent and remote memory intact. Cranial Nerves:  II: visual fields Full to confrontation   II: pupils Equal, round, reactive to light   II: optic disc No papilledema   III,VII: ptosis none   III,IV,VI: extraocular muscles  EOMI, no nystagmus or diplopia   V: facial light touch sensation  normal   VII: facial muscle function   symmetric   VIII: hearing intact   IX: soft palate elevation  normal   XI: trapezius strength  5/5   XI: sternocleidomastoid strength 5/5   XII: tongue  Midline     Motor: normal bulk and tone, no tremor              Strength: 3+ /5 right upper and lower extremity, 5 -/5 left upper and lower extremity, PD right  Sensory: Dysesthesia to LT, PP, temperature and vibration right upper extremity and lower extremity  Coordination: FTN and HTS abnormal right, JUANA abnormal right,Romberg positive  Gait: Unsteady gait, ambulates with cane, unable to heel, toe, and tandem walk  Reflexes: 3+ right, 2+ left   Plantar: Mute           Assessment/Plan       ICD-10-CM ICD-9-CM    1. [facility] ([facility]  I63.89 434.91    2. Cerebral ischemia  I67.82 437.1    3. Paresthesia  R20.2 782.0    4. Gait disorder  R26.9 781.2    5. 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 nightly for prophylaxis because he has had stroke and has a history of diabetes. Patient will need outpatient physical therapy. Aspirin 325 mg p.o. daily  Folic acid 1 mg p.o. daily.   Discussed the risk factors for stroke including cardiac, metabolic, genetic, endocrine and life style. Discussed the importance of identifying these risk factors and addressing the reversible risk factors. Discussed the preventive medications for stroke the benefits and risks of each. Medications discussed and all questions answered. Advised on the dangers of tobacco abuse  Advised on the benefits of discontinuation   Advised on available treatments. 10 minutes spent on counseling. Follow-up and Dispositions    · Return in about 2 months (around 8/3/2021). Thank you very much for this consultation.     .    Laura Noguera MD  6/3/2021

## 2021-06-14 ENCOUNTER — CLINICAL SUPPORT (OUTPATIENT)
Dept: INTERNAL MEDICINE CLINIC | Age: 66
End: 2021-06-14

## 2021-06-14 VITALS
SYSTOLIC BLOOD PRESSURE: 125 MMHG | DIASTOLIC BLOOD PRESSURE: 69 MMHG | HEART RATE: 77 BPM | HEIGHT: 68 IN | OXYGEN SATURATION: 96 % | BODY MASS INDEX: 22.66 KG/M2 | RESPIRATION RATE: 17 BRPM | TEMPERATURE: 97 F | WEIGHT: 149.5 LBS

## 2021-06-14 DIAGNOSIS — Z01.30 BP CHECK: ICD-10-CM

## 2021-06-14 DIAGNOSIS — I10 ESSENTIAL HYPERTENSION: Primary | ICD-10-CM

## 2021-06-14 RX ORDER — GUAIFENESIN 100 MG/5ML
81 LIQUID (ML) ORAL DAILY
Qty: 90 TABLET | Refills: 1 | Status: SHIPPED | OUTPATIENT
Start: 2021-06-14 | End: 2021-06-20 | Stop reason: DRUGHIGH

## 2021-06-14 RX ORDER — ERGOCALCIFEROL 1.25 MG/1
50000 CAPSULE ORAL
Qty: 14 CAPSULE | Refills: 0 | Status: SHIPPED | OUTPATIENT
Start: 2021-06-14 | End: 2021-07-16

## 2021-06-14 RX ORDER — ATORVASTATIN CALCIUM 80 MG/1
80 TABLET, FILM COATED ORAL
Qty: 90 TABLET | Refills: 1 | Status: SHIPPED | OUTPATIENT
Start: 2021-06-14 | End: 2021-06-20 | Stop reason: DRUGHIGH

## 2021-06-14 NOTE — PROGRESS NOTES
Pt is here for   Chief Complaint   Patient presents with    Hypertension     BP CHECK     Denies pain at this time    1. Have you been to the ER, urgent care clinic since your last visit? Hospitalized since your last visit? No    2. Have you seen or consulted any other health care providers outside of the [address] [place] [address] since your last visit? Include any pap smears or colon screening. No      Pt was instructed to keep October appointment with MIKAELA Hu and keep all appointments with Neurology, pt was also instructed to take meds as prescribed everyday.

## 2021-06-20 RX ORDER — ASPIRIN 325 MG
325 TABLET ORAL DAILY
Qty: 30 TABLET | Refills: 4 | Status: SHIPPED | OUTPATIENT
Start: 2021-06-20

## 2021-06-20 RX ORDER — FOLIC ACID 1 MG/1
1 TABLET ORAL DAILY
Qty: 30 TABLET | Refills: 4 | Status: ON HOLD | OUTPATIENT
Start: 2021-06-20 | End: 2022-09-27

## 2021-06-20 RX ORDER — ATORVASTATIN CALCIUM 20 MG/1
20 TABLET, FILM COATED ORAL
Qty: 30 TABLET | Refills: 4 | Status: SHIPPED | OUTPATIENT
Start: 2021-06-20 | End: 2021-07-16 | Stop reason: SDUPTHER

## 2021-07-01 ENCOUNTER — TELEPHONE (OUTPATIENT)
Dept: INTERNAL MEDICINE CLINIC | Age: 66
End: 2021-07-01

## 2021-07-01 NOTE — TELEPHONE ENCOUNTER
Santana Fend with [facility] called and she is requesting a glucometer along with test strips and lancets for this patient please  Atiya's # [phone]

## 2021-07-06 ENCOUNTER — TELEPHONE (OUTPATIENT)
Dept: INTERNAL MEDICINE CLINIC | Age: 66
End: 2021-07-06

## 2021-07-06 RX ORDER — INSULIN PUMP SYRINGE, 3 ML
EACH MISCELLANEOUS
Qty: 1 KIT | Refills: 0 | Status: SHIPPED | OUTPATIENT
Start: 2021-07-06 | End: 2022-08-29 | Stop reason: SDUPTHER

## 2021-07-06 RX ORDER — LANCETS
EACH MISCELLANEOUS
Qty: 1 EACH | Refills: 11 | Status: SHIPPED | OUTPATIENT
Start: 2021-07-06 | End: 2022-08-29 | Stop reason: SDUPTHER

## 2021-07-16 ENCOUNTER — TELEPHONE (OUTPATIENT)
Dept: INTERNAL MEDICINE CLINIC | Age: 66
End: 2021-07-16

## 2021-07-16 RX ORDER — ATORVASTATIN CALCIUM 20 MG/1
20 TABLET, FILM COATED ORAL
Qty: 90 TABLET | Refills: 1 | Status: SHIPPED | OUTPATIENT
Start: 2021-07-16 | End: 2022-04-26

## 2021-07-16 RX ORDER — ERGOCALCIFEROL 1.25 MG/1
50000 CAPSULE ORAL
Qty: 14 CAPSULE | Refills: 0 | OUTPATIENT
Start: 2021-07-16

## 2021-07-16 RX ORDER — AMLODIPINE BESYLATE 10 MG/1
TABLET ORAL
Qty: 90 TABLET | Refills: 1 | Status: SHIPPED | OUTPATIENT
Start: 2021-07-16 | End: 2021-10-04

## 2021-07-16 RX ORDER — MELATONIN
1000 DAILY
Qty: 90 TABLET | Refills: 1 | Status: SHIPPED | OUTPATIENT
Start: 2021-07-16

## 2021-07-22 ENCOUNTER — TELEPHONE (OUTPATIENT)
Dept: INTERNAL MEDICINE CLINIC | Age: 66
End: 2021-07-22

## 2021-07-22 DIAGNOSIS — I10 ESSENTIAL HYPERTENSION: ICD-10-CM

## 2021-07-22 RX ORDER — LOSARTAN POTASSIUM 100 MG/1
TABLET ORAL
Qty: 90 TABLET | Refills: 0 | Status: SHIPPED | OUTPATIENT
Start: 2021-07-22 | End: 2021-10-12

## 2021-07-22 NOTE — TELEPHONE ENCOUNTER
Maru with [facility] wants verbal order for pt to get home health occupational therapy.   Maru# [phone]

## 2021-08-06 ENCOUNTER — OFFICE VISIT (OUTPATIENT)
Dept: NEUROLOGY | Age: 66
End: 2021-08-06
Payer: MEDICARE

## 2021-08-06 VITALS
DIASTOLIC BLOOD PRESSURE: 78 MMHG | HEART RATE: 103 BPM | BODY MASS INDEX: 20.61 KG/M2 | SYSTOLIC BLOOD PRESSURE: 133 MMHG | WEIGHT: 136 LBS | OXYGEN SATURATION: 99 % | HEIGHT: 68 IN

## 2021-08-06 DIAGNOSIS — R29.6 FREQUENT FALLS: ICD-10-CM

## 2021-08-06 DIAGNOSIS — I63.89 PARIETAL LOBE INFARCTION (HCC): ICD-10-CM

## 2021-08-06 DIAGNOSIS — R26.9 GAIT DISORDER: ICD-10-CM

## 2021-08-06 DIAGNOSIS — E72.11 HOMOCYSTINEMIA (HCC): ICD-10-CM

## 2021-08-06 DIAGNOSIS — I67.82 CEREBRAL ISCHEMIA: Primary | ICD-10-CM

## 2021-08-06 PROCEDURE — G8536 NO DOC ELDER MAL SCRN: HCPCS | Performed by: PSYCHIATRY & NEUROLOGY

## 2021-08-06 PROCEDURE — G8420 CALC BMI NORM PARAMETERS: HCPCS | Performed by: PSYCHIATRY & NEUROLOGY

## 2021-08-06 PROCEDURE — G8752 SYS BP LESS 140: HCPCS | Performed by: PSYCHIATRY & NEUROLOGY

## 2021-08-06 PROCEDURE — 99214 OFFICE O/P EST MOD 30 MIN: CPT | Performed by: PSYCHIATRY & NEUROLOGY

## 2021-08-06 PROCEDURE — G8427 DOCREV CUR MEDS BY ELIG CLIN: HCPCS | Performed by: PSYCHIATRY & NEUROLOGY

## 2021-08-06 PROCEDURE — G8432 DEP SCR NOT DOC, RNG: HCPCS | Performed by: PSYCHIATRY & NEUROLOGY

## 2021-08-06 PROCEDURE — G8754 DIAS BP LESS 90: HCPCS | Performed by: PSYCHIATRY & NEUROLOGY

## 2021-08-06 PROCEDURE — 1101F PT FALLS ASSESS-DOCD LE1/YR: CPT | Performed by: PSYCHIATRY & NEUROLOGY

## 2021-08-06 PROCEDURE — 3017F COLORECTAL CA SCREEN DOC REV: CPT | Performed by: PSYCHIATRY & NEUROLOGY

## 2021-08-06 NOTE — PROGRESS NOTES
Neurology Progress Note    NAME:  Ileana Rueda :   1955   MRN:   [id number]     Date/Time:  2021  Subjective:      Ileana Pabon. is a 76 y.o. male here today for follow-up for CVA, numbness and tingling sensation, gait disorder  Patient says he has been doing fairly well since the last visit, he says he has been in physical therapy, however, it is home physical therapy. He says he has not had any falls since then last visit, he still unsteady, he ambulates with walker. Patient noted that the numbness and tingling sensation has not gotten any worse. Patient will benefit more with outpatient physical therapy than Home physical therapy. I will refer patient to outpatient physical therapy.   Review of Systems - General ROS: positive for  - fatigue, sleep disturbance and weight loss  Psychological ROS: positive for - anxiety and sleep disturbances  Ophthalmic ROS: positive for - blurry vision and decreased vision  ENT ROS: positive for - tinnitus, vertigo and visual changes  Allergy and Immunology ROS: negative  Hematological and Lymphatic ROS: negative  Endocrine ROS: negative  Respiratory ROS: no cough, shortness of breath, or wheezing  Cardiovascular ROS: no chest pain or dyspnea on exertion  Gastrointestinal ROS: no abdominal pain, change in bowel habits, or black or bloody stools  Genito-Urinary ROS: no dysuria, trouble voiding, or hematuria  Musculoskeletal ROS: positive for - gait disturbance, joint pain, joint stiffness and muscle pain  Neurological ROS: positive for - dizziness, gait disturbance, impaired coordination/balance, numbness/tingling and weakness  Dermatological ROS: negative        Medications reviewed:  Current Outpatient Medications   Medication Sig Dispense Refill    losartan (COZAAR) 100 mg tablet TAKE 1 TABLET BY MOUTH EVERY DAY FOR BLOOD PRESSURE 90 Tablet 0    amLODIPine (NORVASC) 10 mg tablet TAKE 1 TABLET BY MOUTH EVERY DAY FOR BLOOD PRESSURE 90 Tablet 1    atorvastatin (LIPITOR) 20 mg tablet Take 1 Tablet by mouth nightly. 90 Tablet 1    cholecalciferol (VITAMIN D3) (1000 Units /25 mcg) tablet Take 1 Tablet by mouth daily. 90 Tablet 1    folic acid (FOLVITE) 1 mg tablet Take 1 Tablet by mouth daily. 30 Tablet 4    aspirin (ASPIRIN) 325 mg tablet Take 1 Tablet by mouth daily. 30 Tablet 4    polyethylene glycol (MIRALAX) 17 gram/dose powder Take 17 g by mouth as needed for Constipation.  hydroCHLOROthiazide (HYDRODIURIL) 12.5 mg tablet TAKE 1 TABLET BY MOUTH EVERY DAY FOR BLOOD PRESSURE 90 Tablet 0    metFORMIN (GLUCOPHAGE) 1,000 mg tablet TAKE 1 TAB BY MOUTH TWO (2) TIMES DAILY (WITH MEALS). FOR SUGAR 180 Tablet 0    Blood-Glucose Meter monitoring kit Use as directed. Dx: E11.65. check sugars once daily 1 Kit 0    glucose blood VI test strips (ASCENSIA AUTODISC VI, ONE TOUCH ULTRA TEST VI) strip E11.65 check sugar once daily fasting 100 Strip 11    lancets misc Use as directed.  Dx: E11.65 1 Each 11        Objective:   Vitals:  Vitals:    08/06/21 0952   BP: 133/78   Pulse: (!) 103   SpO2: 99%   Weight: 136 lb (61.7 kg)   Height: 5' 8\" (1.727 m)   PainSc:   0 - No pain     Lab Data Reviewed:  Lab Results   Component Value Date/Time    WBC 6.9 05/13/2021 03:13 PM    HCT 39.8 05/13/2021 03:13 PM    HGB 14.2 05/13/2021 03:13 PM    PLATELET 468 31/63/6651 03:13 PM       Lab Results   Component Value Date/Time    Sodium 142 05/15/2021 03:36 AM    Potassium 3.6 05/15/2021 03:36 AM    Chloride 105 05/15/2021 03:36 AM    CO2 29 05/15/2021 03:36 AM    Glucose 108 (H) 05/15/2021 03:36 AM    BUN 15 05/15/2021 03:36 AM    Creatinine 1.16 05/15/2021 03:36 AM    Calcium 8.5 05/15/2021 03:36 AM       No components found for: TROPQUANT    No results found for: AISHA      Lab Results   Component Value Date/Time    Hemoglobin A1c 6.6 (H) 05/14/2021 06:02 AM    Hemoglobin A1c (POC) 7.4 04/20/2021 10:39 AM        Lab Results   Component Value Date/Time    Vitamin B12 340 05/13/2021 03:13 PM    Folate 8.2 05/13/2021 03:13 PM       No results found for: Raul LEONARD    Lab Results   Component Value Date/Time    Cholesterol, total 113 05/14/2021 06:02 AM    Cholesterol (POC) 216 04/20/2021 10:41 AM    HDL Cholesterol 38 05/14/2021 06:02 AM    HDL Cholesterol (POC) 44 04/20/2021 10:41 AM    LDL Cholesterol (POC) 143 04/20/2021 10:41 AM    LDL, calculated 58.4 05/14/2021 06:02 AM    VLDL, calculated 16.6 05/14/2021 06:02 AM    Triglyceride 83 05/14/2021 06:02 AM    Triglycerides (POC) 149 04/20/2021 10:41 AM    CHOL/HDL Ratio 3.0 05/14/2021 06:02 AM         CT Results (recent):  Results from Hospital Encounter encounter on 05/13/21    CT SPINE LUMB WO CONT    Narrative  EXAM: CT SPINE LUMB WO CONT    INDICATION: low back pain with neuropathy    COMPARISON: None. TECHNIQUE:   Unenhanced multislice helical CT of the lumbar spine was performed  in the axial plane. Coronal and sagittal reconstructions were obtained. CT  dose reduction was achieved through use of a standardized protocol tailored for  this examination and automatic exposure control for dose modulation. FINDINGS:        T12-L1: The spinal canal and neural foramina are widely patent. L1-L2: The spinal canal and neural foramina are widely patent. L2-L3: The spinal canal and neural foramina are widely patent. L3-L4: Mild diffuse disc bulge and mild ligamentum flavum hypertrophy. Right  greater than left facet arthropathy. Mild left foraminal narrowing. L4-L5: Mild diffuse disc bulge and disc space narrowing. L5-S1: Right facet arthropathy. Mild diffuse disc bulge. Impression  No significant findings. Disc bulges L3-S1 with disc space narrowing  L4-5. Multilevel facet arthropathy. MRI Results (recent):  Results from [place] encounter on 05/13/21    MRI LUMB SPINE WO CONT    Narrative  EXAM: MRI LUMB SPINE WO CONT    INDICATION: Low back pain;  No chronic LBP duration >= 3 months; Neurologic  deficit, non-traumatic; LE numbness/paresthesia; Increasing LE  numbness/paresthesia; No known/automatically detected potential  contraindications to imaging. Exam: MRI of the lumbar spine. Sequences include sagittal and axial T1 and  T2-weighted images. Sagittal STIR. There is patient motion artifact    Comparisons: 5/13/2021    Contrast: None. Findings: Alignment is normal. Endplate degenerative change without fracture or  marrow replacement. Cord terminus is within normal limits. Paraspinous soft  tissues are within normal limits. T12-L1: No stenosis    L1-L2: No stenosis    L2-L3: No stenosis    L3-L4: Mild desiccation of this disc with slight bulge. Mild facet hypertrophy. Minimal indentation of the thecal sac and slight narrowing of the foramen    L4-L5: Mild desiccation of this disc with slight bulge asymmetric to the right  with facet arthropathy. There is mild narrowing of the canal with mild right  foraminal narrowing    L5-S1: Mild bilateral facet arthropathy. 2 mm synovial cyst off the left facet. Mild left foraminal narrowing    Impression  1. Mild multilevel degenerative change with no more than mild foraminal  narrowing as detailed by level above      IR Results (recent):  No results found for this or any previous visit. VAS/US Results (recent):  No results found for this or any previous visit. PHYSICAL EXAM:  General:    Alert, cooperative, no distress, appears stated age. Head:   Normocephalic, without obvious abnormality, atraumatic. Eyes:   Conjunctivae/corneas clear. PERRLA  Nose:  Nares normal. No drainage or sinus tenderness. Throat:    Lips, mucosa, and tongue normal.  No Thrush  Neck:  Supple, symmetrical,  no adenopathy, thyroid: non tender    no carotid bruit and no JVD. Back:    Symmetric,  No CVA tenderness. Lungs:   Clear to auscultation bilaterally. No Wheezing or Rhonchi. No rales. Chest wall:  No tenderness or deformity.  No Accessory muscle use. Heart:   Regular rate and rhythm,  no murmur, rub or gallop. Abdomen:   Soft, non-tender. Not distended. Bowel sounds normal. No masses  Extremities: Extremities normal, atraumatic, No cyanosis. No edema. No clubbing  Skin:     Texture, turgor normal. No rashes or lesions. Not Jaundiced  Lymph nodes: Cervical, supraclavicular normal.  Psych:  Good insight. Not depressed. Not anxious or agitated. NEUROLOGICAL EXAM:  Appearance: The patient is well developed, well nourished, provides a coherent history and is in no acute distress. Mental Status: Oriented to time, place and person. Mood and affect appropriate. Cranial Nerves:   Intact visual fields. Fundi are benign. NINOSKA, EOM's full, no nystagmus, no ptosis. Facial sensation is normal. Corneal reflexes are intact. Facial movement is symmetric. Hearing is normal bilaterally. Palate is midline with normal sternocleidomastoid and trapezius muscles are normal. Tongue is midline. Motor:  4/5 strength in right upper and lower proximal and distal muscles. Normal bulk and tone. No fasciculations. Reflexes:   Deep tendon reflexes 3+/4 right, 2+ left . Sensory:   Decrease sensation to touch, pinprick and vibration. Gait:  Unsteady gait. Ambulates with walker   Tremor:   No tremor noted. Cerebellar:  No cerebellar signs present. Neurovascular:  Normal heart sounds and regular rhythm, peripheral pulses intact, and no carotid bruits. Assesment  1. Cerebral ischemia    - REFERRAL TO PHYSICAL THERAPY    2. Parietal lobe infarction (HCC)    - REFERRAL TO PHYSICAL THERAPY    3. Gait disorder    - REFERRAL TO PHYSICAL THERAPY    4. Homocystinemia (HCC)  Folic acid 1 mg p.o. qd    5. Frequent falls    - REFERRAL TO PHYSICAL THERAPY    ___________________________________________________  PLAN: Medication and plan discuss with patient      ICD-10-CM ICD-9-CM    1.  Cerebral ischemia  I67.82 437.1 REFERRAL TO PHYSICAL THERAPY   2. Parietal lobe infarction ([facility]  I63.89 434.91 REFERRAL TO PHYSICAL THERAPY   3. Gait disorder  R26.9 781.2 REFERRAL TO PHYSICAL THERAPY   4. Homocystinemia ([facility]  E72.11 270.4    5.  Frequent falls  R29.6 V15.88 REFERRAL TO PHYSICAL THERAPY     Follow-up and Dispositions    · Return in about 2 months (around 10/6/2021), or [address] [place] [address].               ___________________________________________________    Attending Physician: Damon Archer MD

## 2021-09-02 DIAGNOSIS — E11.65 TYPE 2 DIABETES MELLITUS WITH HYPERGLYCEMIA (HCC): ICD-10-CM

## 2021-09-03 RX ORDER — METFORMIN HYDROCHLORIDE 1000 MG/1
TABLET ORAL
Qty: 180 TABLET | Refills: 0 | Status: SHIPPED | OUTPATIENT
Start: 2021-09-03 | End: 2022-02-21

## 2021-09-15 ENCOUNTER — OFFICE VISIT (OUTPATIENT)
Dept: INTERNAL MEDICINE CLINIC | Age: 66
End: 2021-09-15
Payer: MEDICARE

## 2021-09-15 VITALS
HEIGHT: 68 IN | DIASTOLIC BLOOD PRESSURE: 69 MMHG | RESPIRATION RATE: 17 BRPM | BODY MASS INDEX: 20.46 KG/M2 | SYSTOLIC BLOOD PRESSURE: 121 MMHG | OXYGEN SATURATION: 100 % | HEART RATE: 87 BPM | TEMPERATURE: 98.6 F | WEIGHT: 135 LBS

## 2021-09-15 DIAGNOSIS — F17.200 SMOKER: ICD-10-CM

## 2021-09-15 DIAGNOSIS — Z01.818 PREOP EXAMINATION: Primary | ICD-10-CM

## 2021-09-15 DIAGNOSIS — I63.89 PARIETAL LOBE INFARCTION (HCC): ICD-10-CM

## 2021-09-15 DIAGNOSIS — E78.2 MIXED HYPERLIPIDEMIA: ICD-10-CM

## 2021-09-15 DIAGNOSIS — E11.9 CONTROLLED TYPE 2 DIABETES MELLITUS WITHOUT COMPLICATION, WITHOUT LONG-TERM CURRENT USE OF INSULIN (HCC): ICD-10-CM

## 2021-09-15 DIAGNOSIS — I10 ESSENTIAL HYPERTENSION: ICD-10-CM

## 2021-09-15 LAB
GLUCOSE POC: 129 MG/DL
HBA1C MFR BLD HPLC: 5.7 %

## 2021-09-15 PROCEDURE — G8432 DEP SCR NOT DOC, RNG: HCPCS | Performed by: NURSE PRACTITIONER

## 2021-09-15 PROCEDURE — G8752 SYS BP LESS 140: HCPCS | Performed by: NURSE PRACTITIONER

## 2021-09-15 PROCEDURE — G8754 DIAS BP LESS 90: HCPCS | Performed by: NURSE PRACTITIONER

## 2021-09-15 PROCEDURE — 3044F HG A1C LEVEL LT 7.0%: CPT | Performed by: NURSE PRACTITIONER

## 2021-09-15 PROCEDURE — G8427 DOCREV CUR MEDS BY ELIG CLIN: HCPCS | Performed by: NURSE PRACTITIONER

## 2021-09-15 PROCEDURE — 1101F PT FALLS ASSESS-DOCD LE1/YR: CPT | Performed by: NURSE PRACTITIONER

## 2021-09-15 PROCEDURE — 99214 OFFICE O/P EST MOD 30 MIN: CPT | Performed by: NURSE PRACTITIONER

## 2021-09-15 PROCEDURE — 83036 HEMOGLOBIN GLYCOSYLATED A1C: CPT | Performed by: NURSE PRACTITIONER

## 2021-09-15 PROCEDURE — G8536 NO DOC ELDER MAL SCRN: HCPCS | Performed by: NURSE PRACTITIONER

## 2021-09-15 PROCEDURE — G8420 CALC BMI NORM PARAMETERS: HCPCS | Performed by: NURSE PRACTITIONER

## 2021-09-15 PROCEDURE — 2022F DILAT RTA XM EVC RTNOPTHY: CPT | Performed by: NURSE PRACTITIONER

## 2021-09-15 PROCEDURE — 82962 GLUCOSE BLOOD TEST: CPT | Performed by: NURSE PRACTITIONER

## 2021-09-15 PROCEDURE — 3017F COLORECTAL CA SCREEN DOC REV: CPT | Performed by: NURSE PRACTITIONER

## 2021-09-15 NOTE — PROGRESS NOTES
Chief Complaint   Patient presents with    Pre-op Exam       1. Have you been to the ER, urgent care clinic since your last visit? Hospitalized since your last visit? No    2. Have you seen or consulted any other health care providers outside of the [address] [place] [address] since your last visit? Include any pap smears or colon screening.  No

## 2021-09-15 NOTE — PROGRESS NOTES
Subjective: (As above and below)     Chief Complaint   Patient presents with   Rhoderick Shelter. is a 76y.o. year old male who presents for     Diabetic Review of Systems - medication compliance: compliant all of the time, diabetic diet compliance: compliant most of the time, home glucose monitoring: is not performed. Hypertension ROS:  taking medications as instructed, no medication side effects noted, no TIAs, no chest pain on exertion, no dyspnea on exertion, no swelling of ankles    Hyperlipidemia tolerating statin    Pre-op; having L cataract surgery this month    No hx of latex allergy, anesthesia complications    Hx of parietal lobe infarction, followed by neuro, uses walker for stability        Reviewed PmHx, RxHx, FmHx, SocHx, AllgHx and updated in chart. History reviewed. No pertinent family history. Past Medical History:   Diagnosis Date    Diabetes ([facility]     Fractured rib 03/2021    from a fall per pt on 5/11/2021    High cholesterol     Hypertension     per pt on 5/11/2021      Social History     Socioeconomic History    Marital status:      Spouse name: Not on file    Number of children: Not on file    Years of education: Not on file    Highest education level: Not on file   Tobacco Use    Smoking status: Current Every Day Smoker     Packs/day: 0.50     Types: Cigarettes    Smokeless tobacco: Never Used   Vaping Use    Vaping Use: Never used   Substance and Sexual Activity    Alcohol use:  Yes     Alcohol/week: 1.0 standard drinks     Types: 1 Cans of beer per week     Comment: rarely    Drug use: Not Currently    Sexual activity: Not Currently     Partners: Female     Birth control/protection: None     Social Determinants of Health     Financial Resource Strain:     Difficulty of Paying Living Expenses:    Food Insecurity:     Worried About Running Out of Food in the Last Year:     Ran Out of Food in the Last Year:    Transportation Needs:     Lack of Transportation (Medical):  Lack of Transportation (Non-Medical):    Physical Activity:     Days of Exercise per Week:     Minutes of Exercise per Session:    Stress:     Feeling of Stress :    Social Connections:     Frequency of Communication with Friends and Family:     Frequency of Social Gatherings with Friends and Family:     Attends Bahai Services:     Active Member of Clubs or Organizations:     Attends Club or Organization Meetings:     Marital Status:           Current Outpatient Medications   Medication Sig    metFORMIN (GLUCOPHAGE) 1,000 mg tablet TAKE 1 TABLET TWICE DAILY WITH MEALS  FOR  SUGAR    losartan (COZAAR) 100 mg tablet TAKE 1 TABLET BY MOUTH EVERY DAY FOR BLOOD PRESSURE    amLODIPine (NORVASC) 10 mg tablet TAKE 1 TABLET BY MOUTH EVERY DAY FOR BLOOD PRESSURE    atorvastatin (LIPITOR) 20 mg tablet Take 1 Tablet by mouth nightly.  cholecalciferol (VITAMIN D3) (1000 Units /25 mcg) tablet Take 1 Tablet by mouth daily.  Blood-Glucose Meter monitoring kit Use as directed. Dx: E11.65. check sugars once daily    glucose blood VI test strips (ASCENSIA AUTODISC VI, ONE TOUCH ULTRA TEST VI) strip E11.65 check sugar once daily fasting    lancets misc Use as directed. Dx: P63.22    folic acid (FOLVITE) 1 mg tablet Take 1 Tablet by mouth daily.  aspirin (ASPIRIN) 325 mg tablet Take 1 Tablet by mouth daily.  polyethylene glycol (MIRALAX) 17 gram/dose powder Take 17 g by mouth as needed for Constipation.  hydroCHLOROthiazide (HYDRODIURIL) 12.5 mg tablet TAKE 1 TABLET BY MOUTH EVERY DAY FOR BLOOD PRESSURE     No current facility-administered medications for this visit. Review of Systems:   Constitutional:    Negative for fever and chills, negative diaphoresis. HEENT:              Negative for neck pain and stiffness. Eyes:                  Negative for visual disturbance, itching, redness or discharge.    Respiratory:        Negative for cough and shortness of breath. Cardiovascular:  Negative for chest pain and palpitations. Gastrointestinal: Negative for nausea, vomiting, abdominal pain, diarrhea or constipation. Genitourinary:     Negative for dysuria and frequency. Musculoskeletal: Negative for falls, tenderness and swelling. Skin:                    Negative for rash, masses or lesions. Neurological:       Negative for dizzyness, seizure, loss of consciousness, weakness and numbness. Objective:     Vitals:    09/15/21 0824   BP: 121/69   Pulse: 87   Resp: 17   Temp: 98.6 °F (37 °C)   TempSrc: Temporal   SpO2: 100%   Weight: 61.2 kg (135 lb)   Height: 5' 8\" (1.727 m)           Gen: Oriented to person, place and time and well-developed, well-nourished and in no distress. HEENT:    Head: normocephalic and atraumatic. Eyes:  EOM are normal. Pupils equal and round. Neck:  Normal range of motion. Neck supple. Cardiovascular: normal rate, regular rhythm and normal heart sounds. Pulmonary/Chest:  Effort normal and breath sounds normal.  No respiratory distress. No wheezes, no rales. Abdominal: soft, normal  bowel sounds. Musculoskeletal:  No edema, no tenderness. No calf tenderness or edema. Neurological:  Alert, oriented to person, place and time. Skin: skin is warm and dry. Assessment/ Plan:     Follow-up and Dispositions    · Return in about 6 months (around 3/15/2022). 1. Preop examination  Clearance sent    2. Controlled type 2 diabetes mellitus without complication, without long-term current use of insulin ([facility])    - AMB POC GLUCOSE BLOOD, BY GLUCOSE MONITORING DEVICE  - AMB POC HEMOGLOBIN A1C    3. Essential hypertension      4. Mixed hyperlipidemia      5. Parietal lobe infarction ([facility]      6. Smoker  Encouraged cessation        I have discussed the diagnosis with the patient and the intended plan as seen in the above orders.   The patient has received an after-visit summary and questions were answered concerning future plans. Pt conveyed understanding of plan. Medication Side Effects and Warnings were discussed with patient: yes  Patient Labs were reviewed: yes  Patient Past Records were reviewed:  yes    Lencho Kwon.  Lana Lamar NP

## 2021-10-04 RX ORDER — HYDROCHLOROTHIAZIDE 12.5 MG/1
TABLET ORAL
Qty: 90 TABLET | Refills: 0 | Status: SHIPPED | OUTPATIENT
Start: 2021-10-04 | End: 2022-02-21

## 2021-10-04 RX ORDER — AMLODIPINE BESYLATE 10 MG/1
TABLET ORAL
Qty: 90 TABLET | Refills: 1 | Status: SHIPPED | OUTPATIENT
Start: 2021-10-04 | End: 2021-11-18 | Stop reason: SDUPTHER

## 2021-10-11 DIAGNOSIS — I10 ESSENTIAL HYPERTENSION: ICD-10-CM

## 2021-10-12 RX ORDER — LOSARTAN POTASSIUM 100 MG/1
TABLET ORAL
Qty: 90 TABLET | Refills: 0 | Status: SHIPPED | OUTPATIENT
Start: 2021-10-12 | End: 2022-02-21

## 2021-11-14 NOTE — PROGRESS NOTES
Pt really weak could not walk to BR. Urinal given and was assited x2 to stand up. Pt high fall risk. Bed alarm on. Iv fluids started. Fall Risk

## 2021-11-18 RX ORDER — AMLODIPINE BESYLATE 10 MG/1
10 TABLET ORAL DAILY
Qty: 90 TABLET | Refills: 1 | Status: SHIPPED | OUTPATIENT
Start: 2021-11-18 | End: 2022-08-01

## 2021-11-18 NOTE — TELEPHONE ENCOUNTER
Fela Al with [facility] called stating pt neeeeds a refill on his Amlodipine. He also needs lancets for the [facility] machine.   [facility] # [phone]

## 2021-12-03 ENCOUNTER — TELEPHONE (OUTPATIENT)
Dept: INTERNAL MEDICINE CLINIC | Age: 66
End: 2021-12-03

## 2021-12-03 NOTE — TELEPHONE ENCOUNTER
----- Message from Elizabeth Scott sent at 12/2/2021 12:02 PM EST -----  Subject: Message to Provider    QUESTIONS  Information for Provider? Arash Johns from [facility] is calling to inform   Olivia Hinkle that the pt has missing his appt with her.   ---------------------------------------------------------------------------  --------------  [address]  What is the best way for the office to contact you? OK to leave message on   voicemail  Preferred Call Back Phone Number?  [phone]  ---------------------------------------------------------------------------  --------------  SCRIPT ANSWERS  undefined

## 2022-02-19 DIAGNOSIS — I10 ESSENTIAL HYPERTENSION: ICD-10-CM

## 2022-02-19 DIAGNOSIS — E11.65 TYPE 2 DIABETES MELLITUS WITH HYPERGLYCEMIA (HCC): ICD-10-CM

## 2022-02-21 RX ORDER — METFORMIN HYDROCHLORIDE 1000 MG/1
TABLET ORAL
Qty: 180 TABLET | Refills: 0 | Status: SHIPPED | OUTPATIENT
Start: 2022-02-21 | End: 2022-03-16

## 2022-02-21 RX ORDER — LOSARTAN POTASSIUM 100 MG/1
TABLET ORAL
Qty: 90 TABLET | Refills: 0 | Status: SHIPPED | OUTPATIENT
Start: 2022-02-21 | End: 2022-08-01

## 2022-02-21 RX ORDER — HYDROCHLOROTHIAZIDE 12.5 MG/1
TABLET ORAL
Qty: 90 TABLET | Refills: 0 | Status: SHIPPED | OUTPATIENT
Start: 2022-02-21 | End: 2022-06-06

## 2022-02-28 ENCOUNTER — TELEPHONE (OUTPATIENT)
Dept: INTERNAL MEDICINE CLINIC | Age: 67
End: 2022-02-28

## 2022-02-28 NOTE — TELEPHONE ENCOUNTER
[place] with [facility] called on behalf of patient to let us know that patient had a fall on Saturday and he has bruises on both knees, his left cheek is swollen and he has a cut above his left eyebrow. Patient said that he is just not feeling like himself. Patient would like a phone call.     Best CB#: [phone]

## 2022-03-11 ENCOUNTER — TELEPHONE (OUTPATIENT)
Dept: INTERNAL MEDICINE CLINIC | Age: 67
End: 2022-03-11

## 2022-03-11 NOTE — TELEPHONE ENCOUNTER
----- Message from Khanh Barone sent at 3/10/2022  5:12 PM EST -----  Subject: Message to Provider    QUESTIONS  Information for Provider? Davion Conner with NP with [facility] has bilateral brutis   that needs further evaluation, was told to call for an appointment   ---------------------------------------------------------------------------  --------------  [address]  What is the best way for the office to contact you? OK to leave message on   voicemail  Preferred Call Back Phone Number? [phone]  ---------------------------------------------------------------------------  --------------  SCRIPT ANSWERS  Relationship to Patient? Third Party  Representative Name?  Davion Conner with NP [facility]

## 2022-03-16 ENCOUNTER — OFFICE VISIT (OUTPATIENT)
Dept: INTERNAL MEDICINE CLINIC | Age: 67
End: 2022-03-16
Payer: MEDICARE

## 2022-03-16 VITALS
WEIGHT: 132 LBS | HEIGHT: 68 IN | DIASTOLIC BLOOD PRESSURE: 78 MMHG | TEMPERATURE: 98.3 F | RESPIRATION RATE: 19 BRPM | SYSTOLIC BLOOD PRESSURE: 159 MMHG | BODY MASS INDEX: 20 KG/M2 | HEART RATE: 52 BPM | OXYGEN SATURATION: 100 %

## 2022-03-16 DIAGNOSIS — F17.200 SMOKER: ICD-10-CM

## 2022-03-16 DIAGNOSIS — E11.65 TYPE 2 DIABETES MELLITUS WITH HYPERGLYCEMIA (HCC): Primary | ICD-10-CM

## 2022-03-16 DIAGNOSIS — E72.11 HOMOCYSTINEMIA (HCC): ICD-10-CM

## 2022-03-16 DIAGNOSIS — I10 ESSENTIAL HYPERTENSION: ICD-10-CM

## 2022-03-16 DIAGNOSIS — I65.23 BILATERAL CAROTID ARTERY STENOSIS: ICD-10-CM

## 2022-03-16 DIAGNOSIS — G45.9 TIA (TRANSIENT ISCHEMIC ATTACK): ICD-10-CM

## 2022-03-16 DIAGNOSIS — E78.2 MIXED HYPERLIPIDEMIA: ICD-10-CM

## 2022-03-16 LAB
ALBUMIN UR QL STRIP: 150 MG/L
CREATININE, URINE POC: 100 MG/DL
GLUCOSE POC: 112 MG/DL
HBA1C MFR BLD HPLC: 4.9 %
MICROALBUMIN/CREAT RATIO POC: >300 MG/G

## 2022-03-16 PROCEDURE — G8754 DIAS BP LESS 90: HCPCS | Performed by: NURSE PRACTITIONER

## 2022-03-16 PROCEDURE — 83036 HEMOGLOBIN GLYCOSYLATED A1C: CPT | Performed by: NURSE PRACTITIONER

## 2022-03-16 PROCEDURE — G8432 DEP SCR NOT DOC, RNG: HCPCS | Performed by: NURSE PRACTITIONER

## 2022-03-16 PROCEDURE — 82962 GLUCOSE BLOOD TEST: CPT | Performed by: NURSE PRACTITIONER

## 2022-03-16 PROCEDURE — G8420 CALC BMI NORM PARAMETERS: HCPCS | Performed by: NURSE PRACTITIONER

## 2022-03-16 PROCEDURE — 2022F DILAT RTA XM EVC RTNOPTHY: CPT | Performed by: NURSE PRACTITIONER

## 2022-03-16 PROCEDURE — 1101F PT FALLS ASSESS-DOCD LE1/YR: CPT | Performed by: NURSE PRACTITIONER

## 2022-03-16 PROCEDURE — 3017F COLORECTAL CA SCREEN DOC REV: CPT | Performed by: NURSE PRACTITIONER

## 2022-03-16 PROCEDURE — G8753 SYS BP > OR = 140: HCPCS | Performed by: NURSE PRACTITIONER

## 2022-03-16 PROCEDURE — G8536 NO DOC ELDER MAL SCRN: HCPCS | Performed by: NURSE PRACTITIONER

## 2022-03-16 PROCEDURE — 99214 OFFICE O/P EST MOD 30 MIN: CPT | Performed by: NURSE PRACTITIONER

## 2022-03-16 PROCEDURE — 3044F HG A1C LEVEL LT 7.0%: CPT | Performed by: NURSE PRACTITIONER

## 2022-03-16 PROCEDURE — 82044 UR ALBUMIN SEMIQUANTITATIVE: CPT | Performed by: NURSE PRACTITIONER

## 2022-03-16 PROCEDURE — G8427 DOCREV CUR MEDS BY ELIG CLIN: HCPCS | Performed by: NURSE PRACTITIONER

## 2022-03-16 RX ORDER — METFORMIN HYDROCHLORIDE 500 MG/1
500 TABLET ORAL 2 TIMES DAILY WITH MEALS
Qty: 180 TABLET | Refills: 0 | Status: SHIPPED | OUTPATIENT
Start: 2022-03-16 | End: 2022-08-01

## 2022-03-16 NOTE — PROGRESS NOTES
Subjective: (As above and below)     Chief Complaint   Patient presents with   Jose Cardona. is a 78y.o. year old male who presents for     Hypertension ROS:  taking medications as instructed, no medication side effects noted, no TIAs, no chest pain on exertion, no dyspnea on exertion, no swelling of ankles    BP Readings from Last 3 Encounters:   03/16/22 (!) 159/78   09/15/21 121/69   08/06/21 133/78       Diabetic Review of Systems - medication compliance: compliant all of the time, diabetic diet compliance: compliant most of the time, home glucose monitoring: is performed sporadically. 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 History reviewed. No pertinent family history. Past Medical History:   Diagnosis Date    Diabetes ([facility] [address]     Fractured rib 03/2021    from a fall per pt on 5/11/2021    High cholesterol     Hypertension     per pt on 5/11/2021      Social History     Socioeconomic History    Marital status:    Tobacco Use    Smoking status: Current Every Day Smoker     Packs/day: 0.50     Types: Cigarettes    Smokeless tobacco: Never Used   Vaping Use    Vaping Use: Never used   Substance and Sexual Activity    Alcohol use: Yes     Alcohol/week: 1.0 standard drink     Types: 1 Cans of beer per week     Comment: rarely    Drug use: Not Currently    Sexual activity: Not Currently     Partners: Female     Birth control/protection: None          Current Outpatient Medications   Medication Sig    metFORMIN (GLUCOPHAGE) 500 mg tablet Take 1 Tablet by mouth two (2) times daily (with meals).     losartan (COZAAR) 100 mg tablet TAKE 1 TABLET EVERY DAY FOR BLOOD PRESSURE    hydroCHLOROthiazide (HYDRODIURIL) 12.5 mg tablet TAKE 1 TABLET EVERY DAY FOR BLOOD PRESSURE    amLODIPine (NORVASC) 10 mg tablet Take 1 Tablet by mouth daily.  glucose blood VI test strips (ASCENSIA AUTODISC VI, ONE TOUCH ULTRA TEST VI) strip E11.65 check sugar once daily fasting    atorvastatin (LIPITOR) 20 mg tablet Take 1 Tablet by mouth nightly.  cholecalciferol (VITAMIN D3) (1000 Units /25 mcg) tablet Take 1 Tablet by mouth daily.  Blood-Glucose Meter monitoring kit Use as directed. Dx: E11.65. check sugars once daily    lancets misc Use as directed. Dx: C39.06    folic acid (FOLVITE) 1 mg tablet Take 1 Tablet by mouth daily.  aspirin (ASPIRIN) 325 mg tablet Take 1 Tablet by mouth daily.  polyethylene glycol (MIRALAX) 17 gram/dose powder Take 17 g by mouth as needed for Constipation. No current facility-administered medications for this visit. Review of Systems:   Constitutional:    Negative for fever and chills, negative diaphoresis. HEENT:              Negative for neck pain and stiffness. Eyes:                  Negative for visual disturbance, itching, redness or discharge. Respiratory:        Negative for cough and shortness of breath. Cardiovascular:  Negative for chest pain and palpitations. Gastrointestinal: Negative for nausea, vomiting, abdominal pain, diarrhea or constipation. Genitourinary:     Negative for dysuria and frequency. Musculoskeletal: Negative for falls, tenderness and swelling. Skin:                    Negative for rash, masses or lesions. Neurological:       Negative for dizzyness, seizure, loss of consciousness, weakness and numbness.      Objective:     Vitals:    03/16/22 0822 03/16/22 0902   BP: (!) 167/73 (!) 159/78   Pulse: 78 (!) 52   Resp: 19    Temp: 98.3 °F (36.8 °C)    TempSrc: Temporal    SpO2: 100%    Weight: 132 lb (59.9 kg)    Height: 5' 8\" (1.727 m)             Gen: Oriented to person, place and time and well-developed, well-nourished and in no distress. HEENT:    Head: normocephalic and atraumatic. Eyes:  EOM are normal. Pupils equal and round. Neck:  Normal range of motion. Neck supple. Cardiovascular: normal rate, regular rhythm and normal heart sounds. Pulmonary/Chest:  Effort normal and breath sounds normal.  No respiratory distress. No wheezes, no rales. Abdominal: soft, normal  bowel sounds. Musculoskeletal:  No edema, no tenderness. No calf tenderness or edema. Neurological:  Alert, oriented to person, place and time. Skin: skin is warm and dry. Results for orders placed or performed in visit on 03/16/22   AMB POC HEMOGLOBIN A1C   Result Value Ref Range    Hemoglobin A1c (POC) 4.9 %   AMB POC GLUCOSE BLOOD, BY GLUCOSE MONITORING DEVICE   Result Value Ref Range    Glucose  MG/DL   AMB POC URINE, MICROALBUMIN, SEMIQUANT (3 RESULTS)   Result Value Ref Range    ALBUMIN, URINE  Negative mg/L    CREATININE, URINE  mg/dL    Microalbumin/creat ratio (POC) >300 <30 MG/G         Assessment/ Plan:     Follow-up and Dispositions    · Return in about 3 weeks (around 4/6/2022) for nv bp check. 1. Type 2 diabetes mellitus with hyperglycemia (HCC)  Reduce metformin dose  - AMB POC HEMOGLOBIN A1C  - AMB POC GLUCOSE BLOOD, BY GLUCOSE MONITORING DEVICE  - AMB POC URINE, MICROALBUMIN, SEMIQUANT (3 RESULTS)  - metFORMIN (GLUCOPHAGE) 500 mg tablet; Take 1 Tablet by mouth two (2) times daily (with meals). Dispense: 180 Tablet; Refill: 0    2. Homocystinemia ([address]    - REFERRAL TO NEUROLOGY    3. Essential hypertension  Higher than usual- if still high at close fu NV will adjust    4. Mixed hyperlipidemia      5. Smoker  Encouraged cessation    6. Bilateral carotid artery stenosis  Will update- last doppler <50% occluded  - DUPLEX CAROTID BILATERAL; Future  - REFERRAL TO NEUROLOGY    7.  TIA (transient ischemic attack)  Re-est care  - REFERRAL TO NEUROLOGY          I have discussed the diagnosis with the patient and the intended plan as seen in the above orders. The patient has received an after-visit summary and questions were answered concerning future plans. Pt conveyed understanding of plan. Medication Side Effects and Warnings were discussed with patient: yes  Patient Labs were reviewed: yes  Patient Past Records were reviewed:  yes    Sakshi Callahan.  Jeannette Castellanos NP

## 2022-03-18 PROBLEM — G45.9 TIA (TRANSIENT ISCHEMIC ATTACK): Status: ACTIVE | Noted: 2021-05-13

## 2022-03-18 PROBLEM — I63.9 STROKE (HCC): Status: ACTIVE | Noted: 2021-05-16

## 2022-03-19 PROBLEM — E11.9 TYPE 2 DIABETES MELLITUS, WITHOUT LONG-TERM CURRENT USE OF INSULIN (HCC): Status: ACTIVE | Noted: 2021-02-26

## 2022-03-19 PROBLEM — R19.5 POSITIVE COLORECTAL CANCER SCREENING USING COLOGUARD TEST: Status: ACTIVE | Noted: 2021-03-11

## 2022-03-19 PROBLEM — I10 ESSENTIAL HYPERTENSION: Status: ACTIVE | Noted: 2021-02-26

## 2022-03-28 ENCOUNTER — HOSPITAL ENCOUNTER (OUTPATIENT)
Dept: VASCULAR SURGERY | Age: 67
Discharge: HOME | End: 2022-03-28
Attending: NURSE PRACTITIONER
Payer: MEDICARE

## 2022-03-28 DIAGNOSIS — I65.23 BILATERAL CAROTID ARTERY STENOSIS: ICD-10-CM

## 2022-03-28 PROCEDURE — 93880 EXTRACRANIAL BILAT STUDY: CPT

## 2022-03-29 LAB
LEFT CCA DIST DIAS: 13.1 CENTIMETER/SECOND
LEFT CCA DIST SYS: 80.6 CENTIMETER/SECOND
LEFT CCA PROX DIAS: 13.1 CENTIMETER/SECOND
LEFT CCA PROX SYS: 106.8 CENTIMETER/SECOND
LEFT ECA DIAS: 1.87 CENTIMETER/SECOND
LEFT ECA SYS: 63.7 CENTIMETER/SECOND
LEFT ICA DIST DIAS: 37.5 CENTIMETER/SECOND
LEFT ICA DIST SYS: 152.3 CENTIMETER/SECOND
LEFT ICA MID DIAS: 24.4 CENTIMETER/SECOND
LEFT ICA MID SYS: 110.5 CENTIMETER/SECOND
LEFT ICA PROX DIAS: 26.2 CENTIMETER/SECOND
LEFT ICA PROX SYS: 103.1 CENTIMETER/SECOND
LEFT ICA/CCA SYS: 1.43
LEFT VERTEBRAL DIAS: 16.25 CENTIMETER/SECOND
LEFT VERTEBRAL SYS: 69.1 CENTIMETER/SECOND
RIGHT CCA DIST DIAS: 7.7 CENTIMETER/SECOND
RIGHT CCA DIST SYS: 65.6 CENTIMETER/SECOND
RIGHT CCA PROX DIAS: 4.4 CENTIMETER/SECOND
RIGHT CCA PROX SYS: 65.6 CENTIMETER/SECOND
RIGHT ECA DIAS: 3.75 CENTIMETER/SECOND
RIGHT ECA SYS: 119.9 CENTIMETER/SECOND
RIGHT ICA DIST DIAS: 22.5 CENTIMETER/SECOND
RIGHT ICA DIST SYS: 80.6 CENTIMETER/SECOND
RIGHT ICA MID DIAS: 22.5 CENTIMETER/SECOND
RIGHT ICA MID SYS: 74.9 CENTIMETER/SECOND
RIGHT ICA PROX DIAS: 18.7 CENTIMETER/SECOND
RIGHT ICA PROX SYS: 71.2 CENTIMETER/SECOND
RIGHT ICA/CCA SYS: 1.2
RIGHT VERTEBRAL DIAS: 7.49 CENTIMETER/SECOND
RIGHT VERTEBRAL SYS: 41.2 CENTIMETER/SECOND

## 2022-03-29 PROCEDURE — 93880 EXTRACRANIAL BILAT STUDY: CPT | Performed by: INTERNAL MEDICINE

## 2022-04-11 ENCOUNTER — CLINICAL SUPPORT (OUTPATIENT)
Dept: INTERNAL MEDICINE CLINIC | Age: 67
End: 2022-04-11

## 2022-04-11 VITALS
HEART RATE: 72 BPM | RESPIRATION RATE: 19 BRPM | DIASTOLIC BLOOD PRESSURE: 81 MMHG | OXYGEN SATURATION: 95 % | SYSTOLIC BLOOD PRESSURE: 171 MMHG | TEMPERATURE: 98.6 F

## 2022-04-11 DIAGNOSIS — I10 PRIMARY HYPERTENSION: Primary | ICD-10-CM

## 2022-04-11 NOTE — PROGRESS NOTES
BP Readings from Last 3 Encounters:   04/11/22 (!) 171/81   03/16/22 (!) 159/78   09/15/21 121/69      Labs for renal function then med adjustment

## 2022-04-12 PROBLEM — I65.23 BILATERAL CAROTID ARTERY STENOSIS: Status: ACTIVE | Noted: 2022-04-12

## 2022-04-12 LAB
BUN SERPL-MCNC: 18 MG/DL (ref 8–27)
BUN/CREAT SERPL: 11 (ref 10–24)
CALCIUM SERPL-MCNC: 9.6 MG/DL (ref 8.6–10.2)
CHLORIDE SERPL-SCNC: 107 MMOL/L (ref 96–106)
CO2 SERPL-SCNC: 20 MMOL/L (ref 20–29)
CREAT SERPL-MCNC: 1.58 MG/DL (ref 0.76–1.27)
EGFR: 48 ML/MIN/1.73
GLUCOSE SERPL-MCNC: 71 MG/DL (ref 65–99)
INTERPRETATION: NORMAL
POTASSIUM SERPL-SCNC: 4.3 MMOL/L (ref 3.5–5.2)
SODIUM SERPL-SCNC: 146 MMOL/L (ref 134–144)

## 2022-04-26 DIAGNOSIS — I10 PRIMARY HYPERTENSION: Primary | ICD-10-CM

## 2022-04-26 RX ORDER — ATORVASTATIN CALCIUM 20 MG/1
TABLET, FILM COATED ORAL
Qty: 90 TABLET | Refills: 1 | Status: SHIPPED | OUTPATIENT
Start: 2022-04-26

## 2022-06-06 RX ORDER — HYDROCHLOROTHIAZIDE 12.5 MG/1
TABLET ORAL
Qty: 90 TABLET | Refills: 0 | Status: SHIPPED | OUTPATIENT
Start: 2022-06-06 | End: 2022-08-01

## 2022-07-19 ENCOUNTER — HOSPITAL ENCOUNTER (EMERGENCY)
Age: 67
Discharge: HOME | End: 2022-07-19
Attending: EMERGENCY MEDICINE
Payer: MEDICARE

## 2022-07-19 VITALS
HEART RATE: 70 BPM | DIASTOLIC BLOOD PRESSURE: 77 MMHG | WEIGHT: 150 LBS | BODY MASS INDEX: 22.73 KG/M2 | RESPIRATION RATE: 16 BRPM | TEMPERATURE: 97.8 F | HEIGHT: 68 IN | OXYGEN SATURATION: 99 % | SYSTOLIC BLOOD PRESSURE: 194 MMHG

## 2022-07-19 DIAGNOSIS — R19.7 DIARRHEA, UNSPECIFIED TYPE: Primary | ICD-10-CM

## 2022-07-19 DIAGNOSIS — E86.0 DEHYDRATION: ICD-10-CM

## 2022-07-19 LAB
ALBUMIN SERPL-MCNC: 4.2 G/DL (ref 3.5–5)
ALBUMIN/GLOB SERPL: 1.2 {RATIO} (ref 1.1–2.2)
ALP SERPL-CCNC: 87 U/L (ref 45–117)
ALT SERPL-CCNC: 21 U/L (ref 12–78)
ANION GAP SERPL CALC-SCNC: 11 MMOL/L (ref 5–15)
AST SERPL-CCNC: 20 U/L (ref 15–37)
BASOPHILS # BLD: 0.1 K/UL (ref 0–0.1)
BASOPHILS NFR BLD: 1 % (ref 0–1)
BILIRUB SERPL-MCNC: 0.5 MG/DL (ref 0.2–1)
BUN SERPL-MCNC: 33 MG/DL (ref 6–20)
BUN/CREAT SERPL: 15 (ref 12–20)
CALCIUM SERPL-MCNC: 9.6 MG/DL (ref 8.5–10.1)
CHLORIDE SERPL-SCNC: 107 MMOL/L (ref 97–108)
CO2 SERPL-SCNC: 25 MMOL/L (ref 21–32)
CREAT SERPL-MCNC: 2.14 MG/DL (ref 0.7–1.3)
DIFFERENTIAL METHOD BLD: ABNORMAL
EOSINOPHIL # BLD: 0 K/UL (ref 0–0.4)
EOSINOPHIL NFR BLD: 0 % (ref 0–7)
ERYTHROCYTE [DISTWIDTH] IN BLOOD BY AUTOMATED COUNT: 13.4 % (ref 11.5–14.5)
GLOBULIN SER CALC-MCNC: 3.6 G/DL (ref 2–4)
GLUCOSE SERPL-MCNC: 113 MG/DL (ref 65–100)
HCT VFR BLD AUTO: 34.4 % (ref 36.6–50.3)
HGB BLD-MCNC: 11.5 G/DL (ref 12.1–17)
IMM GRANULOCYTES # BLD AUTO: 0 K/UL (ref 0–0.04)
IMM GRANULOCYTES NFR BLD AUTO: 0 % (ref 0–0.5)
LYMPHOCYTES # BLD: 1.7 K/UL (ref 0.8–3.5)
LYMPHOCYTES NFR BLD: 17 % (ref 12–49)
MAGNESIUM SERPL-MCNC: 2 MG/DL (ref 1.6–2.4)
MCH RBC QN AUTO: 31.3 PG (ref 26–34)
MCHC RBC AUTO-ENTMCNC: 33.4 G/DL (ref 30–36.5)
MCV RBC AUTO: 93.5 FL (ref 80–99)
MONOCYTES # BLD: 0.6 K/UL (ref 0–1)
MONOCYTES NFR BLD: 6 % (ref 5–13)
NEUTS SEG # BLD: 7.8 K/UL (ref 1.8–8)
NEUTS SEG NFR BLD: 76 % (ref 32–75)
NRBC # BLD: 0 K/UL (ref 0–0.01)
NRBC BLD-RTO: 0 PER 100 WBC
PLATELET # BLD AUTO: 317 K/UL (ref 150–400)
PMV BLD AUTO: 10.9 FL (ref 8.9–12.9)
POTASSIUM SERPL-SCNC: 3.7 MMOL/L (ref 3.5–5.1)
PROT SERPL-MCNC: 7.8 G/DL (ref 6.4–8.2)
RBC # BLD AUTO: 3.68 M/UL (ref 4.1–5.7)
SODIUM SERPL-SCNC: 143 MMOL/L (ref 136–145)
TROPONIN-HIGH SENSITIVITY: 70 NG/L (ref 0–76)
WBC # BLD AUTO: 10.2 K/UL (ref 4.1–11.1)

## 2022-07-19 PROCEDURE — 36415 COLL VENOUS BLD VENIPUNCTURE: CPT

## 2022-07-19 PROCEDURE — 74011250636 HC RX REV CODE- 250/636: Performed by: EMERGENCY MEDICINE

## 2022-07-19 PROCEDURE — 83735 ASSAY OF MAGNESIUM: CPT

## 2022-07-19 PROCEDURE — 84484 ASSAY OF TROPONIN QUANT: CPT

## 2022-07-19 PROCEDURE — 80053 COMPREHEN METABOLIC PANEL: CPT

## 2022-07-19 PROCEDURE — 96361 HYDRATE IV INFUSION ADD-ON: CPT

## 2022-07-19 PROCEDURE — 93005 ELECTROCARDIOGRAM TRACING: CPT

## 2022-07-19 PROCEDURE — 96360 HYDRATION IV INFUSION INIT: CPT

## 2022-07-19 PROCEDURE — 85025 COMPLETE CBC W/AUTO DIFF WBC: CPT

## 2022-07-19 PROCEDURE — 99284 EMERGENCY DEPT VISIT MOD MDM: CPT

## 2022-07-19 RX ADMIN — SODIUM CHLORIDE 1000 ML: 9 INJECTION, SOLUTION INTRAVENOUS at 08:21

## 2022-07-19 NOTE — ED PROVIDER NOTES
EMERGENCY DEPARTMENT HISTORY AND PHYSICAL EXAM      Date: 7/19/2022  Patient Name: Fred Rosario. History of Presenting Illness     Chief Complaint   Patient presents with    Diarrhea     History Provided By: Patient and Patient's Wife    HPI: Fred Rosario., 78 y.o. male with past medical history significant for diabetes, hypercholesterolemia, and hypertension who presents via private vehicle accompanied by his wife to the ED with cc of diarrhea for the past 2 days as well as generalized weakness. Patient and wife state that he had constipation over the weekend and took either docusate or MiraLAX (patient believes it was docusate but has MiraLAX listed on his medication list) and then started having copious amounts of diarrhea. Patient states he has no energy and no appetite. He denies any fevers, chills, cough, or dizziness. He also denies any abdominal pain. PMHx: Diabetes, hypercholesterolemia, hypertension  Social Hx: Smokes 1/2 pack/day, occasional alcohol use, denies illegal drug use    PCP: Danica Correa., NP    There are no other complaints, changes, or physical findings at this time. No current facility-administered medications on file prior to encounter. Current Outpatient Medications on File Prior to Encounter   Medication Sig Dispense Refill    hydroCHLOROthiazide (HYDRODIURIL) 12.5 mg tablet TAKE 1 TABLET EVERY DAY FOR BLOOD PRESSURE 90 Tablet 0    atorvastatin (LIPITOR) 20 mg tablet TAKE 1 TABLET EVERY NIGHT 90 Tablet 1    metFORMIN (GLUCOPHAGE) 500 mg tablet Take 1 Tablet by mouth two (2) times daily (with meals). 180 Tablet 0    losartan (COZAAR) 100 mg tablet TAKE 1 TABLET EVERY DAY FOR BLOOD PRESSURE 90 Tablet 0    amLODIPine (NORVASC) 10 mg tablet Take 1 Tablet by mouth daily.  90 Tablet 1    glucose blood VI test strips (ASCENSIA AUTODISC VI, ONE TOUCH ULTRA TEST VI) strip E11.65 check sugar once daily fasting 100 Strip 11    cholecalciferol (VITAMIN D3) (1000 Units /25 mcg) tablet Take 1 Tablet by mouth daily. 90 Tablet 1    Blood-Glucose Meter monitoring kit Use as directed. Dx: E11.65. check sugars once daily 1 Kit 0    lancets misc Use as directed. Dx: R57.58 1 Each 11    folic acid (FOLVITE) 1 mg tablet Take 1 Tablet by mouth daily. 30 Tablet 4    aspirin (ASPIRIN) 325 mg tablet Take 1 Tablet by mouth daily. 30 Tablet 4    polyethylene glycol (MIRALAX) 17 gram/dose powder Take 17 g by mouth as needed for Constipation. Past History     Past Medical History:  Past Medical History:   Diagnosis Date    Diabetes ([facility] [address]     Fractured rib 03/2021    from a fall per pt on 5/11/2021    High cholesterol     Hypertension     per pt on 5/11/2021     Past Surgical History:  Past Surgical History:   Procedure Laterality Date    HX ENDOSCOPY      per pt on 5/11/2021     Family History:  History reviewed. No pertinent family history. Social History:  Social History     Tobacco Use    Smoking status: Current Every Day Smoker     Packs/day: 0.50     Types: Cigarettes    Smokeless tobacco: Never Used   Vaping Use    Vaping Use: Never used   Substance Use Topics    Alcohol use: Yes     Alcohol/week: 1.0 standard drink     Types: 1 Cans of beer per week     Comment: rarely    Drug use: Not Currently     Allergies:  No Known Allergies  Review of Systems   Review of Systems   Constitutional:  Positive for fatigue. Negative for chills and fever. HENT:  Negative for congestion, rhinorrhea, sneezing and sore throat. Eyes:  Negative for redness and visual disturbance. Respiratory:  Negative for shortness of breath. Cardiovascular:  Negative for chest pain and leg swelling. Gastrointestinal:  Positive for constipation and diarrhea. Negative for abdominal pain, nausea and vomiting. Genitourinary:  Negative for difficulty urinating and frequency. Musculoskeletal:  Negative for back pain, myalgias and neck stiffness. Skin:  Negative for rash.    Neurological:  Positive for weakness. Negative for dizziness, syncope and headaches. Hematological:  Negative for adenopathy. All other systems reviewed and are negative. Physical Exam   Physical Exam  Vitals and nursing note reviewed. Constitutional:       Appearance: Normal appearance. He is well-developed and normal weight. HENT:      Head: Normocephalic and atraumatic. Cardiovascular:      Rate and Rhythm: Normal rate and regular rhythm. Pulses: Normal pulses. Heart sounds: Normal heart sounds. Pulmonary:      Effort: Pulmonary effort is normal. No respiratory distress. Breath sounds: Normal breath sounds. Chest:      Chest wall: No tenderness. Abdominal:      General: Bowel sounds are normal.      Palpations: Abdomen is soft. Tenderness: There is no abdominal tenderness. There is no guarding or rebound. Musculoskeletal:      Cervical back: Full passive range of motion without pain, normal range of motion and neck supple. Skin:     General: Skin is warm and dry. Findings: No erythema or rash. Neurological:      Mental Status: He is alert and oriented to person, place, and time. Psychiatric:         Speech: Speech normal.         Behavior: Behavior normal.         Thought Content:  Thought content normal.         Judgment: Judgment normal.     Diagnostic Study Results   Labs -     Recent Results (from the past 12 hour(s))   TROPONIN-HIGH SENSITIVITY    Collection Time: 07/19/22  8:17 AM   Result Value Ref Range    Troponin-High Sensitivity 70 0 - 76 ng/L   CBC WITH AUTOMATED DIFF    Collection Time: 07/19/22  8:17 AM   Result Value Ref Range    WBC 10.2 4.1 - 11.1 K/uL    RBC 3.68 (L) 4.10 - 5.70 M/uL    HGB 11.5 (L) 12.1 - 17.0 g/dL    HCT 34.4 (L) 36.6 - 50.3 %    MCV 93.5 80.0 - 99.0 FL    MCH 31.3 26.0 - 34.0 PG    MCHC 33.4 30.0 - 36.5 g/dL    RDW 13.4 11.5 - 14.5 %    PLATELET 164 251 - 044 K/uL    MPV 10.9 8.9 - 12.9 FL    NRBC 0.0 0  WBC    ABSOLUTE NRBC 0.00 0.00 - 0.01 K/uL NEUTROPHILS 76 (H) 32 - 75 %    LYMPHOCYTES 17 12 - 49 %    MONOCYTES 6 5 - 13 %    EOSINOPHILS 0 0 - 7 %    BASOPHILS 1 0 - 1 %    IMMATURE GRANULOCYTES 0 0.0 - 0.5 %    ABS. NEUTROPHILS 7.8 1.8 - 8.0 K/UL    ABS. LYMPHOCYTES 1.7 0.8 - 3.5 K/UL    ABS. MONOCYTES 0.6 0.0 - 1.0 K/UL    ABS. EOSINOPHILS 0.0 0.0 - 0.4 K/UL    ABS. BASOPHILS 0.1 0.0 - 0.1 K/UL    ABS. IMM. GRANS. 0.0 0.00 - 0.04 K/UL    DF AUTOMATED     METABOLIC PANEL, COMPREHENSIVE    Collection Time: 07/19/22  8:17 AM   Result Value Ref Range    Sodium 143 136 - 145 mmol/L    Potassium 3.7 3.5 - 5.1 mmol/L    Chloride 107 97 - 108 mmol/L    CO2 25 21 - 32 mmol/L    Anion gap 11 5 - 15 mmol/L    Glucose 113 (H) 65 - 100 mg/dL    BUN 33 (H) 6 - 20 MG/DL    Creatinine 2.14 (H) 0.70 - 1.30 MG/DL    BUN/Creatinine ratio 15 12 - 20      GFR est AA 38 (L) >60 ml/min/1.73m2    GFR est non-AA 31 (L) >60 ml/min/1.73m2    Calcium 9.6 8.5 - 10.1 MG/DL    Bilirubin, total 0.5 0.2 - 1.0 MG/DL    ALT (SGPT) 21 12 - 78 U/L    AST (SGOT) 20 15 - 37 U/L    Alk. phosphatase 87 45 - 117 U/L    Protein, total 7.8 6.4 - 8.2 g/dL    Albumin 4.2 3.5 - 5.0 g/dL    Globulin 3.6 2.0 - 4.0 g/dL    A-G Ratio 1.2 1.1 - 2.2     MAGNESIUM    Collection Time: 07/19/22  8:17 AM   Result Value Ref Range    Magnesium 2.0 1.6 - 2.4 mg/dL       Radiologic Studies -   No orders to display     No results found. Medical Decision Making   I am the first provider for this patient. I reviewed the vital signs, available nursing notes, past medical history, past surgical history, family history and social history. Vital Signs-Reviewed the patient's vital signs.   Patient Vitals for the past 24 hrs:   Temp Pulse Resp BP SpO2   07/19/22 0945 -- 72 16 -- 99 %   07/19/22 0731 97.8 °F (36.6 °C) 92 18 (!) 167/85 100 %     Pulse Oximetry Analysis - 100% on RA (normal)    Cardiac Monitor:   Rate: 92 bpm  Rhythm: Normal Sinus Rhythm     ED EKG interpretation: 08:12  Rhythm: normal sinus rhythm; and regular . Rate (approx.): 81; Axis: normal; P wave: normal; QRS interval: normal ; ST/T wave: normal; Other findings: left ventricular hypertrophy. This EKG was interpreted by Dawit Bhatt MD,ED Provider. Records Reviewed: Nursing Notes and Old Medical Records    Provider Notes (Medical Decision Making):   59-year-old male presents with diarrhea for the past 2 days after being constipated and taking either docusate or MiraLAX. Differential includes dehydration, gastroenteritis, colitis, and hypertension. Patient tells me he has not taken his medications yet today but will take them when he is discharged. ED Course:   Initial assessment performed. The patients presenting problems have been discussed, and they are in agreement with the care plan formulated and outlined with them. I have encouraged them to ask questions as they arise throughout their visit. Progress Note  10:46 AM  I have re-evaluated pt and he states his symptoms have improved. He does have a mild HENRRY and his white blood cell count is slightly elevated as well. He has received a liter of IV fluids. Discussed symptomatic management and follow-up with primary care. Patient agrees to increase his fluid intake. Progress Note:   Updated pt on all returned results and findings. Discussed the importance of proper follow up as referred below along with return precautions. Pt in agreement with the care plan and expresses agreement with and understanding of all items discussed. Disposition:  Discharge Note:  The pt is ready for discharge. The pt's signs, symptoms, diagnosis, and discharge instructions have been discussed and pt has conveyed their understanding. The pt is to follow up as recommended or return to ER should their symptoms worsen. Plan has been discussed and pt is in agreement. PLAN:  1. Current Discharge Medication List        2.    Follow-up Information       Follow up With Specialties Details Why Contact Info    Darrell Sandhu., NP Nurse Practitioner Schedule an appointment as soon as possible for a visit   [address] [place] [address] [phone]      [facility] EMERGENCY DEPT Emergency Medicine  As needed, If symptoms worsen [address] [facility]  [phone]          Return to ED if worse     Diagnosis     Clinical Impression:   1. Diarrhea, unspecified type    2. Dehydration            Please note that this dictation was completed with Dragon, computer voice recognition software. Quite often unanticipated grammatical, syntax, homophones, and other interpretive errors are inadvertently transcribed by the computer software. Please disregard these errors. Additionally, please excuse any errors that have escaped final proofreading.

## 2022-07-19 NOTE — PROGRESS NOTES
Spiritual Care Partner Volunteer visited patient at [facility] in [address] [place] [address] on 7/19/2022   Documented by:  Nimisha Strickland.    Paging Service: [phone] ([phone])

## 2022-07-19 NOTE — ED TRIAGE NOTES
Patient complains of diarhea for 2 days. He states he had constipation  And took docusate sodium and subsequently developed diarrhea. Paient is a+ox4. Skin is warm and dry. Respirations are even and unlabored. Emergency Department Nursing Plan of Care       The Nursing Plan of Care is developed from the Nursing assessment and Emergency Department Attending provider initial evaluation. The plan of care may be reviewed in the ED Provider note.     The Plan of Care was developed with the following considerations:   Patient / Family readiness to learn indicated by:verbalized understanding  Persons(s) to be included in education: patient  Barriers to Learning/Limitations:No    Signed     Mark Flores RN    7/19/2022   7:40 AM

## 2022-07-21 LAB
ATRIAL RATE: 81 BPM
CALCULATED P AXIS, ECG09: 79 DEGREES
CALCULATED R AXIS, ECG10: 70 DEGREES
CALCULATED T AXIS, ECG11: 50 DEGREES
DIAGNOSIS, 93000: NORMAL
P-R INTERVAL, ECG05: 158 MS
Q-T INTERVAL, ECG07: 374 MS
QRS DURATION, ECG06: 78 MS
QTC CALCULATION (BEZET), ECG08: 434 MS
VENTRICULAR RATE, ECG03: 81 BPM

## 2022-07-23 ENCOUNTER — HOSPITAL ENCOUNTER (EMERGENCY)
Age: 67
Discharge: HOME | End: 2022-07-23
Attending: EMERGENCY MEDICINE
Payer: MEDICARE

## 2022-07-23 ENCOUNTER — APPOINTMENT (OUTPATIENT)
Dept: CT IMAGING | Age: 67
End: 2022-07-23
Attending: EMERGENCY MEDICINE
Payer: MEDICARE

## 2022-07-23 VITALS
DIASTOLIC BLOOD PRESSURE: 79 MMHG | HEIGHT: 68 IN | SYSTOLIC BLOOD PRESSURE: 130 MMHG | HEART RATE: 101 BPM | BODY MASS INDEX: 22.72 KG/M2 | WEIGHT: 149.91 LBS | RESPIRATION RATE: 17 BRPM | TEMPERATURE: 97.4 F | OXYGEN SATURATION: 100 %

## 2022-07-23 DIAGNOSIS — R10.84 ABDOMINAL PAIN, GENERALIZED: Primary | ICD-10-CM

## 2022-07-23 DIAGNOSIS — K52.9 INFLAMMATION OF COLONIC MUCOSA: ICD-10-CM

## 2022-07-23 LAB
ALBUMIN SERPL-MCNC: 3.4 G/DL (ref 3.5–5)
ALBUMIN/GLOB SERPL: 0.9 {RATIO} (ref 1.1–2.2)
ALP SERPL-CCNC: 84 U/L (ref 45–117)
ALT SERPL-CCNC: 21 U/L (ref 12–78)
ANION GAP SERPL CALC-SCNC: 10 MMOL/L (ref 5–15)
AST SERPL-CCNC: 17 U/L (ref 15–37)
BASOPHILS # BLD: 0.1 K/UL (ref 0–0.1)
BASOPHILS NFR BLD: 1 % (ref 0–1)
BILIRUB SERPL-MCNC: 0.5 MG/DL (ref 0.2–1)
BUN SERPL-MCNC: 33 MG/DL (ref 6–20)
BUN/CREAT SERPL: 15 (ref 12–20)
CALCIUM SERPL-MCNC: 9.1 MG/DL (ref 8.5–10.1)
CHLORIDE SERPL-SCNC: 108 MMOL/L (ref 97–108)
CO2 SERPL-SCNC: 24 MMOL/L (ref 21–32)
CREAT SERPL-MCNC: 2.23 MG/DL (ref 0.7–1.3)
DIFFERENTIAL METHOD BLD: ABNORMAL
EOSINOPHIL # BLD: 0.4 K/UL (ref 0–0.4)
EOSINOPHIL NFR BLD: 4 % (ref 0–7)
ERYTHROCYTE [DISTWIDTH] IN BLOOD BY AUTOMATED COUNT: 13.2 % (ref 11.5–14.5)
GLOBULIN SER CALC-MCNC: 3.7 G/DL (ref 2–4)
GLUCOSE SERPL-MCNC: 98 MG/DL (ref 65–100)
HCT VFR BLD AUTO: 34.4 % (ref 36.6–50.3)
HGB BLD-MCNC: 11.6 G/DL (ref 12.1–17)
IMM GRANULOCYTES # BLD AUTO: 0.1 K/UL (ref 0–0.04)
IMM GRANULOCYTES NFR BLD AUTO: 1 % (ref 0–0.5)
LYMPHOCYTES # BLD: 2 K/UL (ref 0.8–3.5)
LYMPHOCYTES NFR BLD: 22 % (ref 12–49)
MAGNESIUM SERPL-MCNC: 1.8 MG/DL (ref 1.6–2.4)
MCH RBC QN AUTO: 31.3 PG (ref 26–34)
MCHC RBC AUTO-ENTMCNC: 33.7 G/DL (ref 30–36.5)
MCV RBC AUTO: 92.7 FL (ref 80–99)
MONOCYTES # BLD: 0.5 K/UL (ref 0–1)
MONOCYTES NFR BLD: 5 % (ref 5–13)
NEUTS SEG # BLD: 6.2 K/UL (ref 1.8–8)
NEUTS SEG NFR BLD: 67 % (ref 32–75)
NRBC # BLD: 0 K/UL (ref 0–0.01)
NRBC BLD-RTO: 0 PER 100 WBC
PLATELET # BLD AUTO: 315 K/UL (ref 150–400)
PMV BLD AUTO: 10.8 FL (ref 8.9–12.9)
POTASSIUM SERPL-SCNC: 3.6 MMOL/L (ref 3.5–5.1)
PROT SERPL-MCNC: 7.1 G/DL (ref 6.4–8.2)
RBC # BLD AUTO: 3.71 M/UL (ref 4.1–5.7)
SODIUM SERPL-SCNC: 142 MMOL/L (ref 136–145)
WBC # BLD AUTO: 9.1 K/UL (ref 4.1–11.1)

## 2022-07-23 PROCEDURE — 74011250637 HC RX REV CODE- 250/637: Performed by: EMERGENCY MEDICINE

## 2022-07-23 PROCEDURE — 80053 COMPREHEN METABOLIC PANEL: CPT

## 2022-07-23 PROCEDURE — 93005 ELECTROCARDIOGRAM TRACING: CPT

## 2022-07-23 PROCEDURE — 96360 HYDRATION IV INFUSION INIT: CPT

## 2022-07-23 PROCEDURE — 83735 ASSAY OF MAGNESIUM: CPT

## 2022-07-23 PROCEDURE — 74011250636 HC RX REV CODE- 250/636: Performed by: EMERGENCY MEDICINE

## 2022-07-23 PROCEDURE — 85025 COMPLETE CBC W/AUTO DIFF WBC: CPT

## 2022-07-23 PROCEDURE — 99284 EMERGENCY DEPT VISIT MOD MDM: CPT

## 2022-07-23 PROCEDURE — 74176 CT ABD & PELVIS W/O CONTRAST: CPT

## 2022-07-23 PROCEDURE — 96361 HYDRATE IV INFUSION ADD-ON: CPT

## 2022-07-23 PROCEDURE — 36415 COLL VENOUS BLD VENIPUNCTURE: CPT

## 2022-07-23 RX ORDER — CIPROFLOXACIN 500 MG/1
500 TABLET ORAL 2 TIMES DAILY
Qty: 14 TABLET | Refills: 0 | Status: SHIPPED | OUTPATIENT
Start: 2022-07-23 | End: 2022-07-30

## 2022-07-23 RX ORDER — PEPPERMINT OIL
SPIRIT ORAL
Status: COMPLETED | OUTPATIENT
Start: 2022-07-23 | End: 2022-07-23

## 2022-07-23 RX ORDER — METRONIDAZOLE 500 MG/1
500 TABLET ORAL 2 TIMES DAILY
Qty: 14 TABLET | Refills: 0 | Status: SHIPPED | OUTPATIENT
Start: 2022-07-23 | End: 2022-07-30

## 2022-07-23 RX ADMIN — SODIUM CHLORIDE 1000 ML: 9 INJECTION, SOLUTION INTRAVENOUS at 08:08

## 2022-07-23 RX ADMIN — Medication: at 10:32

## 2022-07-23 NOTE — ED NOTES
RN reviewed discharge instructions with the patient. The patient verbalized understanding. Wheelchair on discharge.

## 2022-07-23 NOTE — ED TRIAGE NOTES
Patient comes to the ED for re-evaluation of diarrhea since this past Monday. Seen in the ED on Tuesday and symptom persists.   Did not contact PCP

## 2022-07-23 NOTE — ED PROVIDER NOTES
EMERGENCY DEPARTMENT HISTORY AND PHYSICAL EXAM      Date: 7/23/2022  Patient Name: Castillo Miller. History of Presenting Illness     Chief Complaint   Patient presents with    Diarrhea       History Provided By: Patient and Patient's Wife    HPI: Castillo Mak, 78 y.o. male presents to the ED with cc of diarrhea. Patient was seen here on 7/19. He was diagnosed with diarrhea. He had a little mild HENRRY at that time. Patient reports having loose stools has been going on for about a week. Associated with some left lower quadrant abdominal pain. No urinary symptoms. No vomiting. No fever. No recent hospitalizations. No recent foreign travel. No recent antibiotics. Nothing makes the symptoms better or worse. No other exacerbating relieving factors or associated symptoms    There are no other complaints, changes, or physical findings at this time. PCP: Sherie Martinez, NP    No current facility-administered medications on file prior to encounter. Current Outpatient Medications on File Prior to Encounter   Medication Sig Dispense Refill    hydroCHLOROthiazide (HYDRODIURIL) 12.5 mg tablet TAKE 1 TABLET EVERY DAY FOR BLOOD PRESSURE 90 Tablet 0    atorvastatin (LIPITOR) 20 mg tablet TAKE 1 TABLET EVERY NIGHT 90 Tablet 1    metFORMIN (GLUCOPHAGE) 500 mg tablet Take 1 Tablet by mouth two (2) times daily (with meals). 180 Tablet 0    losartan (COZAAR) 100 mg tablet TAKE 1 TABLET EVERY DAY FOR BLOOD PRESSURE 90 Tablet 0    amLODIPine (NORVASC) 10 mg tablet Take 1 Tablet by mouth daily. 90 Tablet 1    glucose blood VI test strips (ASCENSIA AUTODISC VI, ONE TOUCH ULTRA TEST VI) strip E11.65 check sugar once daily fasting 100 Strip 11    cholecalciferol (VITAMIN D3) (1000 Units /25 mcg) tablet Take 1 Tablet by mouth daily. 90 Tablet 1    Blood-Glucose Meter monitoring kit Use as directed. Dx: E11.65. check sugars once daily 1 Kit 0    lancets misc Use as directed.  Dx: V81.78 1 Each 11    folic acid (FOLVITE) 1 mg tablet Take 1 Tablet by mouth daily. 30 Tablet 4    aspirin (ASPIRIN) 325 mg tablet Take 1 Tablet by mouth daily. 30 Tablet 4    polyethylene glycol (MIRALAX) 17 gram/dose powder Take 17 g by mouth as needed for Constipation. Past History     Past Medical History:  Past Medical History:   Diagnosis Date    Diabetes ([facility] [address]     Fractured rib 03/2021    from a fall per pt on 5/11/2021    High cholesterol     Hypertension     per pt on 5/11/2021       Past Surgical History:  Past Surgical History:   Procedure Laterality Date    HX ENDOSCOPY      per pt on 5/11/2021       Family History:  No family history on file. Social History:  Social History     Tobacco Use    Smoking status: Every Day     Packs/day: 0.50     Types: Cigarettes    Smokeless tobacco: Never   Vaping Use    Vaping Use: Never used   Substance Use Topics    Alcohol use: Yes     Alcohol/week: 1.0 standard drink     Types: 1 Cans of beer per week     Comment: rarely    Drug use: Not Currently       Allergies:  No Known Allergies      Review of Systems   Review of Systems   Constitutional:  Positive for fatigue. Negative for chills and fever. HENT:  Negative for congestion and sore throat. Eyes:  Negative for visual disturbance. Respiratory:  Negative for cough and shortness of breath. Cardiovascular:  Negative for chest pain and leg swelling. Gastrointestinal:  Positive for abdominal pain and diarrhea. Negative for blood in stool and nausea. Endocrine: Negative for polyuria. Genitourinary:  Negative for dysuria and testicular pain. Musculoskeletal:  Negative for arthralgias, joint swelling and myalgias. Skin:  Negative for rash. Allergic/Immunologic: Negative for immunocompromised state. Neurological:  Negative for weakness and headaches. Hematological:  Does not bruise/bleed easily. Psychiatric/Behavioral:  Negative for confusion. Physical Exam   Physical Exam  Vitals and nursing note reviewed. Constitutional:       Appearance: He is well-developed. HENT:      Head: Normocephalic and atraumatic. Mouth/Throat:      Mouth: Mucous membranes are dry. Eyes:      General:         Right eye: No discharge. Left eye: No discharge. Conjunctiva/sclera: Conjunctivae normal.      Pupils: Pupils are equal, round, and reactive to light. Neck:      Trachea: No tracheal deviation. Cardiovascular:      Rate and Rhythm: Normal rate and regular rhythm. Heart sounds: Normal heart sounds. No murmur heard. Pulmonary:      Effort: Pulmonary effort is normal. No respiratory distress. Breath sounds: Normal breath sounds. No wheezing or rales. Abdominal:      General: Bowel sounds are normal.      Palpations: Abdomen is soft. Tenderness: There is abdominal tenderness. There is no guarding or rebound. Comments: Tenderness to palpation in the left lower quadrant with mild guarding, no rebound. Musculoskeletal:         General: No tenderness or deformity. Normal range of motion. Cervical back: Normal range of motion and neck supple. Skin:     General: Skin is warm and dry. Findings: No erythema or rash. Neurological:      Mental Status: He is alert and oriented to person, place, and time.    Psychiatric:         Behavior: Behavior normal.       Diagnostic Study Results     Labs -     Recent Results (from the past 12 hour(s))   CBC WITH AUTOMATED DIFF    Collection Time: 07/23/22  8:07 AM   Result Value Ref Range    WBC 9.1 4.1 - 11.1 K/uL    RBC 3.71 (L) 4.10 - 5.70 M/uL    HGB 11.6 (L) 12.1 - 17.0 g/dL    HCT 34.4 (L) 36.6 - 50.3 %    MCV 92.7 80.0 - 99.0 FL    MCH 31.3 26.0 - 34.0 PG    MCHC 33.7 30.0 - 36.5 g/dL    RDW 13.2 11.5 - 14.5 %    PLATELET 524 663 - 827 K/uL    MPV 10.8 8.9 - 12.9 FL    NRBC 0.0 0  WBC    ABSOLUTE NRBC 0.00 0.00 - 0.01 K/uL    NEUTROPHILS 67 32 - 75 %    LYMPHOCYTES 22 12 - 49 %    MONOCYTES 5 5 - 13 %    EOSINOPHILS 4 0 - 7 % BASOPHILS 1 0 - 1 %    IMMATURE GRANULOCYTES 1 (H) 0.0 - 0.5 %    ABS. NEUTROPHILS 6.2 1.8 - 8.0 K/UL    ABS. LYMPHOCYTES 2.0 0.8 - 3.5 K/UL    ABS. MONOCYTES 0.5 0.0 - 1.0 K/UL    ABS. EOSINOPHILS 0.4 0.0 - 0.4 K/UL    ABS. BASOPHILS 0.1 0.0 - 0.1 K/UL    ABS. IMM. GRANS. 0.1 (H) 0.00 - 0.04 K/UL    DF AUTOMATED     METABOLIC PANEL, COMPREHENSIVE    Collection Time: 07/23/22  8:07 AM   Result Value Ref Range    Sodium 142 136 - 145 mmol/L    Potassium 3.6 3.5 - 5.1 mmol/L    Chloride 108 97 - 108 mmol/L    CO2 24 21 - 32 mmol/L    Anion gap 10 5 - 15 mmol/L    Glucose 98 65 - 100 mg/dL    BUN 33 (H) 6 - 20 MG/DL    Creatinine 2.23 (H) 0.70 - 1.30 MG/DL    BUN/Creatinine ratio 15 12 - 20      GFR est AA 36 (L) >60 ml/min/1.73m2    GFR est non-AA 30 (L) >60 ml/min/1.73m2    Calcium 9.1 8.5 - 10.1 MG/DL    Bilirubin, total 0.5 0.2 - 1.0 MG/DL    ALT (SGPT) 21 12 - 78 U/L    AST (SGOT) 17 15 - 37 U/L    Alk. phosphatase 84 45 - 117 U/L    Protein, total 7.1 6.4 - 8.2 g/dL    Albumin 3.4 (L) 3.5 - 5.0 g/dL    Globulin 3.7 2.0 - 4.0 g/dL    A-G Ratio 0.9 (L) 1.1 - 2.2     MAGNESIUM    Collection Time: 07/23/22  8:07 AM   Result Value Ref Range    Magnesium 1.8 1.6 - 2.4 mg/dL   EKG, 12 LEAD, INITIAL    Collection Time: 07/23/22  8:26 AM   Result Value Ref Range    Ventricular Rate 63 BPM    Atrial Rate 63 BPM    P-R Interval 146 ms    QRS Duration 86 ms    Q-T Interval 426 ms    QTC Calculation (Bezet) 435 ms    Calculated P Axis 74 degrees    Calculated R Axis 56 degrees    Calculated T Axis 108 degrees    Diagnosis       Normal sinus rhythm  Nonspecific T wave abnormality  When compared with ECG of 19-JUL-2022 08:12,  T wave inversion no longer evident in Inferior leads         Radiologic Studies -   CT ABD PELV WO CONT   Final Result   There is moderate fecal stasis in the rectum with slight wall thickening and   slight haziness in the perirectal fat. There is mild fecal stasis in the   remainder of the colon. CT Results  (Last 48 hours)                 07/23/22 0935  CT ABD PELV WO CONT Final result    Impression:  There is moderate fecal stasis in the rectum with slight wall thickening and   slight haziness in the perirectal fat. There is mild fecal stasis in the   remainder of the colon. Narrative:  EXAM: CT ABD PELV WO CONT       INDICATION: Abdominal pain, LLQ TTP       COMPARISON:       IV CONTRAST: None. ORAL CONTRAST:       TECHNIQUE:    Thin axial images were obtained through the abdomen and pelvis. Coronal and   sagittal reformats were generated. CT dose reduction was achieved through use of   a standardized protocol tailored for this examination and automatic exposure   control for dose modulation. The absence of intravenous contrast material reduces the sensitivity for   evaluation of the vasculature and solid organs. FINDINGS:    LOWER THORAX: No significant abnormality in the incidentally imaged lower chest.   There is coronary artery calcification. LIVER: No mass. BILIARY TREE: Gallbladder is within normal limits. CBD is not dilated. SPLEEN: within normal limits. PANCREAS: No focal abnormality. ADRENALS: Unremarkable. KIDNEYS/URETERS: No calculus or hydronephrosis. STOMACH: Unremarkable. SMALL BOWEL: No dilatation or wall thickening. COLON: There is moderate fecal stasis in the rectum with slight wall thickening   and slight haziness in the perirectal fat. There is slight fecal stasis in the   remainder of the colon. APPENDIX: Not distended   PERITONEUM: No ascites or pneumoperitoneum. RETROPERITONEUM: No lymphadenopathy or aortic aneurysm. REPRODUCTIVE ORGANS: Prostate is normal   URINARY BLADDER: No mass or calculus. BONES: No destructive bone lesion. ABDOMINAL WALL: No mass or hernia. ADDITIONAL COMMENTS: N/A                 CXR Results  (Last 48 hours)      None            Medical Decision Making   I am the first provider for this patient.     I reviewed the vital signs, available nursing notes, past medical history, past surgical history, family history and social history. Vital Signs-Reviewed the patient's vital signs. Patient Vitals for the past 12 hrs:   Temp Pulse Resp BP SpO2   07/23/22 0708 97.4 °F (36.3 °C) (!) 101 17 130/79 100 %       EKG interpretation: (Preliminary)  EKG shows sinus rhythm, rate 63. Normal axis. Normal intervals. Nonspecific ST changes, J-point elevation, no concern for ST elevation myocardial infarction. Interpreted by me    Records Reviewed: Nursing Notes, Old Medical Records, and Previous Laboratory Studies    Note from 7/19 reviewed. Patient diagnosed with HENRRY, diarrhea    Provider Notes (Medical Decision Making):   Patient appears ill, but not toxic. He is tenderness palpation the left lower quadrant. No risk factors for C. difficile, but given duration of symptoms, foul-smelling watery diarrhea will test for C. difficile, will send off another stool studies. Patient likely had to have worsening HENRRY. Will give fluids. Will obtain CT of the abdomen and pelvis. Disposition pending laboratory work-up and imaging. Concern for an infectious diarrhea, malignancy. ED Course:   Initial assessment performed. The patients presenting problems have been discussed, and they are in agreement with the care plan formulated and outlined with them. I have encouraged them to ask questions as they arise throughout their visit. Critical Care Time:       Prasanna Ordaz DO      Disposition:    Condition stable  DC- Adult Discharges: All of the diagnostic tests were reviewed and questions answered. Diagnosis, care plan and treatment options were discussed. The patient understands the instructions and will follow up as directed. The patients results have been reviewed with them. They have been counseled regarding their diagnosis.   The patient verbally convey understanding and agreement of the signs, symptoms, diagnosis, treatment and prognosis and additionally agrees to follow up as recommended with their PCP in 24 - 48 hours. They also agree with the care-plan and convey that all of their questions have been answered. I have also put together some discharge instructions for them that include: 1) educational information regarding their diagnosis, 2) how to care for their diagnosis at home, as well a 3) list of reasons why they would want to return to the ED prior to their follow-up appointment, should their condition change. DISCHARGE PLAN:  1. Discharge Medication List as of 7/23/2022 10:51 AM        START taking these medications    Details   ciprofloxacin HCl (Cipro) 500 mg tablet Take 1 Tablet by mouth two (2) times a day for 7 days. , Normal, Disp-14 Tablet, R-0      metroNIDAZOLE (FlagyL) 500 mg tablet Take 1 Tablet by mouth two (2) times a day for 7 days. , Normal, Disp-14 Tablet, R-0           CONTINUE these medications which have NOT CHANGED    Details   hydroCHLOROthiazide (HYDRODIURIL) 12.5 mg tablet TAKE 1 TABLET EVERY DAY FOR BLOOD PRESSURE, Normal, Disp-90 Tablet, R-0      atorvastatin (LIPITOR) 20 mg tablet TAKE 1 TABLET EVERY NIGHT, Normal, Disp-90 Tablet, R-1      metFORMIN (GLUCOPHAGE) 500 mg tablet Take 1 Tablet by mouth two (2) times daily (with meals). , Normal, Disp-180 Tablet, R-0      losartan (COZAAR) 100 mg tablet TAKE 1 TABLET EVERY DAY FOR BLOOD PRESSURE, Normal, Disp-90 Tablet, R-0      amLODIPine (NORVASC) 10 mg tablet Take 1 Tablet by mouth daily. , Normal, Disp-90 Tablet, R-1      glucose blood VI test strips (ASCENSIA AUTODISC VI, ONE TOUCH ULTRA TEST VI) strip E11.65 check sugar once daily fasting, Normal, Disp-100 Strip, R-11accucheck or one touch      cholecalciferol (VITAMIN D3) (1000 Units /25 mcg) tablet Take 1 Tablet by mouth daily. , Normal, Disp-90 Tablet, R-1      Blood-Glucose Meter monitoring kit Use as directed.  Dx: E11.65. check sugars once daily, Normal, Disp-1 Kit, R-0One touch or accu-check      lancets misc Use as directed. Dx: E11.65, Normal, Disp-1 Each, M-24      folic acid (FOLVITE) 1 mg tablet Take 1 Tablet by mouth daily. , Normal, Disp-30 Tablet, R-4      aspirin (ASPIRIN) 325 mg tablet Take 1 Tablet by mouth daily. , Normal, Disp-30 Tablet, R-4      polyethylene glycol (MIRALAX) 17 gram/dose powder Take 17 g by mouth as needed for Constipation. , Historical Med           2. Follow-up Information       Follow up With Specialties Details Why Contact Info    Washington Ferreira, MIKAELA Nurse Practitioner Schedule an appointment as soon as possible for a visit   [facility]  [address] [place] [address] MD Gt Internal Medicine Physician, Gastroenterology Schedule an appointment as soon as possible for a visit   [address] [place] [address] [address] [address]  [phone]      [facility] DEPT Emergency Medicine  If symptoms worsen [address]          3. Return to ED if worse     Diagnosis     Clinical Impression:   1. Abdominal pain, generalized    2. Inflammation of colonic mucosa        Attestations:    Adelaide Montes, DO        Please note that this dictation was completed with [employer], the computer voice recognition software. Quite often unanticipated grammatical, syntax, homophones, and other interpretive errors are inadvertently transcribed by the computer software. Please disregard these errors. Please excuse any errors that have escaped final proofreading. Thank you.

## 2022-07-24 LAB
ATRIAL RATE: 63 BPM
CALCULATED P AXIS, ECG09: 74 DEGREES
CALCULATED R AXIS, ECG10: 56 DEGREES
CALCULATED T AXIS, ECG11: 108 DEGREES
DIAGNOSIS, 93000: NORMAL
P-R INTERVAL, ECG05: 146 MS
Q-T INTERVAL, ECG07: 426 MS
QRS DURATION, ECG06: 86 MS
QTC CALCULATION (BEZET), ECG08: 435 MS
VENTRICULAR RATE, ECG03: 63 BPM

## 2022-07-26 ENCOUNTER — HOSPITAL ENCOUNTER (EMERGENCY)
Age: 67
Discharge: HOME | End: 2022-07-26
Attending: EMERGENCY MEDICINE
Payer: MEDICARE

## 2022-07-26 VITALS
TEMPERATURE: 98.2 F | RESPIRATION RATE: 18 BRPM | BODY MASS INDEX: 13.49 KG/M2 | OXYGEN SATURATION: 100 % | HEIGHT: 68 IN | HEART RATE: 90 BPM | WEIGHT: 89 LBS | SYSTOLIC BLOOD PRESSURE: 157 MMHG | DIASTOLIC BLOOD PRESSURE: 79 MMHG

## 2022-07-26 DIAGNOSIS — I95.1 ORTHOSTASIS: Primary | ICD-10-CM

## 2022-07-26 DIAGNOSIS — E86.0 DEHYDRATION: ICD-10-CM

## 2022-07-26 LAB
ALBUMIN SERPL-MCNC: 3.7 G/DL (ref 3.5–5)
ALBUMIN/GLOB SERPL: 0.9 {RATIO} (ref 1.1–2.2)
ALP SERPL-CCNC: 88 U/L (ref 45–117)
ALT SERPL-CCNC: 17 U/L (ref 12–78)
ANION GAP SERPL CALC-SCNC: 13 MMOL/L (ref 5–15)
AST SERPL-CCNC: 16 U/L (ref 15–37)
BASOPHILS # BLD: 0.1 K/UL (ref 0–0.1)
BASOPHILS NFR BLD: 1 % (ref 0–1)
BILIRUB SERPL-MCNC: 0.4 MG/DL (ref 0.2–1)
BUN SERPL-MCNC: 40 MG/DL (ref 6–20)
BUN/CREAT SERPL: 15 (ref 12–20)
CALCIUM SERPL-MCNC: 9.4 MG/DL (ref 8.5–10.1)
CHLORIDE SERPL-SCNC: 107 MMOL/L (ref 97–108)
CO2 SERPL-SCNC: 20 MMOL/L (ref 21–32)
CREAT SERPL-MCNC: 2.68 MG/DL (ref 0.7–1.3)
DIFFERENTIAL METHOD BLD: ABNORMAL
EOSINOPHIL # BLD: 0.1 K/UL (ref 0–0.4)
EOSINOPHIL NFR BLD: 2 % (ref 0–7)
ERYTHROCYTE [DISTWIDTH] IN BLOOD BY AUTOMATED COUNT: 13.3 % (ref 11.5–14.5)
GLOBULIN SER CALC-MCNC: 4 G/DL (ref 2–4)
GLUCOSE SERPL-MCNC: 112 MG/DL (ref 65–100)
HCT VFR BLD AUTO: 38.7 % (ref 36.6–50.3)
HGB BLD-MCNC: 12.7 G/DL (ref 12.1–17)
IMM GRANULOCYTES # BLD AUTO: 0 K/UL (ref 0–0.04)
IMM GRANULOCYTES NFR BLD AUTO: 1 % (ref 0–0.5)
LYMPHOCYTES # BLD: 1.7 K/UL (ref 0.8–3.5)
LYMPHOCYTES NFR BLD: 20 % (ref 12–49)
MCH RBC QN AUTO: 30.5 PG (ref 26–34)
MCHC RBC AUTO-ENTMCNC: 32.8 G/DL (ref 30–36.5)
MCV RBC AUTO: 92.8 FL (ref 80–99)
MONOCYTES # BLD: 0.6 K/UL (ref 0–1)
MONOCYTES NFR BLD: 6 % (ref 5–13)
NEUTS SEG # BLD: 6.2 K/UL (ref 1.8–8)
NEUTS SEG NFR BLD: 70 % (ref 32–75)
NRBC # BLD: 0 K/UL (ref 0–0.01)
NRBC BLD-RTO: 0 PER 100 WBC
PLATELET # BLD AUTO: 415 K/UL (ref 150–400)
PMV BLD AUTO: 10.9 FL (ref 8.9–12.9)
POTASSIUM SERPL-SCNC: 4.4 MMOL/L (ref 3.5–5.1)
PROT SERPL-MCNC: 7.7 G/DL (ref 6.4–8.2)
RBC # BLD AUTO: 4.17 M/UL (ref 4.1–5.7)
SODIUM SERPL-SCNC: 140 MMOL/L (ref 136–145)
WBC # BLD AUTO: 8.7 K/UL (ref 4.1–11.1)

## 2022-07-26 PROCEDURE — 99284 EMERGENCY DEPT VISIT MOD MDM: CPT

## 2022-07-26 PROCEDURE — 85025 COMPLETE CBC W/AUTO DIFF WBC: CPT

## 2022-07-26 PROCEDURE — 80053 COMPREHEN METABOLIC PANEL: CPT

## 2022-07-26 PROCEDURE — 96361 HYDRATE IV INFUSION ADD-ON: CPT

## 2022-07-26 PROCEDURE — 96360 HYDRATION IV INFUSION INIT: CPT

## 2022-07-26 PROCEDURE — 36415 COLL VENOUS BLD VENIPUNCTURE: CPT

## 2022-07-26 PROCEDURE — 74011250636 HC RX REV CODE- 250/636: Performed by: EMERGENCY MEDICINE

## 2022-07-26 RX ADMIN — SODIUM CHLORIDE 1000 ML: 9 INJECTION, SOLUTION INTRAVENOUS at 19:37

## 2022-07-26 RX ADMIN — SODIUM CHLORIDE 1000 ML: 9 INJECTION, SOLUTION INTRAVENOUS at 19:02

## 2022-07-26 NOTE — ED NOTES
Patient reports being dehydrated with hypotension. Patient is a+ox4 with some confusion and residual deficits from a previous stroke. Skin is warm and dry. Respirations are even and unlabored. Emergency Department Nursing Plan of Care       The Nursing Plan of Care is developed from the Nursing assessment and Emergency Department Attending provider initial evaluation. The plan of care may be reviewed in the ED Provider note.     The Plan of Care was developed with the following considerations:   Patient / Family readiness to learn indicated by:verbalized understanding  Persons(s) to be included in education: patient  Barriers to Learning/Limitations:No    Signed     Emil Mercado RN    7/26/2022   6:42 PM

## 2022-07-27 NOTE — ED PROVIDER NOTES
EMERGENCY DEPARTMENT HISTORY AND PHYSICAL EXAM      Date: 7/26/2022  Patient Name: Kelly Muñiz. History of Presenting Illness     Chief Complaint   Patient presents with    Hypotension     Pt was sent from PCP for low BP today. Pt reports feeling weak. Denies any pain        History Provided By: Patient    HPI: Kelly Alfaro, 78 y.o. male with PMHx significant for diabetes, hypertension, hyperlipidemia, who presents for low blood pressure the primary care office today and concerns for dehydration. Patient has been seen several times in the last week with similar complaints. He initially presented after he developed some diarrhea after taking medications for constipation. He was seen again 4 days later where he had a CT scan showing some inflammation and was started on antibiotics. He was advised to follow-up with his primary care which he did today. Family reports that his blood pressure in the office was low and his heart rate was high. He has not been eating or drinking normally. He was advised to come back to the ED for evaluation. He denies any complaints of pain, shortness of breath, or fever. PCP: Georgia Ivory., NP    There are no other complaints, changes, or physical findings at this time. Current Outpatient Medications   Medication Sig Dispense Refill    ciprofloxacin HCl (Cipro) 500 mg tablet Take 1 Tablet by mouth two (2) times a day for 7 days. 14 Tablet 0    metroNIDAZOLE (FlagyL) 500 mg tablet Take 1 Tablet by mouth two (2) times a day for 7 days. 14 Tablet 0    hydroCHLOROthiazide (HYDRODIURIL) 12.5 mg tablet TAKE 1 TABLET EVERY DAY FOR BLOOD PRESSURE 90 Tablet 0    atorvastatin (LIPITOR) 20 mg tablet TAKE 1 TABLET EVERY NIGHT 90 Tablet 1    metFORMIN (GLUCOPHAGE) 500 mg tablet Take 1 Tablet by mouth two (2) times daily (with meals).  180 Tablet 0    losartan (COZAAR) 100 mg tablet TAKE 1 TABLET EVERY DAY FOR BLOOD PRESSURE 90 Tablet 0    amLODIPine (NORVASC) 10 mg tablet Take 1 Tablet by mouth daily. 90 Tablet 1    glucose blood VI test strips (ASCENSIA AUTODISC VI, ONE TOUCH ULTRA TEST VI) strip E11.65 check sugar once daily fasting 100 Strip 11    cholecalciferol (VITAMIN D3) (1000 Units /25 mcg) tablet Take 1 Tablet by mouth daily. 90 Tablet 1    Blood-Glucose Meter monitoring kit Use as directed. Dx: E11.65. check sugars once daily 1 Kit 0    lancets misc Use as directed. Dx: Y17.75 1 Each 11    folic acid (FOLVITE) 1 mg tablet Take 1 Tablet by mouth daily. 30 Tablet 4    aspirin (ASPIRIN) 325 mg tablet Take 1 Tablet by mouth daily. 30 Tablet 4    polyethylene glycol (MIRALAX) 17 gram/dose powder Take 17 g by mouth as needed for Constipation. Past History     Past Medical History:  Past Medical History:   Diagnosis Date    Diabetes ([address]     Fractured rib 03/2021    from a fall per pt on 5/11/2021    High cholesterol     Hypertension     per pt on 5/11/2021     Past Surgical History:  Past Surgical History:   Procedure Laterality Date    HX ENDOSCOPY      per pt on 5/11/2021     Family History:  History reviewed. No pertinent family history. Social History:  Social History     Tobacco Use    Smoking status: Every Day     Packs/day: 0.50     Types: Cigarettes    Smokeless tobacco: Never   Vaping Use    Vaping Use: Never used   Substance Use Topics    Alcohol use: Yes     Alcohol/week: 1.0 standard drink     Types: 1 Cans of beer per week     Comment: rarely    Drug use: Not Currently     Allergies:  No Known Allergies  Review of Systems   Review of Systems   Constitutional:  Negative for chills and fever. HENT:  Negative for congestion, rhinorrhea and sore throat. Respiratory:  Negative for cough and shortness of breath. Cardiovascular:  Negative for chest pain. Gastrointestinal:  Negative for abdominal pain, nausea and vomiting. Genitourinary:  Negative for dysuria and urgency. Skin:  Negative for rash.    Neurological:  Positive for weakness (general). Negative for dizziness, light-headedness and headaches. All other systems reviewed and are negative. Physical Exam   Physical Exam  Vitals and nursing note reviewed. Constitutional:       General: He is not in acute distress. Appearance: He is well-developed. HENT:      Head: Normocephalic and atraumatic. Mouth/Throat:      Mouth: Mucous membranes are dry. Eyes:      Conjunctiva/sclera: Conjunctivae normal.      Pupils: Pupils are equal, round, and reactive to light. Cardiovascular:      Rate and Rhythm: Normal rate and regular rhythm. Pulmonary:      Effort: Pulmonary effort is normal. No respiratory distress. Breath sounds: Normal breath sounds. No stridor. Abdominal:      General: There is no distension. Palpations: Abdomen is soft. Tenderness: There is no abdominal tenderness. Musculoskeletal:         General: Normal range of motion. Cervical back: Normal range of motion. Skin:     General: Skin is warm and dry. Neurological:      Mental Status: He is alert and oriented to person, place, and time. Psychiatric:         Mood and Affect: Mood normal.         Thought Content: Thought content normal.     Diagnostic Study Results   Labs -     Recent Results (from the past 12 hour(s))   CBC WITH AUTOMATED DIFF    Collection Time: 07/26/22  5:46 PM   Result Value Ref Range    WBC 8.7 4.1 - 11.1 K/uL    RBC 4.17 4.10 - 5.70 M/uL    HGB 12.7 12.1 - 17.0 g/dL    HCT 38.7 36.6 - 50.3 %    MCV 92.8 80.0 - 99.0 FL    MCH 30.5 26.0 - 34.0 PG    MCHC 32.8 30.0 - 36.5 g/dL    RDW 13.3 11.5 - 14.5 %    PLATELET 790 (H) 682 - 400 K/uL    MPV 10.9 8.9 - 12.9 FL    NRBC 0.0 0  WBC    ABSOLUTE NRBC 0.00 0.00 - 0.01 K/uL    NEUTROPHILS 70 32 - 75 %    LYMPHOCYTES 20 12 - 49 %    MONOCYTES 6 5 - 13 %    EOSINOPHILS 2 0 - 7 %    BASOPHILS 1 0 - 1 %    IMMATURE GRANULOCYTES 1 (H) 0.0 - 0.5 %    ABS. NEUTROPHILS 6.2 1.8 - 8.0 K/UL    ABS.  LYMPHOCYTES 1.7 0.8 - 3.5 K/UL ABS. MONOCYTES 0.6 0.0 - 1.0 K/UL    ABS. EOSINOPHILS 0.1 0.0 - 0.4 K/UL    ABS. BASOPHILS 0.1 0.0 - 0.1 K/UL    ABS. IMM. GRANS. 0.0 0.00 - 0.04 K/UL    DF AUTOMATED     METABOLIC PANEL, COMPREHENSIVE    Collection Time: 07/26/22  5:46 PM   Result Value Ref Range    Sodium 140 136 - 145 mmol/L    Potassium 4.4 3.5 - 5.1 mmol/L    Chloride 107 97 - 108 mmol/L    CO2 20 (L) 21 - 32 mmol/L    Anion gap 13 5 - 15 mmol/L    Glucose 112 (H) 65 - 100 mg/dL    BUN 40 (H) 6 - 20 MG/DL    Creatinine 2.68 (H) 0.70 - 1.30 MG/DL    BUN/Creatinine ratio 15 12 - 20      GFR est AA 29 (L) >60 ml/min/1.73m2    GFR est non-AA 24 (L) >60 ml/min/1.73m2    Calcium 9.4 8.5 - 10.1 MG/DL    Bilirubin, total 0.4 0.2 - 1.0 MG/DL    ALT (SGPT) 17 12 - 78 U/L    AST (SGOT) 16 15 - 37 U/L    Alk. phosphatase 88 45 - 117 U/L    Protein, total 7.7 6.4 - 8.2 g/dL    Albumin 3.7 3.5 - 5.0 g/dL    Globulin 4.0 2.0 - 4.0 g/dL    A-G Ratio 0.9 (L) 1.1 - 2.2         Radiologic Studies -   No orders to display     No results found. Medical Decision Making   I am the first provider for this patient. I reviewed the vital signs, available nursing notes, past medical history, past surgical history, family history and social history. Vital Signs-Reviewed the patient's vital signs. Patient Vitals for the past 12 hrs:   Temp Pulse Resp BP SpO2   07/26/22 2059 -- 90 18 (!) 157/79 --   07/26/22 2058 -- 79 16 (!) 150/80 --   07/26/22 2057 -- 76 16 (!) 197/87 --   07/26/22 1913 -- 84 20 -- 100 %   07/26/22 1908 -- (!) 123 -- 111/80 --   07/26/22 1907 -- 90 -- (!) 125/90 --   07/26/22 1906 -- 76 -- (!) 161/86 --   07/26/22 1605 98.2 °F (36.8 °C) (!) 119 18 128/77 100 %       Pulse Oximetry Analysis - 100% on ra      Records Reviewed: Nursing Notes and Old Medical Records    Provider Notes (Medical Decision Making):   Patient presents with concerns for low blood pressure and dehydration in the primary care office today.   On presentation he was tachycardic but was not tachycardic on my evaluation. Fairly severely orthostatic on initial orthostatic vital signs and never became hypotensive, heart rate did increase from 76-1 23 and blood pressure dropped from 911 systolic to 829 systolic. Will check basic lab work, give IV fluids, reevaluate. ED Course:   Initial assessment performed. The patients presenting problems have been discussed, and they are in agreement with the care plan formulated and outlined with them. I have encouraged them to ask questions as they arise throughout their visit. Creatinine returned at 2.6. Creatinine has been in the mid twos on the patient's last 3 visits to the hospital.  His orthostatics did improve after 2 L of IV fluids. I advised that he needs to stay well-hydrated at home and follow-up with his primary care. ED return precautions discussed. ED Course as of 07/26/22 2236 Tue Jul 26, 2022 2059 Orthostatics improved after 2L IVF [PEPPER]      ED Course User Index  Monie Munoz MD       Procedures:  Procedures    Critical Care:  none    Disposition:  Discharge Note:  The patient has been re-evaluated and is ready for discharge. Reviewed available results with patient. Counseled patient on diagnosis and care plan. Patient has expressed understanding, and all questions have been answered. Patient agrees with plan and agrees to follow up as recommended, or to return to the ED if their symptoms worsen. Discharge instructions have been provided and explained to the patient, along with reasons to return to the ED. PLAN:  1. Discharge Medication List as of 7/26/2022  9:11 PM        2.    Follow-up Information       Follow up With Specialties Details Why Contact Info    Johnson Calhoun., NP Nurse Practitioner Schedule an appointment as soon as possible for a visit   [address] [phone]  [phone]      [address] [place] [address] EMERGENCY DEPT Emergency Medicine  As needed, If symptoms worsen [address] [facility]  [phone]          Return to ED if worse     Diagnosis     Clinical Impression:   1. Orthostasis    2. Dehydration            Please note that this dictation was completed with [employer], the computer voice recognition software. Quite often unanticipated grammatical, syntax, homophones, and other interpretive errors are inadvertently transcribed by the computer software. Please disregard these errors.   Please excuse any errors that have escaped final proofreading

## 2022-07-27 NOTE — DISCHARGE INSTRUCTIONS
Please continue to stay well hydrated. Follow up with your PCP for re-evaluation and lab recheck.  Please return to the ED for any new or concerning symptoms

## 2022-07-27 NOTE — ED NOTES
Patient given copy of dc instructions and 0 script(s). Patient verbalized understanding of instructions and script (s). Patient given a current medication reconciliation form and verbalized understanding of their medications. Patient verbalized understanding of the importance of discussing medications with  his or her physician or clinic they will be following up with. Patient alert and oriented and in no acute distress. Patient discharged home ambulatory. Assisted to spouse's vehicle via wheelchair.

## 2022-08-01 ENCOUNTER — OFFICE VISIT (OUTPATIENT)
Dept: INTERNAL MEDICINE CLINIC | Age: 67
End: 2022-08-01
Payer: MEDICARE

## 2022-08-01 VITALS
WEIGHT: 111 LBS | HEIGHT: 68 IN | OXYGEN SATURATION: 99 % | RESPIRATION RATE: 18 BRPM | BODY MASS INDEX: 16.82 KG/M2 | DIASTOLIC BLOOD PRESSURE: 68 MMHG | TEMPERATURE: 98.6 F | HEART RATE: 70 BPM | SYSTOLIC BLOOD PRESSURE: 111 MMHG

## 2022-08-01 DIAGNOSIS — N18.32 TYPE 2 DIABETES MELLITUS WITH STAGE 3B CHRONIC KIDNEY DISEASE, WITHOUT LONG-TERM CURRENT USE OF INSULIN (HCC): ICD-10-CM

## 2022-08-01 DIAGNOSIS — E11.22 TYPE 2 DIABETES MELLITUS WITH STAGE 3B CHRONIC KIDNEY DISEASE, WITHOUT LONG-TERM CURRENT USE OF INSULIN (HCC): ICD-10-CM

## 2022-08-01 DIAGNOSIS — K59.00 CONSTIPATION, UNSPECIFIED CONSTIPATION TYPE: ICD-10-CM

## 2022-08-01 DIAGNOSIS — I10 ESSENTIAL HYPERTENSION: Primary | ICD-10-CM

## 2022-08-01 DIAGNOSIS — R53.1 WEAKNESS GENERALIZED: ICD-10-CM

## 2022-08-01 DIAGNOSIS — N18.31 STAGE 3A CHRONIC KIDNEY DISEASE (HCC): ICD-10-CM

## 2022-08-01 PROBLEM — N18.4 CKD (CHRONIC KIDNEY DISEASE) STAGE 4, GFR 15-29 ML/MIN (HCC): Status: ACTIVE | Noted: 2022-08-01

## 2022-08-01 PROBLEM — N18.30 CHRONIC RENAL DISEASE, STAGE III (HCC): Status: ACTIVE | Noted: 2022-08-01

## 2022-08-01 LAB — GLUCOSE POC: 142 MG/DL

## 2022-08-01 PROCEDURE — 82962 GLUCOSE BLOOD TEST: CPT | Performed by: NURSE PRACTITIONER

## 2022-08-01 PROCEDURE — G8419 CALC BMI OUT NRM PARAM NOF/U: HCPCS | Performed by: NURSE PRACTITIONER

## 2022-08-01 PROCEDURE — G8427 DOCREV CUR MEDS BY ELIG CLIN: HCPCS | Performed by: NURSE PRACTITIONER

## 2022-08-01 PROCEDURE — G8432 DEP SCR NOT DOC, RNG: HCPCS | Performed by: NURSE PRACTITIONER

## 2022-08-01 PROCEDURE — 3044F HG A1C LEVEL LT 7.0%: CPT | Performed by: NURSE PRACTITIONER

## 2022-08-01 PROCEDURE — G8754 DIAS BP LESS 90: HCPCS | Performed by: NURSE PRACTITIONER

## 2022-08-01 PROCEDURE — 1101F PT FALLS ASSESS-DOCD LE1/YR: CPT | Performed by: NURSE PRACTITIONER

## 2022-08-01 PROCEDURE — G8752 SYS BP LESS 140: HCPCS | Performed by: NURSE PRACTITIONER

## 2022-08-01 PROCEDURE — 99214 OFFICE O/P EST MOD 30 MIN: CPT | Performed by: NURSE PRACTITIONER

## 2022-08-01 PROCEDURE — 3017F COLORECTAL CA SCREEN DOC REV: CPT | Performed by: NURSE PRACTITIONER

## 2022-08-01 PROCEDURE — 2022F DILAT RTA XM EVC RTNOPTHY: CPT | Performed by: NURSE PRACTITIONER

## 2022-08-01 PROCEDURE — G8536 NO DOC ELDER MAL SCRN: HCPCS | Performed by: NURSE PRACTITIONER

## 2022-08-01 RX ORDER — POLYETHYLENE GLYCOL 3350 17 G/17G
17 POWDER, FOR SOLUTION ORAL
Qty: 116 G | Refills: 1 | OUTPATIENT
Start: 2022-08-01 | End: 2022-09-06

## 2022-08-01 NOTE — PROGRESS NOTES
Chief Complaint   Patient presents with    ED Follow-up     Pt was told to stop taking BP meds and has not taken any meds since Thursday - finished abx on Saturday- pt is drinking but has not been eating and has been losing weight -- ED referred to GI but BP was low he was sent back to ED     Abdominal Pain     Stomach pain and consitpation x 2 weeks        1. \"Have you been to the ER, urgent care clinic since your last visit? Hospitalized since your last visit? \" Yes When: 7/2022 Where: [facility] Reason for visit: orthostasis, dehydration    2. \"Have you seen or consulted any other health care providers outside of the [address] [place] [address] since your last visit? \" No     3. For patients aged 39-70: Has the patient had a colonoscopy / FIT/ Cologuard? Yes - no Care Gap present      If the patient is female:    4. For patients aged 41-77: Has the patient had a mammogram within the past 2 years? NA - based on age or sex      11. For patients aged 21-65: Has the patient had a pap smear?  NA - based on age or sex

## 2022-08-01 NOTE — PROGRESS NOTES
Subjective: (As above and below)     Chief Complaint   Patient presents with    ED Follow-up     Pt was told to stop taking BP meds and has not taken any meds since Thursday - finished abx on Saturday- pt is drinking but has not been eating and has been losing weight -- ED referred to GI but BP was low he was sent back to ED     Abdominal Pain     Stomach pain and consitpation x 2 weeks      Dawit Nassar. is a 78y.o. year old male who presents for ED follow up    Presented on 7/19/22, 7/23/22 & 7/26/22   Stared with diarrhea and abdominal pain  CT scan showed mucosal thickening and constipation  Pt also found to be hypotensive and with HENRRY  He was treated w/ abx  He continues to have GI upset- generalized  No fevers, he is constipated and is not taking anything for this  He is here w/ his wife- she states that his appetite has been very poor and he is not hydrating enough  The past two days, he has only eaten a small tuna fish sandwhich and a coke zero  He is not checking home BPs or sugars  Denies fevers, nausea or vomiting  He is increasingly weak, uses a walker      Reviewed PmHx, RxHx, FmHx, SocHx, AllgHx and updated in chart. No family history on file. Past Medical History:   Diagnosis Date    Di[address] [address]     Fractured rib 03/2021    from a fall per pt on 5/11/2021    High cholesterol     Hypertension     per pt on 5/11/2021      Social History     Socioeconomic History    Marital status:    Tobacco Use    Smoking status: Every Day     Packs/day: 0.50     Types: Cigarettes    Smokeless tobacco: Never   Vaping Use    Vaping Use: Never used   Substance and Sexual Activity    Alcohol use:  Yes     Alcohol/week: 1.0 standard drink     Types: 1 Cans of beer per week     Comment: rarely    Drug use: Not Currently    Sexual activity: Not Currently     Partners: Female     Birth control/protection: None          Current Outpatient Medications   Medication Sig    polyethylene glycol (MIRALAX) 17 gram/dose powder Take 17 g by mouth daily as needed for Constipation. atorvastatin (LIPITOR) 20 mg tablet TAKE 1 TABLET EVERY NIGHT (Patient not taking: Reported on 8/1/2022)    glucose blood VI test strips (ASCENSIA AUTODISC VI, ONE TOUCH ULTRA TEST VI) strip E11.65 check sugar once daily fasting    cholecalciferol (VITAMIN D3) (1000 Units /25 mcg) tablet Take 1 Tablet by mouth daily. (Patient not taking: Reported on 8/1/2022)    Blood-Glucose Meter monitoring kit Use as directed. Dx: E11.65. check sugars once daily    lancets misc Use as directed. Dx: F40.76    folic acid (FOLVITE) 1 mg tablet Take 1 Tablet by mouth daily. (Patient not taking: Reported on 8/1/2022)    aspirin (ASPIRIN) 325 mg tablet Take 1 Tablet by mouth daily. (Patient not taking: Reported on 8/1/2022)     No current facility-administered medications for this visit. Review of Systems:   Constitutional:    Negative for fever and chills, negative diaphoresis. HEENT:              Negative for neck pain and stiffness. Eyes:                  Negative for visual disturbance, itching, redness or discharge. Respiratory:        Negative for cough and shortness of breath. Cardiovascular:  Negative for chest pain and palpitations. Gastrointestinal: Negative for nausea, vomiting, abdominal pain, diarrhea or constipation. Genitourinary:     Negative for dysuria and frequency. Musculoskeletal: Negative for falls, tenderness and swelling. Skin:                    Negative for rash, masses or lesions. Neurological:       Negative for dizzyness, seizure, loss of consciousness, weakness and numbness.      Objective:     Vitals:    08/01/22 1123   BP: 111/68   Pulse: 70   Resp: 18   Temp: 98.6 °F (37 °C)   TempSrc: Temporal   SpO2: 99%   Weight: 111 lb (50.3 kg)   Height: 5' 8\" (1.727 m)       Results for orders placed or performed in visit on 08/01/22   AMB POC GLUCOSE BLOOD, BY GLUCOSE MONITORING DEVICE   Result Value Ref Range    Glucose  MG/DL         Physical Examination: General appearance - oriented to person, place, and time  Mental status - normal mood, behavior, speech, dress, motor activity, and thought processes  Chest - clear to auscultation, no wheezes, rales or rhonchi, symmetric air entry  Heart - normal rate, regular rhythm, normal S1, S2, no murmurs, rubs, clicks or gallops  Abdomen - no rebound tenderness noted  bowel sounds normal  General discomfort w/ palpation  Extremities - no pedal edema noted      Assessment/ Plan:         1. Essential hypertension  Cont to hold BP meds, [facility]- they have a cuff  - METABOLIC PANEL, BASIC; Future  - LIPID PANEL; Future    2. Stage 3a chronic kidney disease ([facility] [address]  Future labs given for 3 weeks    3. Type 2 diabetes mellitus with stage 3b chronic kidney disease, without long-term current use of insulin ([address]  [facility] fasting home glucose checks, hold metformin due to renal fxn  - AMB POC GLUCOSE BLOOD, BY GLUCOSE MONITORING DEVICE    4. Constipation, unspecified constipation type  Fu w/ GI  - polyethylene glycol (MIRALAX) 17 gram/dose powder; Take 17 g by mouth daily as needed for Constipation. Dispense: 116 g; Refill: 1      5. Weakness generalized    - REFERRAL TO HOME HEALTH          I have discussed the diagnosis with the patient and the intended plan as seen in the above orders. The patient has received an after-visit summary and questions were answered concerning future plans. Pt conveyed understanding of plan. Medication Side Effects and Warnings were discussed with patient: yes  Patient Labs were reviewed: yes  Patient Past Records were reviewed:  yes    Josie Brewer.  Cha Dey NP

## 2022-08-01 NOTE — PATIENT INSTRUCTIONS
Please call the stomach doctor to schedule his follow up    Please try to check home blood pressure once daily  Goal is between 100/80 to 140/90    Mirilax powder - mix with water daily for constipation

## 2022-08-08 ENCOUNTER — TELEPHONE (OUTPATIENT)
Dept: INTERNAL MEDICINE CLINIC | Age: 67
End: 2022-08-08

## 2022-08-08 NOTE — TELEPHONE ENCOUNTER
Pt's wife called to give BS and Bp results from last week    8/2     /83    8/3  BS 91   /84    8/4 BS 95  /89    8/5  BS 95   /81    8/6    /85    8/8 BS 95   /89    Mrs. Turcios Gosselin Bentley's # [phone]

## 2022-08-10 ENCOUNTER — HOSPITAL ENCOUNTER (OUTPATIENT)
Dept: GENERAL RADIOLOGY | Age: 67
Discharge: HOME | End: 2022-08-10
Payer: MEDICARE

## 2022-08-10 ENCOUNTER — TRANSCRIBE ORDER (OUTPATIENT)
Dept: REGISTRATION | Age: 67
End: 2022-08-10

## 2022-08-10 DIAGNOSIS — R63.4 LOSS OF WEIGHT: Primary | ICD-10-CM

## 2022-08-10 DIAGNOSIS — R63.4 LOSS OF WEIGHT: ICD-10-CM

## 2022-08-10 PROCEDURE — 74019 RADEX ABDOMEN 2 VIEWS: CPT

## 2022-08-10 PROCEDURE — 71046 X-RAY EXAM CHEST 2 VIEWS: CPT

## 2022-08-26 ENCOUNTER — TELEPHONE (OUTPATIENT)
Dept: INTERNAL MEDICINE CLINIC | Age: 67
End: 2022-08-26

## 2022-08-26 NOTE — TELEPHONE ENCOUNTER
----- Message from Inés Ramirez sent at 8/24/2022  4:25 PM EDT -----  Subject: Message to Provider    QUESTIONS  Information for Provider? Patient's wife called in to advise that Pharmacy   will be sending a request for a new blood sugar meter. Patient broke his   and pharmacy advised to reach out and let PCP know. Patient's wife stated   she should be on his HIPAA, unable to locate in [address] [place] [address] Rd. Please advise.   ---------------------------------------------------------------------------  --------------  Karol OCAMPO  [phone]; OK to leave message on voicemail  ---------------------------------------------------------------------------  --------------  SCRIPT ANSWERS  Relationship to Patient?  Self

## 2022-08-29 RX ORDER — LANCETS
EACH MISCELLANEOUS
Qty: 1 EACH | Refills: 11 | Status: ON HOLD | OUTPATIENT
Start: 2022-08-29 | End: 2022-09-27

## 2022-08-29 RX ORDER — INSULIN PUMP SYRINGE, 3 ML
EACH MISCELLANEOUS
Qty: 1 KIT | Refills: 0 | Status: ON HOLD | OUTPATIENT
Start: 2022-08-29 | End: 2022-09-27

## 2022-09-06 ENCOUNTER — NURSE TRIAGE (OUTPATIENT)
Dept: OTHER | Facility: CLINIC | Age: 67
End: 2022-09-06

## 2022-09-06 ENCOUNTER — APPOINTMENT (OUTPATIENT)
Dept: CT IMAGING | Age: 67
End: 2022-09-06
Attending: EMERGENCY MEDICINE
Payer: MEDICARE

## 2022-09-06 ENCOUNTER — HOSPITAL ENCOUNTER (EMERGENCY)
Age: 67
Discharge: HOME | End: 2022-09-06
Attending: EMERGENCY MEDICINE
Payer: MEDICARE

## 2022-09-06 VITALS
SYSTOLIC BLOOD PRESSURE: 196 MMHG | OXYGEN SATURATION: 99 % | RESPIRATION RATE: 16 BRPM | WEIGHT: 112.21 LBS | BODY MASS INDEX: 17.01 KG/M2 | HEART RATE: 73 BPM | DIASTOLIC BLOOD PRESSURE: 91 MMHG | TEMPERATURE: 98.6 F | HEIGHT: 68 IN

## 2022-09-06 DIAGNOSIS — R19.7 DIARRHEA, UNSPECIFIED TYPE: Primary | ICD-10-CM

## 2022-09-06 DIAGNOSIS — R53.83 FATIGUE, UNSPECIFIED TYPE: ICD-10-CM

## 2022-09-06 LAB
ALBUMIN SERPL-MCNC: 2.8 G/DL (ref 3.5–5)
ALBUMIN/GLOB SERPL: 0.8 {RATIO} (ref 1.1–2.2)
ALP SERPL-CCNC: 82 U/L (ref 45–117)
ALT SERPL-CCNC: 17 U/L (ref 12–78)
ANION GAP SERPL CALC-SCNC: 10 MMOL/L (ref 5–15)
AST SERPL-CCNC: 14 U/L (ref 15–37)
BASOPHILS # BLD: 0.1 K/UL (ref 0–0.1)
BASOPHILS NFR BLD: 1 % (ref 0–1)
BILIRUB SERPL-MCNC: 0.4 MG/DL (ref 0.2–1)
BUN SERPL-MCNC: 14 MG/DL (ref 6–20)
BUN/CREAT SERPL: 9 (ref 12–20)
CALCIUM SERPL-MCNC: 8.4 MG/DL (ref 8.5–10.1)
CHLORIDE SERPL-SCNC: 106 MMOL/L (ref 97–108)
CO2 SERPL-SCNC: 27 MMOL/L (ref 21–32)
CREAT SERPL-MCNC: 1.6 MG/DL (ref 0.7–1.3)
DIFFERENTIAL METHOD BLD: ABNORMAL
EOSINOPHIL # BLD: 0.1 K/UL (ref 0–0.4)
EOSINOPHIL NFR BLD: 2 % (ref 0–7)
ERYTHROCYTE [DISTWIDTH] IN BLOOD BY AUTOMATED COUNT: 13.7 % (ref 11.5–14.5)
GLOBULIN SER CALC-MCNC: 3.7 G/DL (ref 2–4)
GLUCOSE SERPL-MCNC: 96 MG/DL (ref 65–100)
HCT VFR BLD AUTO: 32.1 % (ref 36.6–50.3)
HGB BLD-MCNC: 10.8 G/DL (ref 12.1–17)
IMM GRANULOCYTES # BLD AUTO: 0 K/UL (ref 0–0.04)
IMM GRANULOCYTES NFR BLD AUTO: 0 % (ref 0–0.5)
LACTATE SERPL-SCNC: 1.2 MMOL/L (ref 0.4–2)
LYMPHOCYTES # BLD: 1.8 K/UL (ref 0.8–3.5)
LYMPHOCYTES NFR BLD: 26 % (ref 12–49)
MAGNESIUM SERPL-MCNC: 1.9 MG/DL (ref 1.6–2.4)
MCH RBC QN AUTO: 31.2 PG (ref 26–34)
MCHC RBC AUTO-ENTMCNC: 33.6 G/DL (ref 30–36.5)
MCV RBC AUTO: 92.8 FL (ref 80–99)
MONOCYTES # BLD: 0.5 K/UL (ref 0–1)
MONOCYTES NFR BLD: 7 % (ref 5–13)
NEUTS SEG # BLD: 4.5 K/UL (ref 1.8–8)
NEUTS SEG NFR BLD: 64 % (ref 32–75)
NRBC # BLD: 0 K/UL (ref 0–0.01)
NRBC BLD-RTO: 0 PER 100 WBC
PLATELET # BLD AUTO: 416 K/UL (ref 150–400)
PMV BLD AUTO: 10.1 FL (ref 8.9–12.9)
POTASSIUM SERPL-SCNC: 3.1 MMOL/L (ref 3.5–5.1)
PROT SERPL-MCNC: 6.5 G/DL (ref 6.4–8.2)
RBC # BLD AUTO: 3.46 M/UL (ref 4.1–5.7)
SODIUM SERPL-SCNC: 143 MMOL/L (ref 136–145)
WBC # BLD AUTO: 6.9 K/UL (ref 4.1–11.1)

## 2022-09-06 PROCEDURE — 74176 CT ABD & PELVIS W/O CONTRAST: CPT

## 2022-09-06 PROCEDURE — 83735 ASSAY OF MAGNESIUM: CPT

## 2022-09-06 PROCEDURE — 99284 EMERGENCY DEPT VISIT MOD MDM: CPT

## 2022-09-06 PROCEDURE — 85025 COMPLETE CBC W/AUTO DIFF WBC: CPT

## 2022-09-06 PROCEDURE — 80053 COMPREHEN METABOLIC PANEL: CPT

## 2022-09-06 PROCEDURE — 36415 COLL VENOUS BLD VENIPUNCTURE: CPT

## 2022-09-06 PROCEDURE — 74011250637 HC RX REV CODE- 250/637: Performed by: EMERGENCY MEDICINE

## 2022-09-06 PROCEDURE — 74011250636 HC RX REV CODE- 250/636: Performed by: EMERGENCY MEDICINE

## 2022-09-06 PROCEDURE — 83605 ASSAY OF LACTIC ACID: CPT

## 2022-09-06 RX ORDER — DEXTROMETHORPHAN POLISTIREX 30 MG/5 ML
1 SUSPENSION, EXTENDED RELEASE 12 HR ORAL
Qty: 133 ML | Refills: 0 | Status: SHIPPED | OUTPATIENT
Start: 2022-09-06 | End: 2022-09-06

## 2022-09-06 RX ORDER — POLYETHYLENE GLYCOL 3350 17 G/17G
17 POWDER, FOR SOLUTION ORAL DAILY
Qty: 510 G | Refills: 0 | Status: SHIPPED | OUTPATIENT
Start: 2022-09-06

## 2022-09-06 RX ADMIN — SODIUM CHLORIDE 1000 ML: 9 INJECTION, SOLUTION INTRAVENOUS at 15:01

## 2022-09-06 RX ADMIN — POTASSIUM BICARBONATE 50 MEQ: 977.5 TABLET, EFFERVESCENT ORAL at 16:06

## 2022-09-06 NOTE — ED PROVIDER NOTES
EMERGENCY DEPARTMENT HISTORY AND PHYSICAL EXAM      Date: 9/6/2022  Patient Name: Manoj Montejo. History of Presenting Illness     Chief Complaint   Patient presents with    Diarrhea    Fatigue     History Provided By: Patient and Patient's Wife    HPI: Manoj Montejo., 78 y.o. male with past medical history significant for diabetes, hypercholesterolemia, and hypertension who presents via private vehicle accompanied by his wife to the ED with cc of multiple episodes of diarrhea and generalized fatigue. Patient and his wife tell me that he has been experiencing 5-6 episodes of diarrhea daily since July. He also has had a significant amount of weight loss and decreased oral intake. His wife states that they followed up with GI, Dr. Marina Renae, and was going to have a colonoscopy but he wanted the patient to gain weight prior to the colonoscopy. He has a follow-up appointment scheduled for 2 weeks with GI but his wife states that he got dizzy and nearly fell twice today. Patient's wife tells me that he has recently been taken off his diabetes medicine as well as his hypertension medication due to the significant amount of weight loss. PMHx: Diabetes, hypertension, and hypercholesterolemia  Social Hx: Smokes 1/2 pack/day, denies alcohol use, denies illegal drug use    PCP: Tammie Anguiano, NP    There are no other complaints, changes, or physical findings at this time. No current facility-administered medications on file prior to encounter. Current Outpatient Medications on File Prior to Encounter   Medication Sig Dispense Refill    atorvastatin (LIPITOR) 20 mg tablet TAKE 1 TABLET EVERY NIGHT 90 Tablet 1    cholecalciferol (VITAMIN D3) (1000 Units /25 mcg) tablet Take 1 Tablet by mouth daily. 90 Tablet 1    aspirin (ASPIRIN) 325 mg tablet Take 1 Tablet by mouth daily.  30 Tablet 4    glucose blood VI test strips strip E11.65 check sugar once daily fasting (Patient not taking: Reported on 9/6/2022) 100 Strip 11    lancets misc Use as directed. Dx: E11.65 (Patient not taking: Reported on 9/6/2022) 1 Each 11    Blood-Glucose Meter monitoring kit Use as directed. Dx: E11.65. check sugars once daily (Patient not taking: Reported on 9/6/2022) 1 Kit 0    polyethylene glycol (MIRALAX) 17 gram/dose powder Take 17 g by mouth daily as needed for Constipation. (Patient not taking: Reported on 7/7/2270) 349 g 1    folic acid (FOLVITE) 1 mg tablet Take 1 Tablet by mouth daily. (Patient not taking: No sig reported) 30 Tablet 4     Past History     Past Medical History:  Past Medical History:   Diagnosis Date    Diabetes ([facility] [address]     Fractured rib 03/2021    from a fall per pt on 5/11/2021    High cholesterol     Hypertension     per pt on 5/11/2021     Past Surgical History:  Past Surgical History:   Procedure Laterality Date    HX ENDOSCOPY      per pt on 5/11/2021     Family History:  History reviewed. No pertinent family history. Social History:  Social History     Tobacco Use    Smoking status: Every Day     Packs/day: 0.50     Types: Cigarettes    Smokeless tobacco: Never   Vaping Use    Vaping Use: Never used   Substance Use Topics    Alcohol use: Yes     Alcohol/week: 1.0 standard drink     Types: 1 Cans of beer per week     Comment: rarely    Drug use: Not Currently     Allergies:  No Known Allergies  Review of Systems   Review of Systems   Constitutional:  Positive for appetite change and unexpected weight change. Negative for chills and fever. HENT:  Negative for congestion, rhinorrhea, sneezing and sore throat. Eyes:  Negative for redness and visual disturbance. Respiratory:  Negative for shortness of breath. Cardiovascular:  Negative for chest pain and leg swelling. Gastrointestinal:  Positive for abdominal pain and diarrhea. Negative for nausea and vomiting. Genitourinary:  Negative for difficulty urinating and frequency.    Musculoskeletal:  Negative for back pain, myalgias and neck stiffness. Skin:  Negative for rash. Neurological:  Negative for dizziness, syncope, weakness and headaches. Hematological:  Negative for adenopathy. All other systems reviewed and are negative. Physical Exam   Physical Exam  Vitals and nursing note reviewed. Constitutional:       Appearance: Normal appearance. He is well-developed and underweight. HENT:      Head: Normocephalic and atraumatic. Cardiovascular:      Rate and Rhythm: Normal rate and regular rhythm. Pulses: Normal pulses. Heart sounds: Normal heart sounds. Pulmonary:      Effort: Pulmonary effort is normal. No respiratory distress. Breath sounds: Normal breath sounds. Chest:      Chest wall: No tenderness. Abdominal:      General: Bowel sounds are increased. Palpations: Abdomen is soft. Tenderness: There is generalized abdominal tenderness. There is no guarding or rebound. Musculoskeletal:      Cervical back: Full passive range of motion without pain, normal range of motion and neck supple. Skin:     General: Skin is warm and dry. Findings: No erythema or rash. Neurological:      Mental Status: He is alert and oriented to person, place, and time. Psychiatric:         Speech: Speech normal.         Behavior: Behavior normal.         Thought Content:  Thought content normal.         Judgment: Judgment normal.     Diagnostic Study Results   Labs -  Recent Results (from the past 24 hour(s))   CBC WITH AUTOMATED DIFF    Collection Time: 09/06/22  3:00 PM   Result Value Ref Range    WBC 6.9 4.1 - 11.1 K/uL    RBC 3.46 (L) 4.10 - 5.70 M/uL    HGB 10.8 (L) 12.1 - 17.0 g/dL    HCT 32.1 (L) 36.6 - 50.3 %    MCV 92.8 80.0 - 99.0 FL    MCH 31.2 26.0 - 34.0 PG    MCHC 33.6 30.0 - 36.5 g/dL    RDW 13.7 11.5 - 14.5 %    PLATELET 136 (H) 146 - 400 K/uL    MPV 10.1 8.9 - 12.9 FL    NRBC 0.0 0  WBC    ABSOLUTE NRBC 0.00 0.00 - 0.01 K/uL    NEUTROPHILS 64 32 - 75 %    LYMPHOCYTES 26 12 - 49 %    MONOCYTES 7 5 - 13 %    EOSINOPHILS 2 0 - 7 %    BASOPHILS 1 0 - 1 %    IMMATURE GRANULOCYTES 0 0.0 - 0.5 %    ABS. NEUTROPHILS 4.5 1.8 - 8.0 K/UL    ABS. LYMPHOCYTES 1.8 0.8 - 3.5 K/UL    ABS. MONOCYTES 0.5 0.0 - 1.0 K/UL    ABS. EOSINOPHILS 0.1 0.0 - 0.4 K/UL    ABS. BASOPHILS 0.1 0.0 - 0.1 K/UL    ABS. IMM. GRANS. 0.0 0.00 - 0.04 K/UL    DF AUTOMATED     METABOLIC PANEL, COMPREHENSIVE    Collection Time: 09/06/22  3:00 PM   Result Value Ref Range    Sodium 143 136 - 145 mmol/L    Potassium 3.1 (L) 3.5 - 5.1 mmol/L    Chloride 106 97 - 108 mmol/L    CO2 27 21 - 32 mmol/L    Anion gap 10 5 - 15 mmol/L    Glucose 96 65 - 100 mg/dL    BUN 14 6 - 20 MG/DL    Creatinine 1.60 (H) 0.70 - 1.30 MG/DL    BUN/Creatinine ratio 9 (L) 12 - 20      GFR est AA 53 (L) >60 ml/min/1.73m2    GFR est non-AA 43 (L) >60 ml/min/1.73m2    Calcium 8.4 (L) 8.5 - 10.1 MG/DL    Bilirubin, total 0.4 0.2 - 1.0 MG/DL    ALT (SGPT) 17 12 - 78 U/L    AST (SGOT) 14 (L) 15 - 37 U/L    Alk. phosphatase 82 45 - 117 U/L    Protein, total 6.5 6.4 - 8.2 g/dL    Albumin 2.8 (L) 3.5 - 5.0 g/dL    Globulin 3.7 2.0 - 4.0 g/dL    A-G Ratio 0.8 (L) 1.1 - 2.2     LACTIC ACID    Collection Time: 09/06/22  3:00 PM   Result Value Ref Range    Lactic acid 1.2 0.4 - 2.0 MMOL/L   MAGNESIUM    Collection Time: 09/06/22  3:00 PM   Result Value Ref Range    Magnesium 1.9 1.6 - 2.4 mg/dL         Radiologic Studies -   CT ABD PELV WO CONT   Final Result   Constipation versus obstipation        CT ABD PELV WO CONT    Result Date: 9/6/2022  Constipation versus obstipation   Medical Decision Making   I am the first provider for this patient. I reviewed the vital signs, available nursing notes, past medical history, past surgical history, family history and social history. Vital Signs-Reviewed the patient's vital signs.   Patient Vitals for the past 24 hrs:   Temp Pulse Resp BP SpO2   09/06/22 1627 -- 73 -- (!) 196/91 --   09/06/22 1626 -- 77 -- (!) 217/87 --   09/06/22 1625 -- 71 -- (!) 219/98 --   09/06/22 1336 98.6 °F (37 °C) 95 16 (!) 145/75 99 %     Pulse Oximetry Analysis - 99% on RA (normal)    Records Reviewed: Nursing Notes, Old Medical Records, Previous Radiology Studies, and Previous Laboratory Studies    Provider Notes (Medical Decision Making):   58-year-old male presents with periumbilical abdominal pain and diarrhea since July. He also has had increased fatigue and weight loss. Differential includes colitis, colon cancer, obstipation, dehydration, orthostatic hypotension, and electrolyte abnormality. I have reviewed his labs and prior imaging study. Will check basic labs and repeat his CT scan and reassess. ED Course:   Initial assessment performed. The patients presenting problems have been discussed, and they are in agreement with the care plan formulated and outlined with them. I have encouraged them to ask questions as they arise throughout their visit. Progress Note  3:30 PM  I have re-evaluated pt and discussed his CT findings with both patient and his wife. His CT has not been interpreted by the radiologist yet but shows a large amount of stool in the rectum. Suspect this is the source of his diarrhea is that he is having liquid stool around this fecal mass. Will discharge with a bowel evacuation regimen and once he has moved his bowels, start him on MiraLAX to keep him regular while he is waiting for GI follow-up. Patient and wife agree with this plan. Progress Note:   Updated pt on all returned results and findings. Discussed the importance of proper follow up as referred below along with return precautions. Pt in agreement with the care plan and expresses agreement with and understanding of all items discussed. Disposition:  Discharge Note:  The pt is ready for discharge. The pt's signs, symptoms, diagnosis, and discharge instructions have been discussed and pt has conveyed their understanding.  The pt is to follow up as recommended or return to ER should their symptoms worsen. Plan has been discussed and pt is in agreement. PLAN:  1. Discharge Medication List as of 9/6/2022  4:12 PM        START taking these medications    Details   PEG 3350-Electrolytes (COLYTE;GOLYTELY) solr Drink 8oz every 20 minutes until your bowels are running clear., Normal, Disp-4000 mL, R-0      polyethylene glycol (Miralax) 17 gram/dose powder Take 17 g by mouth daily. 1 tablespoon with 8 oz of water daily, Normal, Disp-510 g, R-0      mineral oil (Fleet Mineral Oil) enema Insert 133 mL into rectum now for 1 dose., Normal, Disp-133 mL, R-0           CONTINUE these medications which have NOT CHANGED    Details   atorvastatin (LIPITOR) 20 mg tablet TAKE 1 TABLET EVERY NIGHT, Normal, Disp-90 Tablet, R-1      cholecalciferol (VITAMIN D3) (1000 Units /25 mcg) tablet Take 1 Tablet by mouth daily. , Normal, Disp-90 Tablet, R-1      aspirin (ASPIRIN) 325 mg tablet Take 1 Tablet by mouth daily. , Normal, Disp-30 Tablet, R-4      glucose blood VI test strips strip E11.65 check sugar once daily fasting, Normal, Disp-100 Strip, R-11accucheck or one touch      lancets misc Use as directed. Dx: E11.65, Normal, Disp-1 Each, R-11      Blood-Glucose Meter monitoring kit Use as directed. Dx: E11.65. check sugars once daily, Normal, Disp-1 Kit, R-0One touch or accu-check      folic acid (FOLVITE) 1 mg tablet Take 1 Tablet by mouth daily. , Normal, Disp-30 Tablet, R-4           2.    Follow-up Information       Follow up With Specialties Details Why Contact Info    Lisseth Hilario., NP Nurse Practitioner Schedule an appointment as soon as possible for a visit   [facility]  [address]      Dipesh Montgomery MD Internal Medicine Physician, Gastroenterology On 9/20/2022  [address] [place] [address] [address] [id number] [phone]      [facility] EMERGENCY DEPT Emergency Medicine  As needed, If symptoms worsen [address] [id number] [phone]          Return to ED if worse     Diagnosis     Clinical Impression:   1. Diarrhea, unspecified type    2. Fatigue, unspecified type            Please note that this dictation was completed with Dragon, computer voice recognition software. Quite often unanticipated grammatical, syntax, homophones, and other interpretive errors are inadvertently transcribed by the computer software. Please disregard these errors. Additionally, please excuse any errors that have escaped final proofreading.

## 2022-09-06 NOTE — ED NOTES
Patient (s)  given copy of dc instructions and 0 paper script(s) and 2 electronic scripts. Patient (s)  verbalized understanding of instructions and script (s). Patient given a current medication reconciliation form and verbalized understanding of their medications. Patient (s) verbalized understanding of the importance of discussing medications with  his or her physician or clinic they will be following up with. Patient alert and oriented and in no acute distress. Patient offered wheelchair from treatment area to hospital entrance, patient transported by  wheelchair.

## 2022-09-06 NOTE — ED TRIAGE NOTES
Pt brought into ED by wife complaining of intermittent diarrhea and fatigue x 1 month. Per wife, pt has been having episodes of diarrhea and lower abdominal pain. Pt reports pain currently. Pt denies N/V. Pt reports generalized weakness and fatigue. Pt's wife states he was recently seen in the ED for a similar complaint.

## 2022-09-06 NOTE — TELEPHONE ENCOUNTER
Received call from Chaparro Grissom at [facility] with Red Flag Complaint. Subjective: Caller states \"increased weakness for the past week. His GI doctor wants him to gain weight, but he is not eating much. He is in the bed a lot. Complains about his stomach, He sees the GI doctor on the 20th. \"     LIMITED TRIAGE DUE TO PATIENT NOT WITH CALLER    Current Symptoms: Weakness, abdominal pain    Onset: 1 month ago;     Associated Symptoms: NA    Pain Severity: 0Unknown due to patient not with caller/10; Temperature:  Denies Fever    What has been tried: Ensure     LMP: NA Pregnant: NA     Recommended disposition: Go to Office Now     Care advice provided, patient verbalizes understanding; denies any other questions or concerns; instructed to call back for any new or worsening symptoms. Patient/Caller agrees with recommended disposition; writer provided warm transfer to [facility] at [facility] for appointment scheduling    Attention Provider: Thank you for allowing me to participate in the care of your patient. The patient was connected to triage in response to information provided to the [facility]. Please do not respond through this encounter as the response is not directed to a shared pool.         Reason for Disposition   MODERATE weakness (i.e., interferes with work, school, normal activities) and cause unknown  (Exceptions: Weakness with acute minor illness, or weakness from poor fluid intake.)    Protocols used: Weakness (Generalized) and Fatigue-ADULT-OH

## 2022-09-26 ENCOUNTER — ANESTHESIA EVENT (OUTPATIENT)
Dept: SURGERY | Age: 67
End: 2022-09-26
Payer: MEDICARE

## 2022-09-27 ENCOUNTER — ANESTHESIA (OUTPATIENT)
Dept: SURGERY | Age: 67
End: 2022-09-27
Payer: MEDICARE

## 2022-09-27 ENCOUNTER — HOSPITAL ENCOUNTER (OUTPATIENT)
Age: 67
Setting detail: OUTPATIENT SURGERY
Discharge: HOME | End: 2022-09-27
Attending: INTERNAL MEDICINE | Admitting: INTERNAL MEDICINE
Payer: MEDICARE

## 2022-09-27 VITALS
BODY MASS INDEX: 16.67 KG/M2 | HEIGHT: 68 IN | TEMPERATURE: 97.7 F | OXYGEN SATURATION: 100 % | SYSTOLIC BLOOD PRESSURE: 134 MMHG | HEART RATE: 56 BPM | RESPIRATION RATE: 11 BRPM | DIASTOLIC BLOOD PRESSURE: 63 MMHG | WEIGHT: 110 LBS

## 2022-09-27 PROCEDURE — 76060000034 HC ANESTHESIA 1.5 TO 2 HR: Performed by: INTERNAL MEDICINE

## 2022-09-27 PROCEDURE — 76210000020 HC REC RM PH II FIRST 0.5 HR: Performed by: INTERNAL MEDICINE

## 2022-09-27 PROCEDURE — 74011250636 HC RX REV CODE- 250/636: Performed by: ANESTHESIOLOGY

## 2022-09-27 PROCEDURE — 74011000250 HC RX REV CODE- 250: Performed by: ANESTHESIOLOGY

## 2022-09-27 PROCEDURE — 74011000250 HC RX REV CODE- 250: Performed by: INTERNAL MEDICINE

## 2022-09-27 PROCEDURE — 76210000063 HC OR PH I REC FIRST 0.5 HR: Performed by: INTERNAL MEDICINE

## 2022-09-27 PROCEDURE — 77030009426 HC FCPS BIOP ENDOSC BSC -B: Performed by: INTERNAL MEDICINE

## 2022-09-27 PROCEDURE — 2709999900 HC NON-CHARGEABLE SUPPLY: Performed by: INTERNAL MEDICINE

## 2022-09-27 PROCEDURE — 76010000153 HC OR TIME 1.5 TO 2 HR: Performed by: INTERNAL MEDICINE

## 2022-09-27 PROCEDURE — 88305 TISSUE EXAM BY PATHOLOGIST: CPT

## 2022-09-27 RX ORDER — SODIUM CHLORIDE 0.9 % (FLUSH) 0.9 %
5-40 SYRINGE (ML) INJECTION AS NEEDED
Status: DISCONTINUED | OUTPATIENT
Start: 2022-09-27 | End: 2022-09-27 | Stop reason: HOSPADM

## 2022-09-27 RX ORDER — DEXTROMETHORPHAN/PSEUDOEPHED 2.5-7.5/.8
1.2 DROPS ORAL
Status: DISCONTINUED | OUTPATIENT
Start: 2022-09-27 | End: 2022-09-27 | Stop reason: HOSPADM

## 2022-09-27 RX ORDER — LIDOCAINE HYDROCHLORIDE 20 MG/ML
INJECTION, SOLUTION INFILTRATION; PERINEURAL AS NEEDED
Status: DISCONTINUED | OUTPATIENT
Start: 2022-09-27 | End: 2022-09-27 | Stop reason: HOSPADM

## 2022-09-27 RX ORDER — SODIUM CHLORIDE 9 MG/ML
50 INJECTION, SOLUTION INTRAVENOUS CONTINUOUS
Status: DISCONTINUED | OUTPATIENT
Start: 2022-09-27 | End: 2022-09-27 | Stop reason: HOSPADM

## 2022-09-27 RX ORDER — SODIUM CHLORIDE 0.9 % (FLUSH) 0.9 %
5-40 SYRINGE (ML) INJECTION EVERY 8 HOURS
Status: DISCONTINUED | OUTPATIENT
Start: 2022-09-27 | End: 2022-09-27 | Stop reason: HOSPADM

## 2022-09-27 RX ORDER — MIDAZOLAM HYDROCHLORIDE 1 MG/ML
5 INJECTION, SOLUTION INTRAMUSCULAR; INTRAVENOUS
Status: DISCONTINUED | OUTPATIENT
Start: 2022-09-27 | End: 2022-09-27 | Stop reason: HOSPADM

## 2022-09-27 RX ORDER — LIDOCAINE HYDROCHLORIDE 20 MG/ML
5 SOLUTION OROPHARYNGEAL AS NEEDED
Status: DISCONTINUED | OUTPATIENT
Start: 2022-09-27 | End: 2022-09-27 | Stop reason: HOSPADM

## 2022-09-27 RX ORDER — EPHEDRINE SULFATE/0.9% NACL/PF 50 MG/5 ML
SYRINGE (ML) INTRAVENOUS AS NEEDED
Status: DISCONTINUED | OUTPATIENT
Start: 2022-09-27 | End: 2022-09-27 | Stop reason: HOSPADM

## 2022-09-27 RX ORDER — SODIUM CHLORIDE, SODIUM LACTATE, POTASSIUM CHLORIDE, CALCIUM CHLORIDE 600; 310; 30; 20 MG/100ML; MG/100ML; MG/100ML; MG/100ML
INJECTION, SOLUTION INTRAVENOUS
Status: DISCONTINUED | OUTPATIENT
Start: 2022-09-27 | End: 2022-09-27 | Stop reason: HOSPADM

## 2022-09-27 RX ORDER — EPINEPHRINE 0.1 MG/ML
1 INJECTION INTRACARDIAC; INTRAVENOUS
Status: DISCONTINUED | OUTPATIENT
Start: 2022-09-27 | End: 2022-09-27 | Stop reason: HOSPADM

## 2022-09-27 RX ORDER — FENTANYL CITRATE 50 UG/ML
100 INJECTION, SOLUTION INTRAMUSCULAR; INTRAVENOUS ONCE
Status: DISCONTINUED | OUTPATIENT
Start: 2022-09-27 | End: 2022-09-27 | Stop reason: HOSPADM

## 2022-09-27 RX ORDER — PROPOFOL 10 MG/ML
INJECTION, EMULSION INTRAVENOUS AS NEEDED
Status: DISCONTINUED | OUTPATIENT
Start: 2022-09-27 | End: 2022-09-27 | Stop reason: HOSPADM

## 2022-09-27 RX ORDER — FLUMAZENIL 0.1 MG/ML
0.2 INJECTION INTRAVENOUS
Status: DISCONTINUED | OUTPATIENT
Start: 2022-09-27 | End: 2022-09-27 | Stop reason: HOSPADM

## 2022-09-27 RX ORDER — ATROPINE SULFATE 1 MG/ML
0.5 INJECTION, SOLUTION INTRAVENOUS
Status: DISCONTINUED | OUTPATIENT
Start: 2022-09-27 | End: 2022-09-27 | Stop reason: HOSPADM

## 2022-09-27 RX ORDER — SODIUM CHLORIDE, SODIUM LACTATE, POTASSIUM CHLORIDE, CALCIUM CHLORIDE 600; 310; 30; 20 MG/100ML; MG/100ML; MG/100ML; MG/100ML
50 INJECTION, SOLUTION INTRAVENOUS CONTINUOUS
Status: DISCONTINUED | OUTPATIENT
Start: 2022-09-27 | End: 2022-09-27 | Stop reason: HOSPADM

## 2022-09-27 RX ORDER — DEXTROSE MONOHYDRATE AND SODIUM CHLORIDE 5; .9 G/100ML; G/100ML
100 INJECTION, SOLUTION INTRAVENOUS CONTINUOUS
Status: DISCONTINUED | OUTPATIENT
Start: 2022-09-27 | End: 2022-09-27 | Stop reason: HOSPADM

## 2022-09-27 RX ORDER — SODIUM CHLORIDE 9 MG/ML
100 INJECTION, SOLUTION INTRAVENOUS CONTINUOUS
Status: DISCONTINUED | OUTPATIENT
Start: 2022-09-27 | End: 2022-09-27 | Stop reason: HOSPADM

## 2022-09-27 RX ORDER — NALOXONE HYDROCHLORIDE 0.4 MG/ML
0.4 INJECTION, SOLUTION INTRAMUSCULAR; INTRAVENOUS; SUBCUTANEOUS
Status: DISCONTINUED | OUTPATIENT
Start: 2022-09-27 | End: 2022-09-27 | Stop reason: HOSPADM

## 2022-09-27 RX ADMIN — PROPOFOL 10 MG: 10 INJECTION, EMULSION INTRAVENOUS at 09:35

## 2022-09-27 RX ADMIN — PROPOFOL 10 MG: 10 INJECTION, EMULSION INTRAVENOUS at 09:46

## 2022-09-27 RX ADMIN — PROPOFOL 10 MG: 10 INJECTION, EMULSION INTRAVENOUS at 09:39

## 2022-09-27 RX ADMIN — PROPOFOL 10 MG: 10 INJECTION, EMULSION INTRAVENOUS at 09:40

## 2022-09-27 RX ADMIN — SODIUM CHLORIDE, POTASSIUM CHLORIDE, SODIUM LACTATE AND CALCIUM CHLORIDE: 600; 310; 30; 20 INJECTION, SOLUTION INTRAVENOUS at 08:25

## 2022-09-27 RX ADMIN — PROPOFOL 10 MG: 10 INJECTION, EMULSION INTRAVENOUS at 09:42

## 2022-09-27 RX ADMIN — LIDOCAINE HYDROCHLORIDE 60 MG: 20 INJECTION, SOLUTION INFILTRATION; PERINEURAL at 09:31

## 2022-09-27 RX ADMIN — Medication 20 MG: at 09:53

## 2022-09-27 RX ADMIN — PROPOFOL 10 MG: 10 INJECTION, EMULSION INTRAVENOUS at 09:33

## 2022-09-27 RX ADMIN — PROPOFOL 10 MG: 10 INJECTION, EMULSION INTRAVENOUS at 09:41

## 2022-09-27 RX ADMIN — PROPOFOL 50 MG: 10 INJECTION, EMULSION INTRAVENOUS at 09:31

## 2022-09-27 RX ADMIN — PROPOFOL 10 MG: 10 INJECTION, EMULSION INTRAVENOUS at 09:44

## 2022-09-27 RX ADMIN — PROPOFOL 10 MG: 10 INJECTION, EMULSION INTRAVENOUS at 09:37

## 2022-09-27 NOTE — DISCHARGE INSTRUCTIONS
Endoscopy Discharge Instructions     Dr. Bradley [facility] office                                            NAME: Viki Park. RECORD NSBIAY:[id number]    AGE:  75 y.o. YOB: 1955                                                              FINAL Discharge Procedure and Diagnosis:       Procedure(s):  COLONOSCOPY  ESOPHAGOGASTRODUODENOSCOPY (EGD)  ESOPHAGOGASTRODUODENAL (EGD) BIOPSY       FINDINGS:     Gastritis   Inadequate colon prep unable to visualize colon adequately for exam                                        MEDICATIONS    [x] CONTINUE CURRENT MEDICATIONS     [] NEW MEDICATIONS           1.    2.    3.         Testing   Schedule              Colonoscopy Screening                                   Recommendations       [x]  Repeat colonoscopy in 6-12 month         2nd to Inadequate  prep    []  Repeat colonoscopy in 3 years    []  Repeat colonoscopy in 5 years    []  Repeat colonoscopy in 10 years         New additional  Tests  Call the office   [phone] for the appointment time      [x] Referral to nutrition/oncology     []      []                                     YOUR NEXT APPOINTMENT WITH DR Riley Ang:                                                                                                                                []   None follow up with pcp   []  1 week       [x]   2 week    []  1 month    Always keep KEEP  APPOINTMENT WITH  @PCP@ for regular medical follow up                                                                                                                         If you had a colonoscopy the \"C\" indicates specific instructions        x                                           Diet Instructions :   Ordinarily you may resume your previous diet but your initial diet should be       Light your discharge nurse will go over this with you.   Large meals can cause abdominal discomfort after these procedures. Specific Diet Recommendations:        [x] High fiber diet. https://www.R&L. com/diets/        [] GERD diet: avoid fried and fatty foods, peppermint, chocolate, alcohol,               coffee, citrus fruits and juices, and tomato products. Avoid lying down for            2 to 3  hours after eating. https://www.taylor.com/. com/diets/            []  FODMAP DIET  DeathUnit.nl              []  All diets eg high fiber, gastroparesis. , weight loss , gluten free             1. ALKALINE WATER              2.  https://www.R&L. Loud3r/diets/           __x__  Corinda Repress may feel quite tired and need to rest and recuperate for several hours    following these procedures. __x__  Due to the fact that sedation was administered for this procedure, do not drive,   operate machinery or sign legal documents for the next 24 hours. __x__  Mild abdominal pain may be experienced after your procedure, but is should   disappear after several hours. Notify your physician if you have persistent pain,   tenderness or abdominal distension. __x__  C    Many patients for the first few hours following the exam may experience         belching or passing gas through the rectum. Walking may help to relieve        distention and gas pains. A warm bath or shower will often help with abdominal  cramping.                                                                                            __x__   Corinda Repress may return to your normal routine tomorrow, according to how you feel        and depending on your doctors instructions. Be sure to call your doctor to make  an appointment for a post-surgery check-up on the date your doctor has   requested.       __x__ C     Rectal bleeding or spotting in small amounts may occur with the first bowel   movement following a colonoscopy or sigmoidoscopy. If a large amount of blood is noted call immediately     __x__  You may experience a numbness or lack of sensation in throat. If present, do not     eat or drink. Before eating, test your ability to drink with small sips of water. Y     You may try clear liquids or soups. If you tolerate these, you may then eat solid     food which is not greasy or spicy. __x__ C     IF POLYPS REMOVED: Avoid any blood thinning medication such as plavix,   aspirin or coumadin  NSAIDS (like advil or alleve) for 7 days. __x__  Notify your physician if you cough or vomit blood or experience chest pain. Your biopsy or testing result should be available in 7-10 days                                                                                                                      Prescription will be electronically sent to your pharmacy you must     let your nurse know your pharmacy:                                                                                                                                          [address] [place] [address]. TO HELP ENSURE A SMOOTH RECOVERY,       IT IS IMPORTANT TO FOLLOW THEM. _x___Pamphlet /Educational Information provided for diagnostic findings     Additional education information can assessed at the sites below:   Anay   http://www.digestive. niddk.nih.gov/ddiseases/a-z.asp      Web MD patient information                                                                                                Signature of individual given instructions :   Date: 9/27/2022                                                                                                                                DISCHARGE SUMMARY from Nurse    PATIENT INSTRUCTIONS:    After general anesthesia or intravenous sedation, for 24 hours or while taking prescription Narcotics:  Limit your activities  Do not drive and operate hazardous machinery  Do not make important personal or business decisions  Do  not drink alcoholic beverages  If you have not urinated within 8 hours after discharge, please contact your surgeon on call. Report the following to your surgeon:  Excessive pain, swelling, redness or odor of or around the surgical area  Temperature over 100.5  Nausea and vomiting lasting longer than 4 hours or if unable to take medications  Any signs of decreased circulation or nerve impairment to extremity: change in color, persistent  numbness, tingling, coldness or increase pain  Any questions      These are general instructions for a healthy lifestyle:    No smoking/ No tobacco products/ Avoid exposure to second hand smoke  Surgeon General's Warning:  Quitting smoking now greatly reduces serious risk to your health. Obesity, smoking, and sedentary lifestyle greatly increases your risk for illness    A healthy diet, regular physical exercise & weight monitoring are important for maintaining a healthy lifestyle    You may be retaining fluid if you have a history of heart failure or if you experience any of the following symptoms:  Weight gain of 3 pounds or more overnight or 5 pounds in a week, increased swelling in our hands or feet or shortness of breath while lying flat in bed. Please call your doctor as soon as you notice any of these symptoms; do not wait until your next office visit. The discharge information has been reviewed with the patient and spouse. The patient and spouse verbalized understanding.   Discharge medications reviewed with the patient and spouse and appropriate educational materials and side effects teaching were provided.   ___________________________________________________________________________________________________________________________________

## 2022-09-27 NOTE — ANESTHESIA POSTPROCEDURE EVALUATION
Procedure(s):  COLONOSCOPY  ESOPHAGOGASTRODUODENOSCOPY (EGD)  ESOPHAGOGASTRODUODENAL (EGD) BIOPSY. MAC    Anesthesia Post Evaluation      Multimodal analgesia: multimodal analgesia not used between 6 hours prior to anesthesia start to PACU discharge  Patient location during evaluation: PACU  Patient participation: complete - patient participated  Level of consciousness: awake and alert  Pain management: adequate  Airway patency: patent  Anesthetic complications: no  Cardiovascular status: acceptable  Respiratory status: acceptable  Hydration status: acceptable  Post anesthesia nausea and vomiting:  none  Final Post Anesthesia Temperature Assessment:  Normothermia (36.0-37.5 degrees C)      INITIAL Post-op Vital signs:   Vitals Value Taken Time   /67 09/27/22 1025   Temp 36.5 °C (97.7 °F) 09/27/22 1002   Pulse 62 09/27/22 1027   Resp 16 09/27/22 1027   SpO2 100 % 09/27/22 1027   Vitals shown include unvalidated device data.

## 2022-09-27 NOTE — ANESTHESIA PREPROCEDURE EVALUATION
Relevant Problems   NEUROLOGY   (+) Stroke (HCC)   (+) TIA (transient ischemic attack)      CARDIOVASCULAR   (+) Essential hypertension      RENAL FAILURE   (+) CKD (chronic kidney disease) stage 4, GFR 15-29 ml/min ([facility])   (+) Chronic renal disease, stage III      ENDOCRINE   (+) Type 2 diabetes mellitus, without long-term current use of insulin (HCC)       Anesthetic History   No history of anesthetic complications            Review of Systems / Medical History  Patient summary reviewed and pertinent labs reviewed    Pulmonary          Smoker         Neuro/Psych       CVA       Cardiovascular    Hypertension              Exercise tolerance: <4 METS     GI/Hepatic/Renal  Within defined limits              Endo/Other    Diabetes         Other Findings              Physical Exam    Airway  Mallampati: II  TM Distance: 4 - 6 cm  Neck ROM: decreased range of motion   Mouth opening: Normal     Cardiovascular    Rhythm: regular  Rate: normal         Dental    Dentition: Edentulous     Pulmonary  Breath sounds clear to auscultation               Abdominal  GI exam deferred       Other Findings            Anesthetic Plan    ASA: 2  Anesthesia type: MAC            Anesthetic plan and risks discussed with: Patient

## 2022-09-27 NOTE — H&P
G I Procedure Note           Endoscopy History and Physical           Dr. Star Yao     [facility] - [address] [id number]  [id number]    1955  75 y.o.  male      Date of Procedure:   Preoperative Diagnosis:       Procedure:   9/27/2022      WEIGHT LOSS                         Procedure(s):  COLONOSCOPY  ESOPHAGOGASTRODUODENOSCOPY (EGD)      Gastroenterologist:  Anesthesia:           Trice Darby MD                               MAC            History and procedure indication:  Stephanie Petty. is a 75 y.o. BLACK/ male who presents with: WEIGHT LOSS   including the additional history of Weight Loss, Constipation, Abdominal Pain, and Dysphagia ,,Abdominal pain, epigastric,  Change in bowel habits      Past Medical History:   Diagnosis Date    Diabetes ([address]     Fractured rib 03/2021    from a fall per pt on 5/11/2021    High cholesterol     Hypertension     per pt on 5/11/2021      Prior to Admission medications    Medication Sig Start Date End Date Taking? Authorizing Provider   PEG 3350-Electrolytes (COLYTE;GOLYTELY) solr Drink 8oz every 20 minutes until your bowels are running clear. 9/6/22   Jennifer Alexis MD   polyethylene glycol (Miralax) 17 gram/dose powder Take 17 g by mouth daily. 1 tablespoon with 8 oz of water daily 9/6/22   Jennifer Alexis MD   glucose blood VI test strips strip E11.65 check sugar once daily fasting  Patient not taking: Reported on 9/6/2022 8/29/22   Beulah Simmons NP   lancets misc Use as directed. Dx: E11.65  Patient not taking: Reported on 9/6/2022 8/29/22   Beulah Simmons NP   Blood-Glucose Meter monitoring kit Use as directed.  Dx: E11.65. check sugars once daily  Patient not taking: Reported on 9/6/2022 8/29/22   Beulah Simmons NP   atorvastatin (LIPITOR) 20 mg tablet TAKE 1 TABLET EVERY NIGHT 4/26/22   Johnson Calhoun., NP   cholecalciferol (VITAMIN D3) (1000 Units /25 mcg) tablet Take 1 Tablet by mouth daily. 7/16/21   Johnson Prater, NP   folic acid (FOLVITE) 1 mg tablet Take 1 Tablet by mouth daily. Patient not taking: No sig reported 6/20/21   Alvarez Avelar MD   aspirin (ASPIRIN) 325 mg tablet Take 1 Tablet by mouth daily. 6/20/21   Alvarez Avelar MD     No Known Allergies    Past Surgical History:   Procedure Laterality Date    HX ENDOSCOPY      per pt on 5/11/2021     No family history on file. Social History     Tobacco Use    Smoking status: Every Day     Packs/day: 0.50     Types: Cigarettes    Smokeless tobacco: Never   Substance Use Topics    Alcohol use: Yes     Alcohol/week: 1.0 standard drink     Types: 1 Cans of beer per week     Comment: rarely                                                      PHYSICAL EXAM   There were no vitals taken for this visit. General appearance:  alert,  in no distress  Mental status:  normal mood, behavior, speech, dress, motor activity and thought processes  Nose:      normal and patent, no erythema, discharge    Mouth:- mucous membranes moist, pharynx normal without lesions                  [x]  No Loose teeth      []    Loose teeth  Finger opening:  []1     []1.5    [] 2     [] 2.5     [x] 3      [] 3.5     [] 4   Mallampati:         [] Class 1     [x] Class 2    [] Class 3      [] Class 4      Neck - supple,      [x] Full ROM [] Decreased ROM  [] Short Neck no significant adenopathy    Chest - clear to auscultation, no wheezes, rales or rhonchi, symmetric air entry  Heart: normal rate, regular rhythm, normal S1, S2, no murmurs,   Abdomen: abdomen soft, bowel sounds  [x] normal  [] increased  [] hypoactive                   [] no tenderness  [] epigastric tenderness  [x] LLQ tenderness   [] RLQ tenderness                      No masses, organomegaly or guarding.   Rectal exam: negative without mass, lesions or tenderness  Extremities: , no pedal edema, no  cyanosis  Neurologic: Alert and oriented to person, place, and time;                          Normal symmetric reflexes  Normal gait:                                      Assessement:                                 Pre op dx:  WEIGHT LOSS   Additional medical problems list below   Patient Active Problem List   Diagnosis Code    Type 2 diabetes mellitus, without long-term current use of insulin ([facility]) E11.9    Essential hypertension I10    Positive colorectal cancer screening using Cologuard test R19.5    TIA (transient ischemic attack) G45.9    Stroke [facility]) I63.9    Bilateral carotid artery stenosis I65.23    CKD (chronic kidney disease) stage 4, GFR 15-29 ml/min ([facility]) N18.4    Chronic renal disease, stage III N18.30                                                                                           This note documentation was performed prior to this planned procedure       after a history and physical was performed in the office.          Date: 9 21 22   Office exam   9/27/2022 Immediate update no changes in H&P                        Pre Procedure Evaluation (per anesthesia or per h&p)                                                Sedation/Assessment:                                                                                               Mallampati Classification                            []Class 1                    []Class 2                    [] Class 3                  [] Class 4                                              ASA classfication         []     Class I: Normally healthy         []     Class II: Patient with mild systemic disease (e.g. hypertension)         []     Class III: Patient with severe systemic disease (e.g. CHF), non-decompensated         []     Class IV: Patient with severe systemic disease, decompensated         []     Class V: Moribund patient, survival unlikely                     Plan:   [x]    Egd                                [x]  Colonoscopy [] with Moderate Sedation /Conscious Sedation                                  [x] MAC          Patient stable for planned procedure. See orders.      Jonah Mcdonald MD

## 2022-09-27 NOTE — PROCEDURES
G I Procedure Note                         EGD    Dr. Damon Rad office   [facility] -  [address]                              [id number]                                 [id number]   1955                       75 y.o.                male        Procedure Date: 9/27/2022   Procedure  EGD  with biopsy. [x]  Anesthesia MAC           Pre Op Diagnosis  Indications:                     1. WEIGHT LOSS etiology unclear       Positive colo guard                                                                                                                                                                          Post Op Diagnosis:                    1.   weight loss, atrophic gastritis                                                          2.   Extremely poor prep unable to view colon adequately    3.  hemorrhoids             H&p completed  Yes    Anesthesia Assessment Performed prior to procedure:No change   Medications Medication Record            Description of Procedure:  GORDY@  was seen in the endoscopy suite and the pre procedure evaluation was completed. The patient was identified as Mila Schmitz  and the procedure verified as EGD with biopsy. A Time Out was held and the above information confirmed. The risks, benefits, complications, treatment options and expected outcomes were discussed with the patient.   The possibilities of reaction to medication, pulmonary aspiration, perforation of a viscus, bleeding, failure to diagnose a condition and creating a complication requiring transfusion or operation were discussed with the patient who  Permit obtained and risks explained ( [address])     Procedure Note:  With the patient in the left lateral position and after appropriate conscious anesthesia the Olympus Video endoscope was passed under direct vision into the oropharynx. The oropharynx appeared Normal   The instrument was advanced into the esophagus and the findings were      [x] normal  [] hiatal hernia  cm      [] normal z line  . The instrument was advanced into the stomach through the esophagogastric junction. The gastroscope was advanced progressively through the stomach visualizing the body and antral areas. The findings were a moderate gastritis with erosions. A biopsy was obtained. The instrument was further advanced through the pylorus into the duodenum. The bulb of the duodenum and the second portion of the duodenum were examined and the findings were a smooth appearing duodenal mucosa with mild erythema. The endoscope was withdrawn back into the stomach and retroflexed with examination of the fundus and cardia and the findings were no additional abnormalities . The instrument was withdrawn back into the esophagus and the the finding were no additional abnormalities    Biopsies were obtained for helicobacter testing and pathologic analysis from the representative areas. The endoscope was completely withdrawn and the patient tolerated the procedure well. 1.Blood Loss nil  No complications  Anesthesia  MAC  No crystalloids  No Implants  Assistants : per nursing documentation team members     2. For biopsy Specimen verification by physician and nurse two sources name, social security number    Suggestions  Plan 1. -   -  nutrition consult for dietary supplementation  - Hematology referral and Francisca Keita NP                                                                                                                      G I Procedure Note            COLONOSCOPY   Dr. Rajan Sites office   [facility] [place] [id number] [facility]                                   [id number]   1955                                      75 y.o.                                    male      Procedure Date: 9/27/2022   [x]  Anesthesia MAC                                                                                                Pre Op Diagnosis:    Indications:                   1. WEIGHT LOSS                                                                                                                                                                          Post Op Diagnosis:                    1.   Hemorrhoids                                                           2.  Very poor prep solid stools noted sigmoid to decscending colon precedure terminated 2nd inability to view adequately                              H&p completed: Yes            Anesthesia Assessment: Performed prior to procedure:      No change  Anesthesia Plan: Performed prior to procedure:                   No change       Medications: See Reviewed List and Reconcilation           Informed consent was obtained     Risk Statement:  Prior to the procedure the risks were explained to the patient and/or to the family including but not limited to perforation, bleeding, adverse drug reaction, aspiration, and even the need for possible surgery. A colonoscopy exam is not 100% accurate which may be related to preparation or blind spots during the exam.The possibility that an abnormality and /or cancer could be missed was also discussed as well as alternative x-ray options. Instrument:    Olympus adult Videocolonoscope                                   Immediate Procedure Reassessment Completed     With the patient in the left lateral position, a rectal examination was performed and the findings were: negative without mass, lesions or tenderness   The Olympus Video colonoscope was inserted under direct vision into the rectum.  The colonoscope was passed from the rectum to the cecum, which was identified by the ileocecal valve. The colon findings demonstrated:  ANUS: Anal exam reveals no masses or external hemorrhoids, sphincter tone is normal.   RECTUM: Rectal exam reveals no masses  . SIGMOID COLON: The sigmoid was unremarkable except as noted below   Findings below large amt of solid stools unable to visualize  DESCENDING COLON:  The videoscolonoscope was advanced carefully. Findings below increasing amt of solid stools unable to visualize  Procedure terminated. Finding noted      [] mucosa normal      [] Diverticulosis     [] avm     [] Additional findings:      A    The colonoscope was slowly withdrawn >6 minute period and the instrument was retroflexed in the rectum. The rectal findings were: no gross lesions noted. The patient tolerated the entire procedure well. Blood Loss nil  No complications  Anesthesia  MAC  No crystalloids  No Implants  Assistants : per nursing documentation team members     For biopsy  Specimen verification by physician and nurse two sources, name,           social security numbers     Colon preparation was good    Recommendations:      - referral for adequate nutrition   Repeat colonoscopy after adequate prep  I think that his ability to adequate prepare is not possible given weakness weight loss   Referral to oncology for their opinion also.   If colon cancer is noted is he a candidate for surgery or chemotherapy       Copies sent to   Robert Khan MD  CC:  Isak Johnson., NP

## 2022-09-27 NOTE — PERIOP NOTES
Seven Christopherjuan  1955  [id number]    Situation:  Verbal report given from: Dr. Neal Rice and Padma Rubalcava RN  Procedure: Procedure(s):  COLONOSCOPY  ESOPHAGOGASTRODUODENOSCOPY (EGD)  ESOPHAGOGASTRODUODENAL (EGD) BIOPSY    Background:    Preoperative diagnosis: WEIGHT LOSS    Postoperative diagnosis: weight loss, atrophic gastritis    :  Dr. Jc Bolton    Assistant(s): Circ-1: Dom Davidson RN  Scrub Tech-1: Orly KELLY    Specimens:   ID Type Source Tests Collected by Time Destination   1 : gastric bx Preservative   Rosetta Edwards MD 9/27/2022 0372 Pathology       Assessment:  Intra-procedure medications         Anesthesia gave intra-procedure sedation and medications, see anesthesia flow sheet     Intravenous fluids: LR@ KVO     Vital signs stable

## 2022-09-27 NOTE — PERIOP NOTES
Per Dr. Stormy Vasquez request patient's wife and sister brought to bedside. Dr. Catherine Sidhu speaking with patient's wife and sister at bedside.

## 2022-09-27 NOTE — PERIOP NOTES
Patient meets discharge criteria. Patient and wife provided with verbal and written discharge instructions. Patient discharged by wheelchair with wife to transport home.

## 2022-10-18 ENCOUNTER — TELEPHONE (OUTPATIENT)
Dept: INTERNAL MEDICINE CLINIC | Age: 67
End: 2022-10-18

## 2022-10-18 RX ORDER — AMLODIPINE BESYLATE 10 MG/1
TABLET ORAL
Qty: 90 TABLET | Refills: 1 | Status: SHIPPED | OUTPATIENT
Start: 2022-10-18

## 2022-10-18 NOTE — TELEPHONE ENCOUNTER
Called to check on pt  Wife answered, pt in hospital at [facility], possible pneumonia  Will fu when out  Wanted to fu on pt BP

## 2022-11-23 ENCOUNTER — PATIENT OUTREACH (OUTPATIENT)
Dept: CASE MANAGEMENT | Age: 67
End: 2022-11-23

## 2022-11-23 NOTE — PROGRESS NOTES
Ambulatory Care Management Note    Date/Time:  11/23/2022 12:41 PM    This patient was received as a referral from 1 [employer] [facility]. Ambulatory Care Manager received incoming return phone call from the patient's spouse, Shannon Mei, today. ACM returned phone call to spouse. Introduction to self and role of care manager provided. Spouse reports that patient is currently admitted to SNF as of 11/18/22; anticipated length of stay is 20 days from date of admission. ACM will continue to follow; plan for patient/family outreach by this ACM post-SNF discharge to offer care management services.

## 2022-11-23 NOTE — PROGRESS NOTES
Ambulatory Care Management Note    Date/Time:  11/23/2022 12:27 PM    This patient was received as a referral from 1 [facility]. Ambulatory Care Manager outreached to patient today to offer care management services. AC was unable to reach the patient by telephone today; lvm requesting a return phone call to this ACM. [facility] get-in-touch letter sent to the patient today via [address]

## 2022-12-14 ENCOUNTER — PATIENT OUTREACH (OUTPATIENT)
Dept: CASE MANAGEMENT | Age: 67
End: 2022-12-14

## 2022-12-14 NOTE — PROGRESS NOTES
Ambulatory Care Management Note    Date/Time:  12/14/2022 3:40 PM    ACM received incoming return phone call from the patient's spouse, Sheryl Duane; HIPAA not on file. ACM informed spouse that HIPAA is not on file and therefore verbal authorization from the patient is required for this ACM to speak with her regarding the patient's PHI; spouse verbalizes understanding. Per spouse, patient was discharged from SNF on 11/19/22. Spouse reports that patient has transitioned his care to another provider for primary care services and has attended office visit to establish care with new PCP; spouse is unable to recall the name of patient's new PCP at this time. Spouse requests ACM outreach later this afternoon after she returns home; she will be with the patient at this time.

## 2022-12-14 NOTE — PROGRESS NOTES
Ambulatory Care Management Note    Date/Time:  12/14/2022 3:38 PM    This patient was received as a referral from 1 [facility]. Ambulatory Care Manager outreached to patient today to offer care management services. ACM was unable to reach the patient by telephone today; lvm requesting a return phone call to this ACM.

## 2022-12-14 NOTE — PROGRESS NOTES
12/14/2022  5:17 PM    Patient outreach attempt by this ACM; re: update PCP in EMR and confirm that patient has established care with new PCP. ACM was unable to reach the patient by telephone; lvm requesting a return phone call to this ACM.

## 2022-12-15 NOTE — PROGRESS NOTES
12/15/2022  1:56 PM    Patient outreach attempt by this ACM; re: update PCP in EMR and confirm that patient has established care with new PCP. ACM was unable to reach the patient by telephone; lvm requesting a return phone call to this ACM.

## 2022-12-15 NOTE — PROGRESS NOTES
12/15/2022  4:57 PM    Patient outreach by this AC today to perform care management follow-up; re: update PCP in EMR and confirm that patient has established care with new PCP. Spouse reports that patient established care with Dr. Himanshu Gutierrez on 12/13/22; spouse reports that the patient's next scheduled appointment with Dr. Himanshu Gutierrez is 1/12/2023. PCP updated in EMR. No further patient outreach by this AC is scheduled at this time.

## 2023-01-12 ENCOUNTER — APPOINTMENT (OUTPATIENT)
Dept: GENERAL RADIOLOGY | Age: 68
DRG: 178 | End: 2023-01-12
Attending: EMERGENCY MEDICINE
Payer: MEDICARE

## 2023-01-12 ENCOUNTER — APPOINTMENT (OUTPATIENT)
Dept: CT IMAGING | Age: 68
DRG: 178 | End: 2023-01-12
Attending: EMERGENCY MEDICINE
Payer: MEDICARE

## 2023-01-12 ENCOUNTER — HOSPITAL ENCOUNTER (INPATIENT)
Age: 68
LOS: 4 days | Discharge: HOME | DRG: 178 | End: 2023-01-16
Attending: EMERGENCY MEDICINE | Admitting: STUDENT IN AN ORGANIZED HEALTH CARE EDUCATION/TRAINING PROGRAM
Payer: MEDICARE

## 2023-01-12 DIAGNOSIS — R77.8 ELEVATED TROPONIN: ICD-10-CM

## 2023-01-12 DIAGNOSIS — J90 LARGE PLEURAL EFFUSION: Primary | ICD-10-CM

## 2023-01-12 DIAGNOSIS — I21.4 NSTEMI (NON-ST ELEVATED MYOCARDIAL INFARCTION) (HCC): ICD-10-CM

## 2023-01-12 DIAGNOSIS — U07.1 COVID-19 VIRUS INFECTION: ICD-10-CM

## 2023-01-12 DIAGNOSIS — J96.01 ACUTE RESPIRATORY FAILURE WITH HYPOXIA (HCC): ICD-10-CM

## 2023-01-12 DIAGNOSIS — I10 ACCELERATED HYPERTENSION: ICD-10-CM

## 2023-01-12 DIAGNOSIS — R06.03 ACUTE RESPIRATORY DISTRESS: ICD-10-CM

## 2023-01-12 LAB
ALBUMIN SERPL-MCNC: 3 G/DL (ref 3.5–5)
ALBUMIN/GLOB SERPL: 0.8 (ref 1.1–2.2)
ALP SERPL-CCNC: 100 U/L (ref 45–117)
ALT SERPL-CCNC: 22 U/L (ref 12–78)
ANION GAP SERPL CALC-SCNC: 7 MMOL/L (ref 5–15)
AST SERPL-CCNC: 25 U/L (ref 15–37)
BASOPHILS # BLD: 0.1 K/UL (ref 0–0.1)
BASOPHILS NFR BLD: 1 % (ref 0–1)
BILIRUB SERPL-MCNC: 0.5 MG/DL (ref 0.2–1)
BNP SERPL-MCNC: ABNORMAL PG/ML
BUN SERPL-MCNC: 20 MG/DL (ref 6–20)
BUN/CREAT SERPL: 10 (ref 12–20)
CALCIUM SERPL-MCNC: 8.5 MG/DL (ref 8.5–10.1)
CHLORIDE SERPL-SCNC: 110 MMOL/L (ref 97–108)
CO2 SERPL-SCNC: 27 MMOL/L (ref 21–32)
COVID-19 RAPID TEST, COVR: DETECTED
CREAT SERPL-MCNC: 1.92 MG/DL (ref 0.7–1.3)
DIFFERENTIAL METHOD BLD: ABNORMAL
EOSINOPHIL # BLD: 0.1 K/UL (ref 0–0.4)
EOSINOPHIL NFR BLD: 1 % (ref 0–7)
ERYTHROCYTE [DISTWIDTH] IN BLOOD BY AUTOMATED COUNT: 16.1 % (ref 11.5–14.5)
FLUAV AG NPH QL IA: NEGATIVE
FLUBV AG NOSE QL IA: NEGATIVE
GLOBULIN SER CALC-MCNC: 3.6 G/DL (ref 2–4)
GLUCOSE SERPL-MCNC: 115 MG/DL (ref 65–100)
HCT VFR BLD AUTO: 34.1 % (ref 36.6–50.3)
HGB BLD-MCNC: 10.7 G/DL (ref 12.1–17)
IMM GRANULOCYTES # BLD AUTO: 0 K/UL (ref 0–0.04)
IMM GRANULOCYTES NFR BLD AUTO: 0 % (ref 0–0.5)
LYMPHOCYTES # BLD: 0.6 K/UL (ref 0.8–3.5)
LYMPHOCYTES NFR BLD: 7 % (ref 12–49)
MCH RBC QN AUTO: 30 PG (ref 26–34)
MCHC RBC AUTO-ENTMCNC: 31.4 G/DL (ref 30–36.5)
MCV RBC AUTO: 95.5 FL (ref 80–99)
MONOCYTES # BLD: 0.6 K/UL (ref 0–1)
MONOCYTES NFR BLD: 7 % (ref 5–13)
NEUTS SEG # BLD: 7.7 K/UL (ref 1.8–8)
NEUTS SEG NFR BLD: 84 % (ref 32–75)
NRBC # BLD: 0 K/UL (ref 0–0.01)
NRBC BLD-RTO: 0 PER 100 WBC
PLATELET # BLD AUTO: 256 K/UL (ref 150–400)
PMV BLD AUTO: 10.2 FL (ref 8.9–12.9)
POTASSIUM SERPL-SCNC: 3.6 MMOL/L (ref 3.5–5.1)
PROCALCITONIN SERPL-MCNC: 0.11 NG/ML
PROT SERPL-MCNC: 6.6 G/DL (ref 6.4–8.2)
RBC # BLD AUTO: 3.57 M/UL (ref 4.1–5.7)
RBC MORPH BLD: ABNORMAL
SODIUM SERPL-SCNC: 144 MMOL/L (ref 136–145)
SOURCE, COVRS: ABNORMAL
TROPONIN-HIGH SENSITIVITY: 203 NG/L (ref 0–76)
TROPONIN-HIGH SENSITIVITY: 215 NG/L (ref 0–76)
WBC # BLD AUTO: 9.1 K/UL (ref 4.1–11.1)

## 2023-01-12 PROCEDURE — 74011250636 HC RX REV CODE- 250/636: Performed by: NURSE PRACTITIONER

## 2023-01-12 PROCEDURE — 80053 COMPREHEN METABOLIC PANEL: CPT

## 2023-01-12 PROCEDURE — 84484 ASSAY OF TROPONIN QUANT: CPT

## 2023-01-12 PROCEDURE — 74011250636 HC RX REV CODE- 250/636: Performed by: EMERGENCY MEDICINE

## 2023-01-12 PROCEDURE — 85025 COMPLETE CBC W/AUTO DIFF WBC: CPT

## 2023-01-12 PROCEDURE — 84145 PROCALCITONIN (PCT): CPT

## 2023-01-12 PROCEDURE — 83880 ASSAY OF NATRIURETIC PEPTIDE: CPT

## 2023-01-12 PROCEDURE — 96365 THER/PROPH/DIAG IV INF INIT: CPT

## 2023-01-12 PROCEDURE — 93005 ELECTROCARDIOGRAM TRACING: CPT

## 2023-01-12 PROCEDURE — 74011000250 HC RX REV CODE- 250: Performed by: NURSE PRACTITIONER

## 2023-01-12 PROCEDURE — 96375 TX/PRO/DX INJ NEW DRUG ADDON: CPT

## 2023-01-12 PROCEDURE — 36415 COLL VENOUS BLD VENIPUNCTURE: CPT

## 2023-01-12 PROCEDURE — 71046 X-RAY EXAM CHEST 2 VIEWS: CPT

## 2023-01-12 PROCEDURE — 74011250637 HC RX REV CODE- 250/637: Performed by: NURSE PRACTITIONER

## 2023-01-12 PROCEDURE — 87635 SARS-COV-2 COVID-19 AMP PRB: CPT

## 2023-01-12 PROCEDURE — 87804 INFLUENZA ASSAY W/OPTIC: CPT

## 2023-01-12 PROCEDURE — 3E0333Z INTRODUCTION OF ANTI-INFLAMMATORY INTO PERIPHERAL VEIN, PERCUTANEOUS APPROACH: ICD-10-PCS | Performed by: INTERNAL MEDICINE

## 2023-01-12 PROCEDURE — 74011000258 HC RX REV CODE- 258: Performed by: EMERGENCY MEDICINE

## 2023-01-12 PROCEDURE — 65270000046 HC RM TELEMETRY

## 2023-01-12 PROCEDURE — 71275 CT ANGIOGRAPHY CHEST: CPT

## 2023-01-12 PROCEDURE — 99285 EMERGENCY DEPT VISIT HI MDM: CPT

## 2023-01-12 PROCEDURE — 74011000636 HC RX REV CODE- 636: Performed by: EMERGENCY MEDICINE

## 2023-01-12 PROCEDURE — 96367 TX/PROPH/DG ADDL SEQ IV INF: CPT

## 2023-01-12 RX ORDER — ONDANSETRON 4 MG/1
4 TABLET, ORALLY DISINTEGRATING ORAL
Status: DISCONTINUED | OUTPATIENT
Start: 2023-01-12 | End: 2023-01-16 | Stop reason: HOSPADM

## 2023-01-12 RX ORDER — GUAIFENESIN 100 MG/5ML
200 SOLUTION ORAL
Status: DISCONTINUED | OUTPATIENT
Start: 2023-01-12 | End: 2023-01-16 | Stop reason: HOSPADM

## 2023-01-12 RX ORDER — ALBUTEROL SULFATE 90 UG/1
2 AEROSOL, METERED RESPIRATORY (INHALATION)
Status: DISCONTINUED | OUTPATIENT
Start: 2023-01-12 | End: 2023-01-16 | Stop reason: HOSPADM

## 2023-01-12 RX ORDER — HYDRALAZINE HYDROCHLORIDE 20 MG/ML
10-20 INJECTION INTRAMUSCULAR; INTRAVENOUS
Status: DISCONTINUED | OUTPATIENT
Start: 2023-01-12 | End: 2023-01-16 | Stop reason: HOSPADM

## 2023-01-12 RX ORDER — ACETAMINOPHEN 325 MG/1
650 TABLET ORAL
Status: DISCONTINUED | OUTPATIENT
Start: 2023-01-12 | End: 2023-01-16 | Stop reason: HOSPADM

## 2023-01-12 RX ORDER — ONDANSETRON 2 MG/ML
4 INJECTION INTRAMUSCULAR; INTRAVENOUS
Status: DISCONTINUED | OUTPATIENT
Start: 2023-01-12 | End: 2023-01-16 | Stop reason: HOSPADM

## 2023-01-12 RX ORDER — SODIUM CHLORIDE 0.9 % (FLUSH) 0.9 %
5-40 SYRINGE (ML) INJECTION AS NEEDED
Status: DISCONTINUED | OUTPATIENT
Start: 2023-01-12 | End: 2023-01-16 | Stop reason: HOSPADM

## 2023-01-12 RX ORDER — AMLODIPINE BESYLATE 5 MG/1
10 TABLET ORAL DAILY
Status: DISCONTINUED | OUTPATIENT
Start: 2023-01-13 | End: 2023-01-16 | Stop reason: HOSPADM

## 2023-01-12 RX ORDER — FUROSEMIDE 10 MG/ML
40 INJECTION INTRAMUSCULAR; INTRAVENOUS ONCE
Status: COMPLETED | OUTPATIENT
Start: 2023-01-12 | End: 2023-01-12

## 2023-01-12 RX ORDER — ASCORBIC ACID 500 MG
500 TABLET ORAL DAILY
Status: DISCONTINUED | OUTPATIENT
Start: 2023-01-13 | End: 2023-01-16 | Stop reason: HOSPADM

## 2023-01-12 RX ORDER — ENOXAPARIN SODIUM 100 MG/ML
30 INJECTION SUBCUTANEOUS EVERY 12 HOURS
Status: DISCONTINUED | OUTPATIENT
Start: 2023-01-12 | End: 2023-01-14

## 2023-01-12 RX ORDER — POLYETHYLENE GLYCOL 3350 17 G/17G
17 POWDER, FOR SOLUTION ORAL DAILY PRN
Status: DISCONTINUED | OUTPATIENT
Start: 2023-01-12 | End: 2023-01-16 | Stop reason: HOSPADM

## 2023-01-12 RX ORDER — ATORVASTATIN CALCIUM 20 MG/1
20 TABLET, FILM COATED ORAL
Status: DISCONTINUED | OUTPATIENT
Start: 2023-01-12 | End: 2023-01-16 | Stop reason: HOSPADM

## 2023-01-12 RX ORDER — SODIUM CHLORIDE 0.9 % (FLUSH) 0.9 %
5-40 SYRINGE (ML) INJECTION EVERY 8 HOURS
Status: DISCONTINUED | OUTPATIENT
Start: 2023-01-12 | End: 2023-01-16 | Stop reason: HOSPADM

## 2023-01-12 RX ORDER — GUAIFENESIN 600 MG/1
600 TABLET, EXTENDED RELEASE ORAL EVERY 12 HOURS
Status: DISCONTINUED | OUTPATIENT
Start: 2023-01-12 | End: 2023-01-16 | Stop reason: HOSPADM

## 2023-01-12 RX ORDER — ACETAMINOPHEN 650 MG/1
650 SUPPOSITORY RECTAL
Status: DISCONTINUED | OUTPATIENT
Start: 2023-01-12 | End: 2023-01-16 | Stop reason: HOSPADM

## 2023-01-12 RX ORDER — ZINC SULFATE 50(220)MG
1 CAPSULE ORAL DAILY
Status: DISCONTINUED | OUTPATIENT
Start: 2023-01-13 | End: 2023-01-16 | Stop reason: HOSPADM

## 2023-01-12 RX ADMIN — ENOXAPARIN SODIUM 30 MG: 100 INJECTION SUBCUTANEOUS at 23:05

## 2023-01-12 RX ADMIN — GUAIFENESIN 600 MG: 600 TABLET, EXTENDED RELEASE ORAL at 23:05

## 2023-01-12 RX ADMIN — CEFTRIAXONE 2 G: 2 INJECTION, POWDER, FOR SOLUTION INTRAMUSCULAR; INTRAVENOUS at 20:29

## 2023-01-12 RX ADMIN — SODIUM CHLORIDE, PRESERVATIVE FREE 10 ML: 5 INJECTION INTRAVENOUS at 23:08

## 2023-01-12 RX ADMIN — AZITHROMYCIN 500 MG: 500 INJECTION, POWDER, LYOPHILIZED, FOR SOLUTION INTRAVENOUS at 20:41

## 2023-01-12 RX ADMIN — IOPAMIDOL 82 ML: 755 INJECTION, SOLUTION INTRAVENOUS at 18:24

## 2023-01-12 RX ADMIN — ATORVASTATIN CALCIUM 20 MG: 20 TABLET, FILM COATED ORAL at 23:05

## 2023-01-12 RX ADMIN — FUROSEMIDE 40 MG: 10 INJECTION, SOLUTION INTRAMUSCULAR; INTRAVENOUS at 20:33

## 2023-01-12 NOTE — ED PROVIDER NOTES
EMERGENCY DEPARTMENT HISTORY AND PHYSICAL EXAM             Please note that this dictation was completed with the assistance of \"Dragon\", the computer voice recognition software. Quite often unanticipated grammatical, syntax, homophones, and other interpretive errors are inadvertently transcribed by the computer software. Please disregard these errors and any errors that have escaped final proofreading. Thank you. Date of Evaluation: 01/13/23  Patient: Jeff Patel. Patient Age and Sex: 75 y.o. male   MRN: [id number]  CSN: [id number]  PCP: Phong Perez MD    History of Present Illness     Chief Complaint   Patient presents with    Positive For Covid-19    Shortness of Breath     Via EMS from home. Pt tested covid + by home health care this morning. He is having sob. Pt's daughter also has covid     History Provided By: Patient/family/EMS (if available)    HPI Limitations : None    HPI: Jeff Patel., 75 y.o. male with past medical history as documented below presents to the ED with c/o of several days of severe SOB, weakness and cough. Pt with hx of CABG on aspirin, carvedilol. Pt states he took a COVID test at home and it was positive. Notes mild chest tightness. States SOB worse with exertion. Pt states her daughter has COVID and states he likely caught it from her. Pt denies any other exacerbating or ameliorating factors. There are no other complaints, changes or physical findings pertinent to the HPI at this time. Nursing Notes were all reviewed and agreed with or any disagreements were addressed in the HPI.     Past History   Past Medical History:  Past Medical History:   Diagnosis Date    CAD (coronary artery disease)     10/31/22: NSTEMI sees [facility], may be getting CABG    Fractured rib 03/2021    from a fall per pt on 5/11/2021    High cholesterol     Hypertension     per pt on 5/11/2021    Stroke [facility]) 2022       Past Surgical History:  Past Surgical History:   Procedure Laterality Date    COLONOSCOPY N/A 9/27/2022    COLONOSCOPY performed by Ilya Trimble MD at [facility] ENDOSCOPY      per pt on 5/11/2021       Family History:   Family history reviewed and was non-contributory, unless specified below:  No family history on file. Social History:  Social History     Tobacco Use    Smoking status: Every Day     Packs/day: 0.50     Types: Cigarettes    Smokeless tobacco: Never   Vaping Use    Vaping Use: Never used   Substance Use Topics    Alcohol use: Not Currently     Alcohol/week: 1.0 standard drink     Types: 1 Cans of beer per week    Drug use: Not Currently       Allergies:  No Known Allergies    Current Medications:  No current facility-administered medications on file prior to encounter. Current Outpatient Medications on File Prior to Encounter   Medication Sig Dispense Refill    empagliflozin (JARDIANCE) 10 mg tablet Take 10 mg by mouth daily.  carvediloL (COREG) 25 mg tablet Take 25 mg by mouth two (2) times a day.  amLODIPine (NORVASC) 10 mg tablet TAKE 1 TABLET EVERY DAY 90 Tablet 1    OTHER Patient taking antihypertensive x 2, unable to recall medication names      polyethylene glycol (Miralax) 17 gram/dose powder Take 17 g by mouth daily. 1 tablespoon with 8 oz of water daily 510 g 0    atorvastatin (LIPITOR) 20 mg tablet TAKE 1 TABLET EVERY NIGHT 90 Tablet 1    cholecalciferol (VITAMIN D3) (1000 Units /25 mcg) tablet Take 1 Tablet by mouth daily. 90 Tablet 1    aspirin (ASPIRIN) 325 mg tablet Take 1 Tablet by mouth daily. 30 Tablet 4       Review of Systems   A complete ROS was reviewed by me today and all other systems negative, unless otherwise specified below:  Review of Systems   Constitutional:  Positive for fatigue. Negative for chills and fever. HENT: Negative. Negative for congestion and sore throat. Eyes: Negative. Respiratory:  Positive for cough, chest tightness and shortness of breath. Negative for wheezing. Cardiovascular:  Positive for chest pain. Negative for palpitations and leg swelling. Gastrointestinal: Negative. Negative for abdominal distention, abdominal pain, blood in stool, constipation, diarrhea, nausea and vomiting. Endocrine: Negative. Genitourinary: Negative. Negative for dysuria, flank pain, frequency, hematuria and urgency. Musculoskeletal: Negative. Negative for arthralgias, back pain and myalgias. Skin: Negative. Negative for color change and rash. Neurological:  Positive for weakness. Negative for dizziness, syncope, speech difficulty, light-headedness, numbness and headaches. Hematological: Negative. Psychiatric/Behavioral: Negative. Negative for confusion and self-injury. The patient is not nervous/anxious. All other systems reviewed and are negative.   Physical Exam   Patient Vitals for the past 24 hrs:   Temp Pulse Resp BP SpO2   01/13/23 1325 -- 65 18 125/60 93 %   01/13/23 1315 -- 72 17 (!) 148/64 96 %   01/13/23 1200 -- 64 18 (!) 152/62 91 %   01/13/23 1130 -- 65 20 (!) 151/63 91 %   01/13/23 1100 -- 70 26 (!) 160/82 96 %   01/13/23 1030 -- 66 19 (!) 156/72 94 %   01/13/23 1000 -- 66 20 (!) 165/73 91 %   01/13/23 0924 -- 65 20 (!) 164/73 92 %   01/13/23 0824 -- 65 18 (!) 174/74 91 %   01/13/23 0709 -- 73 20 -- 92 %   01/13/23 0654 -- 66 18 -- 92 %   01/13/23 0645 -- 67 18 -- 91 %   01/13/23 0630 -- 73 22 (!) 166/66 93 %   01/13/23 0615 -- 73 28 -- 96 %   01/13/23 0600 -- 68 20 -- 91 %   01/13/23 0554 -- 68 18 (!) 167/65 95 %   01/13/23 0545 -- 68 20 -- 92 %   01/13/23 0530 -- 69 22 -- 94 %   01/13/23 0524 -- 66 19 (!) 173/69 92 %   01/13/23 0515 -- 65 19 -- 92 %   01/13/23 0500 -- 66 19 -- 93 %   01/13/23 0454 -- 68 19 (!) 174/65 92 %   01/13/23 0445 -- 70 18 -- 94 %   01/13/23 0430 -- 76 21 (!) 164/69 96 %   01/13/23 0400 -- 76 21 (!) 168/66 93 %   01/13/23 0330 -- 76 21 (!) 170/68 95 %   01/13/23 0300 -- 71 21 (!) 167/69 91 %   01/13/23 0230 -- 73 23 (!) 161/66 92 %   01/13/23 0200 -- 78 22 (!) 153/75 92 %   01/13/23 0145 -- 79 20 (!) 158/69 95 %   01/13/23 0115 -- 77 26 -- --   01/13/23 0100 -- 80 26 (!) 158/71 93 %   01/13/23 0033 -- 79 24 (!) 176/70 91 %   01/13/23 0003 -- 77 24 (!) 170/66 91 %   01/12/23 2322 -- 85 -- (!) 156/74 92 %   01/12/23 2159 -- 84 (!) 31 (!) 175/74 94 %   01/12/23 2130 -- 89 25 (!) 165/82 92 %   01/12/23 2100 -- 86 30 (!) 148/116 94 %   01/12/23 2033 -- 87 -- (!) 172/80 --   01/12/23 2030 -- 84 29 (!) 172/80 92 %   01/12/23 2015 -- 87 30 91/65 91 %   01/12/23 2000 -- 93 29 -- 93 %   01/12/23 1945 -- 82 27 -- 92 %   01/12/23 1930 -- 82 27 -- 92 %   01/12/23 1915 -- 83 (!) 31 (!) 193/77 93 %   01/12/23 1900 -- 84 (!) 35 (!) 191/79 94 %   01/12/23 1845 99.8 °F (37.7 °C) 82 23 (!) 191/84 (!) 88 %   01/12/23 1800 -- 77 27 (!) 181/89 95 %   01/12/23 1745 -- 76 26 (!) 178/88 97 %   01/12/23 1730 -- 76 27 (!) 178/74 96 %   01/12/23 1715 -- -- -- (!) 181/99 --   01/12/23 1630 99.7 °F (37.6 °C) 77 16 (!) 164/77 98 %       Physical Exam  Vitals and nursing note reviewed. Constitutional:       General: He is in acute distress. Appearance: He is well-developed. He is ill-appearing, toxic-appearing and diaphoretic. HENT:      Head: Normocephalic and atraumatic. Mouth/Throat:      Pharynx: No posterior oropharyngeal erythema. Eyes:      Conjunctiva/sclera: Conjunctivae normal.   Cardiovascular:      Rate and Rhythm: Normal rate and regular rhythm. Heart sounds: Normal heart sounds. No murmur heard. No friction rub. No gallop. Pulmonary:      Effort: Tachypnea, accessory muscle usage and respiratory distress present. Breath sounds: Examination of the left-upper field reveals decreased breath sounds and rales. Examination of the left-middle field reveals decreased breath sounds and rales. Examination of the left-lower field reveals decreased breath sounds and rales.  Decreased breath sounds and rales present. No wheezing. Chest:      Chest wall: No tenderness. Abdominal:      General: Bowel sounds are normal. There is no distension. Palpations: Abdomen is soft. There is no mass. Tenderness: There is no abdominal tenderness. There is no guarding or rebound. Musculoskeletal:         General: Normal range of motion. Cervical back: Normal range of motion. Skin:     General: Skin is warm. Neurological:      General: No focal deficit present. Mental Status: He is alert and oriented to person, place, and time. Motor: No abnormal muscle tone. Psychiatric:         Behavior: Behavior is cooperative. Diagnostic Studies     LABORATORY RESULTS:  I have personally reviewed and interpreted all available laboratory results.    Recent Results (from the past 24 hour(s))   EKG, 12 LEAD, INITIAL    Collection Time: 01/12/23  4:35 PM   Result Value Ref Range    Ventricular Rate 80 BPM    Atrial Rate 80 BPM    P-R Interval 196 ms    QRS Duration 80 ms    Q-T Interval 362 ms    QTC Calculation (Bezet) 417 ms    Calculated P Axis 65 degrees    Calculated R Axis 45 degrees    Calculated T Axis 176 degrees    Diagnosis       Normal sinus rhythm  Left atrial enlargement  Left ventricular hypertrophy with repolarization abnormality  When compared with ECG of 23-JUL-2022 08:26,  ST less elevated in Anterior leads  Inverted T waves have replaced nonspecific T wave abnormality in Inferior   leads  Inverted T waves have replaced nonspecific T wave abnormality in Lateral   leads  Confirmed by [facility] ([id number]) on 1/13/2023 12:14:33 PM     CBC WITH AUTOMATED DIFF    Collection Time: 01/12/23  5:01 PM   Result Value Ref Range    WBC 9.1 4.1 - 11.1 K/uL    RBC 3.57 (L) 4.10 - 5.70 M/uL    HGB 10.7 (L) 12.1 - 17.0 g/dL    HCT 34.1 (L) 36.6 - 50.3 %    MCV 95.5 80.0 - 99.0 FL    MCH 30.0 26.0 - 34.0 PG    MCHC 31.4 30.0 - 36.5 g/dL    RDW 16.1 (H) 11.5 - 14.5 %    PLATELET 829 859 - 566 K/uL    MPV 10.2 8.9 - 12.9 FL    NRBC 0.0 0  WBC    ABSOLUTE NRBC 0.00 0.00 - 0.01 K/uL    NEUTROPHILS 84 (H) 32 - 75 %    LYMPHOCYTES 7 (L) 12 - 49 %    MONOCYTES 7 5 - 13 %    EOSINOPHILS 1 0 - 7 %    BASOPHILS 1 0 - 1 %    IMMATURE GRANULOCYTES 0 0.0 - 0.5 %    ABS. NEUTROPHILS 7.7 1.8 - 8.0 K/UL    ABS. LYMPHOCYTES 0.6 (L) 0.8 - 3.5 K/UL    ABS. MONOCYTES 0.6 0.0 - 1.0 K/UL    ABS. EOSINOPHILS 0.1 0.0 - 0.4 K/UL    ABS. BASOPHILS 0.1 0.0 - 0.1 K/UL    ABS. IMM. GRANS. 0.0 0.00 - 0.04 K/UL    DF SMEAR SCANNED      RBC COMMENTS ANISOCYTOSIS  1+       METABOLIC PANEL, COMPREHENSIVE    Collection Time: 01/12/23  5:01 PM   Result Value Ref Range    Sodium 144 136 - 145 mmol/L    Potassium 3.6 3.5 - 5.1 mmol/L    Chloride 110 (H) 97 - 108 mmol/L    CO2 27 21 - 32 mmol/L    Anion gap 7 5 - 15 mmol/L    Glucose 115 (H) 65 - 100 mg/dL    BUN 20 6 - 20 MG/DL    Creatinine 1.92 (H) 0.70 - 1.30 MG/DL    BUN/Creatinine ratio 10 (L) 12 - 20      eGFR 37 (L) >60 ml/min/1.73m2    Calcium 8.5 8.5 - 10.1 MG/DL    Bilirubin, total 0.5 0.2 - 1.0 MG/DL    ALT (SGPT) 22 12 - 78 U/L    AST (SGOT) 25 15 - 37 U/L    Alk.  phosphatase 100 45 - 117 U/L    Protein, total 6.6 6.4 - 8.2 g/dL    Albumin 3.0 (L) 3.5 - 5.0 g/dL    Globulin 3.6 2.0 - 4.0 g/dL    A-G Ratio 0.8 (L) 1.1 - 2.2     NT-PRO BNP    Collection Time: 01/12/23  5:01 PM   Result Value Ref Range    NT pro-BNP 13,762 (H) <125 PG/ML   TROPONIN-HIGH SENSITIVITY    Collection Time: 01/12/23  5:01 PM   Result Value Ref Range    Troponin-High Sensitivity 215 (HH) 0 - 76 ng/L   COVID-19 RAPID TEST    Collection Time: 01/12/23  6:46 PM   Result Value Ref Range    Specimen source Nasopharyngeal      COVID-19 rapid test Detected (AA) NOTD     INFLUENZA A+B VIRAL AGS    Collection Time: 01/12/23  6:46 PM   Result Value Ref Range    Influenza A Antigen Negative NEG      Influenza B Antigen Negative NEG     TROPONIN-HIGH SENSITIVITY    Collection Time: 01/12/23  8:38 PM   Result Value Ref Range Troponin-High Sensitivity 203 (HH) 0 - 76 ng/L   PROCALCITONIN    Collection Time: 01/12/23  8:38 PM   Result Value Ref Range    Procalcitonin 0.11 ng/mL   VITAMIN D, 25 HYDROXY    Collection Time: 01/13/23  3:55 AM   Result Value Ref Range    Vitamin D 25-Hydroxy 12.6 (L) 30 - 100 ng/mL   CBC WITH AUTOMATED DIFF    Collection Time: 01/13/23  3:55 AM   Result Value Ref Range    WBC 7.5 4.1 - 11.1 K/uL    RBC 3.18 (L) 4.10 - 5.70 M/uL    HGB 9.4 (L) 12.1 - 17.0 g/dL    HCT 29.9 (L) 36.6 - 50.3 %    MCV 94.0 80.0 - 99.0 FL    MCH 29.6 26.0 - 34.0 PG    MCHC 31.4 30.0 - 36.5 g/dL    RDW 16.2 (H) 11.5 - 14.5 %    PLATELET 872 787 - 899 K/uL    MPV 10.8 8.9 - 12.9 FL    NRBC 0.0 0  WBC    ABSOLUTE NRBC 0.00 0.00 - 0.01 K/uL    NEUTROPHILS 76 (H) 32 - 75 %    LYMPHOCYTES 12 12 - 49 %    MONOCYTES 12 5 - 13 %    EOSINOPHILS 0 0 - 7 %    BASOPHILS 0 0 - 1 %    IMMATURE GRANULOCYTES 0 0.0 - 0.5 %    ABS. NEUTROPHILS 5.7 1.8 - 8.0 K/UL    ABS. LYMPHOCYTES 0.9 0.8 - 3.5 K/UL    ABS. MONOCYTES 0.9 0.0 - 1.0 K/UL    ABS. EOSINOPHILS 0.0 0.0 - 0.4 K/UL    ABS. BASOPHILS 0.0 0.0 - 0.1 K/UL    ABS. IMM. GRANS. 0.0 0.00 - 0.04 K/UL    DF AUTOMATED     METABOLIC PANEL, COMPREHENSIVE    Collection Time: 01/13/23  3:55 AM   Result Value Ref Range    Sodium 141 136 - 145 mmol/L    Potassium 3.3 (L) 3.5 - 5.1 mmol/L    Chloride 108 97 - 108 mmol/L    CO2 24 21 - 32 mmol/L    Anion gap 9 5 - 15 mmol/L    Glucose 74 65 - 100 mg/dL    BUN 20 6 - 20 MG/DL    Creatinine 1.85 (H) 0.70 - 1.30 MG/DL    BUN/Creatinine ratio 11 (L) 12 - 20      eGFR 39 (L) >60 ml/min/1.73m2    Calcium 8.4 (L) 8.5 - 10.1 MG/DL    Bilirubin, total 0.4 0.2 - 1.0 MG/DL    ALT (SGPT) 23 12 - 78 U/L    AST (SGOT) 31 15 - 37 U/L    Alk.  phosphatase 88 45 - 117 U/L    Protein, total 6.4 6.4 - 8.2 g/dL    Albumin 2.7 (L) 3.5 - 5.0 g/dL    Globulin 3.7 2.0 - 4.0 g/dL    A-G Ratio 0.7 (L) 1.1 - 2.2     MAGNESIUM    Collection Time: 01/13/23  3:55 AM   Result Value Ref Range    Magnesium 1.8 1.6 - 2.4 mg/dL   PROTHROMBIN TIME + INR    Collection Time: 01/13/23  3:55 AM   Result Value Ref Range    INR 1.3 (H) 0.9 - 1.1      Prothrombin time 13.5 (H) 9.0 - 11.1 sec   PROCALCITONIN    Collection Time: 01/13/23  3:55 AM   Result Value Ref Range    Procalcitonin 0.15 ng/mL   LD    Collection Time: 01/13/23  3:55 AM   Result Value Ref Range     (H) 85 - 241 U/L   TROPONIN-HIGH SENSITIVITY    Collection Time: 01/13/23  3:55 AM   Result Value Ref Range    Troponin-High Sensitivity 242 (HH) 0 - 76 ng/L   LACTIC ACID    Collection Time: 01/13/23  3:55 AM   Result Value Ref Range    Lactic acid 1.0 0.4 - 2.0 MMOL/L   NT-PRO BNP    Collection Time: 01/13/23  3:55 AM   Result Value Ref Range    NT pro-BNP 15,552 (H) <125 PG/ML   HEMOGLOBIN A1C WITH EAG    Collection Time: 01/13/23  3:55 AM   Result Value Ref Range    Hemoglobin A1c 5.4 4.0 - 5.6 %    Est. average glucose 108 mg/dL   GLUCOSE, POC    Collection Time: 01/13/23  5:56 AM   Result Value Ref Range    Glucose (POC) 71 65 - 117 mg/dL    Performed by Guero Justin RN    GLUCOSE, POC    Collection Time: 01/13/23  6:16 AM   Result Value Ref Range    Glucose (POC) 72 65 - 117 mg/dL    Performed by Guero Justin RN    GLUCOSE, POC    Collection Time: 01/13/23 12:34 PM   Result Value Ref Range    Glucose (POC) 75 65 - 117 mg/dL    Performed by Patito Shin EDT        RADIOLOGY RESULTS:  I have personally reviewed and interpreted all available imaging studies and agree with radiology interpretation. CTA CHEST W OR W WO CONT   Final Result   1. No visualized pulmonary embolus. 2. Large volume of pleural fluid on the left with associated   atelectasis/consolidation of the left lower lobe. 3. Incidental findings as above      XR CHEST PA LAT   Final Result   Left pleural effusion and left lung atelectasis/lingular collapse. Retrosternal   mediastinal masslike opacity.  Recommend CT chest with IV contrast.          US THORACENTESIS NDL PUNC ASP W IMAGE    (Results Pending)   XR CHEST PORT    (Results Pending)     CT Results  (Last 48 hours)                 01/12/23 1823  CTA CHEST W OR W WO CONT Final result    Impression:  1. No visualized pulmonary embolus. 2. Large volume of pleural fluid on the left with associated   atelectasis/consolidation of the left lower lobe. 3. Incidental findings as above       Narrative:  EXAM:  CTA CHEST W OR W WO CONT   INDICATION:  acute chest pain, SOB, abnormal CXR. Additional history:   COMPARISON: CT of the abdomen and pelvis, 9/6/2022. .   TECHNIQUE:    Precontrast  images were obtained to localize the volume for acquisition. Multislice helical CT arteriography was performed from the diaphragm to the   thoracic inlet during uneventful rapid bolus intravenous contrast   administration. Lung and soft tissue windows were generated. Coronal and   sagittal images were generated and 3D post processing consisting of coronal   maximum intensity images was performed. CT dose reduction was achieved through use of a standardized protocol tailored   for this examination and automatic exposure control for dose modulation. Author Showman FINDINGS:   CHEST:   Chest wall/thoracic inlet: Within normal limits. Thyroid: Within normal limits. Mediastinum/maxine: Patulous esophagus. Heart/vessels: Status post coronary artery bypass grafting. Coronary artery   calcifications: Present   Lungs/Pleura: Large volume of pleural fluid on the left with near complete   atelectasis of the left lower lobe. Respiratory motion limits evaluation of the   right lung. There is right apical paraseptal emphysema. .   INCIDENTALLY IMAGED ABDOMEN:   Diverticulosis at the splenic flexure    . MSK:    Status post median sternotomy. .             CXR Results  (Last 48 hours)                 01/12/23 1716  XR CHEST PA LAT Final result    Impression:  Left pleural effusion and left lung atelectasis/lingular collapse.  Retrosternal mediastinal masslike opacity. Recommend CT chest with IV contrast.            Narrative:  INDICATION: covd + sob       COMPARISON: 8/10/2022       FINDINGS:       Frontal and lateral views of the chest demonstrate prior median sternotomy. Retrosternal opacity on lateral view. The lungs are bright lung is clear. Left   pleural effusion/lingular collapse. No pneumothorax. The osseous structures are   unremarkable. CTA CHEST W OR W WO CONT   Final Result   1. No visualized pulmonary embolus. 2. Large volume of pleural fluid on the left with associated   atelectasis/consolidation of the left lower lobe. 3. Incidental findings as above      XR CHEST PA LAT   Final Result   Left pleural effusion and left lung atelectasis/lingular collapse. Retrosternal   mediastinal masslike opacity. Recommend CT chest with IV contrast.          US THORACENTESIS NDL PUNC ASP W IMAGE    (Results Pending)   XR CHEST PORT    (Results Pending)        MEDICAL DECISION MAKING   I am the first and primary ED physician for this patient's ED visit today. I reviewed our EMR for any past records that may contribute to the patient's current condition, including their past medical, surgical, social and family history. This also includes their most recent ED visits, previous hospitalizations and prior diagnostic data. I have reviewed and summarized the most pertinent findings in my HPI and MDM.     Vital Signs Reviewed:  Patient Vitals for the past 24 hrs:   Temp Pulse Resp BP SpO2   01/13/23 1325 -- 65 18 125/60 93 %   01/13/23 1315 -- 72 17 (!) 148/64 96 %   01/13/23 1200 -- 64 18 (!) 152/62 91 %   01/13/23 1130 -- 65 20 (!) 151/63 91 %   01/13/23 1100 -- 70 26 (!) 160/82 96 %   01/13/23 1030 -- 66 19 (!) 156/72 94 %   01/13/23 1000 -- 66 20 (!) 165/73 91 %   01/13/23 0924 -- 65 20 (!) 164/73 92 %   01/13/23 0824 -- 65 18 (!) 174/74 91 %   01/13/23 0709 -- 73 20 -- 92 %   01/13/23 0654 -- 66 18 -- 92 %   01/13/23 0645 -- 67 18 -- 91 %   01/13/23 0630 -- 73 22 (!) 166/66 93 %   01/13/23 0615 -- 73 28 -- 96 %   01/13/23 0600 -- 68 20 -- 91 %   01/13/23 0554 -- 68 18 (!) 167/65 95 %   01/13/23 0545 -- 68 20 -- 92 %   01/13/23 0530 -- 69 22 -- 94 %   01/13/23 0524 -- 66 19 (!) 173/69 92 %   01/13/23 0515 -- 65 19 -- 92 %   01/13/23 0500 -- 66 19 -- 93 %   01/13/23 0454 -- 68 19 (!) 174/65 92 %   01/13/23 0445 -- 70 18 -- 94 %   01/13/23 0430 -- 76 21 (!) 164/69 96 %   01/13/23 0400 -- 76 21 (!) 168/66 93 %   01/13/23 0330 -- 76 21 (!) 170/68 95 %   01/13/23 0300 -- 71 21 (!) 167/69 91 %   01/13/23 0230 -- 73 23 (!) 161/66 92 %   01/13/23 0200 -- 78 22 (!) 153/75 92 %   01/13/23 0145 -- 79 20 (!) 158/69 95 %   01/13/23 0115 -- 77 26 -- --   01/13/23 0100 -- 80 26 (!) 158/71 93 %   01/13/23 0033 -- 79 24 (!) 176/70 91 %   01/13/23 0003 -- 77 24 (!) 170/66 91 %   01/12/23 2322 -- 85 -- (!) 156/74 92 %   01/12/23 2159 -- 84 (!) 31 (!) 175/74 94 %   01/12/23 2130 -- 89 25 (!) 165/82 92 %   01/12/23 2100 -- 86 30 (!) 148/116 94 %   01/12/23 2033 -- 87 -- (!) 172/80 --   01/12/23 2030 -- 84 29 (!) 172/80 92 %   01/12/23 2015 -- 87 30 91/65 91 %   01/12/23 2000 -- 93 29 -- 93 %   01/12/23 1945 -- 82 27 -- 92 %   01/12/23 1930 -- 82 27 -- 92 %   01/12/23 1915 -- 83 (!) 31 (!) 193/77 93 %   01/12/23 1900 -- 84 (!) 35 (!) 191/79 94 %   01/12/23 1845 99.8 °F (37.7 °C) 82 23 (!) 191/84 (!) 88 %   01/12/23 1800 -- 77 27 (!) 181/89 95 %   01/12/23 1745 -- 76 26 (!) 178/88 97 %   01/12/23 1730 -- 76 27 (!) 178/74 96 %   01/12/23 1715 -- -- -- (!) 181/99 --   01/12/23 1630 99.7 °F (37.6 °C) 77 16 (!) 164/77 98 %     Pulse Oximetry Analysis: 88% on RA with good pleth    Cardiac Monitor:   Rate: 77 bpm  The cardiac monitor revealed the following rhythm as interpreted by me: Normal Sinus Rhythm  Cardiac monitoring was ordered to monitor patient for signs of cardiac dysrhythmia, which they are at risk for based on their history and/or risk for cardiovascular disease and/or metabolic abnormalities. EKG interpretation:   Billable EKG reviewed by ED Physician in the absence of a cardiologist: Yes  Rhythm: normal sinus rhythm; and regular . Rate (approx.): 80; Axis: normal; P wave: normal; QRS interval: normal ; ST/T wave: normal; Other findings: left ventricular hypertrophy. This EKG was interpreted by Fannie Chiu MD     Records Reviewed: Nursing Notes, Old Medical Records, Previous electrocardiograms, Previous Radiology Studies and Previous Laboratory Studies, EMS reports    DIFFERENTIAL DIAGNOSIS AND MDM:  Patient presents with acute dyspnea. DDx: asthma, copd, pna, pulmonary edema, acute bronchitis, ACS, ptx, pna. Will obtain EKG, labs, CXR, provide O2 as needed for hypoxia, treat symptomatically and reassess. Will continue to monitor closely in ED. Review of Prior Records and External Documents:   reviewed: YES - I have reviewed the patient's controlled substance prescription history thru the Prescription Monitoring Program so that the prescription(s) for a controlled substance can be given. Prior hospital discharge summaries and clinic notes reviewed: Reviewed discharge summary from [facility] when patient was admitted 10/29-11/11 for NSTEMI and underwent CABG on 11/4. His ECHO showed EF 55%. Pt discharged home on Lasix and aspirin. Independent History: An independent clinically history was obtained from EMS. They state pt recently had positive COVID test done by home health agency. Pt was hypertensive but otherwise stable vitals. ED Course: Progress Notes, Reevaluation, and Consults:  Initial assessment performed. I discussed presenting problems and concerns, and my formulated plan for today's visit with the patient and any available family members. I have encouraged them to ask questions as they arise throughout the visit.      ED Physician Orders:   Orders Placed This Encounter    MECHANICAL PROPHYLAXIS IS CONTRAINDICATED Other, please document Already on Anticoagulation    COVID-19 RAPID TEST    CULTURE, BODY FLUID    XR CHEST PA LAT    CTA CHEST W OR W WO CONT    US THORACENTESIS NDL PUNC ASP W IMAGE    XR CHEST PORT    CBC WITH AUTOMATED DIFF    METABOLIC PANEL, COMPREHENSIVE    NT-PRO BNP    TROPONIN-HIGH SENSITIVITY    INFLUENZA A+B VIRAL AGS    TROPONIN-HIGH SENSITIVITY    PROCALCITONIN    VITAMIN D, 25 HYDROXY    CBC WITH AUTOMATED DIFF    METABOLIC PANEL, COMPREHENSIVE    MAGNESIUM    PROTHROMBIN TIME + INR    PROCALCITONIN    LD    TROPONIN-HIGH SENSITIVITY    LACTIC ACID    NT-PRO BNP    HEMOGLOBIN A1C WITH EAG    CRP, HIGH SENSITIVITY    CBC W/O DIFF    METABOLIC PANEL, BASIC    ALBUMIN, FLUID    CELL COUNT, FLUID    GLUCOSE, FLUID    PH, FLUID    PROTEIN TOTAL, FLUID    HOLD SAMPLE - any specimen type    DIET NPO    CARDIAC/RESPIRATORY MONITORING    NOTIFY PROVIDER: SPECIFY Notify provider on pt's arrival to floor ONE TIME STAT    BEDREST, COMPLETE    INTAKE AND OUTPUT    VITAL SIGNS PER UNIT ROUTINE    CARDIAC MONITORING    VITAL SIGNS    NURSING-MISCELLANEOUS: PPE required for any aerosolizing procedure (ie, intubation, bronchoscopy). Surgical mask on patient for any transport outside room. Patient should be single room, closed door with notification of droplet plus isolation. CON. ..    ACTIVITY AS TOLERATED W/ASSIST    FULL CODE    IP CONSULT TO INTERVENTIONAL RADIOLOGY    DROPLET PLUS    OXYGEN CANNULA Liters per minute: 2; Indications for O2 therapy: HYPOXIA PRN Routine    GLUCOSE, POC    GLUCOSE, POC    GLUCOSE, POC    EKG, 12 LEAD, INITIAL    INSERT PERIPHERAL IV ONE TIME STAT    INSERT PERIPHERAL IV ONE TIME STAT    iopamidoL (ISOVUE-370) 370 mg iodine /mL (76 %) injection 100 mL    furosemide (LASIX) injection 40 mg    cefTRIAXone (ROCEPHIN) 2 g in 0.9% sodium chloride (MBP/ADV) 50 mL MBP    DISCONTD: azithromycin (ZITHROMAX) 500 mg in 0.9% sodium chloride 250 mL ([id number])    azithromycin (ZITHROMAX) 500 mg in 0.9% sodium chloride 250 mL ([id number])    sodium chloride (NS) flush 5-40 mL    sodium chloride (NS) flush 5-40 mL    OR Linked Order Group     acetaminophen (TYLENOL) tablet 650 mg     acetaminophen (TYLENOL) suppository 650 mg    polyethylene glycol (MIRALAX) packet 17 g    OR Linked Order Group     ondansetron (ZOFRAN ODT) tablet 4 mg     ondansetron (ZOFRAN) injection 4 mg    enoxaparin (LOVENOX) injection 30 mg    guaiFENesin ER (MUCINEX) tablet 600 mg    guaiFENesin (ROBITUSSIN) 100 mg/5 mL oral liquid 200 mg    albuterol (PROVENTIL HFA, VENTOLIN HFA, PROAIR HFA) inhaler 2 Puff    ascorbic acid (vitamin C) (VITAMIN C) tablet 500 mg    zinc sulfate (ZINCATE) 50 mg zinc (220 mg) capsule 1 Capsule    amLODIPine (NORVASC) tablet 10 mg    atorvastatin (LIPITOR) tablet 20 mg    hydrALAZINE (APRESOLINE) 20 mg/mL injection 10-20 mg    insulin lispro (HUMALOG) injection    glucose chewable tablet 16 g    glucagon (GLUCAGEN) injection 1 mg    dextrose 10% infusion 0-250 mL    carvediloL (COREG) 25 mg tablet    empagliflozin (JARDIANCE) 10 mg tablet    carvediloL (COREG) tablet 25 mg    bumetanide (BUMEX) injection 1 mg    potassium chloride 10 mEq in 100 ml IVPB    bumetanide (BUMEX) injection 1 mg    DISCONTD: dexamethasone (PF) (DECADRON) 10 mg/mL injection 6 mg    dexamethasone (DECADRON) 4 mg/mL injection 6 mg    IP CONSULT TO HOSPITALIST    IP CONSULT TO CARDIOLOGY    INITIAL PHYSICIAN ORDER: INPATIENT Telemetry; Yes; 3.  Patient receiving treatment that can only be provided in an inpatient setting (further clarification in H&P documentation)     ED Medications Given:   Medications   sodium chloride (NS) flush 5-40 mL (10 mL IntraVENous Given 1/12/23 2308)   sodium chloride (NS) flush 5-40 mL (has no administration in time range)   acetaminophen (TYLENOL) tablet 650 mg (has no administration in time range)     Or   acetaminophen (TYLENOL) suppository 650 mg (has no administration in time range)   polyethylene glycol (MIRALAX) packet 17 g (has no administration in time range)   ondansetron (ZOFRAN ODT) tablet 4 mg (has no administration in time range)     Or   ondansetron (ZOFRAN) injection 4 mg (has no administration in time range)   enoxaparin (LOVENOX) injection 30 mg ( SubCUTAneous Automatically Held 1/26/23 2200)   guaiFENesin ER (MUCINEX) tablet 600 mg (600 mg Oral Given 1/13/23 0914)   guaiFENesin (ROBITUSSIN) 100 mg/5 mL oral liquid 200 mg (has no administration in time range)   albuterol (PROVENTIL HFA, VENTOLIN HFA, PROAIR HFA) inhaler 2 Puff (has no administration in time range)   ascorbic acid (vitamin C) (VITAMIN C) tablet 500 mg (500 mg Oral Given 1/13/23 0914)   zinc sulfate (ZINCATE) 50 mg zinc (220 mg) capsule 1 Capsule (1 Capsule Oral Given 1/13/23 0914)   amLODIPine (NORVASC) tablet 10 mg (10 mg Oral Given 1/13/23 0913)   atorvastatin (LIPITOR) tablet 20 mg (20 mg Oral Given 1/12/23 2305)   hydrALAZINE (APRESOLINE) 20 mg/mL injection 10-20 mg (has no administration in time range)   insulin lispro (HUMALOG) injection (0 Units SubCUTAneous Held 1/13/23 0600)   glucose chewable tablet 16 g (has no administration in time range)   glucagon (GLUCAGEN) injection 1 mg (has no administration in time range)   dextrose 10% infusion 0-250 mL (has no administration in time range)   carvediloL (COREG) tablet 25 mg (25 mg Oral Given 1/13/23 0914)   bumetanide (BUMEX) injection 1 mg (has no administration in time range)   dexamethasone (DECADRON) 4 mg/mL injection 6 mg (6 mg IntraVENous Given 1/13/23 0950)   iopamidoL (ISOVUE-370) 370 mg iodine /mL (76 %) injection 100 mL (82 mL IntraVENous Given 1/12/23 1824)   furosemide (LASIX) injection 40 mg (40 mg IntraVENous Given 1/12/23 2033)   cefTRIAXone (ROCEPHIN) 2 g in 0.9% sodium chloride (MBP/ADV) 50 mL MBP (0 g IntraVENous IV Completed 1/12/23 2059)   azithromycin (ZITHROMAX) 500 mg in 0.9% sodium chloride 250 mL ([id number]) (0 mg IntraVENous IV Completed 1/12/23 2621)   bumetanide (BUMEX) injection 1 mg (1 mg IntraVENous Given 1/13/23 3317)   potassium chloride 10 mEq in 100 ml IVPB (10 mEq IntraVENous New Bag 1/13/23 0620)       ED Physician Interpretation of Test Results: All results were independently reviewed and interpreted by myself, notably showing:     RADIOLOGY:  Non-plain film images such as CT, ultrasound and MRI are read by the radiologist. Pb [place] radiographic images are visualized and preliminarily interpreted by the ED Provider with the below findings:     Imaging interpreted by me: XR shows left pleural effusion with left lung collapse; will order CT chest to further evaluate. Interpretation per the Radiologist below, if available at the time of this note:     XR CHEST PORT   Final Result   No pneumothorax post left thoracentesis. [facility]   Final Result   Technically successful ultrasound guided left thoracentesis yielding   approximately 1600 ml of fluid. CTA CHEST W OR W WO CONT   Final Result   1. No visualized pulmonary embolus. 2. Large volume of pleural fluid on the left with associated   atelectasis/consolidation of the left lower lobe. 3. Incidental findings as above      XR CHEST PA LAT   Final Result   Left pleural effusion and left lung atelectasis/lingular collapse. Retrosternal   mediastinal masslike opacity. Recommend CT chest with IV contrast.            CT Results  (Last 48 hours)                 01/12/23 1823  CTA CHEST W OR W WO CONT Final result    Impression:  1. No visualized pulmonary embolus. 2. Large volume of pleural fluid on the left with associated   atelectasis/consolidation of the left lower lobe. 3. Incidental findings as above       Narrative:  EXAM:  CTA CHEST W OR W WO CONT   INDICATION:  acute chest pain, SOB, abnormal CXR.    Additional history:   COMPARISON: CT of the abdomen and pelvis, 9/6/2022. .   TECHNIQUE:    Precontrast  images were obtained to localize the volume for acquisition. Multislice helical CT arteriography was performed from the diaphragm to the   thoracic inlet during uneventful rapid bolus intravenous contrast   administration. Lung and soft tissue windows were generated. Coronal and   sagittal images were generated and 3D post processing consisting of coronal   maximum intensity images was performed. CT dose reduction was achieved through use of a standardized protocol tailored   for this examination and automatic exposure control for dose modulation. Author Lamin FINDINGS:   CHEST:   Chest wall/thoracic inlet: Within normal limits. Thyroid: Within normal limits. Mediastinum/maxine: Patulous esophagus. Heart/vessels: Status post coronary artery bypass grafting. Coronary artery   calcifications: Present   Lungs/Pleura: Large volume of pleural fluid on the left with near complete   atelectasis of the left lower lobe. Respiratory motion limits evaluation of the   right lung. There is right apical paraseptal emphysema. .   INCIDENTALLY IMAGED ABDOMEN:   Diverticulosis at the splenic flexure    . MSK:    Status post median sternotomy. .             CXR Results  (Last 48 hours)                 01/12/23 1716  XR CHEST PA LAT Final result    Impression:  Left pleural effusion and left lung atelectasis/lingular collapse. Retrosternal   mediastinal masslike opacity. Recommend CT chest with IV contrast.            Narrative:  INDICATION: covd + sob       COMPARISON: 8/10/2022       FINDINGS:       Frontal and lateral views of the chest demonstrate prior median sternotomy. Retrosternal opacity on lateral view. The lungs are bright lung is clear. Left   pleural effusion/lingular collapse. No pneumothorax. The osseous structures are   unremarkable. XR CHEST PORT   Final Result   No pneumothorax post left thoracentesis.            US THORACENTESIS NDL PUNC ASP W IMAGE   Final Result   Technically successful ultrasound guided left thoracentesis yielding   approximately 1600 ml of fluid. CTA CHEST W OR W WO CONT   Final Result   1. No visualized pulmonary embolus. 2. Large volume of pleural fluid on the left with associated   atelectasis/consolidation of the left lower lobe. 3. Incidental findings as above      XR CHEST PA LAT   Final Result   Left pleural effusion and left lung atelectasis/lingular collapse. Retrosternal   mediastinal masslike opacity. Recommend CT chest with IV contrast.            Progress Note:  Given concerns for COVID-19 infection in this patient, I spent extra time to ensure proper and full PPE was used for the initial assessment and for subsequent patient encounters for updates and reassessments. This was done to help combat the transmission of the virus to myself and other patients and staff in the emergency department. ED physician interpretation of EKG: No STEMI. See my EKG interpretation in ED course above. ED physician interpretation of laboratory results:     Laboratory data interpreted by me: D dimer elevated, trop elevated, BNP 15K, CBC showing Hgb 9.4, CMP with K 3.3, Cr 1.85, COVID positive test.     Progress Note:  I have just re-evaluated the patient. Pt reports improvement of his symptoms. I have reviewed his vital signs and determined there is currently no worsening in their condition or physical exam. Results have been reviewed with them and their questions have been answered. I will continue to review further results as they come available. Reassessments:  ED Course as of 01/14/23 1906   Sat Jan 14, 2023   1856 PROCALCITONIN:    Procalcitonin 0.15 [HW]      ED Course User Index  [HW] Faisal Brown MD    Medical Decision Making  Amount and/or Complexity of Data Reviewed  Labs: ordered. Radiology: ordered. ECG/medicine tests: ordered. Risk  Prescription drug management.   Decision regarding hospitalization. Progress Note:  SOB likely multifactorial with COVID infection, large left pleural effusion, CT chest with large volume of pleural fluid with associated possible consolidation; check procalcitonin    Progress Note:  BNP 15K, given CT findings, will diureses with IV Bumex; holding off emergent thoracentesis. Troponin elevated 215, repeat 203    Progress Note:  Chart reviewed, recent NSTEMI with CABG 11/14, pt does have plavix allergy noted    ED Consult Note:   Cherise Galvan MD spoke with Hospitalist   Discussed pt's hx, presentation, current and ongoing workup. Reviewed available diagnostic data and care plans. Agree with management and plan thus far. Consultant will admit patient, plan for NPO for IR evaluation in AM.     CRITICAL CARE NOTE :  IMPENDING DETERIORATION -Airway, Respiratory, Cardiovascular, Metabolic, and Renal  ASSOCIATED RISK FACTORS - Hypotension, Shock, Hypoxia, Dysrhythmia, Metabolic changes, Dehydration, and Vascular Compromise  MANAGEMENT- Bedside Assessment and Supervision of Care  INTERPRETATION -  Xrays, CT Scan, Blood Gases, ECG, Blood Pressure, and Cardiac Output Measures   INTERVENTIONS - hemodynamic mngmt, vascular control, and Metabolic interventions  CASE REVIEW - Hospitalist/Intensivist, Nursing, and Family  TREATMENT RESPONSE -Improved  PERFORMED BY - Self    NOTES   :  I personally spent 85 minutes of critical care time with this patient. This is time spent at this critically ill patient's bedside actively involved in patient care as well as the coordination of care and discussions with the patient's family.  This includes time involved in ordering and reviewing of laboratory studies, pulse oximetry, re-evaluation of patient's condition, examination of patient, evaluation of patient's response to treatment, ordering and performing treatments and interventions, review of old charts, consultations with specialist, discussions with family regarding pertinent collateral history and plan of care, bedside attention and documentation. During this entire length of time I was immediately available to the patient. This does not include time spent on separately reported billable procedures. Critical Care: The reason for providing this level of medical care for this critically-ill patient was due to a critical illness that impaired one or more vital organ systems, such that there was a high probability of imminent or life-threatening deterioration in the patient's condition. This care involved the highest level of preparedness to intervene urgently. This care involved high complexity decision making to assess, manipulate, and support vital system functions, to treat this degree of vital organ system failure, and to prevent further life threatening deterioration of the patients condition requiring frequent assessments and interventions. Erin Hatch MD      Social Determinants of Health:  Patients evaluation and management were significantly impacted by social determinants of health. Social Determinants affecting Dx or Tx: None    TOBACCO COUNSELING:  Upon evaluation, pt expressed that they are a current tobacco user. For approximately 3-5 mins, pt has been counseled on the dangers of smoking and was encouraged to quit as soon as possible in order to decrease further risks to their health. Pt has conveyed their understanding of the risks involved should they continue to use tobacco products.     Social History     Socioeconomic History    Marital status:      Spouse name: Not on file    Number of children: Not on file    Years of education: Not on file    Highest education level: Not on file   Occupational History    Not on file   Tobacco Use    Smoking status: Every Day     Packs/day: 0.50     Types: Cigarettes    Smokeless tobacco: Never   Vaping Use    Vaping Use: Never used   Substance and Sexual Activity    Alcohol use: Not Currently     Alcohol/week: 1.0 standard drink     Types: 1 Cans of beer per week    Drug use: Not Currently    Sexual activity: Not Currently     Partners: Female     Birth control/protection: None   Other Topics Concern    Not on file   Social History Narrative    Not on file     Social Determinants of Health     Financial Resource Strain: Not on file   Food Insecurity: Not on file   Transportation Needs: Not on file   Physical Activity: Not on file   Stress: Not on file   Social Connections: Not on file   Intimate Partner Violence: Not on file   Housing Stability: Not on file       Disposition:  ADMIT  Given the patient's current clinical presentation, I have a high level of concern for decompensation if discharged from the emergency department. Patient is being admitted to the hospital.  Complex decision making was performed, which includes reviewing the patient's available past medical records, laboratory results, and radiographic imaging. The results of their tests and reasons for their admission have been discussed with them and/or available family. They convey agreement and understanding for the need to be admitted and for their admission diagnosis. Consultation has been made with the inpatient physician specialist for hospitalization. FINAL DIAGNOSIS   Clinical Impression:  1. Large pleural effusion    2. Acute respiratory failure with hypoxia (HCC)    3. Acute respiratory distress    4. COVID-19 virus infection    5. Accelerated hypertension    6. NSTEMI (non-ST elevated myocardial infarction) (HCC)    7. Elevated troponin      Attestation:  I am the attending of record for this patient. I personally performed the services described in this documentation on this date, 1/12/2023 for patientLinden. . I have reviewed the chart and verified that the record is accurate and complete.       Bhvaya Coombs MD (Electronic Signature)

## 2023-01-13 ENCOUNTER — APPOINTMENT (OUTPATIENT)
Dept: ULTRASOUND IMAGING | Age: 68
DRG: 178 | End: 2023-01-13
Attending: INTERNAL MEDICINE
Payer: MEDICARE

## 2023-01-13 ENCOUNTER — APPOINTMENT (OUTPATIENT)
Dept: GENERAL RADIOLOGY | Age: 68
DRG: 178 | End: 2023-01-13
Attending: STUDENT IN AN ORGANIZED HEALTH CARE EDUCATION/TRAINING PROGRAM
Payer: MEDICARE

## 2023-01-13 LAB
25(OH)D3 SERPL-MCNC: 12.6 NG/ML (ref 30–100)
ALBUMIN FLD-MCNC: 1.9 G/DL
ALBUMIN SERPL-MCNC: 2.7 G/DL (ref 3.5–5)
ALBUMIN/GLOB SERPL: 0.7 (ref 1.1–2.2)
ALP SERPL-CCNC: 88 U/L (ref 45–117)
ALT SERPL-CCNC: 23 U/L (ref 12–78)
ANION GAP SERPL CALC-SCNC: 9 MMOL/L (ref 5–15)
APPEARANCE FLD: ABNORMAL
AST SERPL-CCNC: 31 U/L (ref 15–37)
ATRIAL RATE: 80 BPM
BASOPHILS # BLD: 0 K/UL (ref 0–0.1)
BASOPHILS NFR BLD: 0 % (ref 0–1)
BILIRUB SERPL-MCNC: 0.4 MG/DL (ref 0.2–1)
BNP SERPL-MCNC: ABNORMAL PG/ML
BODY FLD TYPE: NORMAL
BUN SERPL-MCNC: 20 MG/DL (ref 6–20)
BUN/CREAT SERPL: 11 (ref 12–20)
CALCIUM SERPL-MCNC: 8.4 MG/DL (ref 8.5–10.1)
CALCULATED P AXIS, ECG09: 65 DEGREES
CALCULATED R AXIS, ECG10: 45 DEGREES
CALCULATED T AXIS, ECG11: 176 DEGREES
CHLORIDE SERPL-SCNC: 108 MMOL/L (ref 97–108)
CO2 SERPL-SCNC: 24 MMOL/L (ref 21–32)
COLOR FLD: ABNORMAL
COMMENT, HOLDF: NORMAL
CREAT SERPL-MCNC: 1.85 MG/DL (ref 0.7–1.3)
D DIMER PPP FEU-MCNC: 2.28 MG/L FEU (ref 0–0.65)
DIAGNOSIS, 93000: NORMAL
DIFFERENTIAL METHOD BLD: ABNORMAL
EOSINOPHIL # BLD: 0 K/UL (ref 0–0.4)
EOSINOPHIL NFR BLD: 0 % (ref 0–7)
ERYTHROCYTE [DISTWIDTH] IN BLOOD BY AUTOMATED COUNT: 16.2 % (ref 11.5–14.5)
EST. AVERAGE GLUCOSE BLD GHB EST-MCNC: 108 MG/DL
GLOBULIN SER CALC-MCNC: 3.7 G/DL (ref 2–4)
GLUCOSE BLD STRIP.AUTO-MCNC: 121 MG/DL (ref 65–117)
GLUCOSE BLD STRIP.AUTO-MCNC: 181 MG/DL (ref 65–117)
GLUCOSE BLD STRIP.AUTO-MCNC: 71 MG/DL (ref 65–117)
GLUCOSE BLD STRIP.AUTO-MCNC: 72 MG/DL (ref 65–117)
GLUCOSE BLD STRIP.AUTO-MCNC: 75 MG/DL (ref 65–117)
GLUCOSE BLD STRIP.AUTO-MCNC: 97 MG/DL (ref 65–117)
GLUCOSE FLD-MCNC: 72 MG/DL
GLUCOSE SERPL-MCNC: 74 MG/DL (ref 65–100)
HBA1C MFR BLD: 5.4 % (ref 4–5.6)
HCT VFR BLD AUTO: 29.9 % (ref 36.6–50.3)
HGB BLD-MCNC: 9.4 G/DL (ref 12.1–17)
IMM GRANULOCYTES # BLD AUTO: 0 K/UL (ref 0–0.04)
IMM GRANULOCYTES NFR BLD AUTO: 0 % (ref 0–0.5)
INR PPP: 1.3 (ref 0.9–1.1)
LACTATE SERPL-SCNC: 1 MMOL/L (ref 0.4–2)
LDH SERPL L TO P-CCNC: 337 U/L (ref 85–241)
LYMPHOCYTES # BLD: 0.9 K/UL (ref 0.8–3.5)
LYMPHOCYTES NFR BLD: 12 % (ref 12–49)
LYMPHOCYTES NFR FLD: 76 %
MAGNESIUM SERPL-MCNC: 1.8 MG/DL (ref 1.6–2.4)
MCH RBC QN AUTO: 29.6 PG (ref 26–34)
MCHC RBC AUTO-ENTMCNC: 31.4 G/DL (ref 30–36.5)
MCV RBC AUTO: 94 FL (ref 80–99)
MESOTHL CELL NFR FLD: 3 %
MONOCYTES # BLD: 0.9 K/UL (ref 0–1)
MONOCYTES NFR BLD: 12 % (ref 5–13)
MONOS+MACROS NFR FLD: 20 %
NEUTROPHILS NFR FLD: 1 %
NEUTS SEG # BLD: 5.7 K/UL (ref 1.8–8)
NEUTS SEG NFR BLD: 76 % (ref 32–75)
NRBC # BLD: 0 K/UL (ref 0–0.01)
NRBC BLD-RTO: 0 PER 100 WBC
NUC CELL # FLD: 540 /CU MM
P-R INTERVAL, ECG05: 196 MS
PH FLD: 7
PLATELET # BLD AUTO: 220 K/UL (ref 150–400)
PMV BLD AUTO: 10.8 FL (ref 8.9–12.9)
POTASSIUM SERPL-SCNC: 3.3 MMOL/L (ref 3.5–5.1)
PROCALCITONIN SERPL-MCNC: 0.15 NG/ML
PROT FLD-MCNC: 3.5 G/DL
PROT SERPL-MCNC: 6.4 G/DL (ref 6.4–8.2)
PROTHROMBIN TIME: 13.5 SEC (ref 9–11.1)
Q-T INTERVAL, ECG07: 362 MS
QRS DURATION, ECG06: 80 MS
QTC CALCULATION (BEZET), ECG08: 417 MS
RBC # BLD AUTO: 3.18 M/UL (ref 4.1–5.7)
RBC # FLD: >100 /CU MM
SAMPLES BEING HELD,HOLD: NORMAL
SERVICE CMNT-IMP: ABNORMAL
SERVICE CMNT-IMP: ABNORMAL
SERVICE CMNT-IMP: NORMAL
SODIUM SERPL-SCNC: 141 MMOL/L (ref 136–145)
SPECIMEN SOURCE FLD: ABNORMAL
SPECIMEN SOURCE FLD: NORMAL
TROPONIN-HIGH SENSITIVITY: 242 NG/L (ref 0–76)
VENTRICULAR RATE, ECG03: 80 BPM
WBC # BLD AUTO: 7.5 K/UL (ref 4.1–11.1)

## 2023-01-13 PROCEDURE — 85610 PROTHROMBIN TIME: CPT

## 2023-01-13 PROCEDURE — 71045 X-RAY EXAM CHEST 1 VIEW: CPT

## 2023-01-13 PROCEDURE — 85379 FIBRIN DEGRADATION QUANT: CPT

## 2023-01-13 PROCEDURE — 83735 ASSAY OF MAGNESIUM: CPT

## 2023-01-13 PROCEDURE — 74011000250 HC RX REV CODE- 250: Performed by: NURSE PRACTITIONER

## 2023-01-13 PROCEDURE — 82306 VITAMIN D 25 HYDROXY: CPT

## 2023-01-13 PROCEDURE — 83615 LACTATE (LD) (LDH) ENZYME: CPT

## 2023-01-13 PROCEDURE — 74011250636 HC RX REV CODE- 250/636: Performed by: NURSE PRACTITIONER

## 2023-01-13 PROCEDURE — 82962 GLUCOSE BLOOD TEST: CPT

## 2023-01-13 PROCEDURE — 74011000250 HC RX REV CODE- 250: Performed by: INTERNAL MEDICINE

## 2023-01-13 PROCEDURE — 36415 COLL VENOUS BLD VENIPUNCTURE: CPT

## 2023-01-13 PROCEDURE — 83036 HEMOGLOBIN GLYCOSYLATED A1C: CPT

## 2023-01-13 PROCEDURE — 80053 COMPREHEN METABOLIC PANEL: CPT

## 2023-01-13 PROCEDURE — 83880 ASSAY OF NATRIURETIC PEPTIDE: CPT

## 2023-01-13 PROCEDURE — 65270000046 HC RM TELEMETRY

## 2023-01-13 PROCEDURE — 84157 ASSAY OF PROTEIN OTHER: CPT

## 2023-01-13 PROCEDURE — 83986 ASSAY PH BODY FLUID NOS: CPT

## 2023-01-13 PROCEDURE — 0W9B3ZZ DRAINAGE OF LEFT PLEURAL CAVITY, PERCUTANEOUS APPROACH: ICD-10-PCS | Performed by: INTERNAL MEDICINE

## 2023-01-13 PROCEDURE — 89050 BODY FLUID CELL COUNT: CPT

## 2023-01-13 PROCEDURE — 77030032034 US THORACENTESIS NDL PUNC ASP W IMAGE

## 2023-01-13 PROCEDURE — 82945 GLUCOSE OTHER FLUID: CPT

## 2023-01-13 PROCEDURE — 84484 ASSAY OF TROPONIN QUANT: CPT

## 2023-01-13 PROCEDURE — 87205 SMEAR GRAM STAIN: CPT

## 2023-01-13 PROCEDURE — 74011250637 HC RX REV CODE- 250/637: Performed by: NURSE PRACTITIONER

## 2023-01-13 PROCEDURE — 74011250637 HC RX REV CODE- 250/637: Performed by: INTERNAL MEDICINE

## 2023-01-13 PROCEDURE — 84145 PROCALCITONIN (PCT): CPT

## 2023-01-13 PROCEDURE — 83605 ASSAY OF LACTIC ACID: CPT

## 2023-01-13 PROCEDURE — 85025 COMPLETE CBC W/AUTO DIFF WBC: CPT

## 2023-01-13 PROCEDURE — 74011250636 HC RX REV CODE- 250/636: Performed by: INTERNAL MEDICINE

## 2023-01-13 PROCEDURE — 82042 OTHER SOURCE ALBUMIN QUAN EA: CPT

## 2023-01-13 RX ORDER — BUMETANIDE 0.25 MG/ML
1 INJECTION INTRAMUSCULAR; INTRAVENOUS DAILY
Status: DISCONTINUED | OUTPATIENT
Start: 2023-01-14 | End: 2023-01-16 | Stop reason: HOSPADM

## 2023-01-13 RX ORDER — DEXAMETHASONE SODIUM PHOSPHATE 4 MG/ML
6 INJECTION, SOLUTION INTRA-ARTICULAR; INTRALESIONAL; INTRAMUSCULAR; INTRAVENOUS; SOFT TISSUE EVERY 24 HOURS
Status: DISCONTINUED | OUTPATIENT
Start: 2023-01-13 | End: 2023-01-16 | Stop reason: HOSPADM

## 2023-01-13 RX ORDER — CARVEDILOL 25 MG/1
25 TABLET ORAL 2 TIMES DAILY
COMMUNITY
Start: 2022-12-26

## 2023-01-13 RX ORDER — INSULIN LISPRO 100 [IU]/ML
INJECTION, SOLUTION INTRAVENOUS; SUBCUTANEOUS EVERY 6 HOURS
Status: DISCONTINUED | OUTPATIENT
Start: 2023-01-13 | End: 2023-01-16 | Stop reason: HOSPADM

## 2023-01-13 RX ORDER — IBUPROFEN 200 MG
4 TABLET ORAL AS NEEDED
Status: DISCONTINUED | OUTPATIENT
Start: 2023-01-13 | End: 2023-01-16 | Stop reason: HOSPADM

## 2023-01-13 RX ORDER — DEXAMETHASONE SODIUM PHOSPHATE 10 MG/ML
6 INJECTION INTRAMUSCULAR; INTRAVENOUS EVERY 24 HOURS
Status: DISCONTINUED | OUTPATIENT
Start: 2023-01-13 | End: 2023-01-13

## 2023-01-13 RX ORDER — POTASSIUM CHLORIDE 7.45 MG/ML
10 INJECTION INTRAVENOUS
Status: COMPLETED | OUTPATIENT
Start: 2023-01-13 | End: 2023-01-13

## 2023-01-13 RX ORDER — BUMETANIDE 0.25 MG/ML
1 INJECTION INTRAMUSCULAR; INTRAVENOUS ONCE
Status: COMPLETED | OUTPATIENT
Start: 2023-01-13 | End: 2023-01-13

## 2023-01-13 RX ORDER — CARVEDILOL 12.5 MG/1
25 TABLET ORAL 2 TIMES DAILY
Status: DISCONTINUED | OUTPATIENT
Start: 2023-01-13 | End: 2023-01-16 | Stop reason: HOSPADM

## 2023-01-13 RX ORDER — POTASSIUM CHLORIDE 750 MG/1
40 TABLET, FILM COATED, EXTENDED RELEASE ORAL
Status: COMPLETED | OUTPATIENT
Start: 2023-01-13 | End: 2023-01-13

## 2023-01-13 RX ORDER — LIDOCAINE HYDROCHLORIDE 10 MG/ML
10 INJECTION, SOLUTION EPIDURAL; INFILTRATION; INTRACAUDAL; PERINEURAL
Status: COMPLETED | OUTPATIENT
Start: 2023-01-13 | End: 2023-01-13

## 2023-01-13 RX ADMIN — DEXAMETHASONE SODIUM PHOSPHATE 6 MG: 4 INJECTION, SOLUTION INTRAMUSCULAR; INTRAVENOUS at 09:50

## 2023-01-13 RX ADMIN — CARVEDILOL 25 MG: 12.5 TABLET, FILM COATED ORAL at 19:02

## 2023-01-13 RX ADMIN — LIDOCAINE HYDROCHLORIDE 10 ML: 10 INJECTION, SOLUTION EPIDURAL; INFILTRATION; INTRACAUDAL; PERINEURAL at 13:50

## 2023-01-13 RX ADMIN — ENOXAPARIN SODIUM 30 MG: 100 INJECTION SUBCUTANEOUS at 23:12

## 2023-01-13 RX ADMIN — CARVEDILOL 25 MG: 12.5 TABLET, FILM COATED ORAL at 09:14

## 2023-01-13 RX ADMIN — ZINC SULFATE 220 MG (50 MG) CAPSULE 1 CAPSULE: CAPSULE at 09:14

## 2023-01-13 RX ADMIN — POTASSIUM CHLORIDE 40 MEQ: 750 TABLET, FILM COATED, EXTENDED RELEASE ORAL at 19:02

## 2023-01-13 RX ADMIN — BUMETANIDE 1 MG: 0.25 INJECTION INTRAMUSCULAR; INTRAVENOUS at 05:28

## 2023-01-13 RX ADMIN — ATORVASTATIN CALCIUM 20 MG: 20 TABLET, FILM COATED ORAL at 23:13

## 2023-01-13 RX ADMIN — POTASSIUM CHLORIDE 10 MEQ: 7.46 INJECTION, SOLUTION INTRAVENOUS at 06:20

## 2023-01-13 RX ADMIN — AMLODIPINE BESYLATE 10 MG: 5 TABLET ORAL at 09:13

## 2023-01-13 RX ADMIN — POTASSIUM CHLORIDE 10 MEQ: 7.46 INJECTION, SOLUTION INTRAVENOUS at 06:19

## 2023-01-13 RX ADMIN — OXYCODONE HYDROCHLORIDE AND ACETAMINOPHEN 500 MG: 500 TABLET ORAL at 09:14

## 2023-01-13 RX ADMIN — GUAIFENESIN 600 MG: 600 TABLET, EXTENDED RELEASE ORAL at 09:14

## 2023-01-13 RX ADMIN — GUAIFENESIN 600 MG: 600 TABLET, EXTENDED RELEASE ORAL at 23:13

## 2023-01-13 RX ADMIN — SODIUM CHLORIDE, PRESERVATIVE FREE 10 ML: 5 INJECTION INTRAVENOUS at 23:13

## 2023-01-13 NOTE — H&P
Hospitalist Admission Note    NAME: Maria Del Rosario Ramírez. :  1955   MRN:  [id number]     Date/Time:  2023 9:18 PM    Patient PCP: Costa Maria MD  ______________________________________________________________________  Given the patient's current clinical presentation, I have a high level of concern for decompensation if discharged from the emergency department. Complex decision making was performed, which includes reviewing the patient's available past medical records, laboratory results, and x-ray films. Discussed assessment of this patient's clinical condition and plan of care with Dr. Priyanka Reed which is as follows. Assessment / Plan:    SOB (secondary to)  COVID Infection  Large pleural effusion  Rapid COVID: positive  CT chest WWO contrast:  Pro BNP: 15,552  1. No visualized pulmonary embolus. 2. Large volume of pleural fluid on the left with associated  atelectasis/consolidation of the left lower lobe. 3. Incidental findings as above  - Procalcitonin, Lactic, CRP, Troponin, pro BNP, CBC, CMP, Magnesium  - O2 support and neb treatment PRN  - Mucolytics, antitussive  - Vitamin C, Zinc  - Bumex IV daily  - not hypoxic, no need for steroids  - IR consultation for thoracentesis  - NPO for now    Elevated Troponin, POA  ECG: Normal sinus rhythm.  Possible Left atrial enlargement   Left ventricular hypertrophy with repolarization abnormality  Troponin: #1 215, #2 203, #3 242  - cardiology consult    CAD, POA  Recent NSTEMI (s/p x 1 V CABG- 22)  - Plavix Allergy  - continue Coreg BID    Hypertension, POA  - resume Norvasc    Hyperlipidemia, POA  - resume Lipitor    CKD (crea 1.92,  baseline 1.58 - 2.68)  - avoid nephrotoxins    NIDDM  - check A1C  - SSI with Lispro coverage      Code Status: Full  Surrogate Decision Maker: Valma Eisenmenger, spouse ([phone]), Chrissy Solis, sister ([phone])    DVT Prophylaxis: Lovenox  GI Prophylaxis: not indicated    Baseline: lives with family, independent with ADLs      Subjective:   CHIEF COMPLAINT: SOB, positive for home COVID screen    Unique Mauro is a 75 y.o.  male who presents with SOB and positive home COVID screen. Patient stated his daughter got it first. Accompanying symptoms include generalized weakness. Denies fever, chills, headache, congestion, cough, CP, abdominal pain, N/V, hematuria, dysuria, melena, diarrhea, peripheral swelling and focal  weakness. Past medical history is significant for CAD, NSTEMI s/p CABG x 1 V, hypertension, hyperlipidemia, TIA, NIDDM, CKD III and GERD. Most recent hospitalization was 10/29-11/11 for NSTEMI where he underwent CABG at [facility] and discharge to [facility] rehab where he was treated from 11/11-26 and was discharged home. We were asked to admit for work up and evaluation of the above problems. Past Medical History:   Diagnosis Date    CAD (coronary artery disease)     10/31/22: NSTEMI sees [facility] cardiology, may be getting CABG    Fractured rib 03/2021    from a fall per pt on 5/11/2021    High cholesterol     Hypertension     per pt on 5/11/2021    Stroke ([facility] [address] 2022        Past Surgical History:   Procedure Laterality Date    COLONOSCOPY N/A 9/27/2022    COLONOSCOPY performed by Sean Fried MD at [address]      per pt on 5/11/2021       Social History     Tobacco Use    Smoking status: Every Day     Packs/day: 0.50     Types: Cigarettes    Smokeless tobacco: Never   Substance Use Topics    Alcohol use: Not Currently     Alcohol/week: 1.0 standard drink     Types: 1 Cans of beer per week        No family history on file. No Known Allergies     Prior to Admission medications    Medication Sig Start Date End Date Taking?  Authorizing Provider   amLODIPine (NORVASC) 10 mg tablet TAKE 1 TABLET EVERY DAY 10/18/22   Jesenia Huddleston., MIKAELA   OTHER Patient taking antihypertensive x 2, unable to recall medication names    Provider, Historical polyethylene glycol (Miralax) 17 gram/dose powder Take 17 g by mouth daily. 1 tablespoon with 8 oz of water daily 9/6/22   Val Rivas MD   atorvastatin (LIPITOR) 20 mg tablet TAKE 1 TABLET EVERY NIGHT 4/26/22   Jessy Tse., MIKAELA   cholecalciferol (VITAMIN D3) (1000 Units /25 mcg) tablet Take 1 Tablet by mouth daily. 7/16/21   Jessy Urias, MIKAELA   aspirin (ASPIRIN) 325 mg tablet Take 1 Tablet by mouth daily. 6/20/21   Alvarez Avelar MD       REVIEW OF SYSTEMS:     I am not able to complete the review of systems because:    The patient is intubated and sedated    The patient has altered mental status due to his acute medical problems    The patient has baseline aphasia from prior stroke(s)    The patient has baseline dementia and is not reliable historian    The patient is in acute medical distress and unable to provide information           Total of 12 systems reviewed as follows:       POSITIVE= bolded text  Negative = text not bolded  General:  fever, chills, sweats, generalized weakness, weight loss/gain,      loss of appetite   Eyes:    blurred vision, eye pain, loss of vision, double vision  ENT:    rhinorrhea, pharyngitis   Respiratory:   cough, sputum production, SOB, ARELLANO, wheezing, pleuritic pain   Cardiology:   chest pain, palpitations, orthopnea, PND, edema, syncope   Gastrointestinal:  abdominal pain , N/V, diarrhea, dysphagia, constipation, bleeding   Genitourinary:  frequency, urgency, dysuria, hematuria, incontinence   Muskuloskeletal :  arthralgia, myalgia, back pain  Hematology:  easy bruising, nose or gum bleeding, lymphadenopathy   Dermatological: rash, ulceration, pruritis, color change / jaundice  Endocrine:   hot flashes or polydipsia   Neurological:  headache, dizziness, confusion, focal weakness, paresthesia,     Speech difficulties, memory loss, gait difficulty  Psychological: Feelings of anxiety, depression, agitation    Objective:   VITALS:    Visit Vitals  BP Nelida Louis ) 172/80   Pulse 87   Temp 99.8 °F (37.7 °C)   Resp (!) 31   Ht 5' 8.5\" (1.74 m)   Wt 61.2 kg (135 lb)   SpO2 93%   BMI 20.23 kg/m²       PHYSICAL EXAM:    General:    Alert, cooperative, no distress, appears stated age. HEENT: Atraumatic, anicteric sclerae, pink conjunctivae     No oral ulcers, mucosa moist, throat clear, dentition fair  Neck:  Supple, symmetrical,  thyroid: non tender  Lungs:   Clear to auscultation bilaterally. No Wheezing or Rhonchi. No rales. Chest wall:  No tenderness  No Accessory muscle use. Heart:   Regular  rhythm,  No  murmur   No edema  Abdomen:   Soft, non-tender. Not distended. Bowel sounds normal  Extremities: No cyanosis. No clubbing,      Skin turgor normal, Capillary refill normal, Radial dial pulse 2+  Skin:     Not pale. Not Jaundiced  No rashes   Psych:  Good insight. Not depressed. Not anxious or agitated. Neurologic: EOMs intact. No facial asymmetry. No aphasia or slurred speech. Symmetrical strength, Sensation grossly intact. Alert and oriented X 3.     _______________________________________________________________________  Care Plan discussed with:    Comments   Patient y    [facility]:      _______________________________________________________________________  Expected  Disposition:   Home with Family    HH/PT/OT/RN TBD   SNF/LTC    JENNIFER    ______________________________________________________        Comments    y Reviewed previous records   >50% of visit spent in counseling and coordination of care y Discussion with patient and/or family and questions answered       ________________________________________________________________________  Signed: Jay Chamber, NP    Procedures: see electronic medical records for all procedures/Xrays and details which were not copied into this note but were reviewed prior to creation of Plan.     LAB DATA REVIEWED:    Recent Results (from the past 24 hour(s))   EKG, 12 LEAD, INITIAL    Collection Time: 01/12/23  4:35 PM   Result Value Ref Range    Ventricular Rate 80 BPM    Atrial Rate 80 BPM    P-R Interval 196 ms    QRS Duration 80 ms    Q-T Interval 362 ms    QTC Calculation (Bezet) 417 ms    Calculated P Axis 65 degrees    Calculated R Axis 45 degrees    Calculated T Axis 176 degrees    Diagnosis       Normal sinus rhythm  Possible Left atrial enlargement  Left ventricular hypertrophy with repolarization abnormality  When compared with ECG of 23-JUL-2022 08:26,  ST less elevated in Anterior leads  Inverted T waves have replaced nonspecific T wave abnormality in Inferior   leads  Inverted T waves have replaced nonspecific T wave abnormality in Lateral   leads     CBC WITH AUTOMATED DIFF    Collection Time: 01/12/23  5:01 PM   Result Value Ref Range    WBC 9.1 4.1 - 11.1 K/uL    RBC 3.57 (L) 4.10 - 5.70 M/uL    HGB 10.7 (L) 12.1 - 17.0 g/dL    HCT 34.1 (L) 36.6 - 50.3 %    MCV 95.5 80.0 - 99.0 FL    MCH 30.0 26.0 - 34.0 PG    MCHC 31.4 30.0 - 36.5 g/dL    RDW 16.1 (H) 11.5 - 14.5 %    PLATELET 503 971 - 795 K/uL    MPV 10.2 8.9 - 12.9 FL    NRBC 0.0 0  WBC    ABSOLUTE NRBC 0.00 0.00 - 0.01 K/uL    NEUTROPHILS 84 (H) 32 - 75 %    LYMPHOCYTES 7 (L) 12 - 49 %    MONOCYTES 7 5 - 13 %    EOSINOPHILS 1 0 - 7 %    BASOPHILS 1 0 - 1 %    IMMATURE GRANULOCYTES 0 0.0 - 0.5 %    ABS. NEUTROPHILS 7.7 1.8 - 8.0 K/UL    ABS. LYMPHOCYTES 0.6 (L) 0.8 - 3.5 K/UL    ABS. MONOCYTES 0.6 0.0 - 1.0 K/UL    ABS. EOSINOPHILS 0.1 0.0 - 0.4 K/UL    ABS. BASOPHILS 0.1 0.0 - 0.1 K/UL    ABS. IMM.  GRANS. 0.0 0.00 - 0.04 K/UL    DF SMEAR SCANNED      RBC COMMENTS ANISOCYTOSIS  1+       METABOLIC PANEL, COMPREHENSIVE    Collection Time: 01/12/23  5:01 PM   Result Value Ref Range    Sodium 144 136 - 145 mmol/L    Potassium 3.6 3.5 - 5.1 mmol/L    Chloride 110 (H) 97 - 108 mmol/L    CO2 27 21 - 32 mmol/L    Anion gap 7 5 - 15 mmol/L    Glucose 115 (H) 65 - 100 mg/dL    BUN 20 6 - 20 MG/DL    Creatinine 1.92 (H) 0.70 - 1.30 MG/DL    BUN/Creatinine ratio 10 (L) 12 - 20      eGFR 37 (L) >60 ml/min/1.73m2    Calcium 8.5 8.5 - 10.1 MG/DL    Bilirubin, total 0.5 0.2 - 1.0 MG/DL    ALT (SGPT) 22 12 - 78 U/L    AST (SGOT) 25 15 - 37 U/L    Alk.  phosphatase 100 45 - 117 U/L    Protein, total 6.6 6.4 - 8.2 g/dL    Albumin 3.0 (L) 3.5 - 5.0 g/dL    Globulin 3.6 2.0 - 4.0 g/dL    A-G Ratio 0.8 (L) 1.1 - 2.2     NT-PRO BNP    Collection Time: 01/12/23  5:01 PM   Result Value Ref Range    NT pro-BNP 13,762 (H) <125 PG/ML   TROPONIN-HIGH SENSITIVITY    Collection Time: 01/12/23  5:01 PM   Result Value Ref Range    Troponin-High Sensitivity 215 (HH) 0 - 76 ng/L   COVID-19 RAPID TEST    Collection Time: 01/12/23  6:46 PM   Result Value Ref Range    Specimen source Nasopharyngeal      COVID-19 rapid test Detected (AA) NOTD     INFLUENZA A+B VIRAL AGS    Collection Time: 01/12/23  6:46 PM   Result Value Ref Range    Influenza A Antigen Negative NEG      Influenza B Antigen Negative NEG

## 2023-01-13 NOTE — PROGRESS NOTES
Hospitalist Progress Note    NAME: Nori Tse. :  1955   MRN:  [id number]       Assessment / Plan:  COVID Infection  Large pleural effusion  Rapid COVID: positive  Status post thoracocentesis on   CT chest WWO contrast:  Pro BNP: 15,552  1. No visualized pulmonary embolus. 2. Large volume of pleural fluid on the left with associated  atelectasis/consolidation of the left lower lobe. 3. Incidental findings as above  -proBNP elevated, procalcitonin 0.15,   Check D-dimer  - O2 support and neb treatment PRN  - Mucolytics, antitussive  - Vitamin C, Zinc  - Bumex IV daily  - not hypoxic, no need for steroids  -Status post thoracocentesis drainage of  1600    elevated Troponin, POA  ECG: Normal sinus rhythm. Possible Left atrial enlargement   Left ventricular hypertrophy with repolarization abnormality  Troponin: #1 215, #2 203, #3 242  - cardiology consult pending     CAD, POA  Recent NSTEMI (s/p x 1 V CABG- 22)  - Plavix Allergy  - continue Coreg BID  Last EF is 55%  Hypertension, POA  - resume Norvasc     Hyperlipidemia, POA  - resume Lipitor     CKD (crea 1.92,  baseline 1.58 - 2.68)  - avoid nephrotoxins     NIDDM   A1C 5.4  - SSI with Lispro coverage        Code Status: Full  Surrogate Decision Maker: Venus Him, spouse ([phone]), Eitan Crowder, sister ([phone])     DVT Prophylaxis: Lovenox  GI Prophylaxis: not indicated     Baseline: lives with family, independent with ADLs      18.5 - 24.9 Normal weight / Body mass index is 20.23 kg/m². Estimated discharge date:   Barriers: Fluid studies, medical improvement    Code status: Full  Prophylaxis: Lovenox  Recommended Disposition:  PT, OT, RN     Subjective:     Chief Complaint / Reason for Physician Visit  Follow-up on COVID-19, pleural effusion and fluid overload. Discussed with RN events overnight.    Reported feeling better since thoracocentesis  Review of Systems:  Symptom Y/N Comments  Symptom Y/N Comments   Fever/Chills n   Chest Pain n    Poor Appetite    Edema     Cough    Abdominal Pain n    Sputum    Joint Pain     SOB/ARELLANO n   Pruritis/Rash     Nausea/vomit    Tolerating PT/OT     Diarrhea    Tolerating Diet y    Constipation    Other       Could NOT obtain due to:      Objective:     VITALS:   Last 24hrs VS reviewed since prior progress note.  Most recent are:  Patient Vitals for the past 24 hrs:   Temp Pulse Resp BP SpO2   01/13/23 1630 -- 60 (!) 0 (!) 142/65 95 %   01/13/23 1600 -- 60 -- (!) 153/66 95 %   01/13/23 1530 -- 61 16 (!) 150/66 96 %   01/13/23 1500 -- 71 20 (!) 143/83 --   01/13/23 1430 -- 62 18 (!) 152/68 95 %   01/13/23 1400 -- 64 20 (!) 142/70 95 %   01/13/23 1345 -- 67 20 (!) 127/52 97 %   01/13/23 1340 -- 69 30 (!) 127/52 95 %   01/13/23 1330 -- 70 18 125/60 94 %   01/13/23 1325 -- 65 18 125/60 93 %   01/13/23 1315 -- 72 17 (!) 148/64 96 %   01/13/23 1300 -- 65 17 (!) 148/64 91 %   01/13/23 1230 -- 65 17 (!) 154/64 94 %   01/13/23 1200 -- 64 18 (!) 152/62 91 %   01/13/23 1130 -- 65 20 (!) 151/63 91 %   01/13/23 1100 -- 70 26 (!) 160/82 96 %   01/13/23 1030 -- 66 19 (!) 156/72 94 %   01/13/23 1000 -- 66 20 (!) 165/73 91 %   01/13/23 0924 -- 65 20 (!) 164/73 92 %   01/13/23 0824 -- 65 18 (!) 174/74 91 %   01/13/23 0709 -- 73 20 -- 92 %   01/13/23 0654 -- 66 18 -- 92 %   01/13/23 0645 -- 67 18 -- 91 %   01/13/23 0630 -- 73 22 (!) 166/66 93 %   01/13/23 0615 -- 73 28 -- 96 %   01/13/23 0600 -- 68 20 -- 91 %   01/13/23 0554 -- 68 18 (!) 167/65 95 %   01/13/23 0545 -- 68 20 -- 92 %   01/13/23 0530 -- 69 22 -- 94 %   01/13/23 0524 -- 66 19 (!) 173/69 92 %   01/13/23 0515 -- 65 19 -- 92 %   01/13/23 0500 -- 66 19 -- 93 %   01/13/23 0454 -- 68 19 (!) 174/65 92 %   01/13/23 0445 -- 70 18 -- 94 %   01/13/23 0430 -- 76 21 (!) 164/69 96 %   01/13/23 0400 -- 76 21 (!) 168/66 93 %   01/13/23 0330 -- 76 21 (!) 170/68 95 %   01/13/23 0300 -- 71 21 (!) 167/69 91 %   01/13/23 0230 -- 73 23 (!) 161/66 92 %   01/13/23 0200 -- 78 22 (!) 153/75 92 %   01/13/23 0145 -- 79 20 (!) 158/69 95 %   01/13/23 0115 -- 77 26 -- --   01/13/23 0100 -- 80 26 (!) 158/71 93 %   01/13/23 0033 -- 79 24 (!) 176/70 91 %   01/13/23 0003 -- 77 24 (!) 170/66 91 %   01/12/23 2322 -- 85 -- (!) 156/74 92 %   01/12/23 2159 -- 84 (!) 31 (!) 175/74 94 %   01/12/23 2130 -- 89 25 (!) 165/82 92 %   01/12/23 2100 -- 86 30 (!) 148/116 94 %   01/12/23 2033 -- 87 -- (!) 172/80 --   01/12/23 2030 -- 84 29 (!) 172/80 92 %   01/12/23 2015 -- 87 30 91/65 91 %   01/12/23 2000 -- 93 29 -- 93 %   01/12/23 1945 -- 82 27 -- 92 %   01/12/23 1930 -- 82 27 -- 92 %   01/12/23 1915 -- 83 (!) 31 (!) 193/77 93 %   01/12/23 1900 -- 84 (!) 35 (!) 191/79 94 %   01/12/23 1845 99.8 °F (37.7 °C) 82 23 (!) 191/84 (!) 88 %   01/12/23 1800 -- 77 27 (!) 181/89 95 %   01/12/23 1745 -- 76 26 (!) 178/88 97 %   01/12/23 1730 -- 76 27 (!) 178/74 96 %   01/12/23 1715 -- -- -- Nieves Soto 181/99 --     No intake or output data in the 24 hours ending 01/13/23 1713     I had a face to face encounter and independently examined this patient on 1/13/2023, as outlined below:  PHYSICAL EXAM:  General: WD, WN. Alert, cooperative, no acute distress    EENT:  EOMI. Anicteric sclerae. MMM  Resp:  CTA bilaterally, no wheezing or rales. No accessory muscle use  CV:  Regular  rhythm,  No edema  GI:  Soft, Non distended, Non tender. +Bowel sounds  Neurologic:  Alert and oriented X 3, normal speech,   Psych:   Good insight. Not anxious nor agitated  Skin:  No rashes.   No jaundice    Reviewed most current lab test results and cultures  YES  Reviewed most current radiology test results   YES  Review and summation of old records today    NO  Reviewed patient's current orders and MAR    YES  PMH/SH reviewed - no change compared to H&P  ________________________________________________________________________  Care Plan discussed with:    Comments   Patient     Family      RN     Care Manager     Consultant Multidiciplinary team rounds were held today with , nursing, pharmacist and clinical coordinator. Patient's plan of care was discussed; medications were reviewed and discharge planning was addressed. ________________________________________________________________________  Total NON critical care TIME:  35   Minutes    Total CRITICAL CARE TIME Spent:   Minutes non procedure based      Comments   >50% of visit spent in counseling and coordination of care     ________________________________________________________________________  Julienne Zavaleta MD     Procedures: see electronic medical records for all procedures/Xrays and details which were not copied into this note but were reviewed prior to creation of Plan. LABS:  I reviewed today's most current labs and imaging studies.   Pertinent labs include:  Recent Labs     01/13/23  0355 01/12/23  1701   WBC 7.5 9.1   HGB 9.4* 10.7*   HCT 29.9* 34.1*    256     Recent Labs     01/13/23  0355 01/12/23  1701    144   K 3.3* 3.6    110*   CO2 24 27   GLU 74 115*   BUN 20 20   CREA 1.85* 1.92*   CA 8.4* 8.5   MG 1.8  --    ALB 2.7* 3.0*   TBILI 0.4 0.5   ALT 23 22   INR 1.3*  --        Signed: Julienne Zavaleta MD

## 2023-01-13 NOTE — ED NOTES
Patient here for shortness of breath. Tested positive for COVID by home health today. 1845  Swabs sent for flu and COVID after returned from [facility]  Report given to PEG LÓPEZ [facility] RN at change of shift.

## 2023-01-13 NOTE — PROGRESS NOTES
Bedside thoracentesis will be completed at this time due to pt being COVID positive. Pt Is A&Ox4, on RA, and is in NAD at this time.

## 2023-01-13 NOTE — PROGRESS NOTES
Name of Procedure: image guided left side thoracentesis    Vital Signs: stable     Fluids Removed: 1,600 mL of clear dark brown/blood tinged pleural fluid     Samples sent to lab: yes    Any complications related to procedure: none    Post portable cxray ordered. Pt stable post procedure.  Bedside report given to ZITA UNDERWOOD RN

## 2023-01-14 LAB
ANION GAP SERPL CALC-SCNC: 9 MMOL/L (ref 5–15)
BUN SERPL-MCNC: 48 MG/DL (ref 6–20)
BUN/CREAT SERPL: 22 (ref 12–20)
CALCIUM SERPL-MCNC: 8.2 MG/DL (ref 8.5–10.1)
CHLORIDE SERPL-SCNC: 109 MMOL/L (ref 97–108)
CO2 SERPL-SCNC: 22 MMOL/L (ref 21–32)
CREAT SERPL-MCNC: 2.22 MG/DL (ref 0.7–1.3)
CRP SERPL HS-MCNC: >9.5 MG/L
ERYTHROCYTE [DISTWIDTH] IN BLOOD BY AUTOMATED COUNT: 15.9 % (ref 11.5–14.5)
GLUCOSE BLD STRIP.AUTO-MCNC: 147 MG/DL (ref 65–117)
GLUCOSE BLD STRIP.AUTO-MCNC: 219 MG/DL (ref 65–117)
GLUCOSE BLD STRIP.AUTO-MCNC: 250 MG/DL (ref 65–117)
GLUCOSE BLD STRIP.AUTO-MCNC: 306 MG/DL (ref 65–117)
GLUCOSE SERPL-MCNC: 134 MG/DL (ref 65–100)
HCT VFR BLD AUTO: 32.5 % (ref 36.6–50.3)
HGB BLD-MCNC: 10.5 G/DL (ref 12.1–17)
MCH RBC QN AUTO: 29.9 PG (ref 26–34)
MCHC RBC AUTO-ENTMCNC: 32.3 G/DL (ref 30–36.5)
MCV RBC AUTO: 92.6 FL (ref 80–99)
NRBC # BLD: 0 K/UL (ref 0–0.01)
NRBC BLD-RTO: 0 PER 100 WBC
PLATELET # BLD AUTO: 241 K/UL (ref 150–400)
PMV BLD AUTO: 10.8 FL (ref 8.9–12.9)
POTASSIUM SERPL-SCNC: 4.1 MMOL/L (ref 3.5–5.1)
RBC # BLD AUTO: 3.51 M/UL (ref 4.1–5.7)
SERVICE CMNT-IMP: ABNORMAL
SODIUM SERPL-SCNC: 140 MMOL/L (ref 136–145)
WBC # BLD AUTO: 9.1 K/UL (ref 4.1–11.1)

## 2023-01-14 PROCEDURE — 36415 COLL VENOUS BLD VENIPUNCTURE: CPT

## 2023-01-14 PROCEDURE — 80048 BASIC METABOLIC PNL TOTAL CA: CPT

## 2023-01-14 PROCEDURE — 74011250636 HC RX REV CODE- 250/636: Performed by: INTERNAL MEDICINE

## 2023-01-14 PROCEDURE — 65270000046 HC RM TELEMETRY

## 2023-01-14 PROCEDURE — 74011250637 HC RX REV CODE- 250/637: Performed by: NURSE PRACTITIONER

## 2023-01-14 PROCEDURE — 74011250636 HC RX REV CODE- 250/636: Performed by: NURSE PRACTITIONER

## 2023-01-14 PROCEDURE — 82962 GLUCOSE BLOOD TEST: CPT

## 2023-01-14 PROCEDURE — 86141 C-REACTIVE PROTEIN HS: CPT

## 2023-01-14 PROCEDURE — 85027 COMPLETE CBC AUTOMATED: CPT

## 2023-01-14 PROCEDURE — 74011000250 HC RX REV CODE- 250: Performed by: NURSE PRACTITIONER

## 2023-01-14 PROCEDURE — 74011636637 HC RX REV CODE- 636/637: Performed by: NURSE PRACTITIONER

## 2023-01-14 RX ORDER — ENOXAPARIN SODIUM 100 MG/ML
30 INJECTION SUBCUTANEOUS EVERY 24 HOURS
Status: DISCONTINUED | OUTPATIENT
Start: 2023-01-15 | End: 2023-01-16 | Stop reason: HOSPADM

## 2023-01-14 RX ADMIN — BUMETANIDE 1 MG: 0.25 INJECTION INTRAMUSCULAR; INTRAVENOUS at 09:58

## 2023-01-14 RX ADMIN — SODIUM CHLORIDE, PRESERVATIVE FREE 10 ML: 5 INJECTION INTRAVENOUS at 05:41

## 2023-01-14 RX ADMIN — Medication 3 UNITS: at 23:34

## 2023-01-14 RX ADMIN — AMLODIPINE BESYLATE 10 MG: 5 TABLET ORAL at 09:57

## 2023-01-14 RX ADMIN — ZINC SULFATE 220 MG (50 MG) CAPSULE 1 CAPSULE: CAPSULE at 09:58

## 2023-01-14 RX ADMIN — DEXAMETHASONE SODIUM PHOSPHATE 6 MG: 4 INJECTION, SOLUTION INTRAMUSCULAR; INTRAVENOUS at 09:57

## 2023-01-14 RX ADMIN — Medication 2 UNITS: at 05:40

## 2023-01-14 RX ADMIN — GUAIFENESIN 600 MG: 600 TABLET, EXTENDED RELEASE ORAL at 21:59

## 2023-01-14 RX ADMIN — ATORVASTATIN CALCIUM 20 MG: 20 TABLET, FILM COATED ORAL at 21:59

## 2023-01-14 RX ADMIN — Medication 5 UNITS: at 12:41

## 2023-01-14 RX ADMIN — Medication 7 UNITS: at 18:55

## 2023-01-14 RX ADMIN — GUAIFENESIN 600 MG: 600 TABLET, EXTENDED RELEASE ORAL at 09:57

## 2023-01-14 RX ADMIN — Medication 2 UNITS: at 00:06

## 2023-01-14 RX ADMIN — SODIUM CHLORIDE, PRESERVATIVE FREE 10 ML: 5 INJECTION INTRAVENOUS at 14:33

## 2023-01-14 RX ADMIN — CARVEDILOL 25 MG: 12.5 TABLET, FILM COATED ORAL at 09:57

## 2023-01-14 RX ADMIN — CARVEDILOL 25 MG: 12.5 TABLET, FILM COATED ORAL at 19:06

## 2023-01-14 RX ADMIN — ENOXAPARIN SODIUM 30 MG: 100 INJECTION SUBCUTANEOUS at 10:10

## 2023-01-14 RX ADMIN — SODIUM CHLORIDE, PRESERVATIVE FREE 10 ML: 5 INJECTION INTRAVENOUS at 21:59

## 2023-01-14 RX ADMIN — OXYCODONE HYDROCHLORIDE AND ACETAMINOPHEN 500 MG: 500 TABLET ORAL at 09:57

## 2023-01-14 NOTE — CONSULTS
EP/ ARRHYTHMIA/ CARDIOLOGY CONSULT    Patient ID:  Patient: Robert Gracia MRN: [id number]  Age: 75 y.o.  : 1955    Date of  Admission: 2023  4:43 PM   PCP:  Dangelo Khalil MD    Assessment:   Positive troponin in the setting of COVID infection. CAD with recent NSTEMI in 2022 with CABGx1. Multifactorial dyspnea including with large L pleural effusion with lung consolidation. S/p thoracentesis. Hypertension. Hypercholesterolemia. 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 Tobacco smoker. History of stroke. Plavix allergy stated. Full code. Plan:     Continue beta-blocker. Continue statin. Continue amlodipine. Agree with diuretic. DVT prophylaxis. Clopidogrel allergy stated, continue ASA when able. Supportive care, COVID. [x]       High complexity decision making was performed in this patient at high risk for decompensation with multiple organ involvement. Robert Gracia is a 75 y.o. male with a history of the above with a positive troponin. I was asked to see him for this. Past Medical History:   Diagnosis Date    CAD (coronary artery disease)     10/31/22: NSTEMI sees [facility] cardiology, may be getting CABG    Fractured rib 2021    from a fall per pt on 2021    High cholesterol     Hypertension     per pt on 2021    Stroke ([facility] [address]         Past Surgical History:   Procedure Laterality Date    COLONOSCOPY N/A 2022    COLONOSCOPY performed by Arlyn Guadalupe MD at [address]      per pt on 2021       Social History     Tobacco Use    Smoking status: Every Day     Packs/day: 0.50     Types: Cigarettes    Smokeless tobacco: Never   Substance Use Topics    Alcohol use: Not Currently     Alcohol/week: 1.0 standard drink     Types: 1 Cans of beer per week        No family history on file.      No Known Allergies       Current Facility-Administered Medications   Medication Dose Route Frequency    insulin lispro (HUMALOG) injection   SubCUTAneous Q6H    glucose chewable tablet 16 g  4 Tablet Oral PRN    glucagon (GLUCAGEN) injection 1 mg  1 mg IntraMUSCular PRN    dextrose 10% infusion 0-250 mL  0-250 mL IntraVENous PRN    carvediloL (COREG) tablet 25 mg  25 mg Oral BID    [START ON 2023] bumetanide (BUMEX) injection 1 mg  1 mg IntraVENous DAILY    dexamethasone (DECADRON) 4 mg/mL injection 6 mg  6 mg IntraVENous Q24H    sodium chloride (NS) flush 5-40 mL  5-40 mL IntraVENous Q8H    sodium chloride (NS) flush 5-40 mL  5-40 mL IntraVENous PRN    acetaminophen (TYLENOL) tablet 650 mg  650 mg Oral Q6H PRN    Or    acetaminophen (TYLENOL) suppository 650 mg  650 mg Rectal Q6H PRN    polyethylene glycol (MIRALAX) packet 17 g  17 g Oral DAILY PRN    ondansetron (ZOFRAN ODT) tablet 4 mg  4 mg Oral Q8H PRN    Or    ondansetron (ZOFRAN) injection 4 mg  4 mg IntraVENous Q6H PRN    enoxaparin (LOVENOX) injection 30 mg  30 mg SubCUTAneous Q12H    guaiFENesin ER (MUCINEX) tablet 600 mg  600 mg Oral Q12H    guaiFENesin (ROBITUSSIN) 100 mg/5 mL oral liquid 200 mg  200 mg Oral Q4H PRN    albuterol (PROVENTIL HFA, VENTOLIN HFA, PROAIR HFA) inhaler 2 Puff  2 Puff Inhalation Q4H PRN    ascorbic acid (vitamin C) (VITAMIN C) tablet 500 mg  500 mg Oral DAILY    zinc sulfate (ZINCATE) 50 mg zinc (220 mg) capsule 1 Capsule  1 Capsule Oral DAILY    amLODIPine (NORVASC) tablet 10 mg  10 mg Oral DAILY    atorvastatin (LIPITOR) tablet 20 mg  20 mg Oral QHS    hydrALAZINE (APRESOLINE) 20 mg/mL injection 10-20 mg  10-20 mg IntraVENous Q6H PRN       Review of Symptoms:  Noncontributory across 12 body systems except as above.      Objective:      Physical Exam:  Temp (24hrs), Av.2 °F (36.8 °C), Min:98.1 °F (36.7 °C), Max:98.3 °F (36.8 °C)    Patient Vitals for the past 8 hrs:   Pulse   23 1912 66   23 1806 64   23 1630 60   23 1600 60    Patient Vitals for the past 8 hrs:   Resp   232 15   23 1806 21 01/13/23 1630 (!) 0    Patient Vitals for the past 8 hrs:   BP   01/13/23 1912 (!) 142/72   01/13/23 1806 (!) 165/76   01/13/23 1630 (!) 142/65   01/13/23 1600 (!) 153/66      No intake or output data in the 24 hours ending 01/13/23 2335    Nondiaphoretic, not in acute distress, ill-appearing. No scleral icterus, mucous membranes moist, no xanthelasma. Unlabored, clear to auscultation bilaterally anteriorly, symmetric air movement. Sternotomy incision noted. Regular rate and rhythm. Palpable radial pulses bilaterally. Abdomen, soft, nontender, nondistended. Extremities without cyanosis or clubbing. Muscle tone and bulk normal for age. Skin warm and dry. No rashes or ulcers. No tremor. Awake and appropriate. CARDIOGRAPHICS and STUDIES, I reviewed:    Telemetry:  No events. ECG 1/12:  Sinus rhythm, left atrial enlargement, LVH with repolarization abnormality. Echo 5/2021:  Left Ventricle Normal cavity size and systolic function (ejection fraction normal). Mild concentric hypertrophy. Wall motion: normal. The estimated EF is 55 - 60%. Visually measured ejection fraction. Left Atrium Normal cavity size. Right Ventricle Normal cavity size and global systolic function. Right Atrium Normal cavity size. Aortic Valve Normal valve structure and no stenosis. Mild to moderate aortic valve regurgitation. Mitral Valve Normal valve structure, no stenosis and no regurgitation. Tricuspid Valve Normal valve structure, no stenosis and no regurgitation. Pulmonic Valve Pulmonic valve not well visualized. Aorta Normal aortic root. Pericardium No evidence of pericardial effusion. Labs:  No results for input(s): CPK, CKMB, CKNDX, TROIQ in the last 72 hours.     No lab exists for component: CPKMB  Lab Results   Component Value Date/Time    Cholesterol, total 113 05/14/2021 06:02 AM    HDL Cholesterol 38 05/14/2021 06:02 AM    LDL, calculated 58.4 05/14/2021 06:02 AM    Triglyceride 83 05/14/2021 06:02 AM    CHOL/HDL Ratio 3.0 05/14/2021 06:02 AM     Recent Labs     01/13/23  0355   INR 1.3*   PTP 13.5*      Recent Labs     01/13/23  0355 01/12/23  1701    144   K 3.3* 3.6    110*   CO2 24 27   BUN 20 20   CREA 1.85* 1.92*   GLU 74 115*   CA 8.4* 8.5   ALB 2.7* 3.0*   WBC 7.5 9.1   HGB 9.4* 10.7*   HCT 29.9* 34.1*    256     Recent Labs     01/13/23 0355 01/12/23  1701   AP 88 100   TP 6.4 6.6   ALB 2.7* 3.0*   GLOB 3.7 3.6     No components found for: GLPOC  No results for input(s): PH, PCO2, PO2 in the last 72 hours.         Orville Rey MD  1/13/2023

## 2023-01-14 NOTE — PROGRESS NOTES
Problem: Airway Clearance - Ineffective  Goal: Achieve or maintain patent airway  Outcome: Progressing Towards Goal     Problem: Gas Exchange - Impaired  Goal: Absence of hypoxia  Outcome: Progressing Towards Goal  Goal: Promote optimal lung function  Outcome: Progressing Towards Goal     Problem: Breathing Pattern - Ineffective  Goal: Ability to achieve and maintain a regular respiratory rate  Outcome: Progressing Towards Goal     Problem: Body Temperature -  Risk of, Imbalanced  Goal: Ability to maintain a body temperature within defined limits  Outcome: Progressing Towards Goal     Problem: Isolation Precautions - Risk of Spread of Infection  Goal: Prevent transmission of infectious organism to others  Outcome: Progressing Towards Goal     Problem: Nutrition Deficits  Goal: Optimize nutrtional status  Outcome: Progressing Towards Goal     Problem: Risk for Fluid Volume Deficit  Goal: Maintain normal heart rhythm  Outcome: Progressing Towards Goal  Goal: Maintain absence of muscle cramping  Outcome: Progressing Towards Goal     Problem: Loneliness or Risk for Loneliness  Goal: Demonstrate positive use of time alone when socialization is not possible  Outcome: Progressing Towards Goal     Problem: Pressure Injury - Risk of  Goal: *Prevention of pressure injury  Description: Document Mark Scale and appropriate interventions in the flowsheet.   Outcome: Progressing Towards Goal  Note: Pressure Injury Interventions:  Sensory Interventions: Minimize linen layers    Moisture Interventions: Limit adult briefs, Minimize layers, Apply protective barrier, creams and emollients    Activity Interventions: Increase time out of bed    Mobility Interventions: Float heels, HOB 30 degrees or less    Nutrition Interventions: Document food/fluid/supplement intake    Friction and Shear Interventions: Apply protective barrier, creams and emollients, Minimize layers                Problem: Patient Education: Go to Patient Education Activity  Goal: Patient/Family Education  Outcome: Progressing Towards Goal

## 2023-01-14 NOTE — PROGRESS NOTES
Bedside and Verbal shift change report given to Jenni Zapata (oncoming nurse) by Chino Mustafa RN (offgoing nurse). Report included the following information SBAR, Kardex, Intake/Output, MAR, Recent Results, and Cardiac Rhythm NSR .

## 2023-01-14 NOTE — PROGRESS NOTES
Bedside and Verbal shift change report given to [id number] [facility] (oncoming nurse) by Adela Jay RN (offgoing nurse). Report included the following information SBAR, Kardex, Recent Results, and Cardiac Rhythm NSR .

## 2023-01-14 NOTE — PROGRESS NOTES
EP/ ARRHYTHMIA    Patient ID:  Patient: Eduard Ryan MRN: [id number]  Age: 75 y.o.  : 1955    Date of  Admission: 2023  4:43 PM   PCP:  Armand Coronado MD    Assessment:   Positive troponin in the setting of COVID infection. CAD with recent NSTEMI in 2022 with CABGx1. Multifactorial dyspnea including with large L pleural effusion with lung consolidation. S/p thoracentesis (see analysis). Hypertension. Hypercholesterolemia. 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 Tobacco smoker. History of stroke. Plavix allergy stated. Mild acute renal failure. Full code. Plan:     Continue beta-blocker. Continue statin. Continue amlodipine. Agree with diuretic. Last 3 Recorded Weights in this Encounter    23 1630   Weight: 61.2 kg (135 lb)     DVT prophylaxis. Clopidogrel allergy stated, will start baby ASA daily. Supportive care, COVID. [x]       High complexity decision making was performed in this patient    Eduard Ryan is a 75 y.o. male with a history of the above with a positive troponin. I was asked to see him for this. He denies chest pain. No syncope, dizziness, palpitations. No orthopnea, PND, or edema. His breathing is better but not at baseline.     No Known Allergies       Current Facility-Administered Medications   Medication Dose Route Frequency    [START ON 1/15/2023] enoxaparin (LOVENOX) injection 30 mg  30 mg SubCUTAneous Q24H    insulin lispro (HUMALOG) injection   SubCUTAneous Q6H    glucose chewable tablet 16 g  4 Tablet Oral PRN    glucagon (GLUCAGEN) injection 1 mg  1 mg IntraMUSCular PRN    dextrose 10% infusion 0-250 mL  0-250 mL IntraVENous PRN    carvediloL (COREG) tablet 25 mg  25 mg Oral BID    bumetanide (BUMEX) injection 1 mg  1 mg IntraVENous DAILY    dexamethasone (DECADRON) 4 mg/mL injection 6 mg  6 mg IntraVENous Q24H    sodium chloride (NS) flush 5-40 mL  5-40 mL IntraVENous Q8H    sodium chloride (NS) flush 5-40 mL  5-40 mL IntraVENous PRN    acetaminophen (TYLENOL) tablet 650 mg  650 mg Oral Q6H PRN    Or    acetaminophen (TYLENOL) suppository 650 mg  650 mg Rectal Q6H PRN    polyethylene glycol (MIRALAX) packet 17 g  17 g Oral DAILY PRN    ondansetron (ZOFRAN ODT) tablet 4 mg  4 mg Oral Q8H PRN    Or    ondansetron (ZOFRAN) injection 4 mg  4 mg IntraVENous Q6H PRN    guaiFENesin ER (MUCINEX) tablet 600 mg  600 mg Oral Q12H    guaiFENesin (ROBITUSSIN) 100 mg/5 mL oral liquid 200 mg  200 mg Oral Q4H PRN    albuterol (PROVENTIL HFA, VENTOLIN HFA, PROAIR HFA) inhaler 2 Puff  2 Puff Inhalation Q4H PRN    ascorbic acid (vitamin C) (VITAMIN C) tablet 500 mg  500 mg Oral DAILY    zinc sulfate (ZINCATE) 50 mg zinc (220 mg) capsule 1 Capsule  1 Capsule Oral DAILY    amLODIPine (NORVASC) tablet 10 mg  10 mg Oral DAILY    atorvastatin (LIPITOR) tablet 20 mg  20 mg Oral QHS    hydrALAZINE (APRESOLINE) 20 mg/mL injection 10-20 mg  10-20 mg IntraVENous Q6H PRN       Review of Symptoms:  Noncontributory across 12 body systems except as above. Objective:      Physical Exam:  Temp (24hrs), Av.9 °F (36.6 °C), Min:97.3 °F (36.3 °C), Max:98.3 °F (36.8 °C)    Patient Vitals for the past 8 hrs:   Pulse   23 1502 (!) 57   23 0842 61      Patient Vitals for the past 8 hrs:   Resp   23 1502 18   23 0842 16      Patient Vitals for the past 8 hrs:   BP   23 1502 120/60   23 0842 126/62          Intake/Output Summary (Last 24 hours) at 2023 1601  Last data filed at 2023 0339  Gross per 24 hour   Intake --   Output 450 ml   Net -450 ml       Nondiaphoretic, not in acute distress, ill-appearing. Unlabored, clear to auscultation bilaterally anteriorly, symmetric air movement. Sternotomy incision noted. Regular rate and rhythm. Palpable radial pulses bilaterally. Abdomen, soft, nontender, nondistended. Extremities without cyanosis or clubbing. Muscle tone and bulk normal for age.   Skin warm and dry.  No rashes or ulcers. No tremor. Awake and appropriate. CARDIOGRAPHICS and STUDIES, I reviewed:    Telemetry:  No events. ECG 1/12:  Sinus rhythm, left atrial enlargement, LVH with repolarization abnormality. Echo 5/2021:  Left Ventricle Normal cavity size and systolic function (ejection fraction normal). Mild concentric hypertrophy. Wall motion: normal. The estimated EF is 55 - 60%. Visually measured ejection fraction. Left Atrium Normal cavity size. Right Ventricle Normal cavity size and global systolic function. Right Atrium Normal cavity size. Aortic Valve Normal valve structure and no stenosis. Mild to moderate aortic valve regurgitation. Mitral Valve Normal valve structure, no stenosis and no regurgitation. Tricuspid Valve Normal valve structure, no stenosis and no regurgitation. Pulmonic Valve Pulmonic valve not well visualized. Aorta Normal aortic root. Pericardium No evidence of pericardial effusion. Labs:  No results for input(s): CPK, CKMB, CKNDX, TROIQ in the last 72 hours. No lab exists for component: CPKMB  Lab Results   Component Value Date/Time    Cholesterol, total 113 05/14/2021 06:02 AM    HDL Cholesterol 38 05/14/2021 06:02 AM    LDL, calculated 58.4 05/14/2021 06:02 AM    Triglyceride 83 05/14/2021 06:02 AM    CHOL/HDL Ratio 3.0 05/14/2021 06:02 AM     Recent Labs     01/13/23 0355   INR 1.3*   PTP 13.5*        Recent Labs     01/14/23  0430 01/13/23  0355 01/12/23  1701    141 144   K 4.1 3.3* 3.6   * 108 110*   CO2 22 24 27   BUN 48* 20 20   CREA 2.22* 1.85* 1.92*   * 74 115*   CA 8.2* 8.4* 8.5   ALB  --  2.7* 3.0*   WBC 9.1 7.5 9.1   HGB 10.5* 9.4* 10.7*   HCT 32.5* 29.9* 34.1*    220 256       Recent Labs     01/13/23  0355 01/12/23  1701   AP 88 100   TP 6.4 6.6   ALB 2.7* 3.0*   GLOB 3.7 3.6       No components found for: GLPOC  No results for input(s): PH, PCO2, PO2 in the last 72 hours.         Orville Rey, MD  1/14/2023

## 2023-01-14 NOTE — PROGRESS NOTES
Spiritual Care Assessment/Progress Note  [facility]      NAME: Michael Clement. MRN: [id number]  AGE: 75 y.o.  SEX: male  Samaritan Affiliation: Muslim   Language: English     1/14/2023     Total Time (in minutes): 19     Spiritual Assessment begun in [facility]LE[facility] through conversation with:         [x]Patient        [] Family    [] Friend(s)        Reason for Consult: Request by staff     Spiritual beliefs: (Please include comment if needed)     [x] Identifies with a vasquez tradition:         [] Supported by a vasquez community:            [] Claims no spiritual orientation:           [] Seeking spiritual identity:                [] Adheres to an individual form of spirituality:           [] Not able to assess:                           Identified resources for coping:      [] Prayer                               [] Music                  [] Guided Imagery     [x] Family/friends                 [] Pet visits     [] Devotional reading                         [] Unknown     [] Other:                                                Interventions offered during this visit: (See comments for more details)    Patient Interventions: Coping skills reviewed/reinforced, Initial/Spiritual assessment, patient floor, Normalization of emotional/spiritual concerns, Prayer (assurance of), Integration of medical assessment with existing values and beliefs, Affirmation of vasquez, Catharsis/review of pertinent events in supportive environment, Iconic (affirming the presence of God/Higher Power)           Plan of Care:     [] Support spiritual and/or cultural needs    [] Support AMD and/or advance care planning process      [] Support grieving process   [] Coordinate Rites and/or Rituals    [] Coordination with community clergy   [] No spiritual needs identified at this time   [] Detailed Plan of Care below (See Comments)  [] Make referral to Music Therapy  [] Make referral to Pet Therapy     [] Make referral to Addiction services  [] Make referral to [facility]  [] Make referral to Spiritual Care Partner  [] No future visits requested        [x] Contact Spiritual Care for further referrals     Comments:  Visit was in response to in basket request from nurse for pastoral support in 3232. Patient was on contact restriction at time of visit.  consulted with nurse and called patient in his room for support. He welcomed call and engaged in conversation. He indicated he was doing much better today. He has been in touch with family for support. Continuous self care encouraged as well as assurance of prayer. Patient thanked  for the call.         Visited by: Nadira richards : [phone])

## 2023-01-15 LAB
ANION GAP SERPL CALC-SCNC: 7 MMOL/L (ref 5–15)
BUN SERPL-MCNC: 54 MG/DL (ref 6–20)
BUN/CREAT SERPL: 26 (ref 12–20)
CALCIUM SERPL-MCNC: 7.9 MG/DL (ref 8.5–10.1)
CHLORIDE SERPL-SCNC: 110 MMOL/L (ref 97–108)
CO2 SERPL-SCNC: 22 MMOL/L (ref 21–32)
CREAT SERPL-MCNC: 2.1 MG/DL (ref 0.7–1.3)
ERYTHROCYTE [DISTWIDTH] IN BLOOD BY AUTOMATED COUNT: 15.6 % (ref 11.5–14.5)
GLUCOSE BLD STRIP.AUTO-MCNC: 152 MG/DL (ref 65–117)
GLUCOSE BLD STRIP.AUTO-MCNC: 163 MG/DL (ref 65–117)
GLUCOSE BLD STRIP.AUTO-MCNC: 168 MG/DL (ref 65–117)
GLUCOSE BLD STRIP.AUTO-MCNC: 244 MG/DL (ref 65–117)
GLUCOSE SERPL-MCNC: 129 MG/DL (ref 65–100)
HCT VFR BLD AUTO: 30.7 % (ref 36.6–50.3)
HGB BLD-MCNC: 10.1 G/DL (ref 12.1–17)
MCH RBC QN AUTO: 30.2 PG (ref 26–34)
MCHC RBC AUTO-ENTMCNC: 32.9 G/DL (ref 30–36.5)
MCV RBC AUTO: 91.9 FL (ref 80–99)
NRBC # BLD: 0 K/UL (ref 0–0.01)
NRBC BLD-RTO: 0 PER 100 WBC
PLATELET # BLD AUTO: 245 K/UL (ref 150–400)
PMV BLD AUTO: 11 FL (ref 8.9–12.9)
POTASSIUM SERPL-SCNC: 4 MMOL/L (ref 3.5–5.1)
RBC # BLD AUTO: 3.34 M/UL (ref 4.1–5.7)
SERVICE CMNT-IMP: ABNORMAL
SODIUM SERPL-SCNC: 139 MMOL/L (ref 136–145)
WBC # BLD AUTO: 12.2 K/UL (ref 4.1–11.1)

## 2023-01-15 PROCEDURE — 74011250637 HC RX REV CODE- 250/637: Performed by: NURSE PRACTITIONER

## 2023-01-15 PROCEDURE — 74011000250 HC RX REV CODE- 250: Performed by: NURSE PRACTITIONER

## 2023-01-15 PROCEDURE — 82962 GLUCOSE BLOOD TEST: CPT

## 2023-01-15 PROCEDURE — 80048 BASIC METABOLIC PNL TOTAL CA: CPT

## 2023-01-15 PROCEDURE — 65270000046 HC RM TELEMETRY

## 2023-01-15 PROCEDURE — 74011636637 HC RX REV CODE- 636/637: Performed by: NURSE PRACTITIONER

## 2023-01-15 PROCEDURE — 85027 COMPLETE CBC AUTOMATED: CPT

## 2023-01-15 PROCEDURE — 74011250636 HC RX REV CODE- 250/636: Performed by: INTERNAL MEDICINE

## 2023-01-15 PROCEDURE — 36415 COLL VENOUS BLD VENIPUNCTURE: CPT

## 2023-01-15 RX ADMIN — ZINC SULFATE 220 MG (50 MG) CAPSULE 1 CAPSULE: CAPSULE at 10:31

## 2023-01-15 RX ADMIN — CARVEDILOL 25 MG: 12.5 TABLET, FILM COATED ORAL at 19:15

## 2023-01-15 RX ADMIN — ENOXAPARIN SODIUM 30 MG: 100 INJECTION SUBCUTANEOUS at 10:55

## 2023-01-15 RX ADMIN — SODIUM CHLORIDE, PRESERVATIVE FREE 10 ML: 5 INJECTION INTRAVENOUS at 16:18

## 2023-01-15 RX ADMIN — Medication 2 UNITS: at 13:33

## 2023-01-15 RX ADMIN — GUAIFENESIN 600 MG: 600 TABLET, EXTENDED RELEASE ORAL at 21:44

## 2023-01-15 RX ADMIN — Medication 2 UNITS: at 08:41

## 2023-01-15 RX ADMIN — Medication 2 UNITS: at 19:15

## 2023-01-15 RX ADMIN — SODIUM CHLORIDE, PRESERVATIVE FREE 10 ML: 5 INJECTION INTRAVENOUS at 21:45

## 2023-01-15 RX ADMIN — AMLODIPINE BESYLATE 10 MG: 5 TABLET ORAL at 10:30

## 2023-01-15 RX ADMIN — GUAIFENESIN 600 MG: 600 TABLET, EXTENDED RELEASE ORAL at 10:30

## 2023-01-15 RX ADMIN — OXYCODONE HYDROCHLORIDE AND ACETAMINOPHEN 500 MG: 500 TABLET ORAL at 10:30

## 2023-01-15 RX ADMIN — CARVEDILOL 25 MG: 12.5 TABLET, FILM COATED ORAL at 10:31

## 2023-01-15 RX ADMIN — ATORVASTATIN CALCIUM 20 MG: 20 TABLET, FILM COATED ORAL at 21:44

## 2023-01-15 RX ADMIN — BUMETANIDE 1 MG: 0.25 INJECTION INTRAMUSCULAR; INTRAVENOUS at 10:54

## 2023-01-15 RX ADMIN — DEXAMETHASONE SODIUM PHOSPHATE 6 MG: 4 INJECTION, SOLUTION INTRAMUSCULAR; INTRAVENOUS at 10:54

## 2023-01-15 RX ADMIN — SODIUM CHLORIDE, PRESERVATIVE FREE 10 ML: 5 INJECTION INTRAVENOUS at 06:33

## 2023-01-15 NOTE — PROGRESS NOTES
Bedside and Verbal shift change report given to Jenni Zapata (oncoming nurse) by BRENDEN Tovar RN (offgoing nurse). Report given with SBAR, Kardex, Intake/Output, MAR and Recent Results.

## 2023-01-15 NOTE — PROGRESS NOTES
Transition of Care Plan  RUR: 15%  DISPOSITION: The disposition plan is home with family assistance  F/U with PCP/Specialist    Transport: family       Reason for Admission:   COVID                     RUR Score:     15%             PCP: First and Last name:   Liseth Root MD     Name of Practice:    Are you a current patient: Yes/No:    Approximate date of last visit:    Can you participate in a virtual visit if needed:     Do you (patient/family) have any concerns for transition/discharge? No                Plan for utilizing home health:   not recommended     Current Advanced Directive/Advance Care Plan:  Full Code      Healthcare Decision Maker:   Click here to complete [address] including selection of the Healthcare Decision Maker Relationship (ie \"Primary\")            [facility] - [phone]    Secondary Decision Maker: [facility] - [phone]    Transition of Care Plan:            Patient lives with family and is independent in his ADLs/IADLsa. Patient uses [address] Care Management Interventions  PCP Verified by CM: Yes  Palliative Care Criteria Met (RRAT>21 & CHF Dx)?: No  Mode of Transport at Discharge:  Other (see comment) (family)  Transition of Care Consult (CM Consult): Discharge Planning  MyChart Signup: No  Discharge Durable Medical Equipment: No  Health Maintenance Reviewed: Yes  Physical Therapy Consult: No  Occupational Therapy Consult: No  Speech Therapy Consult: No  Support Systems: Spouse/Significant Other, Child(keila)  Confirm Follow Up Transport: Family  [employer] Information Provided?: No  Discharge Location  Patient Expects to be Discharged to[de-identified] Home with family assistance    1:58 PM  ROSALES Valenzuela

## 2023-01-15 NOTE — PROGRESS NOTES
Hospitalist Progress Note    NAME: Saranya Santiago. :  1955   MRN:  [id number]       Assessment / Plan:  COVID Infection  Large pleural effusion  Rapid COVID: positive  Status post thoracocentesis on   CT chest WWO contrast:  Pro BNP: 15,552  1. No visualized pulmonary embolus. 2. Large volume of pleural fluid on the left with associated  atelectasis/consolidation of the left lower lobe. 3. Incidental findings as above  -proBNP elevated, procalcitonin 0.15,    D-dimer 2.28, no PE  - O2 support and neb treatment PRN  - Mucolytics, antitussive  - Vitamin C, Zinc  - Bumex IV daily  - not hypoxic, no need for steroids  -Status post thoracocentesis drainage of  1600, body fluid culture and stain negative  Awaiting 2D echo, last echo was in May 2021 showed EF of 55%    elevated Troponin, POA  ECG: Normal sinus rhythm. Possible Left atrial enlargement   Left ventricular hypertrophy with repolarization abnormality  Troponin: #1 215, #2 203, #3 242  - cardiology noted    2D echo pending   CAD, POA  Recent NSTEMI (s/p x 1 V CABG- 22)  - Plavix Allergy  - continue Coreg BID  Last EF is 55%  Hypertension, POA  - resume Norvasc     Hyperlipidemia, POA  - resume Lipitor     CKD (crea 1.92,  baseline 1.58 - 2.68)  - avoid nephrotoxins     NIDDM   A1C 5.4  - SSI with Lispro coverage        Code Status: Full  Surrogate Decision Maker: Mecca Trivedi, spouse ([phone]), Darcy Ganser, sister ([phone])     DVT Prophylaxis: Lovenox  GI Prophylaxis: not indicated     Baseline: lives with family, independent with ADLs      18.5 - 24.9 Normal weight / Body mass index is 20.23 kg/m². Estimated discharge date:   Barriers: Fluid studies, 2d echo pending     Code status: Full  Prophylaxis: Lovenox  Recommended Disposition:  PT, OT, RN     Subjective:     Chief Complaint / Reason for Physician Visit  Follow-up on COVID-19, pleural effusion and fluid overload.   Discussed with RN events overnight. No acute issues on room air  Review of Systems:  Symptom Y/N Comments  Symptom Y/N Comments   Fever/Chills n   Chest Pain n    Poor Appetite    Edema     Cough    Abdominal Pain n    Sputum    Joint Pain     SOB/ARELLANO n   Pruritis/Rash     Nausea/vomit    Tolerating PT/OT     Diarrhea    Tolerating Diet y    Constipation    Other       Could NOT obtain due to:      Objective:     VITALS:   Last 24hrs VS reviewed since prior progress note. Most recent are:  Patient Vitals for the past 24 hrs:   Temp Pulse Resp BP SpO2   01/15/23 0736 97.3 °F (36.3 °C) (!) 55 16 (!) 142/74 98 %   01/15/23 0239 97.6 °F (36.4 °C) (!) 55 17 124/69 98 %   01/14/23 2157 97.5 °F (36.4 °C) (!) 59 16 129/64 99 %   01/14/23 1853 97.7 °F (36.5 °C) (!) 57 16 135/66 99 %         Intake/Output Summary (Last 24 hours) at 1/15/2023 1506  Last data filed at 1/15/2023 0042  Gross per 24 hour   Intake --   Output 350 ml   Net -350 ml          I had a face to face encounter and independently examined this patient on 1/15/2023, as outlined below:  PHYSICAL EXAM:  General: WD, WN. Alert, cooperative, no acute distress    EENT:  EOMI. Anicteric sclerae. MMM  Resp:  CTA bilaterally, no wheezing or rales. No accessory muscle use  CV:  Regular  rhythm,  No edema  GI:  Soft, Non distended, Non tender. +Bowel sounds  Neurologic:  Alert and oriented X 3, normal speech,   Psych:   Good insight. Not anxious nor agitated  Skin:  No rashes.   No jaundice    Reviewed most current lab test results and cultures  YES  Reviewed most current radiology test results   YES  Review and summation of old records today    NO  Reviewed patient's current orders and MAR    YES  PMH/SH reviewed - no change compared to H&P  ________________________________________________________________________  Care Plan discussed with:    Comments   Patient     Family      RN     Care Manager     Consultant                        Multidiciplinary team rounds were held today with , nursing, pharmacist and clinical coordinator. Patient's plan of care was discussed; medications were reviewed and discharge planning was addressed. ________________________________________________________________________  Total NON critical care TIME:  35   Minutes    Total CRITICAL CARE TIME Spent:   Minutes non procedure based      Comments   >50% of visit spent in counseling and coordination of care     ________________________________________________________________________  Gio Gorman MD     Procedures: see electronic medical records for all procedures/Xrays and details which were not copied into this note but were reviewed prior to creation of Plan. LABS:  I reviewed today's most current labs and imaging studies.   Pertinent labs include:  Recent Labs     01/15/23  0308 01/14/23  0430 01/13/23  0355   WBC 12.2* 9.1 7.5   HGB 10.1* 10.5* 9.4*   HCT 30.7* 32.5* 29.9*    241 220       Recent Labs     01/15/23  0308 01/14/23  0430 01/13/23  0355 01/12/23  1701    140 141 144   K 4.0 4.1 3.3* 3.6   * 109* 108 110*   CO2 22 22 24 27   * 134* 74 115*   BUN 54* 48* 20 20   CREA 2.10* 2.22* 1.85* 1.92*   CA 7.9* 8.2* 8.4* 8.5   MG  --   --  1.8  --    ALB  --   --  2.7* 3.0*   TBILI  --   --  0.4 0.5   ALT  --   --  23 22   INR  --   --  1.3*  --          Signed: Gio Gorman MD

## 2023-01-16 ENCOUNTER — APPOINTMENT (OUTPATIENT)
Dept: NON INVASIVE DIAGNOSTICS | Age: 68
DRG: 178 | End: 2023-01-16
Attending: INTERNAL MEDICINE
Payer: MEDICARE

## 2023-01-16 VITALS
HEIGHT: 69 IN | HEART RATE: 54 BPM | DIASTOLIC BLOOD PRESSURE: 61 MMHG | RESPIRATION RATE: 17 BRPM | TEMPERATURE: 97.5 F | WEIGHT: 135 LBS | OXYGEN SATURATION: 99 % | SYSTOLIC BLOOD PRESSURE: 133 MMHG | BODY MASS INDEX: 19.99 KG/M2

## 2023-01-16 LAB
ANION GAP SERPL CALC-SCNC: 9 MMOL/L (ref 5–15)
BUN SERPL-MCNC: 52 MG/DL (ref 6–20)
BUN/CREAT SERPL: 28 (ref 12–20)
CALCIUM SERPL-MCNC: 7.8 MG/DL (ref 8.5–10.1)
CHLORIDE SERPL-SCNC: 111 MMOL/L (ref 97–108)
CO2 SERPL-SCNC: 22 MMOL/L (ref 21–32)
CREAT SERPL-MCNC: 1.83 MG/DL (ref 0.7–1.3)
ECHO AR MAX VEL PISA: 4.3 M/S
ECHO AV AREA PEAK VELOCITY: 2.1 CM2
ECHO AV AREA VTI: 2.3 CM2
ECHO AV AREA/BSA PEAK VELOCITY: 1.2 CM2/M2
ECHO AV AREA/BSA VTI: 1.3 CM2/M2
ECHO AV MEAN GRADIENT: 5 MMHG
ECHO AV MEAN VELOCITY: 1 M/S
ECHO AV PEAK GRADIENT: 11 MMHG
ECHO AV PEAK VELOCITY: 1.7 M/S
ECHO AV REGURGITANT PHT: 558.6 MILLISECOND
ECHO AV VELOCITY RATIO: 0.65
ECHO AV VTI: 34.6 CM
ECHO LA DIAMETER INDEX: 2.3 CM/M2
ECHO LA DIAMETER: 4 CM
ECHO LA VOL 4C: 50 ML (ref 18–58)
ECHO LA VOLUME INDEX A4C: 29 ML/M2 (ref 16–34)
ECHO LV EDV A4C: 102 ML
ECHO LV EDV INDEX A4C: 59 ML/M2
ECHO LV EJECTION FRACTION A4C: 49 %
ECHO LV ESV A4C: 52 ML
ECHO LV ESV INDEX A4C: 30 ML/M2
ECHO LV FRACTIONAL SHORTENING: 24 % (ref 28–44)
ECHO LV INTERNAL DIMENSION DIASTOLE INDEX: 2.41 CM/M2
ECHO LV INTERNAL DIMENSION DIASTOLIC: 4.2 CM (ref 4.2–5.9)
ECHO LV INTERNAL DIMENSION SYSTOLIC INDEX: 1.84 CM/M2
ECHO LV INTERNAL DIMENSION SYSTOLIC: 3.2 CM
ECHO LV IVSD: 1.1 CM (ref 0.6–1)
ECHO LV MASS 2D: 200.6 G (ref 88–224)
ECHO LV MASS INDEX 2D: 115.3 G/M2 (ref 49–115)
ECHO LV POSTERIOR WALL DIASTOLIC: 1.5 CM (ref 0.6–1)
ECHO LV RELATIVE WALL THICKNESS RATIO: 0.71
ECHO LVOT AREA: 3.1 CM2
ECHO LVOT AV VTI INDEX: 0.71
ECHO LVOT DIAM: 2 CM
ECHO LVOT MEAN GRADIENT: 2 MMHG
ECHO LVOT PEAK GRADIENT: 4 MMHG
ECHO LVOT PEAK VELOCITY: 1.1 M/S
ECHO LVOT STROKE VOLUME INDEX: 44.6 ML/M2
ECHO LVOT SV: 77.6 ML
ECHO LVOT VTI: 24.7 CM
ECHO MV AREA VTI: 2.4 CM2
ECHO MV LVOT VTI INDEX: 1.31
ECHO MV MAX VELOCITY: 1 M/S
ECHO MV MEAN GRADIENT: 2 MMHG
ECHO MV MEAN VELOCITY: 0.6 M/S
ECHO MV PEAK GRADIENT: 4 MMHG
ECHO MV REGURGITANT PEAK GRADIENT: 88 MMHG
ECHO MV REGURGITANT PEAK VELOCITY: 4.7 M/S
ECHO MV VTI: 32.4 CM
ECHO RV INTERNAL DIMENSION: 3.6 CM
ECHO TV REGURGITANT MAX VELOCITY: 2.32 M/S
ECHO TV REGURGITANT PEAK GRADIENT: 22 MMHG
ERYTHROCYTE [DISTWIDTH] IN BLOOD BY AUTOMATED COUNT: 15.7 % (ref 11.5–14.5)
GLUCOSE BLD STRIP.AUTO-MCNC: 152 MG/DL (ref 65–117)
GLUCOSE BLD STRIP.AUTO-MCNC: 153 MG/DL (ref 65–117)
GLUCOSE BLD STRIP.AUTO-MCNC: 219 MG/DL (ref 65–117)
GLUCOSE BLD STRIP.AUTO-MCNC: 226 MG/DL (ref 65–117)
GLUCOSE SERPL-MCNC: 134 MG/DL (ref 65–100)
HCT VFR BLD AUTO: 30.7 % (ref 36.6–50.3)
HGB BLD-MCNC: 9.9 G/DL (ref 12.1–17)
MCH RBC QN AUTO: 29.5 PG (ref 26–34)
MCHC RBC AUTO-ENTMCNC: 32.2 G/DL (ref 30–36.5)
MCV RBC AUTO: 91.4 FL (ref 80–99)
NRBC # BLD: 0 K/UL (ref 0–0.01)
NRBC BLD-RTO: 0 PER 100 WBC
PLATELET # BLD AUTO: 244 K/UL (ref 150–400)
PMV BLD AUTO: 10.7 FL (ref 8.9–12.9)
POTASSIUM SERPL-SCNC: 3.9 MMOL/L (ref 3.5–5.1)
RBC # BLD AUTO: 3.36 M/UL (ref 4.1–5.7)
SERVICE CMNT-IMP: ABNORMAL
SODIUM SERPL-SCNC: 142 MMOL/L (ref 136–145)
WBC # BLD AUTO: 10.6 K/UL (ref 4.1–11.1)

## 2023-01-16 PROCEDURE — 93308 TTE F-UP OR LMTD: CPT

## 2023-01-16 PROCEDURE — 74011000250 HC RX REV CODE- 250: Performed by: NURSE PRACTITIONER

## 2023-01-16 PROCEDURE — 74011250636 HC RX REV CODE- 250/636: Performed by: INTERNAL MEDICINE

## 2023-01-16 PROCEDURE — 82962 GLUCOSE BLOOD TEST: CPT

## 2023-01-16 PROCEDURE — 80048 BASIC METABOLIC PNL TOTAL CA: CPT

## 2023-01-16 PROCEDURE — 74011250637 HC RX REV CODE- 250/637: Performed by: NURSE PRACTITIONER

## 2023-01-16 PROCEDURE — 85027 COMPLETE CBC AUTOMATED: CPT

## 2023-01-16 PROCEDURE — 74011636637 HC RX REV CODE- 636/637: Performed by: NURSE PRACTITIONER

## 2023-01-16 PROCEDURE — 36415 COLL VENOUS BLD VENIPUNCTURE: CPT

## 2023-01-16 RX ORDER — BUMETANIDE 2 MG/1
1 TABLET ORAL DAILY
Qty: 15 TABLET | Refills: 0 | Status: SHIPPED | OUTPATIENT
Start: 2023-01-16 | End: 2023-02-15

## 2023-01-16 RX ORDER — ASPIRIN 81 MG/1
81 TABLET ORAL
Status: DISCONTINUED | OUTPATIENT
Start: 2023-01-16 | End: 2023-01-16 | Stop reason: HOSPADM

## 2023-01-16 RX ORDER — DEXAMETHASONE 6 MG/1
6 TABLET ORAL
Qty: 7 TABLET | Refills: 0 | Status: SHIPPED | OUTPATIENT
Start: 2023-01-16 | End: 2023-01-23

## 2023-01-16 RX ADMIN — SODIUM CHLORIDE, PRESERVATIVE FREE 10 ML: 5 INJECTION INTRAVENOUS at 05:51

## 2023-01-16 RX ADMIN — Medication 2 UNITS: at 12:48

## 2023-01-16 RX ADMIN — DEXAMETHASONE SODIUM PHOSPHATE 6 MG: 4 INJECTION, SOLUTION INTRAMUSCULAR; INTRAVENOUS at 12:36

## 2023-01-16 RX ADMIN — SODIUM CHLORIDE, PRESERVATIVE FREE 10 ML: 5 INJECTION INTRAVENOUS at 17:45

## 2023-01-16 RX ADMIN — OXYCODONE HYDROCHLORIDE AND ACETAMINOPHEN 500 MG: 500 TABLET ORAL at 12:36

## 2023-01-16 RX ADMIN — Medication 2 UNITS: at 06:48

## 2023-01-16 RX ADMIN — GUAIFENESIN 600 MG: 600 TABLET, EXTENDED RELEASE ORAL at 12:47

## 2023-01-16 RX ADMIN — BUMETANIDE 1 MG: 0.25 INJECTION INTRAMUSCULAR; INTRAVENOUS at 12:38

## 2023-01-16 RX ADMIN — ENOXAPARIN SODIUM 30 MG: 100 INJECTION SUBCUTANEOUS at 12:36

## 2023-01-16 RX ADMIN — ZINC SULFATE 220 MG (50 MG) CAPSULE 1 CAPSULE: CAPSULE at 12:47

## 2023-01-16 RX ADMIN — AMLODIPINE BESYLATE 10 MG: 5 TABLET ORAL at 12:47

## 2023-01-16 RX ADMIN — Medication 3 UNITS: at 00:37

## 2023-01-16 RX ADMIN — CARVEDILOL 25 MG: 12.5 TABLET, FILM COATED ORAL at 12:47

## 2023-01-16 RX ADMIN — CARVEDILOL 25 MG: 12.5 TABLET, FILM COATED ORAL at 17:44

## 2023-01-16 NOTE — DISCHARGE INSTRUCTIONS
DISCHARGE DIAGNOSIS:  ***    MEDICATIONS:  It is important that you take the medication exactly as they are prescribed. Keep your medication in the bottles provided by the pharmacist and keep a list of the medication names, dosages, and times to be taken in your wallet. Do not take other medications without consulting your doctor. Pain Management: per above medications    What to do at Home    Recommended diet:  {diet:84610}    Recommended activity: {discharge activity:08401}    If you have questions regarding the hospital related prescriptions or hospital related issues please call [address] [place] [address] at . You can always direct your questions to your primary care doctor if you are unable to reach your hospital physician; your PCP works as an extension of your hospital doctor just like your hospital doctor is an extension of your PCP for your time at the hospital [facility], [facility]).     If you experience any of the following symptoms then please call your primary care physician or return to the emergency room if you cannot get hold of your doctor:  Fever, chills, nausea, vomiting, diarrhea, change in mentation, falling, bleeding, shortness of breath, ***

## 2023-01-16 NOTE — PROGRESS NOTES
Problem: Airway Clearance - Ineffective  Goal: Achieve or maintain patent airway  Outcome: Progressing Towards Goal     Problem: Gas Exchange - Impaired  Goal: Absence of hypoxia  Outcome: Progressing Towards Goal  Goal: Promote optimal lung function  Outcome: Progressing Towards Goal     Problem: Breathing Pattern - Ineffective  Goal: Ability to achieve and maintain a regular respiratory rate  Outcome: Progressing Towards Goal     Problem:  Body Temperature -  Risk of, Imbalanced  Goal: Ability to maintain a body temperature within defined limits  Outcome: Progressing Towards Goal  Goal: Will regain or maintain usual level of consciousness  Outcome: Progressing Towards Goal  Goal: Complications related to the disease process, condition or treatment will be avoided or minimized  Outcome: Progressing Towards Goal     Problem: Isolation Precautions - Risk of Spread of Infection  Goal: Prevent transmission of infectious organism to others  Outcome: Progressing Towards Goal     Problem: Nutrition Deficits  Goal: Optimize nutrtional status  Outcome: Progressing Towards Goal     Problem: Risk for Fluid Volume Deficit  Goal: Maintain normal heart rhythm  Outcome: Progressing Towards Goal  Goal: Maintain absence of muscle cramping  Outcome: Progressing Towards Goal  Goal: Maintain normal serum potassium, sodium, calcium, phosphorus, and pH  Outcome: Progressing Towards Goal     Problem: Loneliness or Risk for Loneliness  Goal: Demonstrate positive use of time alone when socialization is not possible  Outcome: Progressing Towards Goal     Problem: Fatigue  Goal: Verbalize increase energy and improved vitality  Outcome: Progressing Towards Goal     Problem: Patient Education: Go to Patient Education Activity  Goal: Patient/Family Education  Outcome: Progressing Towards Goal     Problem: Pressure Injury - Risk of  Goal: *Prevention of pressure injury  Description: Document Mark Scale and appropriate interventions in the flowsheet. Outcome: Progressing Towards Goal  Note: Pressure Injury Interventions:  Sensory Interventions: Keep linens dry and wrinkle-free, Minimize linen layers    Moisture Interventions: Minimize layers    Activity Interventions: Increase time out of bed    Mobility Interventions: HOB 30 degrees or less    Nutrition Interventions: Document food/fluid/supplement intake    Friction and Shear Interventions: Minimize layers                Problem: Patient Education: Go to Patient Education Activity  Goal: Patient/Family Education  Outcome: Progressing Towards Goal     Problem: Falls - Risk of  Goal: *Absence of Falls  Description: Document Jaqueline Fall Risk and appropriate interventions in the flowsheet.   Outcome: Progressing Towards Goal  Note: Fall Risk Interventions:  Mobility Interventions: Patient to call before getting OOB    Mentation Interventions: Bed/chair exit alarm, More frequent rounding, Update white board, Reorient patient    Medication Interventions: Patient to call before getting OOB, Teach patient to arise slowly    Elimination Interventions: Bed/chair exit alarm, Call light in reach, Toileting schedule/hourly rounds              Problem: Patient Education: Go to Patient Education Activity  Goal: Patient/Family Education  Outcome: Progressing Towards Goal

## 2023-01-16 NOTE — PROGRESS NOTES
Hospitalist Progress Note    NAME: Tavo Sheets. :  1955   MRN:  [id number]       Assessment / Plan:  COVID Infection  Large pleural effusion  Rapid COVID: positive  Status post thoracocentesis on   CT chest WWO contrast:  Pro BNP: 15,552  1. No visualized pulmonary embolus. 2. Large volume of pleural fluid on the left with associated  atelectasis/consolidation of the left lower lobe. 3. Incidental findings as above  -proBNP elevated, procalcitonin 0.15,    D-dimer 2.28, no PE  - O2 support and neb treatment PRN  - Mucolytics, antitussive  - Vitamin C, Zinc  - Bumex IV daily  - not hypoxic, no need for steroids  -Status post thoracocentesis drainage of  1600, body fluid culture and stain negative  Awaiting 2D echo, last echo was in May 2021 showed EF of 55%    elevated Troponin, POA  ECG: Normal sinus rhythm. Possible Left atrial enlargement   Left ventricular hypertrophy with repolarization abnormality  Troponin: #1 215, #2 203, #3 242  - cardiology noted    2D echo pending   CAD, POA  Recent NSTEMI (s/p x 1 V CABG- 22)  - Plavix Allergy  - continue Coreg BID  Last EF is 55%  Hypertension, POA  - resume Norvasc     Hyperlipidemia, POA  - resume Lipitor     CKD (crea 1.92,  baseline 1.58 - 2.68)  - avoid nephrotoxins     NIDDM   A1C 5.4  - SSI with Lispro coverage        Code Status: Full  Surrogate Decision Maker: Thad Hansen, spouse [phone]), Alan Jones, sister [phone])     DVT Prophylaxis: Lovenox  GI Prophylaxis: not indicated     Baseline: lives with family, independent with ADLs      18.5 - 24.9 Normal weight / Body mass index is 20.23 kg/m². Estimated discharge date:   Barriers: Fluid studies, 2d echo pending     Code status: Full  Prophylaxis: Lovenox  Recommended Disposition:  PT, OT, RN     Subjective:     Chief Complaint / Reason for Physician Visit  Follow-up on COVID-19, pleural effusion and fluid overload.   Discussed with RN events overnight. No acute issues on room air  Review of Systems:  Symptom Y/N Comments  Symptom Y/N Comments   Fever/Chills n   Chest Pain n    Poor Appetite    Edema     Cough    Abdominal Pain n    Sputum    Joint Pain     SOB/ARELLANO n   Pruritis/Rash     Nausea/vomit    Tolerating PT/OT     Diarrhea    Tolerating Diet y    Constipation    Other       Could NOT obtain due to:      Objective:     VITALS:   Last 24hrs VS reviewed since prior progress note. Most recent are:  Patient Vitals for the past 24 hrs:   Temp Pulse Resp BP SpO2   01/16/23 0853 98.2 °F (36.8 °C) (!) 59 17 (!) 128/58 99 %   01/16/23 0119 -- (!) 54 15 134/68 98 %   01/16/23 0000 98.1 °F (36.7 °C) (!) 56 16 134/68 98 %   01/15/23 2000 -- 61 -- -- --   01/15/23 1904 98.2 °F (36.8 °C) 61 15 134/63 100 %         Intake/Output Summary (Last 24 hours) at 1/16/2023 1555  Last data filed at 1/16/2023 0582  Gross per 24 hour   Intake 500 ml   Output 550 ml   Net -50 ml          I had a face to face encounter and independently examined this patient on 1/16/2023, as outlined below:  PHYSICAL EXAM:  General: WD, WN. Alert, cooperative, no acute distress    EENT:  EOMI. Anicteric sclerae. MMM  Resp:  CTA bilaterally, no wheezing or rales. No accessory muscle use  CV:  Regular  rhythm,  No edema  GI:  Soft, Non distended, Non tender. +Bowel sounds  Neurologic:  Alert and oriented X 3, normal speech,   Psych:   Good insight. Not anxious nor agitated  Skin:  No rashes.   No jaundice    Reviewed most current lab test results and cultures  YES  Reviewed most current radiology test results   YES  Review and summation of old records today    NO  Reviewed patient's current orders and MAR    YES  PMH/SH reviewed - no change compared to H&P  ________________________________________________________________________  Care Plan discussed with:    Comments   Patient     Family      RN     Care Manager     Consultant                        Multidiciplinary team rounds were held today with , nursing, pharmacist and clinical coordinator. Patient's plan of care was discussed; medications were reviewed and discharge planning was addressed. ________________________________________________________________________  Total NON critical care TIME:  35   Minutes    Total CRITICAL CARE TIME Spent:   Minutes non procedure based      Comments   >50% of visit spent in counseling and coordination of care     ________________________________________________________________________  Sarah Simeon MD     Procedures: see electronic medical records for all procedures/Xrays and details which were not copied into this note but were reviewed prior to creation of Plan. LABS:  I reviewed today's most current labs and imaging studies.   Pertinent labs include:  Recent Labs     01/16/23  0341 01/15/23  0308 01/14/23  0430   WBC 10.6 12.2* 9.1   HGB 9.9* 10.1* 10.5*   HCT 30.7* 30.7* 32.5*    245 241       Recent Labs     01/16/23  0341 01/15/23  0308 01/14/23  0430    139 140   K 3.9 4.0 4.1   * 110* 109*   CO2 22 22 22   * 129* 134*   BUN 52* 54* 48*   CREA 1.83* 2.10* 2.22*   CA 7.8* 7.9* 8.2*         Signed: Sarah Simeon MD

## 2023-01-16 NOTE — PROGRESS NOTES
EP/ ARRHYTHMIA    Patient ID:  Patient: Zachary Ramos MRN: [id number]  Age: 75 y.o.  : 1955    Date of  Admission: 2023  4:43 PM   PCP:  Dahlia Runner, MD    Assessment:   Positive troponin in the setting of COVID infection. CAD with recent NSTEMI in 2022 with CABGx1. Multifactorial dyspnea including with large L pleural effusion with lung consolidation. S/p thoracentesis (see analysis). Hypertension. Hypercholesterolemia. 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 Tobacco smoker. History of stroke. Plavix allergy stated. Mild acute renal failure. Full code. Plan:     Continue beta-blocker. Continue statin. Continue amlodipine. Agree with diuretic. Last 3 Recorded Weights in this Encounter    23 1630   Weight: 61.2 kg (135 lb)     DVT prophylaxis. Clopidogrel allergy reported, started baby ASA daily. Supportive care, COVID. I reordered tele. [x]       High complexity decision making was performed in this patient    Zachary Ramos is a 75 y.o. male with a history of the above with a positive troponin. I was asked to see him for this. He denies chest pain. No syncope, dizziness, palpitations. No orthopnea, PND, or edema. His breathing is better but not at baseline.     No Known Allergies       Current Facility-Administered Medications   Medication Dose Route Frequency    enoxaparin (LOVENOX) injection 30 mg  30 mg SubCUTAneous Q24H    insulin lispro (HUMALOG) injection   SubCUTAneous Q6H    glucose chewable tablet 16 g  4 Tablet Oral PRN    glucagon (GLUCAGEN) injection 1 mg  1 mg IntraMUSCular PRN    dextrose 10% infusion 0-250 mL  0-250 mL IntraVENous PRN    carvediloL (COREG) tablet 25 mg  25 mg Oral BID    bumetanide (BUMEX) injection 1 mg  1 mg IntraVENous DAILY    dexamethasone (DECADRON) 4 mg/mL injection 6 mg  6 mg IntraVENous Q24H    sodium chloride (NS) flush 5-40 mL  5-40 mL IntraVENous Q8H    sodium chloride (NS) flush 5-40 mL 5-40 mL IntraVENous PRN    acetaminophen (TYLENOL) tablet 650 mg  650 mg Oral Q6H PRN    Or    acetaminophen (TYLENOL) suppository 650 mg  650 mg Rectal Q6H PRN    polyethylene glycol (MIRALAX) packet 17 g  17 g Oral DAILY PRN    ondansetron (ZOFRAN ODT) tablet 4 mg  4 mg Oral Q8H PRN    Or    ondansetron (ZOFRAN) injection 4 mg  4 mg IntraVENous Q6H PRN    guaiFENesin ER (MUCINEX) tablet 600 mg  600 mg Oral Q12H    guaiFENesin (ROBITUSSIN) 100 mg/5 mL oral liquid 200 mg  200 mg Oral Q4H PRN    albuterol (PROVENTIL HFA, VENTOLIN HFA, PROAIR HFA) inhaler 2 Puff  2 Puff Inhalation Q4H PRN    ascorbic acid (vitamin C) (VITAMIN C) tablet 500 mg  500 mg Oral DAILY    zinc sulfate (ZINCATE) 50 mg zinc (220 mg) capsule 1 Capsule  1 Capsule Oral DAILY    amLODIPine (NORVASC) tablet 10 mg  10 mg Oral DAILY    atorvastatin (LIPITOR) tablet 20 mg  20 mg Oral QHS    hydrALAZINE (APRESOLINE) 20 mg/mL injection 10-20 mg  10-20 mg IntraVENous Q6H PRN       Review of Symptoms:  Noncontributory across 12 body systems except as above. Objective:      Physical Exam:  Temp (24hrs), Av.5 °F (36.4 °C), Min:97.3 °F (36.3 °C), Max:97.6 °F (36.4 °C)    Patient Vitals for the past 8 hrs:   Pulse   01/15/23 1904 61      Patient Vitals for the past 8 hrs:   Resp   01/15/23 1904 15      Patient Vitals for the past 8 hrs:   BP   01/15/23 1904 134/63          Intake/Output Summary (Last 24 hours) at 1/15/2023 2206  Last data filed at 1/15/2023 0042  Gross per 24 hour   Intake --   Output 200 ml   Net -200 ml         Nondiaphoretic, not in acute distress, ill-appearing. Unlabored, clear to auscultation bilaterally anteriorly, symmetric air movement. Sternotomy incision noted. Regular rate and rhythm. Palpable radial pulses bilaterally. Abdomen, soft, nontender, nondistended. Extremities without cyanosis or clubbing. Muscle tone and bulk normal for age. Skin warm and dry. No rashes or ulcers. No tremor.   Awake and appropriate. CARDIOGRAPHICS and STUDIES, I reviewed:    Telemetry:  No events. ECG 1/12:  Sinus rhythm, left atrial enlargement, LVH with repolarization abnormality. Echo 5/2021:  Left Ventricle Normal cavity size and systolic function (ejection fraction normal). Mild concentric hypertrophy. Wall motion: normal. The estimated EF is 55 - 60%. Visually measured ejection fraction. Left Atrium Normal cavity size. Right Ventricle Normal cavity size and global systolic function. Right Atrium Normal cavity size. Aortic Valve Normal valve structure and no stenosis. Mild to moderate aortic valve regurgitation. Mitral Valve Normal valve structure, no stenosis and no regurgitation. Tricuspid Valve Normal valve structure, no stenosis and no regurgitation. Pulmonic Valve Pulmonic valve not well visualized. Aorta Normal aortic root. Pericardium No evidence of pericardial effusion. Labs:  No results for input(s): CPK, CKMB, CKNDX, TROIQ in the last 72 hours. No lab exists for component: CPKMB  Lab Results   Component Value Date/Time    Cholesterol, total 113 05/14/2021 06:02 AM    HDL Cholesterol 38 05/14/2021 06:02 AM    LDL, calculated 58.4 05/14/2021 06:02 AM    Triglyceride 83 05/14/2021 06:02 AM    CHOL/HDL Ratio 3.0 05/14/2021 06:02 AM     Recent Labs     01/13/23  0355   INR 1.3*   PTP 13.5*        Recent Labs     01/15/23  0308 01/14/23  0430 01/13/23  0355    140 141   K 4.0 4.1 3.3*   * 109* 108   CO2 22 22 24   BUN 54* 48* 20   CREA 2.10* 2.22* 1.85*   * 134* 74   CA 7.9* 8.2* 8.4*   ALB  --   --  2.7*   WBC 12.2* 9.1 7.5   HGB 10.1* 10.5* 9.4*   HCT 30.7* 32.5* 29.9*    241 220       Recent Labs     01/13/23  0355   AP 88   TP 6.4   ALB 2.7*   GLOB 3.7       No components found for: GLPOC  No results for input(s): PH, PCO2, PO2 in the last 72 hours.         Naren Panda MD  1/15/2023

## 2023-01-17 ENCOUNTER — PATIENT OUTREACH (OUTPATIENT)
Dept: CASE MANAGEMENT | Age: 68
End: 2023-01-17

## 2023-01-17 LAB
BACTERIA SPEC CULT: NORMAL
GRAM STN SPEC: NORMAL
GRAM STN SPEC: NORMAL
SERVICE CMNT-IMP: NORMAL

## 2023-01-17 NOTE — PROGRESS NOTES
Pt discharged home with belongings. Gulshan Harry RN completed discharge process and review with pt and removed IV access for this nurse.

## 2023-01-17 NOTE — PROGRESS NOTES
Care Transitions Initial Call    Call within 2 business days of discharge: Yes     Patient: Harley Hernandez. Patient : 1955 MRN: [id number]    Last Discharge [address]       Date Complaint Diagnosis Description Type Department Provider    23 Positive For Covid-19; Shortness of Breath Large pleural effusion . .. ED to Hosp-Admission (Discharged) (ADMIT) Tommie Brady MD; Faisal Brown,... Was this an external facility discharge? No Discharge Facility: [facility] -     Challenges to be reviewed by the provider   Additional needs identified to be addressed with provider: yes  [facility] - Covid 19/Large pleural effusion s/p thoracentesis  (1600)         Method of communication with provider : chart routing    Discussed COVID-19 related testing which was available at this time. Test results were positive. Patient informed of results, if available? yes     Advance Care Planning:   Does patient have an Advance Directive: not on file, declined education at this time. Inpatient Readmission Risk score: Unplanned Readmit Risk Score: 16.2    Was this a readmission? no   Patient stated reason for the admission: sob, weakness, positive home Covid test    Patients top risk factors for readmission: medical condition-recent NSTEMI and CABG at [facility] (-), smoker,HTN,CKD3,Covid +    Interventions to address risk factors: Scheduled appointment with PCP-  , Scheduled appointment with Specialist-reminded wife to schedule f/u with cardio, and Obtained and reviewed discharge summary and/or continuity of care documents    Care Transition Nurse (CTN) contacted the family by telephone to perform post hospital discharge assessment. Verified name and  with family as identifiers. Provided introduction to self, and explanation of the CTN role. Spoke with wife, reports  is doing better. Less sob, no fever, stronger.  Has not started new Bumex or Dexamethasone, were not ready last night, will p/u today. Using walker to ambulate. Did not check FBS this am, reminded to do so to monitor while on steroid. CTN reviewed discharge instructions, medical action plan and red flags with family who verbalized understanding. Were discharge instructions available to patient? yes. Reviewed appropriate site of care based on symptoms and resources available to patient including: PCP, Specialist, Urgent Care Clinics, When to call 911, and [employer]. Family given an opportunity to ask questions and does not have any further questions or concerns at this time. The family agrees to contact the PCP office for questions related to their healthcare. Medication reconciliation was performed with family, who verbalizes understanding of administration of home medications. Advised obtaining a 90-day supply of all daily and as-needed medications. Wife says new rxs were not ready last night, she will pick both up today. Referral to Pharm D needed: no     Home Health/Outpatient orders at discharge: [address]: na  Date of initial visit: na    Durable Medical Equipment ordered at discharge: None  [address]:   Durable Medical Equipment received: na    Was patient discharged with a pulse oximeter? no    Discussed follow-up appointments. If no appointment was previously scheduled, appointment scheduling offered: yes. Is follow up appointment scheduled within 7 days of discharge? no. This was first available. [address] for now. [address] follow up appointment(s): No future appointments. Non-[facility] follow up appointment(s): , 1/26     Plan for follow-up call in 3-5 days based on severity of symptoms and risk factors. Plan for next call: symptom management-assess current symptoms, self management-following discharge instructions, follow up appointment-saw pcp for KOBY visit, and medication management-taking meds as ordered.   CTN provided contact information for future needs. Goals Addressed                   This Visit's Progress     Understands red flags post discharge. 1/17/23 ED [facility] 1/12-1/16 Covid 19 +, Large pleural effusion s/p thoracentesis  Reviewed discharge instructions with wife using teach back. Reviewed meds, education provided on new meds, using teach back. Reviewed red flags: sob, fever,nausea,vomiting,diarrhea,weakness, chest pain. KOBY with pcp, , 1/26. Reminded wife to schedule f/u with cardio. Given info on [facility] as resource, declined KOBY visit by them at this time. Given CTN contact info, advised to call if questions/or concerns. CTN to check back in about 3-5 days. jack

## 2023-01-19 ENCOUNTER — APPOINTMENT (OUTPATIENT)
Dept: GENERAL RADIOLOGY | Age: 68
End: 2023-01-19
Attending: EMERGENCY MEDICINE
Payer: MEDICARE

## 2023-01-19 ENCOUNTER — HOSPITAL ENCOUNTER (EMERGENCY)
Age: 68
Discharge: HOME | End: 2023-01-19
Attending: EMERGENCY MEDICINE
Payer: MEDICARE

## 2023-01-19 VITALS
BODY MASS INDEX: 20.46 KG/M2 | RESPIRATION RATE: 19 BRPM | TEMPERATURE: 98.5 F | WEIGHT: 135 LBS | HEART RATE: 62 BPM | SYSTOLIC BLOOD PRESSURE: 185 MMHG | HEIGHT: 68 IN | OXYGEN SATURATION: 100 % | DIASTOLIC BLOOD PRESSURE: 79 MMHG

## 2023-01-19 DIAGNOSIS — J18.9 PNEUMONIA OF LEFT LOWER LOBE DUE TO INFECTIOUS ORGANISM: Primary | ICD-10-CM

## 2023-01-19 LAB
ANION GAP SERPL CALC-SCNC: 5 MMOL/L (ref 5–15)
APPEARANCE UR: CLEAR
BACTERIA URNS QL MICRO: NEGATIVE /HPF
BASOPHILS # BLD: 0 K/UL (ref 0–0.1)
BASOPHILS NFR BLD: 0 % (ref 0–1)
BILIRUB UR QL: NEGATIVE
BNP SERPL-MCNC: 3709 PG/ML (ref 0–125)
BUN SERPL-MCNC: 36 MG/DL (ref 6–20)
BUN/CREAT SERPL: 21 (ref 12–20)
CALCIUM SERPL-MCNC: 8.6 MG/DL (ref 8.5–10.1)
CHLORIDE SERPL-SCNC: 106 MMOL/L (ref 97–108)
CO2 SERPL-SCNC: 29 MMOL/L (ref 21–32)
COLOR UR: ABNORMAL
CREAT SERPL-MCNC: 1.7 MG/DL (ref 0.7–1.3)
DIFFERENTIAL METHOD BLD: ABNORMAL
EOSINOPHIL # BLD: 0 K/UL (ref 0–0.4)
EOSINOPHIL NFR BLD: 0 % (ref 0–7)
EPITH CASTS URNS QL MICRO: NORMAL /LPF
ERYTHROCYTE [DISTWIDTH] IN BLOOD BY AUTOMATED COUNT: 15 % (ref 11.5–14.5)
GLUCOSE SERPL-MCNC: 185 MG/DL (ref 65–100)
GLUCOSE UR STRIP.AUTO-MCNC: 100 MG/DL
HCT VFR BLD AUTO: 36.9 % (ref 36.6–50.3)
HGB BLD-MCNC: 12.2 G/DL (ref 12.1–17)
HGB UR QL STRIP: ABNORMAL
IMM GRANULOCYTES # BLD AUTO: 0.1 K/UL (ref 0–0.04)
IMM GRANULOCYTES NFR BLD AUTO: 1 % (ref 0–0.5)
KETONES UR QL STRIP.AUTO: NEGATIVE MG/DL
LACTATE SERPL-SCNC: 1.2 MMOL/L (ref 0.4–2)
LEUKOCYTE ESTERASE UR QL STRIP.AUTO: NEGATIVE
LYMPHOCYTES # BLD: 1.2 K/UL (ref 0.8–3.5)
LYMPHOCYTES NFR BLD: 7 % (ref 12–49)
MCH RBC QN AUTO: 29.4 PG (ref 26–34)
MCHC RBC AUTO-ENTMCNC: 33.1 G/DL (ref 30–36.5)
MCV RBC AUTO: 88.9 FL (ref 80–99)
MONOCYTES # BLD: 1.5 K/UL (ref 0–1)
MONOCYTES NFR BLD: 9 % (ref 5–13)
NEUTS SEG # BLD: 13.8 K/UL (ref 1.8–8)
NEUTS SEG NFR BLD: 83 % (ref 32–75)
NITRITE UR QL STRIP.AUTO: NEGATIVE
NRBC # BLD: 0 K/UL (ref 0–0.01)
NRBC BLD-RTO: 0 PER 100 WBC
PH UR STRIP: 5.5 (ref 5–8)
PLATELET # BLD AUTO: 262 K/UL (ref 150–400)
PMV BLD AUTO: 11.5 FL (ref 8.9–12.9)
POTASSIUM SERPL-SCNC: 3.9 MMOL/L (ref 3.5–5.1)
PROT UR STRIP-MCNC: 300 MG/DL
RBC # BLD AUTO: 4.15 M/UL (ref 4.1–5.7)
RBC #/AREA URNS HPF: NORMAL /HPF (ref 0–5)
SODIUM SERPL-SCNC: 140 MMOL/L (ref 136–145)
SP GR UR REFRACTOMETRY: 1.02
TROPONIN-HIGH SENSITIVITY: 51 NG/L (ref 0–76)
UROBILINOGEN UR QL STRIP.AUTO: 0.2 EU/DL (ref 0.2–1)
WBC # BLD AUTO: 16.7 K/UL (ref 4.1–11.1)
WBC URNS QL MICRO: NORMAL /HPF (ref 0–4)

## 2023-01-19 PROCEDURE — 51701 INSERT BLADDER CATHETER: CPT

## 2023-01-19 PROCEDURE — 80048 BASIC METABOLIC PNL TOTAL CA: CPT

## 2023-01-19 PROCEDURE — 96374 THER/PROPH/DIAG INJ IV PUSH: CPT

## 2023-01-19 PROCEDURE — 84484 ASSAY OF TROPONIN QUANT: CPT

## 2023-01-19 PROCEDURE — 83880 ASSAY OF NATRIURETIC PEPTIDE: CPT

## 2023-01-19 PROCEDURE — 93005 ELECTROCARDIOGRAM TRACING: CPT

## 2023-01-19 PROCEDURE — 71045 X-RAY EXAM CHEST 1 VIEW: CPT

## 2023-01-19 PROCEDURE — 99285 EMERGENCY DEPT VISIT HI MDM: CPT

## 2023-01-19 PROCEDURE — 81001 URINALYSIS AUTO W/SCOPE: CPT

## 2023-01-19 PROCEDURE — 83605 ASSAY OF LACTIC ACID: CPT

## 2023-01-19 PROCEDURE — 87040 BLOOD CULTURE FOR BACTERIA: CPT

## 2023-01-19 PROCEDURE — 74011250637 HC RX REV CODE- 250/637: Performed by: EMERGENCY MEDICINE

## 2023-01-19 PROCEDURE — 96361 HYDRATE IV INFUSION ADD-ON: CPT

## 2023-01-19 PROCEDURE — 85025 COMPLETE CBC W/AUTO DIFF WBC: CPT

## 2023-01-19 PROCEDURE — 74011250636 HC RX REV CODE- 250/636: Performed by: EMERGENCY MEDICINE

## 2023-01-19 PROCEDURE — 74011000250 HC RX REV CODE- 250: Performed by: EMERGENCY MEDICINE

## 2023-01-19 RX ORDER — AZITHROMYCIN 250 MG/1
250 TABLET, FILM COATED ORAL DAILY
Qty: 4 TABLET | Refills: 0 | Status: SHIPPED | OUTPATIENT
Start: 2023-01-20 | End: 2023-01-24

## 2023-01-19 RX ORDER — AZITHROMYCIN 500 MG/1
500 TABLET, FILM COATED ORAL
Status: COMPLETED | OUTPATIENT
Start: 2023-01-19 | End: 2023-01-19

## 2023-01-19 RX ORDER — AMOXICILLIN AND CLAVULANATE POTASSIUM 875; 125 MG/1; MG/1
1 TABLET, FILM COATED ORAL 2 TIMES DAILY
Qty: 20 TABLET | Refills: 0 | Status: SHIPPED | OUTPATIENT
Start: 2023-01-19

## 2023-01-19 RX ADMIN — AZITHROMYCIN DIHYDRATE 500 MG: 500 TABLET, FILM COATED ORAL at 03:28

## 2023-01-19 RX ADMIN — CEFTRIAXONE SODIUM 1 G: 1 INJECTION, POWDER, FOR SOLUTION INTRAMUSCULAR; INTRAVENOUS at 03:28

## 2023-01-19 RX ADMIN — SODIUM CHLORIDE 500 ML: 900 INJECTION, SOLUTION INTRAVENOUS at 04:16

## 2023-01-19 NOTE — ED NOTES
Discharge instructions were given to the patient by Omar Devi RN. The patient left the Emergency Department ambulatory, alert and oriented and in no acute distress with 2 prescriptions. The patient was encouraged to call or return to the ED for worsening issues or problems and was encouraged to schedule a follow up appointment for continuing care. The patient verbalized understanding of discharge instructions and prescriptions, all questions were answered. The patient has no further concerns at this time.

## 2023-01-19 NOTE — ED PROVIDER NOTES
[facility] EMERGENCY DEPT  EMERGENCY DEPARTMENT ENCOUNTER       Pt Name: Eduard Ryan MRN: [id number]  Birthdate 1955  Date of evaluation: 1/19/2023  Provider: Erika Fishman MD   PCP: Armand Coronado MD  Note Started: 1:17 AM 1/19/23     CHIEF COMPLAINT       Chief Complaint   Patient presents with    Shortness of Breath     Pt arrives via EMS (called by wife) for difficulty breathing. Recent admission to hospital (was COVID+). HISTORY OF PRESENT ILLNESS: 1 or more elements      History From: Patient, History limited by:  none     Eduard Ryan is a 75 y.o. male who presents via EMS with chief complaint of worsening dyspnea tonight. Patient was recently admitted for [facility] and had a thoracentesis during that admission for pleural effusion. States he was doing better when he first got home. Tonight his breathing got worse. It has improved since arrival here. Still mild dyspnea. Review of systems admits to mild congestion. Denies fever, cough, leg swelling, chest pain. Nursing Notes were all reviewed and agreed with or any disagreements were addressed in the HPI. REVIEW OF SYSTEMS        Positives and Pertinent negatives as per HPI. PAST HISTORY     Past Medical History:  Past Medical History:   Diagnosis Date    CAD (coronary artery disease)     10/31/22: NSTEMI sees [facility], may be getting CABG    Fractured rib 03/2021    from a fall per pt on 5/11/2021    High cholesterol     Hypertension     per pt on 5/11/2021    Stroke ([address] 2022       Past Surgical History:  Past Surgical History:   Procedure Laterality Date    COLONOSCOPY N/A 9/27/2022    COLONOSCOPY performed by Gautam Stapleton MD at [address]      per pt on 5/11/2021       Family History:  No family history on file.     Social History:  Social History     Tobacco Use    Smoking status: Every Day     Packs/day: 0.50     Types: Cigarettes    Smokeless tobacco: Never   Vaping Use    Vaping Use: Never used   Substance Use Topics    Alcohol use: Not Currently     Alcohol/week: 1.0 standard drink     Types: 1 Cans of beer per week    Drug use: Not Currently       Allergies: Allergies   Allergen Reactions    Clopidogrel Not Reported This Time       CURRENT MEDICATIONS      Discharge Medication List as of 1/19/2023  6:06 AM        CONTINUE these medications which have NOT CHANGED    Details   bumetanide (BUMEX) 2 mg tablet Take 0.5 Tablets by mouth daily for 30 days. , Normal, Disp-15 Tablet, R-0      dexAMETHasone (DECADRON) 6 mg tablet Take 1 Tablet by mouth Daily (before breakfast) for 7 days. , Normal, Disp-7 Tablet, R-0      carvediloL (COREG) 25 mg tablet Take 25 mg by mouth two (2) times a day., Historical Med      empagliflozin (JARDIANCE) 10 mg tablet Take 10 mg by mouth daily. , Historical Med      amLODIPine (NORVASC) 10 mg tablet TAKE 1 TABLET EVERY DAY, Normal, Disp-90 Tablet, R-1      OTHER Patient taking antihypertensive x 2, unable to recall medication names, Historical Med      polyethylene glycol (Miralax) 17 gram/dose powder Take 17 g by mouth daily. 1 tablespoon with 8 oz of water daily, Normal, Disp-510 g, R-0      atorvastatin (LIPITOR) 20 mg tablet TAKE 1 TABLET EVERY NIGHT, Normal, Disp-90 Tablet, R-1      cholecalciferol (VITAMIN D3) (1000 Units /25 mcg) tablet Take 1 Tablet by mouth daily. , Normal, Disp-90 Tablet, R-1      aspirin (ASPIRIN) 325 mg tablet Take 1 Tablet by mouth daily. , Normal, Disp-30 Tablet, R-4             SCREENINGS               No data recorded         PHYSICAL EXAM      ED Triage Vitals [01/19/23 0045]   ED Encounter Vitals Group      BP (!) 144/66      Pulse (Heart Rate) 62      Resp Rate 19      Temp 98.5 °F (36.9 °C)      Temp src       O2 Sat (%) 100 %      Weight 135 lb      Height 5' 8\"        Physical Exam  Constitutional:       General: He is not in acute distress. Appearance: He is not ill-appearing or toxic-appearing. HENT:      Nose: No congestion. Pulmonary:      Effort: Pulmonary effort is normal.      Breath sounds: Normal breath sounds. Abdominal:      Palpations: Abdomen is soft. Skin:     General: Skin is warm and dry. Neurological:      General: No focal deficit present. Mental Status: He is oriented to person, place, and time. Psychiatric:         Mood and Affect: Mood normal.        DIAGNOSTIC RESULTS   LABS:     No results found for this or any previous visit (from the past 12 hour(s)). EKG: If performed, independent interpretation documented below in the MDM section     RADIOLOGY:  Non-plain film images such as CT, Ultrasound and MRI are read by the radiologist. Plain radiographic images are visualized and preliminarily interpreted by the ED Provider with the findings documented in the MDM section. Interpretation per the Radiologist below, if available at the time of this note:     XR CHEST PORT    Result Date: 1/19/2023  EXAM:  XR CHEST PORT INDICATION: Dyspnea COMPARISON: 1/13/2023 TECHNIQUE: Portable AP upright chest view at 0211 hours FINDINGS: The cardiomediastinal contours are stable. The pulmonary vasculature is within normal limits. There is a stable small left pleural effusion and left basilar opacity. The right lung and pleural space are clear. There is no pneumothorax. The bones and upper abdomen are stable. Stable small left pleural effusion and left basilar opacity. No new acute process.         PROCEDURES   Unless otherwise noted below, none  Procedures       EMERGENCY DEPARTMENT COURSE and DIFFERENTIAL DIAGNOSIS/MDM   Vitals:    Vitals:    01/19/23 0429 01/19/23 0528 01/19/23 0549 01/19/23 0600   BP: (!) 152/82 (!) 169/80  (!) 185/79   Pulse:       Resp:       Temp:       SpO2: 100% 100% 100%    Weight:       Height:            Patient was given the following medications:  Medications   cefTRIAXone (ROCEPHIN) 1 g in sterile water (preservative free) 10 mL IV syringe (1 g IntraVENous Given 1/19/23 0328) azithromycin (ZITHROMAX) tablet 500 mg (500 mg Oral Given 1/19/23 0328)   sodium chloride 0.9 % bolus infusion 500 mL (0 mL IntraVENous IV Completed 1/19/23 0600)       Medical Decision Making  Do not see a documented history of asthma or COPD and patient is not wheezing. Concern for persistent COVID symptoms versus recurrent pleural effusion/CHF. Alternative etiologies include pneumonia, uri, acs, hypertensive urgency. Low suspicion PE given no tachycardia and no pain. Amount and/or Complexity of Data Reviewed  Labs: ordered. Radiology: ordered. ECG/medicine tests: ordered. Risk  Prescription drug management. **PLEASE SEE ED COURSE DETAILS BELOW FOR FURTHER MDM DETAILS:  ED Course as of 01/19/23 1936   Thu Jan 19, 2023   0140 EKG done at 137: Sinus bradycardia, rate 57, J-point elevation suggestive of early repull, LVH, normal axis, normal intervals, no ectopy, nonspecific ST segment changes. Minimal change from EKG done 1/12/2023. No obvious ischemia. Interpreted by me. [SS]   6721 Echo done on 1/16/2023 showed EF of 50 to 55%. [SS]   0159 States breathing treatment helped. Will treat as asthma exac triggered by viral URI [SS]   0042 X-ray shows stable small left pleural effusion and left basilar opacity which are chronic. However, given dyspnea elusive leukocytosis, will treat as pneumonia [SS]   0351 Will send lactate to eval for possible sepsis. Will not give sepsis fluid volume given history of recent CHF/pleural effusion. [SS]   0413 Patient has not yet voided. RN attempted straight cath and gross hematuria came out through catheter. Per RN report, there was significant resistance at prostate.   It is unknown whether patient had Adame cath during recent hospitalization, but would suspect he had prior difficult Adame placement secondary to enlarged prostate which could be the source of urethral  blood tonight [SS]   6097 Will give gentle fluid bolus to promote spontaneous urination. [SS] 0441 Lactate wnl. The plan will be discharge on abx for pneumonia. Need patient to void before d/c, b/c at this point I have concern for urinary retention.   [SS]   0606 Patient finally urinated. Urine is normal in appearance. [SS]      ED Course User Index  [SS] Luann Young MD         FINAL IMPRESSION     1. Pneumonia of left lower lobe due to infectious organism          DISPOSITION/PLAN   Hernan Crowe JrEunice's  results have been reviewed with him. He has been counseled regarding his diagnosis, treatment, and plan. He verbally conveys understanding and agreement of the signs, symptoms, diagnosis, treatment and prognosis and additionally agrees to follow up as discussed. He also agrees with the care-plan and conveys that all of his questions have been answered. I have also provided discharge instructions for him that include: educational information regarding their diagnosis and treatment, and list of reasons why they would want to return to the ED prior to their follow-up appointment, should his condition change. PATIENT REFERRED TO:  Follow-up Information       Follow up With Specialties Details Why Contact Info    [address] [place], [id number]  [address]  [phone]                DISCHARGE MEDICATIONS:  Discharge Medication List as of 1/19/2023  6:06 AM        START taking these medications    Details   azithromycin (ZITHROMAX) 250 mg tablet Take 1 Tablet by mouth daily for 4 days. , Normal, Disp-4 Tablet, R-0      amoxicillin-clavulanate (Augmentin) 875-125 mg per tablet Take 1 Tablet by mouth two (2) times a day., Normal, Disp-20 Tablet, R-0           CONTINUE these medications which have NOT CHANGED    Details   bumetanide (BUMEX) 2 mg tablet Take 0.5 Tablets by mouth daily for 30 days. , Normal, Disp-15 Tablet, R-0      dexAMETHasone (DECADRON) 6 mg tablet Take 1 Tablet by mouth Daily (before breakfast) for 7 days. , Normal, Disp-7 Tablet, R-0 carvediloL (COREG) 25 mg tablet Take 25 mg by mouth two (2) times a day., Historical Med      empagliflozin (JARDIANCE) 10 mg tablet Take 10 mg by mouth daily. , Historical Med      amLODIPine (NORVASC) 10 mg tablet TAKE 1 TABLET EVERY DAY, Normal, Disp-90 Tablet, R-1      OTHER Patient taking antihypertensive x 2, unable to recall medication names, Historical Med      polyethylene glycol (Miralax) 17 gram/dose powder Take 17 g by mouth daily. 1 tablespoon with 8 oz of water daily, Normal, Disp-510 g, R-0      atorvastatin (LIPITOR) 20 mg tablet TAKE 1 TABLET EVERY NIGHT, Normal, Disp-90 Tablet, R-1      cholecalciferol (VITAMIN D3) (1000 Units /25 mcg) tablet Take 1 Tablet by mouth daily. , Normal, Disp-90 Tablet, R-1      aspirin (ASPIRIN) 325 mg tablet Take 1 Tablet by mouth daily. , Normal, Disp-30 Tablet, R-4               DISCONTINUED MEDICATIONS:  Discharge Medication List as of 1/19/2023  6:06 AM          I am the Primary Clinician of Record. Kiko Vaca MD (electronically signed)    (Please note that parts of this dictation were completed with voice recognition software. Quite often unanticipated grammatical, syntax, homophones, and other interpretive errors are inadvertently transcribed by the computer software. Please disregards these errors.  Please excuse any errors that have escaped final proofreading.)

## 2023-01-19 NOTE — ED NOTES
Straight cath unsuccessful, Dr Arash Burgos bedside for evaluation. Will obtain condom cath for urine sample.

## 2023-01-19 NOTE — ED NOTES
Pt has medium sized condom catheter placed on pt, with urine bag on side of bed, pt aware of need for urine.

## 2023-01-19 NOTE — ED NOTES
Wife is here to , went over d/c instructions with her, made aware of possible prostate issue and pt dx of pnuemonia. Pt d/c home. Given wheelchair.

## 2023-01-20 LAB
ATRIAL RATE: 57 BPM
CALCULATED P AXIS, ECG09: 58 DEGREES
CALCULATED R AXIS, ECG10: 41 DEGREES
CALCULATED T AXIS, ECG11: -154 DEGREES
DIAGNOSIS, 93000: NORMAL
P-R INTERVAL, ECG05: 158 MS
Q-T INTERVAL, ECG07: 434 MS
QRS DURATION, ECG06: 92 MS
QTC CALCULATION (BEZET), ECG08: 422 MS
VENTRICULAR RATE, ECG03: 57 BPM

## 2023-01-22 LAB
BACTERIA SPEC CULT: NORMAL
SERVICE CMNT-IMP: NORMAL

## 2023-01-24 ENCOUNTER — PATIENT OUTREACH (OUTPATIENT)
Dept: CASE MANAGEMENT | Age: 68
End: 2023-01-24

## 2023-01-25 NOTE — PROGRESS NOTES
Called and LM 1/24 and 1/25. Message sent via [employer] portal as well. Will continue to try and contact patient.

## 2023-01-30 ENCOUNTER — TELEPHONE (OUTPATIENT)
Dept: INTERNAL MEDICINE CLINIC | Age: 68
End: 2023-01-30

## 2023-02-02 ENCOUNTER — PATIENT OUTREACH (OUTPATIENT)
Dept: CASE MANAGEMENT | Age: 68
End: 2023-02-02

## 2023-02-10 ENCOUNTER — PATIENT OUTREACH (OUTPATIENT)
Dept: CASE MANAGEMENT | Age: 68
End: 2023-02-10

## 2023-02-13 ENCOUNTER — PATIENT OUTREACH (OUTPATIENT)
Dept: CASE MANAGEMENT | Age: 68
End: 2023-02-13

## 2023-02-13 NOTE — PROGRESS NOTES
Called and LM 2/13 to check on patient for final KOBY call. Unable to reach. No hospitalizations since last admission to [facility] 1/12-1/16. Advised in message to contact CTN if any questions/concerns. Otherwise, wished him all the best.  Episode closed at this time.

## 2023-03-14 ENCOUNTER — APPOINTMENT (OUTPATIENT)
Dept: GENERAL RADIOLOGY | Age: 68
End: 2023-03-14
Attending: EMERGENCY MEDICINE
Payer: MEDICARE

## 2023-03-14 ENCOUNTER — HOSPITAL ENCOUNTER (EMERGENCY)
Age: 68
Discharge: ANOTHER HOSPITAL | End: 2023-03-14
Attending: EMERGENCY MEDICINE
Payer: MEDICARE

## 2023-03-14 ENCOUNTER — HOSPITAL ENCOUNTER (INPATIENT)
Age: 68
LOS: 4 days | Discharge: ANOTHER HOSPITAL | DRG: 315 | End: 2023-03-18
Attending: EMERGENCY MEDICINE | Admitting: HOSPITALIST
Payer: MEDICARE

## 2023-03-14 VITALS
BODY MASS INDEX: 20.46 KG/M2 | HEIGHT: 68 IN | WEIGHT: 135 LBS | SYSTOLIC BLOOD PRESSURE: 157 MMHG | OXYGEN SATURATION: 99 % | RESPIRATION RATE: 19 BRPM | TEMPERATURE: 97.5 F | DIASTOLIC BLOOD PRESSURE: 73 MMHG | HEART RATE: 69 BPM

## 2023-03-14 DIAGNOSIS — N18.32 STAGE 3B CHRONIC KIDNEY DISEASE (HCC): ICD-10-CM

## 2023-03-14 DIAGNOSIS — J90 PLEURAL EFFUSION: ICD-10-CM

## 2023-03-14 DIAGNOSIS — E87.0 HYPERNATREMIA: ICD-10-CM

## 2023-03-14 DIAGNOSIS — J90 PLEURAL EFFUSION: Primary | ICD-10-CM

## 2023-03-14 DIAGNOSIS — R06.00 DYSPNEA, UNSPECIFIED TYPE: Primary | ICD-10-CM

## 2023-03-14 DIAGNOSIS — R06.02 SOB (SHORTNESS OF BREATH): ICD-10-CM

## 2023-03-14 DIAGNOSIS — R77.8 ELEVATED TROPONIN I LEVEL: ICD-10-CM

## 2023-03-14 LAB
ALBUMIN SERPL-MCNC: 2.5 G/DL (ref 3.5–5)
ALBUMIN/GLOB SERPL: 0.7 (ref 1.1–2.2)
ALP SERPL-CCNC: 105 U/L (ref 45–117)
ALT SERPL-CCNC: 13 U/L (ref 12–78)
ANION GAP SERPL CALC-SCNC: 8 MMOL/L (ref 5–15)
AST SERPL-CCNC: 21 U/L (ref 15–37)
ATRIAL RATE: 63 BPM
BASOPHILS # BLD: 0.1 K/UL (ref 0–0.1)
BASOPHILS NFR BLD: 1 % (ref 0–1)
BILIRUB SERPL-MCNC: 0.3 MG/DL (ref 0.2–1)
BUN SERPL-MCNC: 15 MG/DL (ref 6–20)
BUN/CREAT SERPL: 9 (ref 12–20)
CALCIUM SERPL-MCNC: 8.1 MG/DL (ref 8.5–10.1)
CALCULATED P AXIS, ECG09: 56 DEGREES
CALCULATED R AXIS, ECG10: 35 DEGREES
CALCULATED T AXIS, ECG11: -151 DEGREES
CHLORIDE SERPL-SCNC: 113 MMOL/L (ref 97–108)
CO2 SERPL-SCNC: 29 MMOL/L (ref 21–32)
CREAT SERPL-MCNC: 1.74 MG/DL (ref 0.7–1.3)
DIAGNOSIS, 93000: NORMAL
DIFFERENTIAL METHOD BLD: ABNORMAL
EOSINOPHIL # BLD: 1.2 K/UL (ref 0–0.4)
EOSINOPHIL NFR BLD: 16 % (ref 0–7)
ERYTHROCYTE [DISTWIDTH] IN BLOOD BY AUTOMATED COUNT: 16.3 % (ref 11.5–14.5)
GLOBULIN SER CALC-MCNC: 3.8 G/DL (ref 2–4)
GLUCOSE SERPL-MCNC: 107 MG/DL (ref 65–100)
HCT VFR BLD AUTO: 34 % (ref 36.6–50.3)
HGB BLD-MCNC: 10.9 G/DL (ref 12.1–17)
IMM GRANULOCYTES # BLD AUTO: 0 K/UL (ref 0–0.04)
IMM GRANULOCYTES NFR BLD AUTO: 0 % (ref 0–0.5)
LYMPHOCYTES # BLD: 2 K/UL (ref 0.8–3.5)
LYMPHOCYTES NFR BLD: 26 % (ref 12–49)
MCH RBC QN AUTO: 29.9 PG (ref 26–34)
MCHC RBC AUTO-ENTMCNC: 32.1 G/DL (ref 30–36.5)
MCV RBC AUTO: 93.2 FL (ref 80–99)
MONOCYTES # BLD: 0.5 K/UL (ref 0–1)
MONOCYTES NFR BLD: 7 % (ref 5–13)
NEUTS SEG # BLD: 3.7 K/UL (ref 1.8–8)
NEUTS SEG NFR BLD: 50 % (ref 32–75)
NRBC # BLD: 0 K/UL (ref 0–0.01)
NRBC BLD-RTO: 0 PER 100 WBC
P-R INTERVAL, ECG05: 180 MS
PLATELET # BLD AUTO: 270 K/UL (ref 150–400)
PMV BLD AUTO: 11.6 FL (ref 8.9–12.9)
POTASSIUM SERPL-SCNC: 4 MMOL/L (ref 3.5–5.1)
PROT SERPL-MCNC: 6.3 G/DL (ref 6.4–8.2)
Q-T INTERVAL, ECG07: 434 MS
QRS DURATION, ECG06: 84 MS
QTC CALCULATION (BEZET), ECG08: 444 MS
RBC # BLD AUTO: 3.65 M/UL (ref 4.1–5.7)
RBC MORPH BLD: ABNORMAL
SODIUM SERPL-SCNC: 150 MMOL/L (ref 136–145)
TROPONIN I SERPL HS-MCNC: 100 NG/L (ref 0–76)
TROPONIN I SERPL HS-MCNC: 117 NG/L (ref 0–76)
VENTRICULAR RATE, ECG03: 63 BPM
WBC # BLD AUTO: 7.5 K/UL (ref 4.1–11.1)

## 2023-03-14 PROCEDURE — 65270000046 HC RM TELEMETRY

## 2023-03-14 PROCEDURE — 99285 EMERGENCY DEPT VISIT HI MDM: CPT

## 2023-03-14 PROCEDURE — 96374 THER/PROPH/DIAG INJ IV PUSH: CPT

## 2023-03-14 PROCEDURE — 74011250636 HC RX REV CODE- 250/636: Performed by: EMERGENCY MEDICINE

## 2023-03-14 PROCEDURE — 71046 X-RAY EXAM CHEST 2 VIEWS: CPT

## 2023-03-14 PROCEDURE — 74011000250 HC RX REV CODE- 250: Performed by: HOSPITALIST

## 2023-03-14 PROCEDURE — 84484 ASSAY OF TROPONIN QUANT: CPT

## 2023-03-14 PROCEDURE — 85025 COMPLETE CBC W/AUTO DIFF WBC: CPT

## 2023-03-14 PROCEDURE — 80053 COMPREHEN METABOLIC PANEL: CPT

## 2023-03-14 PROCEDURE — 93005 ELECTROCARDIOGRAM TRACING: CPT

## 2023-03-14 PROCEDURE — 36415 COLL VENOUS BLD VENIPUNCTURE: CPT

## 2023-03-14 PROCEDURE — 74011250637 HC RX REV CODE- 250/637: Performed by: EMERGENCY MEDICINE

## 2023-03-14 PROCEDURE — 74011250637 HC RX REV CODE- 250/637: Performed by: HOSPITALIST

## 2023-03-14 RX ORDER — HYDRALAZINE HYDROCHLORIDE 20 MG/ML
20 INJECTION INTRAMUSCULAR; INTRAVENOUS
Status: COMPLETED | OUTPATIENT
Start: 2023-03-14 | End: 2023-03-14

## 2023-03-14 RX ORDER — LANOLIN ALCOHOL/MO/W.PET/CERES
325 CREAM (GRAM) TOPICAL
COMMUNITY

## 2023-03-14 RX ORDER — PANTOPRAZOLE SODIUM 40 MG/1
40 TABLET, DELAYED RELEASE ORAL DAILY
COMMUNITY

## 2023-03-14 RX ORDER — ASPIRIN 325 MG
325 TABLET ORAL DAILY
Status: CANCELLED | OUTPATIENT
Start: 2023-03-15

## 2023-03-14 RX ORDER — PANTOPRAZOLE SODIUM 40 MG/1
40 TABLET, DELAYED RELEASE ORAL DAILY
Status: DISCONTINUED | OUTPATIENT
Start: 2023-03-15 | End: 2023-03-18 | Stop reason: HOSPADM

## 2023-03-14 RX ORDER — SODIUM CHLORIDE 0.9 % (FLUSH) 0.9 %
5-40 SYRINGE (ML) INJECTION EVERY 8 HOURS
Status: DISCONTINUED | OUTPATIENT
Start: 2023-03-14 | End: 2023-03-18 | Stop reason: HOSPADM

## 2023-03-14 RX ORDER — ATORVASTATIN CALCIUM 20 MG/1
80 TABLET, FILM COATED ORAL
Status: DISCONTINUED | OUTPATIENT
Start: 2023-03-14 | End: 2023-03-14 | Stop reason: SDUPTHER

## 2023-03-14 RX ORDER — AMLODIPINE BESYLATE 5 MG/1
10 TABLET ORAL DAILY
Status: DISCONTINUED | OUTPATIENT
Start: 2023-03-15 | End: 2023-03-15

## 2023-03-14 RX ORDER — IPRATROPIUM BROMIDE AND ALBUTEROL SULFATE 2.5; .5 MG/3ML; MG/3ML
3 SOLUTION RESPIRATORY (INHALATION)
Status: DISCONTINUED | OUTPATIENT
Start: 2023-03-14 | End: 2023-03-18 | Stop reason: HOSPADM

## 2023-03-14 RX ORDER — SODIUM CHLORIDE 0.9 % (FLUSH) 0.9 %
5-40 SYRINGE (ML) INJECTION AS NEEDED
Status: DISCONTINUED | OUTPATIENT
Start: 2023-03-14 | End: 2023-03-18 | Stop reason: HOSPADM

## 2023-03-14 RX ORDER — MELATONIN
1000 DAILY
Status: DISCONTINUED | OUTPATIENT
Start: 2023-03-15 | End: 2023-03-18 | Stop reason: HOSPADM

## 2023-03-14 RX ORDER — ASPIRIN 81 MG/1
81 TABLET ORAL DAILY
COMMUNITY

## 2023-03-14 RX ORDER — ATORVASTATIN CALCIUM 80 MG/1
80 TABLET, FILM COATED ORAL
COMMUNITY

## 2023-03-14 RX ORDER — ASPIRIN 81 MG/1
81 TABLET ORAL DAILY
Status: DISCONTINUED | OUTPATIENT
Start: 2023-03-15 | End: 2023-03-14

## 2023-03-14 RX ORDER — LANOLIN ALCOHOL/MO/W.PET/CERES
325 CREAM (GRAM) TOPICAL
Status: DISCONTINUED | OUTPATIENT
Start: 2023-03-15 | End: 2023-03-18 | Stop reason: HOSPADM

## 2023-03-14 RX ORDER — ATORVASTATIN CALCIUM 20 MG/1
80 TABLET, FILM COATED ORAL
Status: DISCONTINUED | OUTPATIENT
Start: 2023-03-14 | End: 2023-03-18 | Stop reason: HOSPADM

## 2023-03-14 RX ORDER — CARVEDILOL 12.5 MG/1
25 TABLET ORAL 2 TIMES DAILY
Status: DISCONTINUED | OUTPATIENT
Start: 2023-03-14 | End: 2023-03-18 | Stop reason: HOSPADM

## 2023-03-14 RX ADMIN — ATORVASTATIN CALCIUM 80 MG: 20 TABLET, FILM COATED ORAL at 21:00

## 2023-03-14 RX ADMIN — HYDRALAZINE HYDROCHLORIDE 20 MG: 20 INJECTION, SOLUTION INTRAMUSCULAR; INTRAVENOUS at 12:23

## 2023-03-14 RX ADMIN — CARVEDILOL 25 MG: 12.5 TABLET, FILM COATED ORAL at 20:59

## 2023-03-14 RX ADMIN — SODIUM CHLORIDE, PRESERVATIVE FREE 10 ML: 5 INJECTION INTRAVENOUS at 21:00

## 2023-03-14 NOTE — H&P
History and Physical    Date of Service:  3/14/2023  Primary Care Provider: Mallory Cunningham MD  Source of information: The patient and reviewing his previous medical record    Chief Complaint: Shortness of Breath since this morning      History of Presenting Illness:   Bernardino Dunn is a 75 y.o. -American male PMH significant for history of left pleural effusion s/p thoracentesis in January, 2023 at [facility], CAD s/p CABG, HTN, CVA, HLD who who is transferred from [facility] after he presented with progressive shortness of breath since this morning and work-up at [facility] showed moderate left pleural effusion and transferred to [facility] ER. Patient denied cough, chest pain, fever, chills, diaphoresis nausea, vomiting or abdominal pain. He denied any weight loss and stop smoking last year. The patient denies any headache, blurry vision, sore throat, trouble swallowing, trouble with speech, urinary symptoms, constipation, recent travels, sick contacts, focal or generalized neurological symptoms, falls, injuries, rashes, contact with COVID-19 diagnosed patients, hematemesis, melena, hemoptysis, hematuria, rashes, denies starting any new medications and denies any other concerns or problems besides as mentioned above. Chest x-ray showed moderate size left pleural effusion, increased         REVIEW OF SYSTEMS:  A comprehensive review of systems was negative except for that written in the History of Present Illness.      Past Medical History:   Diagnosis Date    CAD (coronary artery disease)     10/31/22: NSTEMI sees [facility] cardiology, may be getting CABG    Fractured rib 03/2021    from a fall per pt on 5/11/2021    High cholesterol     Hypertension     per pt on 5/11/2021    Stroke [facility]) 2022      Past Surgical History:   Procedure Laterality Date    COLONOSCOPY N/A 9/27/2022    COLONOSCOPY performed by Leonid Oates MD at [address]      per pt on 5/11/2021     Prior to Admission medications    Medication Sig Start Date End Date Taking? Authorizing Provider   aspirin delayed-release 81 mg tablet Take 81 mg by mouth daily. Yes Provider, Historical   atorvastatin (LIPITOR) 80 mg tablet Take 80 mg by mouth nightly. Yes Provider, Historical   ferrous sulfate 325 mg (65 mg iron) tablet Take 325 mg by mouth Before breakfast and dinner. Yes Provider, Historical   pantoprazole (PROTONIX) 40 mg tablet Take 40 mg by mouth daily. Yes Provider, Historical   carvediloL (COREG) 25 mg tablet Take 25 mg by mouth two (2) times a day. 12/26/22  Yes Provider, Historical   amLODIPine (NORVASC) 10 mg tablet TAKE 1 TABLET EVERY DAY 10/18/22   Letohatcheeshayna Crews., NP     Allergies   Allergen Reactions    Clopidogrel Not Reported This Time      History reviewed. No pertinent family history. Social History:  reports that he has been smoking cigarettes. He has been smoking an average of .5 packs per day. He has never used smokeless tobacco. He reports that he does not currently use alcohol after a past usage of about 1.0 standard drink per week. He reports that he does not currently use drugs. Social Determinants of Health     Tobacco Use: High Risk    Smoking Tobacco Use: Every Day    Smokeless Tobacco Use: Never    Passive Exposure: Not on file   Alcohol Use: Not on file   Financial Resource Strain: Not on file   Food Insecurity: Not on file   Transportation Needs: Not on file   Physical Activity: Not on file   Stress: Not on file   Social Connections: Not on file   Intimate Partner Violence: Not on file   Depression: Not at risk    PHQ-2 Score: 2   Housing Stability: Not on file        Medications were reconciled to the best of my ability given all available resources at the time of admission. Route is PO if not otherwise noted. Family and social history were personally reviewed, all pertinent and relevant details are outlined as above.     Objective:   Visit Vitals  BP (!) 165/81 Pulse 68   Temp 97.7 °F (36.5 °C)   Resp 20   Ht 5' 8\" (1.727 m)   Wt 61.2 kg (135 lb)   SpO2 97%   BMI 20.53 kg/m²      O2 Device: None (Room air)    PHYSICAL EXAM:   General: Alert x oriented x 3, awake, no acute distress,   HEENT: PEERL, EOMI, moist mucus membranes  Neck: Supple, no JVD, no meningeal signs  Chest: Decreased bronchial breath to auscultation bilaterally   CVS: RRR, S1 S2 heard, no murmurs/rubs/gallops  Abd: Soft, non-tender, non-distended, +bowel sounds   Ext: No clubbing, no cyanosis, no edema  Neuro/Psych: Pleasant mood and affect, chest and well-oriented, CN 2-12 grossly intact, sensory grossly within normal limit, Strength 5/5 in all extremities, DTR 1+ x 4  Cap refill: Brisk, less than 3 seconds  Pulses: 2+, symmetric in all extremities  Skin: Warm, dry, without rashes or lesions    Data Review:   I have independently reviewed and interpreted patient's lab and all other diagnostic data    Abnormal Labs Reviewed - No data to display    All Micro Results       None            IMAGING:   US THORACENTESIS NDL PUNC ASP W IMAGE    (Results Pending)        ECG/ECHO:    Results for orders placed or performed during the hospital encounter of 03/14/23   EKG, 12 LEAD, INITIAL   Result Value Ref Range    Ventricular Rate 63 BPM    Atrial Rate 63 BPM    P-R Interval 180 ms    QRS Duration 84 ms    Q-T Interval 434 ms    QTC Calculation (Bezet) 444 ms    Calculated P Axis 56 degrees    Calculated R Axis 35 degrees    Calculated T Axis -151 degrees    Diagnosis       Normal sinus rhythm  Possible Left atrial enlargement  Left ventricular hypertrophy with repolarization abnormality  Abnormal ECG  When compared with ECG of 19-JAN-2023 01:37,  No significant change was found  Confirmed by Yariel Crow M.D., Gerardo Spencer ([id number]) on 3/14/2023 12:40:08 PM            Notes reviewed from all clinical/nonclinical/nursing services involved in patient's clinical care.  Care coordination discussions were held with appropriate clinical/nonclinical/ nursing providers based on care coordination needs. Assessment:   Given the patient's current clinical presentation, there is a high level of concern for decompensation if discharged from the emergency department. Complex decision making was performed, which includes reviewing the patient's available past medical records, laboratory results, and imaging studies. Active Problems:  Shortness of breath due to moderate left pleural effusion  Hypernatremia  Elevated troponin  Hx of CAD s/p CABG  HTN  History of CVA  HLD      Plan:     Shortness of breath due to moderate left pleural effusion unclear etiology  -Admit patient to telemetry floor  -IR is consulted for thoracentesis for diagnostic and therapeutic  -Last echo was on 1/16/2023 LVEF 50 to 55%, left ventricular size was normal  -No leukocytosis, afebrile or left-sided chest pain  -Saturation of oxygen 97% on room air  -Follow-up on pleural fluid analysis  -Consult pulmonologist    Hypernatremia likely due to poor oral intake  -Start D5 W at 75 mL/h, cautious with IV fluid as patient with left pleural effusion  -Repeat BMP in a.m. Elevated troponin possible due to demand ischemia versus renal insufficiency  -Patient denied left-sided chest pain    Hx of CAD s/p CABG  -Stable, continue home Coreg Lipitor, hold aspirin for now for thoracentesis    CKD stage III  -Creatinine is stable  -Avoid nephrotoxin  -Monitor renal function test    HTN poorly controlled  -Continue Coreg, Norvasc and as needed hydralazine, monitor BP    History of CVA  -stable, continue Lipitor, hold aspirin    HLD  -Continue Lipitor        DIET: No diet orders on file   ISOLATION PRECAUTIONS: There are currently no Active Isolations  CODE STATUS: Full code  DVT PROPHYLAXIS: SCDs  FUNCTIONAL STATUS PRIOR TO HOSPITALIZATION: Fully active and ambulatory; able to carry on all self-care without restriction.   Ambulatory status/function: Ambulates with assistance:  [employer] and [employer]   EARLY MOBILITY ASSESSMENT:     ANTICIPATED DISCHARGE: Greater than 48 hours. ANTICIPATED DISPOSITION: Home  EMERGENCY CONTACT/SURROGATE DECISION MAKER: Nickie Prieto, spouse, [phone]    CRITICAL CARE WAS PERFORMED FOR THIS ENCOUNTER: NO.      Signed By: Javier Ferreira MD     March 14, 2023         Please note that this dictation may have been completed with Dragon, the computer voice recognition software. Quite often unanticipated grammatical, syntax, homophones, and other interpretive errors are inadvertently transcribed by the computer software. Please disregard these errors. Please excuse any errors that have escaped final proofreading.

## 2023-03-14 NOTE — ED NOTES
TRANSFER - OUT REPORT:    Verbal report given to Liban Blount RN (name) on Luis Angel Rivera.  being transferred to [facility] ED (unit) for routine progression of care       Report consisted of patients Situation, Background, Assessment and   Recommendations(SBAR). Information from the following report(s) SBAR, Kardex, ED Summary, [facility] and Recent Results was reviewed with the receiving nurse. Lines:   Peripheral IV 03/14/23 Right Antecubital (Active)   Site Assessment Clean, dry, & intact 03/14/23 1205   Phlebitis Assessment 0 03/14/23 1205   Infiltration Assessment 0 03/14/23 1205   Dressing Status Clean, dry, & intact 03/14/23 1205   Dressing Type Transparent 03/14/23 1205   Hub Color/Line Status Pink;Flushed 03/14/23 1205   Action Taken Blood drawn 03/14/23 1205        Opportunity for questions and clarification was provided.       Patient transported with:   [employer]

## 2023-03-14 NOTE — ED NOTES
Pt presents to ED accompanied by caretaker complaining of shortness of breath x 1 day. Pt's caretaker states that pt had been hospitalized for COVID in January and had pneumonia afterwards. Pt presents with diminished breath sounds on the left lung, and has hx of heart surgery. Pt currently takes aspirin. Pt is alert and oriented x 4, RR even and unlabored, skin is warm and dry. Assessment completed and pt updated on plan of care. Call bell in reach. Emergency Department Nursing Plan of Care       The Nursing Plan of Care is developed from the Nursing assessment and Emergency Department Attending provider initial evaluation. The plan of care may be reviewed in the ED Provider note.     The Plan of Care was developed with the following considerations:   Patient / Family readiness to learn indicated by:verbalized understanding  Persons(s) to be included in education: patient and caretaker  Barriers to Learning/Limitations:No    Signed     Srinivasan Calderon RN    3/14/2023

## 2023-03-14 NOTE — ED PROVIDER NOTES
EMERGENCY DEPARTMENT HISTORY AND PHYSICAL EXAM      Patient Name: Frank Olmstead MRN: [id number]  YOB: 1955    Provider: Mali Alcantara MD  PCP: Radene Sacks, MD     Time/Date of evaluation: 11:49 AM 3/14/23    History of Presenting Illness     Chief Complaint   Patient presents with    Shortness of Breath     Per pt reports shortness of breath started earlier today, \"it feels like I've been running and I'm breathing real hard,\" denies chest pain. PMHx open heart surgery 11/2022. History Provided By: Patient     History Larkelsie Goodman):   Frank Olmstead is a 75 y.o. male with a PMHx of CAD, hyperlipidemia, hypertension, and CVA  who presents to the emergency department (room 3) by POV C/O shortness of breath that started this morning after he woke up. Patient denies any symptoms last night prior to going to bed and tells me he woke up feeling pretty normal for the first 30 to 45 minutes. He denies any chest pain, lightheadedness, dizziness, blurry vision, or slurred speech. He also denies any fever, cough, or known sick contacts. Past History     Past Medical History:  Past Medical History:   Diagnosis Date    CAD (coronary artery disease)     10/31/22: NSTEMI sees [facility] cardiology, may be getting CABG    Fractured rib 03/2021    from a fall per pt on 5/11/2021    High cholesterol     Hypertension     per pt on 5/11/2021    Stroke ([facility] [address] 2022       Past Surgical History:  Past Surgical History:   Procedure Laterality Date    COLONOSCOPY N/A 9/27/2022    COLONOSCOPY performed by Kelly Xiao MD at [address]      per pt on 5/11/2021       Family History:  History reviewed. No pertinent family history.     Social History:  Social History     Tobacco Use    Smoking status: Every Day     Packs/day: 0.50     Types: Cigarettes    Smokeless tobacco: Never   Vaping Use    Vaping Use: Never used   Substance Use Topics    Alcohol use: Not Currently     Alcohol/week: 1.0 standard drink     Types: 1 Cans of beer per week    Drug use: Not Currently       Medications:  Current Outpatient Medications   Medication Sig Dispense Refill    amoxicillin-clavulanate (Augmentin) 875-125 mg per tablet Take 1 Tablet by mouth two (2) times a day. 20 Tablet 0    carvediloL (COREG) 25 mg tablet Take 25 mg by mouth two (2) times a day. empagliflozin (JARDIANCE) 10 mg tablet Take 10 mg by mouth daily. (Patient not taking: No sig reported)      amLODIPine (NORVASC) 10 mg tablet TAKE 1 TABLET EVERY DAY 90 Tablet 1    OTHER Patient taking antihypertensive x 2, unable to recall medication names (Patient not taking: No sig reported)      polyethylene glycol (Miralax) 17 gram/dose powder Take 17 g by mouth daily. 1 tablespoon with 8 oz of water daily 510 g 0    atorvastatin (LIPITOR) 20 mg tablet TAKE 1 TABLET EVERY NIGHT 90 Tablet 1    cholecalciferol (VITAMIN D3) (1000 Units /25 mcg) tablet Take 1 Tablet by mouth daily. (Patient not taking: No sig reported) 90 Tablet 1    aspirin (ASPIRIN) 325 mg tablet Take 1 Tablet by mouth daily. 30 Tablet 4       Allergies: Allergies   Allergen Reactions    Clopidogrel Not Reported This Time       Social Determinants of Health:  Social Determinants of Health     Tobacco Use: High Risk    Smoking Tobacco Use: Every Day    Smokeless Tobacco Use: Never    Passive Exposure: Not on file   Alcohol Use: Not on file   Financial Resource Strain: Not on file   Food Insecurity: Not on file   Transportation Needs: Not on file   Physical Activity: Not on file   Stress: Not on file   Social Connections: Not on file   Intimate Partner Violence: Not on file   Depression: Not at risk    PHQ-2 Score: 2   Housing Stability: Not on file       Review of Systems     Review of Systems   Constitutional:  Negative for chills and fever. Respiratory:  Positive for shortness of breath. Negative for cough. Cardiovascular:  Negative for chest pain and leg swelling. Gastrointestinal:  Negative for abdominal pain. Neurological:  Negative for dizziness. Physical Exam     Patient Vitals for the past 24 hrs:   Temp Pulse Resp BP SpO2   03/14/23 1243 -- 70 19 (!) 149/68 100 %   03/14/23 1139 97.5 °F (36.4 °C) 65 19 (!) 204/97 100 %       Physical Exam  Vitals and nursing note reviewed. Constitutional:       Appearance: Normal appearance. He is well-developed. HENT:      Head: Normocephalic and atraumatic. Cardiovascular:      Rate and Rhythm: Normal rate and regular rhythm. Pulses: Normal pulses. Heart sounds: Normal heart sounds. Pulmonary:      Effort: Pulmonary effort is normal. No respiratory distress. Breath sounds: Examination of the left-middle field reveals decreased breath sounds. Examination of the left-lower field reveals decreased breath sounds. Decreased breath sounds present. Chest:      Chest wall: No tenderness. Abdominal:      General: Bowel sounds are normal.      Palpations: Abdomen is soft. Tenderness: There is no abdominal tenderness. There is no guarding or rebound. Musculoskeletal:      Cervical back: Full passive range of motion without pain, normal range of motion and neck supple. Skin:     General: Skin is warm and dry. Coloration: Skin is pale. Findings: No erythema or rash. Neurological:      Mental Status: He is alert and oriented to person, place, and time. Psychiatric:         Speech: Speech normal.         Behavior: Behavior normal.         Thought Content:  Thought content normal.         Judgment: Judgment normal.       Diagnostic Study Results     Labs:  Recent Results (from the past 12 hour(s))   TROPONIN-HIGH SENSITIVITY    Collection Time: 03/14/23 12:02 PM   Result Value Ref Range    Troponin-High Sensitivity 117 (H) 0 - 76 ng/L   CBC WITH AUTOMATED DIFF    Collection Time: 03/14/23 12:02 PM   Result Value Ref Range    WBC 7.5 4.1 - 11.1 K/uL    RBC 3.65 (L) 4.10 - 5.70 M/uL    HGB 10.9 (L) 12.1 - 17.0 g/dL    HCT 34.0 (L) 36.6 - 50.3 %    MCV 93.2 80.0 - 99.0 FL    MCH 29.9 26.0 - 34.0 PG    MCHC 32.1 30.0 - 36.5 g/dL    RDW 16.3 (H) 11.5 - 14.5 %    PLATELET 395 200 - 582 K/uL    MPV 11.6 8.9 - 12.9 FL    NRBC 0.0 0  WBC    ABSOLUTE NRBC 0.00 0.00 - 0.01 K/uL    NEUTROPHILS 50 32 - 75 %    LYMPHOCYTES 26 12 - 49 %    MONOCYTES 7 5 - 13 %    EOSINOPHILS 16 (H) 0 - 7 %    BASOPHILS 1 0 - 1 %    IMMATURE GRANULOCYTES 0 0.0 - 0.5 %    ABS. NEUTROPHILS 3.7 1.8 - 8.0 K/UL    ABS. LYMPHOCYTES 2.0 0.8 - 3.5 K/UL    ABS. MONOCYTES 0.5 0.0 - 1.0 K/UL    ABS. EOSINOPHILS 1.2 (H) 0.0 - 0.4 K/UL    ABS. BASOPHILS 0.1 0.0 - 0.1 K/UL    ABS. IMM. GRANS. 0.0 0.00 - 0.04 K/UL    DF SMEAR SCANNED      RBC COMMENTS NORMOCYTIC, NORMOCHROMIC     METABOLIC PANEL, COMPREHENSIVE    Collection Time: 03/14/23 12:02 PM   Result Value Ref Range    Sodium 150 (H) 136 - 145 mmol/L    Potassium 4.0 3.5 - 5.1 mmol/L    Chloride 113 (H) 97 - 108 mmol/L    CO2 29 21 - 32 mmol/L    Anion gap 8 5 - 15 mmol/L    Glucose 107 (H) 65 - 100 mg/dL    BUN 15 6 - 20 MG/DL    Creatinine 1.74 (H) 0.70 - 1.30 MG/DL    BUN/Creatinine ratio 9 (L) 12 - 20      eGFR 42 (L) >60 ml/min/1.73m2    Calcium 8.1 (L) 8.5 - 10.1 MG/DL    Bilirubin, total 0.3 0.2 - 1.0 MG/DL    ALT (SGPT) 13 12 - 78 U/L    AST (SGOT) 21 15 - 37 U/L    Alk.  phosphatase 105 45 - 117 U/L    Protein, total 6.3 (L) 6.4 - 8.2 g/dL    Albumin 2.5 (L) 3.5 - 5.0 g/dL    Globulin 3.8 2.0 - 4.0 g/dL    A-G Ratio 0.7 (L) 1.1 - 2.2     EKG, 12 LEAD, INITIAL    Collection Time: 03/14/23 12:02 PM   Result Value Ref Range    Ventricular Rate 63 BPM    Atrial Rate 63 BPM    P-R Interval 180 ms    QRS Duration 84 ms    Q-T Interval 434 ms    QTC Calculation (Bezet) 444 ms    Calculated P Axis 56 degrees    Calculated R Axis 35 degrees    Calculated T Axis -151 degrees    Diagnosis       Normal sinus rhythm  Possible Left atrial enlargement  Left ventricular hypertrophy with repolarization abnormality  Abnormal ECG  When compared with ECG of 19-JAN-2023 01:37,  No significant change was found  Confirmed by Harriet Echols M.D., Leigh Ann Barnes ([id number]) on 3/14/2023 12:40:08 PM         Radiologic Studies:   XR CHEST PA LAT   Final Result   Moderate-sized left pleural effusion, increased. CT Results  (Last 48 hours)      None          CXR Results  (Last 48 hours)      None            I have independently reviewed patient's chest x-ray which shows a large left lower lobe pleural effusion. Procedures     EKG    Date/Time: 3/14/2023 12:02 PM  Performed by: Oksana Doshi MD  Authorized by: Oksana Doshi MD     ECG reviewed by ED Physician in the absence of a cardiologist: yes    Previous ECG:     Previous ECG:  Compared to current    Similarity:  No change    Comparison ECG info:  01/19/2023  Rate:     ECG rate:  63    ECG rate assessment: normal    Rhythm:     Rhythm: sinus rhythm    Ectopy:     Ectopy: none    QRS:     QRS axis:  Normal  T waves:     T waves: inverted      Inverted:  V4, V5 and V6  Other findings:     Other findings: LVH      ED Course     11:49 AM I Angy Nelson MD) am the first provider for this patient. Initial assessment performed. I reviewed the vital signs, available nursing notes, past medical history, past surgical history, family history and social history. The patients presenting problems have been discussed, and they are in agreement with the care plan formulated and outlined with them. I have encouraged them to ask questions as they arise throughout their visit.     Records Reviewed: Nursing Notes, Old Medical Records, ED Notes from Outside Facility (Separate Group), and Clinical Records from a Different Specialty (cardiothoracic surgery note from 11/16/2022 and geriatric medicine note from [facility] from 11/23/2022)    Cardiac Monitor:  Rate: 65 bpm  Rhythm: Sinus Rhythm    Pulse Oximetry Analysis - 100% on RA    Is this patient to be included in the SEP-1 core measure due to severe sepsis or septic shock? NoExclusion criteria - the patient is NOT to be included for SEP-1 Core Measure due to: 2+ SIRS criteria are not met    MEDICATIONS ADMINISTERED IN THE ED:  Medications   hydrALAZINE (APRESOLINE) 20 mg/mL injection 20 mg (20 mg IntraVENous Given 3/14/23 1223)       ED Course as of 03/15/23 1339   Tue Mar 14, 2023   1332 1:32 PM  Charisma Narayanan MD spoke with Dr. Kristina Poe, Consult for Hospitalist at [facility]. Discussed HPI and PE, available diagnostic tests and clinical findings. He is in agreement with care plans as outlined. He is willing to accept the patient but there is no bed available at this time. He asks that we speak to the ED provider at [facility] [MS]   1359 1:59 PM  Charisma Narayanan MD spoke with Dr. Marina Stephens, Consult for ED Attending at [facility]. Discussed HPI and PE, available diagnostic tests and clinical findings. He is in agreement with care plans as outlined. He accepts the patient to the ED at [facility]. [MS]      ED Course User Index  [MS] Wm Quezada MD       Medical Decision Making     DDX: Pleural effusion, pneumonia, pneumothorax, anemia, ACS    DISCUSSION:  This appears to be a moderate condition. This appears to be an acute condition. 75 y.o. male presents with shortness of breath that started this morning. He is hypertensive but the rest of his vitals are unremarkable. His family member reports giving him his medication around 630 this morning. Will order basic labs including CBC, CMP, troponin, proBNP, chest x-ray, and IV hydralazine and reassess. The decision to perform testing and results were discussed with the patient. I discussed each of these tests and considerations with the patient. The patient agrees with the plan of transfer.     Additional Considerations:  None    Critical Care Time:     CRITICAL CARE NOTE :    2:09 PM    IMPENDING DETERIORATION -Airway and Respiratory    ASSOCIATED RISK FACTORS - Hypoxia    MANAGEMENT- Bedside Assessment and Transfer    INTERPRETATION -  Xrays, ECG, and Blood Pressure    INTERVENTIONS - Metobolic interventions    CASE REVIEW - Hospitalist/Intensivist, Medical Sub-Specialist, Nursing, and Family    TREATMENT RESPONSE -Improved    PERFORMED BY - Self    NOTES   :    I personally spent 65 minutes of critical care time with this patient. This is time spent at this critically ill patient's bedside actively involved in patient care as well as the coordination of care and discussions with the patient's family. This includes time involved in examination of patient, ordering and reviewing of laboratory and imaging studies, cardiac monitoring, pulse oximetry, re-evaluation of patient's condition, evaluation of patient's response to treatment, ordering and performing treatments and interventions, review of old charts, consultations with specialist, discussions with family regarding pertinent collateral history and plan of care, bedside attention and documentation. During this entire length of time I was immediately available to the patient. This does not include time spent on separately reported billable procedures. Critical Care: The reason for providing this level of medical care for this critically-ill patient was due to a critical illness that impaired one or more vital organ systems, such that there was a high probability of imminent or life-threatening deterioration in the patient's condition. This care involved the highest level of preparedness to intervene urgently. This care involved high complexity decision making to assess, manipulate, and support vital system functions, to treat this degree of vital organ system failure, and to prevent further life threatening deterioration of the patients condition requiring frequent assessments and interventions.     Viri Marshall MD      Diagnosis and Disposition   Transfer Note:  1:45 PM  Patient is being transferred to the ED at [facility], transfer accepted by Dr. Bruno Hutchison (ED) and Dr. Franca Segal (hospitalist). The reasons for patient's transfer have been discussed with the patient and available family. They convey agreement and understanding for the need to be transferred as explained to them by Marylu Hodgson MD.     CLINICAL IMPRESSION:    1. Pleural effusion    2. SOB (shortness of breath)    3. Hypernatremia    4. Elevated troponin I level    5. Stage 3b chronic kidney disease ([facility] [address]        PLAN:  1. Transfer to [facility] MD Reny am the primary clinician of record. Dragon Disclaimer     Please note that this dictation was completed with Ultromex, the computer voice recognition software. Quite often unanticipated grammatical, syntax, homophones, and other interpretive errors are inadvertently transcribed by the computer software. Please disregard these errors. Please excuse any errors that have escaped final proofreading.     Marylu Hodgson MD

## 2023-03-14 NOTE — ED NOTES
TRANSFER - OUT REPORT:    Verbal report given to Enrique(name) on [address].  being transferred to (unit) for routine progression of care       Report consisted of patients Situation, Background, Assessment and   Recommendations(SBAR). Information from the following report(s) SBAR was reviewed with the receiving nurse. Lines:   Peripheral IV 49/52/97 Left Basilic (Active)        Opportunity for questions and clarification was provided.       Patient transported with:   [employer]

## 2023-03-14 NOTE — ED TRIAGE NOTES
Patient transferred to [facility] from [facility] with SOB. Patient slid from stretcher to bed. Patient is a+ox4. Skin is warm and dry. Respirations are even and unlabored.

## 2023-03-14 NOTE — ED NOTES
This is a 77-year-old male who was transferred to this facility for admission by the hospitalist service and further evaluation of recurrent pleural effusion. Presented with shortness of breath today. The physician in the ED at [facility] called to transfer the patient here. He is spoken to the hospitalist who is willing to admit the patient to the hospital but there were no beds upstairs and the patient would have to come to the ER and Dr. Soraida Torres therefore called me. The patient will need to have thoracentesis at some point. I notified IR and talk to the interventional list at about 3:00 to let them know the patient was coming. The patient has just recently arrived and the order was placed and I am told that they are not able to do the thoracentesis until tomorrow. The patient is on room air and 97% saturations. We will consult the hospitalist for admission and the procedure can be done then. Perfect Serve Consult for Admission  4:32 PM    ED Room Number: XB26/99  Patient Name and age:  Esequiel Knutson. 75 y.o.  male  Working Diagnosis:   1. Dyspnea, unspecified type    2. Pleural effusion        COVID-19 Suspicion:  no  Sepsis present:  no  Reassessment needed: no  Code Status:  Full Code  Readmission: no  Isolation Requirements:  no  Recommended Level of Care:  telemetry  Department: [facility] Adult ED - [phone]  Admitting Provider: Hospitalist      Other:  US Thoracentesis has been ordered.

## 2023-03-15 ENCOUNTER — APPOINTMENT (OUTPATIENT)
Dept: ULTRASOUND IMAGING | Age: 68
DRG: 315 | End: 2023-03-15
Attending: HOSPITALIST
Payer: MEDICARE

## 2023-03-15 ENCOUNTER — APPOINTMENT (OUTPATIENT)
Dept: GENERAL RADIOLOGY | Age: 68
DRG: 315 | End: 2023-03-15
Attending: NURSE PRACTITIONER
Payer: MEDICARE

## 2023-03-15 LAB
ALBUMIN SERPL-MCNC: 2.4 G/DL (ref 3.5–5)
ALBUMIN/GLOB SERPL: 0.8 (ref 1.1–2.2)
ALP SERPL-CCNC: 99 U/L (ref 45–117)
ALT SERPL-CCNC: 10 U/L (ref 12–78)
ANION GAP SERPL CALC-SCNC: 7 MMOL/L (ref 5–15)
ANION GAP SERPL CALC-SCNC: 8 MMOL/L (ref 5–15)
APPEARANCE FLD: ABNORMAL
AST SERPL-CCNC: 16 U/L (ref 15–37)
BILIRUB SERPL-MCNC: 0.4 MG/DL (ref 0.2–1)
BNP SERPL-MCNC: 7630 PG/ML
BODY FLD TYPE: NORMAL
BUN SERPL-MCNC: 17 MG/DL (ref 6–20)
BUN SERPL-MCNC: 19 MG/DL (ref 6–20)
BUN/CREAT SERPL: 11 (ref 12–20)
BUN/CREAT SERPL: 11 (ref 12–20)
CALCIUM SERPL-MCNC: 8.4 MG/DL (ref 8.5–10.1)
CALCIUM SERPL-MCNC: 8.7 MG/DL (ref 8.5–10.1)
CHLORIDE SERPL-SCNC: 113 MMOL/L (ref 97–108)
CHLORIDE SERPL-SCNC: 115 MMOL/L (ref 97–108)
CO2 SERPL-SCNC: 24 MMOL/L (ref 21–32)
CO2 SERPL-SCNC: 27 MMOL/L (ref 21–32)
COLOR FLD: YELLOW
CREAT SERPL-MCNC: 1.56 MG/DL (ref 0.7–1.3)
CREAT SERPL-MCNC: 1.74 MG/DL (ref 0.7–1.3)
ERYTHROCYTE [DISTWIDTH] IN BLOOD BY AUTOMATED COUNT: 16.5 % (ref 11.5–14.5)
GLOBULIN SER CALC-MCNC: 3 G/DL (ref 2–4)
GLUCOSE FLD-MCNC: 119 MG/DL
GLUCOSE SERPL-MCNC: 113 MG/DL (ref 65–100)
GLUCOSE SERPL-MCNC: 117 MG/DL (ref 65–100)
HCT VFR BLD AUTO: 32.5 % (ref 36.6–50.3)
HGB BLD-MCNC: 10.1 G/DL (ref 12.1–17)
LYMPHOCYTES NFR FLD: 92 %
MCH RBC QN AUTO: 29.2 PG (ref 26–34)
MCHC RBC AUTO-ENTMCNC: 31.1 G/DL (ref 30–36.5)
MCV RBC AUTO: 93.9 FL (ref 80–99)
MONOS+MACROS NFR FLD: 8 %
NRBC # BLD: 0 K/UL (ref 0–0.01)
NRBC BLD-RTO: 0 PER 100 WBC
NUC CELL # FLD: 1047 /CU MM
PH FLD: 7
PLATELET # BLD AUTO: 279 K/UL (ref 150–400)
PMV BLD AUTO: 11.4 FL (ref 8.9–12.9)
POTASSIUM SERPL-SCNC: 3.6 MMOL/L (ref 3.5–5.1)
POTASSIUM SERPL-SCNC: 4.1 MMOL/L (ref 3.5–5.1)
PROT FLD-MCNC: 2.6 G/DL
PROT SERPL-MCNC: 5.4 G/DL (ref 6.4–8.2)
RBC # BLD AUTO: 3.46 M/UL (ref 4.1–5.7)
RBC # FLD: >100 /CU MM
SODIUM SERPL-SCNC: 147 MMOL/L (ref 136–145)
SODIUM SERPL-SCNC: 147 MMOL/L (ref 136–145)
SPECIMEN SOURCE FLD: ABNORMAL
SPECIMEN SOURCE FLD: NORMAL
SPECIMEN SOURCE FLD: NORMAL
WBC # BLD AUTO: 7.1 K/UL (ref 4.1–11.1)

## 2023-03-15 PROCEDURE — 88112 CYTOPATH CELL ENHANCE TECH: CPT

## 2023-03-15 PROCEDURE — 80053 COMPREHEN METABOLIC PANEL: CPT

## 2023-03-15 PROCEDURE — 84157 ASSAY OF PROTEIN OTHER: CPT

## 2023-03-15 PROCEDURE — 85027 COMPLETE CBC AUTOMATED: CPT

## 2023-03-15 PROCEDURE — 71045 X-RAY EXAM CHEST 1 VIEW: CPT

## 2023-03-15 PROCEDURE — 0W9B3ZZ DRAINAGE OF LEFT PLEURAL CAVITY, PERCUTANEOUS APPROACH: ICD-10-PCS | Performed by: HOSPITALIST

## 2023-03-15 PROCEDURE — 87015 SPECIMEN INFECT AGNT CONCNTJ: CPT

## 2023-03-15 PROCEDURE — 88305 TISSUE EXAM BY PATHOLOGIST: CPT

## 2023-03-15 PROCEDURE — 87205 SMEAR GRAM STAIN: CPT

## 2023-03-15 PROCEDURE — 74011250637 HC RX REV CODE- 250/637

## 2023-03-15 PROCEDURE — 74011000250 HC RX REV CODE- 250: Performed by: HOSPITALIST

## 2023-03-15 PROCEDURE — 36415 COLL VENOUS BLD VENIPUNCTURE: CPT

## 2023-03-15 PROCEDURE — 83880 ASSAY OF NATRIURETIC PEPTIDE: CPT

## 2023-03-15 PROCEDURE — 82945 GLUCOSE OTHER FLUID: CPT

## 2023-03-15 PROCEDURE — 74011250636 HC RX REV CODE- 250/636: Performed by: HOSPITALIST

## 2023-03-15 PROCEDURE — 74011250637 HC RX REV CODE- 250/637: Performed by: HOSPITALIST

## 2023-03-15 PROCEDURE — 65270000046 HC RM TELEMETRY

## 2023-03-15 PROCEDURE — 74011250637 HC RX REV CODE- 250/637: Performed by: EMERGENCY MEDICINE

## 2023-03-15 PROCEDURE — 83986 ASSAY PH BODY FLUID NOS: CPT

## 2023-03-15 PROCEDURE — 32555 ASPIRATE PLEURA W/ IMAGING: CPT

## 2023-03-15 PROCEDURE — 89050 BODY FLUID CELL COUNT: CPT

## 2023-03-15 PROCEDURE — 83615 LACTATE (LD) (LDH) ENZYME: CPT

## 2023-03-15 RX ORDER — LIDOCAINE HYDROCHLORIDE 10 MG/ML
10 INJECTION, SOLUTION EPIDURAL; INFILTRATION; INTRACAUDAL; PERINEURAL
Status: COMPLETED | OUTPATIENT
Start: 2023-03-15 | End: 2023-03-15

## 2023-03-15 RX ORDER — DEXTROSE MONOHYDRATE 50 MG/ML
75 INJECTION, SOLUTION INTRAVENOUS CONTINUOUS
Status: DISCONTINUED | OUTPATIENT
Start: 2023-03-15 | End: 2023-03-18 | Stop reason: HOSPADM

## 2023-03-15 RX ORDER — AMLODIPINE BESYLATE 5 MG/1
10 TABLET ORAL DAILY
Status: DISCONTINUED | OUTPATIENT
Start: 2023-03-15 | End: 2023-03-18 | Stop reason: HOSPADM

## 2023-03-15 RX ORDER — HYDRALAZINE HYDROCHLORIDE 20 MG/ML
20 INJECTION INTRAMUSCULAR; INTRAVENOUS
Status: DISCONTINUED | OUTPATIENT
Start: 2023-03-15 | End: 2023-03-18 | Stop reason: HOSPADM

## 2023-03-15 RX ADMIN — AMLODIPINE BESYLATE 10 MG: 5 TABLET ORAL at 06:11

## 2023-03-15 RX ADMIN — PANTOPRAZOLE SODIUM 40 MG: 40 TABLET, DELAYED RELEASE ORAL at 08:39

## 2023-03-15 RX ADMIN — SODIUM CHLORIDE, PRESERVATIVE FREE 10 ML: 5 INJECTION INTRAVENOUS at 06:11

## 2023-03-15 RX ADMIN — FERROUS SULFATE TAB 325 MG (65 MG ELEMENTAL FE) 325 MG: 325 (65 FE) TAB at 08:19

## 2023-03-15 RX ADMIN — CARVEDILOL 25 MG: 12.5 TABLET, FILM COATED ORAL at 17:13

## 2023-03-15 RX ADMIN — Medication 1000 UNITS: at 08:39

## 2023-03-15 RX ADMIN — FERROUS SULFATE TAB 325 MG (65 MG ELEMENTAL FE) 325 MG: 325 (65 FE) TAB at 17:13

## 2023-03-15 RX ADMIN — ATORVASTATIN CALCIUM 80 MG: 20 TABLET, FILM COATED ORAL at 21:17

## 2023-03-15 RX ADMIN — DEXTROSE MONOHYDRATE 75 ML/HR: 50 INJECTION, SOLUTION INTRAVENOUS at 19:05

## 2023-03-15 RX ADMIN — CARVEDILOL 25 MG: 12.5 TABLET, FILM COATED ORAL at 08:39

## 2023-03-15 RX ADMIN — LIDOCAINE HYDROCHLORIDE 10 ML: 10 INJECTION, SOLUTION EPIDURAL; INFILTRATION; INTRACAUDAL; PERINEURAL at 13:57

## 2023-03-15 NOTE — PROGRESS NOTES
Problem: Falls - Risk of  Goal: *Absence of Falls  Description: Document Jaswinder Hokpins Fall Risk and appropriate interventions in the flowsheet.   Outcome: Progressing Towards Goal  Note: Fall Risk Interventions:                                Problem: Patient Education: Go to Patient Education Activity  Goal: Patient/Family Education  Outcome: Progressing Towards Goal     Problem: General Medical Care Plan  Goal: *Vital signs within specified parameters  Outcome: Progressing Towards Goal  Goal: *Labs within defined limits  Outcome: Progressing Towards Goal  Goal: *Absence of infection signs and symptoms  Outcome: Progressing Towards Goal  Goal: *Optimal pain control at patient's stated goal  Outcome: Progressing Towards Goal  Goal: *Skin integrity maintained  Outcome: Progressing Towards Goal  Goal: *Fluid volume balance  Outcome: Progressing Towards Goal  Goal: *Optimize nutritional status  Outcome: Progressing Towards Goal  Goal: *Anxiety reduced or absent  Outcome: Progressing Towards Goal  Goal: *Progressive mobility and function (eg: ADL's)  Outcome: Progressing Towards Goal     Problem: Patient Education: Go to Patient Education Activity  Goal: Patient/Family Education  Outcome: Progressing Towards Goal

## 2023-03-15 NOTE — PROGRESS NOTES
KOBY    RUR - 19% medium   Disposition  - Anticipated d/c 3/18 home   Transportation  - spouse [employer]   Follow Up PCP/Specialist     Reason for Admission:   sob                    RUR Score:     19% moderate             PCP: First and Last name:   Sera Cook MD     Name of Practice:    Are you a current patient: Yes/No:    Approximate date of last visit:    Can you participate in a virtual visit if needed:     Do you (patient/family) have any concerns for transition/discharge? no                Plan for utilizing home health:   tbd     Current Advanced Directive/Advance Care Plan:  Full Code      Healthcare Decision Maker:   Click here to complete [address] including selection of the Healthcare Decision Maker Relationship (ie \"Primary\")            Primary Decision MakerAstrevelin Ann - [phone]    Secondary Decision Maker: Willard Torrie -  - [phone]    Transition of Care Plan:          CM spoke with patient at bedside to introduce self and explain role. Verified demographics, emergency contact, insurance, PCP. Living situation:  lives w/spouse and daughter/ 4 steps to enter home no steps inside 5 on back   ADL's: independent at baseline  DME:  rollator/cane  Pharmacy: [facility]   Discharge transportation: spouse LIVELENZ   PT/OT/Rehab/HH history: [facility] 2022 after heart surgery     Care Management Interventions  PCP Verified by CM: Yes  Mode of Transport at Discharge:  (spouse sue duenas )  Transition of Care Consult (CM Consult):  (home)  Physical Therapy Consult: No  Occupational Therapy Consult: No  Speech Therapy Consult: No  Support Systems: Spouse/Significant Other  Confirm Follow Up Transport: Family  Discharge Location  Patient Expects to be Discharged to[de-identified] Home    Medicare pt has received, reviewed, and signed 1st IM letter informing them of their right to appeal the discharge.   Signed copy has been placed on pt bedside chart.       Lauren Melendez RN, BSN, [facility]

## 2023-03-15 NOTE — CONSULTS
Pulmonary, Critical Care, and Sleep Medicine~Consult Note    Name: Luis Angel Rivera. MRN: [id number]   : 1955 [facility]   Date: 3/15/2023 10:51 AM Admission: 3/14/2023     Impression Plan   Recurrent left pleural effusion. Suspect neoplasia. No signs of infection   Former smoker. Elevated troponin  CKD stage III  HTN  CAD, s/p CABG  Hx of CVA  Getting thoracentesis again today. Added pleural fluid labs including path  Will need to repeat CT chest following tap  O2 titration above 90%  Would benefit from outpatient PFTs and pulm follow up     Daily Progression:    Consult Note requested by hospitalist service. Patient is a very pleasant -American male who presented for admission secondary to a recurrent left pleural effusion with shortness of breath. He did have a thoracentesis in January at [facility].  1.6 L of a lymphocytic exudative pleural effusion was removed. Unfortunately no pathology was sent at that time, but cultures were and they were negative. Post tap the effusion was essentially gone. Chest image yesterday showed recurrence of left pleural effusion. CT scan in January did not reveal any lesions however there was a substantial amount of fluid and atelectatic parenchyma that would preclude any proper evaluation. He was an active smoker that recently stopped. He does not endorse any weight loss, hemoptysis or being followed by pulmonary doctor. He does not carry the diagnosis of COPD but does have emphysematous changes on the CT scan. I have reviewed the labs and previous days notes. A comprehensive review of systems was negative.   Past Medical History:   Diagnosis Date    CAD (coronary artery disease)     10/31/22: NSTEMI sees [facility] cardiology, may be getting CABG    Fractured rib 2021    from a fall per pt on 2021    High cholesterol     Hypertension     per pt on 2021    Stroke ([facility] [address]  Past Surgical History:   Procedure Laterality Date    COLONOSCOPY N/A 2022    COLONOSCOPY performed by Vidya No MD at [address]      per pt on 2021      Prior to Admission medications    Medication Sig Start Date End Date Taking? Authorizing Provider   aspirin delayed-release 81 mg tablet Take 81 mg by mouth daily. Yes Provider, Historical   atorvastatin (LIPITOR) 80 mg tablet Take 80 mg by mouth nightly. Yes Provider, Historical   ferrous sulfate 325 mg (65 mg iron) tablet Take 325 mg by mouth Before breakfast and dinner. Yes Provider, Historical   pantoprazole (PROTONIX) 40 mg tablet Take 40 mg by mouth daily. Yes Provider, Historical   carvediloL (COREG) 25 mg tablet Take 25 mg by mouth two (2) times a day. 22  Yes Provider, Historical   amLODIPine (NORVASC) 10 mg tablet TAKE 1 TABLET EVERY DAY 10/18/22   Jeri Boogie, MIKAELA     Allergies   Allergen Reactions    Clopidogrel Not Reported This Time      Social History     Tobacco Use    Smoking status: Every Day     Packs/day: 0.50     Types: Cigarettes    Smokeless tobacco: Never   Substance Use Topics    Alcohol use: Not Currently     Alcohol/week: 1.0 standard drink     Types: 1 Cans of beer per week      History reviewed. No pertinent family history. OBJECTIVE:     Vital Signs:     Visit Vitals  BP (!) 173/83 (BP 1 Location: Right upper arm, BP Patient Position: At rest)   Pulse 75   Temp 97.5 °F (36.4 °C)   Resp 18   Ht 5' 8\" (1.727 m)   Wt 61.2 kg (135 lb)   SpO2 96%   BMI 20.53 kg/m²      Temp (24hrs), Av.8 °F (36.6 °C), Min:97.4 °F (36.3 °C), Max:98.3 °F (36.8 °C)     Intake/Output:     Last shift: 03/15 0701 - 03/15 1900  In: -   Out: 100 [Urine:100]    Last 3 shifts: No intake/output data recorded.         Intake/Output Summary (Last 24 hours) at 3/15/2023 1051  Last data filed at 3/15/2023 0842  Gross per 24 hour   Intake --   Output 100 ml   Net -100 ml       Physical Exam: Exam Findings Other   General: No resp distress noted, appears stated age    [de-identified]:  No ulcers, JVD not elevated, no cervical LAD    Chest: No pectus deformity, normal chest rise b/l    HEART:  RRR, no murmurs/rubs/gallops    Lungs:  CTA b/l, no rhonchi/crackles/wheeze, diminished BS at bases    ABD: Soft/NT, non rigid mildly distended    EXT: No cyanosis/clubbing/edema, normal peripheral pulses    Skin: No rashes or ulcers, no mottling    Neuro: A/O x 3        Medications:  Current Facility-Administered Medications   Medication Dose Route Frequency    amLODIPine (NORVASC) tablet 10 mg  10 mg Oral DAILY    atorvastatin (LIPITOR) tablet 80 mg  80 mg Oral QHS    carvediloL (COREG) tablet 25 mg  25 mg Oral BID    cholecalciferol (VITAMIN D3) (1000 Units /25 mcg) tablet 1,000 Units  1,000 Units Oral DAILY    sodium chloride (NS) flush 5-40 mL  5-40 mL IntraVENous Q8H    sodium chloride (NS) flush 5-40 mL  5-40 mL IntraVENous PRN    albuterol-ipratropium (DUO-NEB) 2.5 MG-0.5 MG/3 ML  3 mL Nebulization Q4H PRN    ferrous sulfate tablet 325 mg  325 mg Oral ACB&D    pantoprazole (PROTONIX) tablet 40 mg  40 mg Oral DAILY       Labs:  ABG No results for input(s): PHI, PCO2I, PO2I, HCO3I, SO2I, FIO2I in the last 72 hours.      CBC Recent Labs     03/15/23  0625 03/14/23  1202   WBC 7.1 7.5   HGB 10.1* 10.9*   HCT 32.5* 34.0*    270   MCV 93.9 93.2   MCH 29.2 20.4        Metabolic  Panel Recent Labs     03/15/23  0625 03/14/23  1202   * 150*   K 3.6 4.0   * 113*   CO2 24 29   * 107*   BUN 17 15   CREA 1.56* 1.74*   CA 8.4* 8.1*   ALB 2.4* 2.5*   ALT 10* 13        Pertinent Labs                Vladimir Trevino PA-C  3/15/2023

## 2023-03-16 ENCOUNTER — APPOINTMENT (OUTPATIENT)
Dept: CT IMAGING | Age: 68
DRG: 315 | End: 2023-03-16
Attending: PHYSICIAN ASSISTANT
Payer: MEDICARE

## 2023-03-16 LAB
ANION GAP SERPL CALC-SCNC: 4 MMOL/L (ref 5–15)
BUN SERPL-MCNC: 21 MG/DL (ref 6–20)
BUN/CREAT SERPL: 12 (ref 12–20)
CALCIUM SERPL-MCNC: 8.4 MG/DL (ref 8.5–10.1)
CHLORIDE SERPL-SCNC: 113 MMOL/L (ref 97–108)
CO2 SERPL-SCNC: 25 MMOL/L (ref 21–32)
CREAT SERPL-MCNC: 1.72 MG/DL (ref 0.7–1.3)
GLUCOSE SERPL-MCNC: 108 MG/DL (ref 65–100)
POTASSIUM SERPL-SCNC: 3.7 MMOL/L (ref 3.5–5.1)
SODIUM SERPL-SCNC: 142 MMOL/L (ref 136–145)

## 2023-03-16 PROCEDURE — 65270000046 HC RM TELEMETRY

## 2023-03-16 PROCEDURE — 36415 COLL VENOUS BLD VENIPUNCTURE: CPT

## 2023-03-16 PROCEDURE — 71250 CT THORAX DX C-: CPT

## 2023-03-16 PROCEDURE — 74011250637 HC RX REV CODE- 250/637: Performed by: HOSPITALIST

## 2023-03-16 PROCEDURE — 80048 BASIC METABOLIC PNL TOTAL CA: CPT

## 2023-03-16 PROCEDURE — 74011250637 HC RX REV CODE- 250/637

## 2023-03-16 PROCEDURE — 74011250636 HC RX REV CODE- 250/636: Performed by: HOSPITALIST

## 2023-03-16 PROCEDURE — 74011000250 HC RX REV CODE- 250: Performed by: HOSPITALIST

## 2023-03-16 PROCEDURE — 74011250637 HC RX REV CODE- 250/637: Performed by: EMERGENCY MEDICINE

## 2023-03-16 RX ADMIN — CARVEDILOL 25 MG: 12.5 TABLET, FILM COATED ORAL at 08:48

## 2023-03-16 RX ADMIN — ATORVASTATIN CALCIUM 80 MG: 20 TABLET, FILM COATED ORAL at 21:41

## 2023-03-16 RX ADMIN — Medication 1000 UNITS: at 08:48

## 2023-03-16 RX ADMIN — SODIUM CHLORIDE, PRESERVATIVE FREE 10 ML: 5 INJECTION INTRAVENOUS at 21:41

## 2023-03-16 RX ADMIN — PANTOPRAZOLE SODIUM 40 MG: 40 TABLET, DELAYED RELEASE ORAL at 08:48

## 2023-03-16 RX ADMIN — AMLODIPINE BESYLATE 10 MG: 5 TABLET ORAL at 08:48

## 2023-03-16 RX ADMIN — FERROUS SULFATE TAB 325 MG (65 MG ELEMENTAL FE) 325 MG: 325 (65 FE) TAB at 18:20

## 2023-03-16 RX ADMIN — CARVEDILOL 25 MG: 12.5 TABLET, FILM COATED ORAL at 18:20

## 2023-03-16 RX ADMIN — DEXTROSE MONOHYDRATE 75 ML/HR: 50 INJECTION, SOLUTION INTRAVENOUS at 09:59

## 2023-03-16 RX ADMIN — FERROUS SULFATE TAB 325 MG (65 MG ELEMENTAL FE) 325 MG: 325 (65 FE) TAB at 07:17

## 2023-03-16 NOTE — PROGRESS NOTES
Problem: Falls - Risk of  Goal: *Absence of Falls  Description: Document Federico Baker Fall Risk and appropriate interventions in the flowsheet.   Outcome: Progressing Towards Goal  Note: Fall Risk Interventions:

## 2023-03-16 NOTE — PROGRESS NOTES
Pulmonary, Critical Care, and Sleep Medicine~Progress Note    Name: Holly Muse. MRN: [id number]   : 1955 Hospital: [facility]   Date: 3/16/2023 10:51 AM Admission: 3/14/2023     Impression Plan   Recurrent left pleural effusion. Suspect neoplasia. No signs of infection. 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 3/15  Consult Note requested by hospitalist service. Patient is a very pleasant -American male who presented for admission secondary to a recurrent left pleural effusion with shortness of breath. He did have a thoracentesis in January at [facility].  1.6 L of a lymphocytic exudative pleural effusion was removed. Unfortunately no pathology was sent at that time, but cultures were and they were negative. Post tap the effusion was essentially gone. Chest image yesterday showed recurrence of left pleural effusion. CT scan in January did not reveal any lesions however there was a substantial amount of fluid and atelectatic parenchyma that would preclude any proper evaluation. He was an active smoker that recently stopped. He does not endorse any weight loss, hemoptysis or being followed by pulmonary doctor. He does not carry the diagnosis of COPD but does have emphysematous changes on the CT scan. I have reviewed the labs and previous days notes. A comprehensive review of systems was negative.   Past Medical History:   Diagnosis Date    CAD (coronary artery disease)     10/31/22: NSTEMI sees [facility] cardiology, may be getting CABG    Fractured rib 2021    from a fall per pt on 2021    High cholesterol     Hypertension     per pt on 2021    Stroke [facility])       Past Surgical History:   Procedure Laterality Date    COLONOSCOPY N/A 2022    COLONOSCOPY performed by Gorge Cavazos MD at [address]      per pt on 2021      Prior to Admission medications    Medication Sig Start Date End Date Taking? Authorizing Provider   aspirin delayed-release 81 mg tablet Take 81 mg by mouth daily. Yes Provider, Historical   atorvastatin (LIPITOR) 80 mg tablet Take 80 mg by mouth nightly. Yes Provider, Historical   ferrous sulfate 325 mg (65 mg iron) tablet Take 325 mg by mouth Before breakfast and dinner. Yes Provider, Historical   pantoprazole (PROTONIX) 40 mg tablet Take 40 mg by mouth daily. Yes Provider, Historical   carvediloL (COREG) 25 mg tablet Take 25 mg by mouth two (2) times a day. 22  Yes Provider, Historical   amLODIPine (NORVASC) 10 mg tablet TAKE 1 TABLET EVERY DAY 10/18/22   Emilia Odom., NP     Allergies   Allergen Reactions    Clopidogrel Not Reported This Time      Social History     Tobacco Use    Smoking status: Every Day     Packs/day: 0.50     Types: Cigarettes    Smokeless tobacco: Never   Substance Use Topics    Alcohol use: Not Currently     Alcohol/week: 1.0 standard drink     Types: 1 Cans of beer per week      History reviewed. No pertinent family history.   OBJECTIVE:     Vital Signs:     Visit Vitals  BP (!) 172/81 (BP 1 Location: Right upper arm, BP Patient Position: At rest;Sitting)   Pulse 68   Temp 98.2 °F (36.8 °C)   Resp 20   Ht 5' 8\" (1.727 m)   Wt 65.3 kg (143 lb 14.4 oz)   SpO2 97%   BMI 21.88 kg/m²      Temp (24hrs), Av.1 °F (36.7 °C), Min:97.8 °F (36.6 °C), Max:98.4 °F (36.9 °C)     Intake/Output:     Last shift:  07 - 1900  In: -   Out: 100 [Urine:100]    Last 3 shifts: 1901 -  0700  In: -   Out: 100 [Urine:100]        Intake/Output Summary (Last 24 hours) at 3/16/2023 1032  Last data filed at 3/16/2023 0843  Gross per 24 hour   Intake --   Output 100 ml   Net -100 ml         Physical Exam:                                        Exam Findings Other   General: No resp distress noted, appears stated age    [de-identified]:  No ulcers, JVD not elevated, no cervical LAD    Chest: No pectus deformity, normal chest rise b/l    HEART:  RRR, no murmurs/rubs/gallops    Lungs:  CTA b/l, no rhonchi/crackles/wheeze, diminished BS at bases    ABD: Soft/NT, non rigid mildly distended    EXT: No cyanosis/clubbing/edema, normal peripheral pulses    Skin: No rashes or ulcers, no mottling    Neuro: A/O x 3        Medications:  Current Facility-Administered Medications   Medication Dose Route Frequency    amLODIPine (NORVASC) tablet 10 mg  10 mg Oral DAILY    hydrALAZINE (APRESOLINE) 20 mg/mL injection 20 mg  20 mg IntraVENous Q6H PRN    dextrose 5% infusion  75 mL/hr IntraVENous CONTINUOUS    atorvastatin (LIPITOR) tablet 80 mg  80 mg Oral QHS    carvediloL (COREG) tablet 25 mg  25 mg Oral BID    cholecalciferol (VITAMIN D3) (1000 Units /25 mcg) tablet 1,000 Units  1,000 Units Oral DAILY    sodium chloride (NS) flush 5-40 mL  5-40 mL IntraVENous Q8H    sodium chloride (NS) flush 5-40 mL  5-40 mL IntraVENous PRN    albuterol-ipratropium (DUO-NEB) 2.5 MG-0.5 MG/3 ML  3 mL Nebulization Q4H PRN    ferrous sulfate tablet 325 mg  325 mg Oral ACB&D    pantoprazole (PROTONIX) tablet 40 mg  40 mg Oral DAILY       Labs:  ABG No results for input(s): PHI, PCO2I, PO2I, HCO3I, SO2I, FIO2I in the last 72 hours.      CBC Recent Labs     03/15/23  0625 03/14/23  1202   WBC 7.1 7.5   HGB 10.1* 10.9*   HCT 32.5* 34.0*    270   MCV 93.9 93.2   MCH 29.2 86.5          Metabolic  Panel Recent Labs     03/16/23  0345 03/15/23  1530 03/15/23  0625 03/14/23  1202    147* 147* 150*   K 3.7 4.1 3.6 4.0   * 113* 115* 113*   CO2 25 27 24 29   * 117* 113* 107*   BUN 21* 19 17 15   CREA 1.72* 1.74* 1.56* 1.74*   CA 8.4* 8.7 8.4* 8.1* ALB  --   --  2.4* 2.5*   ALT  --   --  10* 13          Pertinent Labs                Gilbert Colon ASHWIN Coto  3/16/2023

## 2023-03-16 NOTE — PROGRESS NOTES
Bedside and Verbal shift change report given to Joelle (oncoming nurse) by Suma Steele (offgoing nurse). Report included the following information SBAR, Kardex, MAR, and Recent Results.

## 2023-03-16 NOTE — PROGRESS NOTES
Progress note    Date of Service:  3/15/2023        History of Presenting Illness:   José Miguel Nicole is a 75 y.o. is transferred to [facility] from [address] [place] [address] for symptomatic left pleural effusion. Patient had left thoracentesis in January 2023 when he was admitted at [facility]. Patient had CABG at [facility] in October-November. Subjective/interval history.  -Patient was lying comfortably, on room air when I see him this afternoon. His wife at the bedside. He already had thoracentesis and feeling normal.  He is eager to go home. Patient to be kept overnight for IV fluid due to unresolved hypernatremia    Assessment and plan   Dyspnea due to recurrent left pleural effusion. I suspect this is complication of post CABG. He had thoracentesis in January 2023, fluid analysis negative for infection. Status post thoracentesis today. Patient stable respiratory wise. I have discussed with patient and his wife to return to his cardiac surgery team at [facility] to discuss the situation.  -Last echo was on 1/16/2023 LVEF 50 to 55%, left ventricular size was normal    Hypernatremia likely due to due to poor oral intake, continue hypotonic fluid. BMP in the a.m. Elevated troponin possible due to demand ischemia versus renal insufficiency  -Patient denied left-sided chest pain    Hx of CAD s/p CABG in November 2022.   Recurrent left sided thoracentesis likely a complication of CABG  -Stable, continue home Coreg Lipitor, hold aspirin for now for thoracentesis    CKD stage III  -Creatinine is stable  -Avoid nephrotoxin  -Monitor renal function test    HTN poorly controlled  -Continue Coreg, Norvasc and as needed hydralazine, monitor BP    History of CVA  -stable, continue Lipitor, hold aspirin    HLD  -Continue Lipitor        DIET: ADULT DIET Regular; 4 carb choices (60 gm/meal)  DIET ONE TIME MESSAGE   ISOLATION PRECAUTIONS: There are currently no Active Isolations  CODE STATUS: Full code  DVT PROPHYLAXIS: SCDs  FUNCTIONAL STATUS PRIOR TO HOSPITALIZATION: Fully active and ambulatory; able to carry on all self-care without restriction. Ambulatory status/function: Ambulates with assistance:  [place] and [place]   EARLY MOBILITY ASSESSMENT:     ANTICIPATED DISCHARGE: Greater than 48 hours. ANTICIPATED DISPOSITION: Home  EMERGENCY CONTACT/SURROGATE DECISION MAKER: Savana Bradley, spouse, [phone]    REVIEW OF SYSTEMS:  A comprehensive review of systems was negative except for that written in the History of Present Illness. Past Medical History:   Diagnosis Date    CAD (coronary artery disease)     10/31/22: NSTEMI sees [facility] cardiology, may be getting CABG    Fractured rib 03/2021    from a fall per pt on 5/11/2021    High cholesterol     Hypertension     per pt on 5/11/2021    Stroke ([address] 2022      Past Surgical History:   Procedure Laterality Date    COLONOSCOPY N/A 9/27/2022    COLONOSCOPY performed by Kimberly Roy MD at [address]      per pt on 5/11/2021     Prior to Admission medications    Medication Sig Start Date End Date Taking? Authorizing Provider   aspirin delayed-release 81 mg tablet Take 81 mg by mouth daily. Yes Provider, Historical   atorvastatin (LIPITOR) 80 mg tablet Take 80 mg by mouth nightly. Yes Provider, Historical   ferrous sulfate 325 mg (65 mg iron) tablet Take 325 mg by mouth Before breakfast and dinner. Yes Provider, Historical   pantoprazole (PROTONIX) 40 mg tablet Take 40 mg by mouth daily. Yes Provider, Historical   carvediloL (COREG) 25 mg tablet Take 25 mg by mouth two (2) times a day. 12/26/22  Yes Provider, Historical   amLODIPine (NORVASC) 10 mg tablet TAKE 1 TABLET EVERY DAY 10/18/22   Desiree Barnes NP     Allergies   Allergen Reactions    Clopidogrel Not Reported This Time      History reviewed. No pertinent family history. Social History:  reports that he has been smoking cigarettes.  He has been smoking an average of .5 packs per day. He has never used smokeless tobacco. He reports that he does not currently use alcohol after a past usage of about 1.0 standard drink per week. He reports that he does not currently use drugs. Social Determinants of Health     Tobacco Use: High Risk    Smoking Tobacco Use: Every Day    Smokeless Tobacco Use: Never    Passive Exposure: Not on file   Alcohol Use: Not on file   Financial Resource Strain: Not on file   Food Insecurity: Not on file   Transportation Needs: Not on file   Physical Activity: Not on file   Stress: Not on file   Social Connections: Not on file   Intimate Partner Violence: Not on file   Depression: Not at risk    PHQ-2 Score: 2   Housing Stability: Not on file        Medications were reconciled to the best of my ability given all available resources at the time of admission. Route is PO if not otherwise noted. Family and social history were personally reviewed, all pertinent and relevant details are outlined as above. Objective:   Visit Vitals  BP (!) 162/77 (BP 1 Location: Right upper arm, BP Patient Position: At rest)   Pulse 72   Temp 98.4 °F (36.9 °C)   Resp 20   Ht 5' 8\" (1.727 m)   Wt 61.2 kg (135 lb)   SpO2 96%   BMI 20.53 kg/m²      O2 Device: None (Room air)    PHYSICAL EXAM:   General: On room air, alert oriented x3. HEENT: PEERL, EOMI, moist mucus membranes  Neck: Supple, no JVD, no meningeal signs  Chest: On room air. Symmetrical air entry bilaterally. Minimally diminished entry on the left posterior lower lung field.   CVS: RRR, S1 S2 heard, no murmurs/rubs/gallops  Abd: Soft, non-tender, non-distended, +bowel sounds   Ext: No clubbing, no cyanosis, no edema  Neuro/Psych: Pleasant mood and affect, chest and well-oriented, CN 2-12 grossly intact, sensory grossly within normal limit, Strength 5/5 in all extremities, DTR 1+ x 4  Cap refill: Brisk, less than 3 seconds  Pulses: 2+, symmetric in all extremities  Skin: Warm, dry, without rashes or lesions    Data Review:   I have independently reviewed and interpreted patient's lab and all other diagnostic data    Abnormal Labs Reviewed   CBC W/O DIFF - Abnormal; Notable for the following components:       Result Value    RBC 3.46 (*)     HGB 10.1 (*)     HCT 32.5 (*)     RDW 16.5 (*)     All other components within normal limits   METABOLIC PANEL, COMPREHENSIVE - Abnormal; Notable for the following components:    Sodium 147 (*)     Chloride 115 (*)     Glucose 113 (*)     Creatinine 1.56 (*)     BUN/Creatinine ratio 11 (*)     eGFR 48 (*)     Calcium 8.4 (*)     ALT (SGPT) 10 (*)     Protein, total 5.4 (*)     Albumin 2.4 (*)     A-G Ratio 0.8 (*)     All other components within normal limits   NT-PRO BNP - Abnormal; Notable for the following components:    NT pro-BNP 7,630 (*)     All other components within normal limits   CELL COUNT, BODY FLUID - Abnormal; Notable for the following components:    FLUID RBC CT. >100 (*)     FLD LYMPHS 92 (*)     FLD MONO/MACROPHAGES 8 (*)     All other components within normal limits   METABOLIC PANEL, BASIC - Abnormal; Notable for the following components:    Sodium 147 (*)     Chloride 113 (*)     Glucose 117 (*)     Creatinine 1.74 (*)     BUN/Creatinine ratio 11 (*)     eGFR 42 (*)     All other components within normal limits       All Micro Results       Procedure Component Value Units Date/Time    CULTURE, BODY FLUID Sugey Jaimes STAIN [[id number]] Collected: 03/15/23 1100    Order Status: Sent Specimen: Pleural Fluid Updated: 03/15/23 1921    CULTURE, BODY FLUID Sugey Jaimes STAIN [[id number]]     Order Status: Sent Specimen: Body Fluid from Pleural Fluid             IMAGING:   XR CHEST PORT   Final Result      Left pneumothorax following thoracentesis related to incomplete expansion of the   left lower lobe. [address]   Final Result   Technically successful ultrasound guided left thoracentesis with evacuation of   1800 ml of fluid.   A post procedure chest x-ray is pending. ECG/ECHO:    Results for orders placed or performed during the hospital encounter of 03/14/23   EKG, 12 LEAD, INITIAL   Result Value Ref Range    Ventricular Rate 63 BPM    Atrial Rate 63 BPM    P-R Interval 180 ms    QRS Duration 84 ms    Q-T Interval 434 ms    QTC Calculation (Bezet) 444 ms    Calculated P Axis 56 degrees    Calculated R Axis 35 degrees    Calculated T Axis -151 degrees    Diagnosis       Normal sinus rhythm  Possible Left atrial enlargement  Left ventricular hypertrophy with repolarization abnormality  Abnormal ECG  When compared with ECG of 19-JAN-2023 01:37,  No significant change was found  Confirmed by Ev Young M.D., Lindalou Guess ([id number]) on 3/14/2023 12:40:08 PM            Notes reviewed from all clinical/nonclinical/nursing services involved in patient's clinical care. Care coordination discussions were held with appropriate clinical/nonclinical/ nursing providers based on care coordination needs. Signed By: Ivania Jacobson MD     March 15, 2023         Please note that this dictation may have been completed with Dragon, the computer voice recognition software. Quite often unanticipated grammatical, syntax, homophones, and other interpretive errors are inadvertently transcribed by the computer software. Please disregard these errors. Please excuse any errors that have escaped final proofreading.

## 2023-03-17 LAB
LDH SERPL L TO P-CCNC: 139 IU/L
SPECIMEN SOURCE: NORMAL

## 2023-03-17 PROCEDURE — 74011000250 HC RX REV CODE- 250: Performed by: HOSPITALIST

## 2023-03-17 PROCEDURE — 74011250637 HC RX REV CODE- 250/637: Performed by: HOSPITALIST

## 2023-03-17 PROCEDURE — 74011250637 HC RX REV CODE- 250/637: Performed by: EMERGENCY MEDICINE

## 2023-03-17 PROCEDURE — 65270000046 HC RM TELEMETRY

## 2023-03-17 PROCEDURE — 74011250637 HC RX REV CODE- 250/637

## 2023-03-17 RX ADMIN — CARVEDILOL 25 MG: 12.5 TABLET, FILM COATED ORAL at 17:19

## 2023-03-17 RX ADMIN — CARVEDILOL 25 MG: 12.5 TABLET, FILM COATED ORAL at 08:59

## 2023-03-17 RX ADMIN — SODIUM CHLORIDE, PRESERVATIVE FREE 10 ML: 5 INJECTION INTRAVENOUS at 07:06

## 2023-03-17 RX ADMIN — PANTOPRAZOLE SODIUM 40 MG: 40 TABLET, DELAYED RELEASE ORAL at 08:59

## 2023-03-17 RX ADMIN — FERROUS SULFATE TAB 325 MG (65 MG ELEMENTAL FE) 325 MG: 325 (65 FE) TAB at 17:18

## 2023-03-17 RX ADMIN — ATORVASTATIN CALCIUM 80 MG: 20 TABLET, FILM COATED ORAL at 21:27

## 2023-03-17 RX ADMIN — SODIUM CHLORIDE, PRESERVATIVE FREE 10 ML: 5 INJECTION INTRAVENOUS at 21:29

## 2023-03-17 RX ADMIN — Medication 1000 UNITS: at 08:59

## 2023-03-17 RX ADMIN — AMLODIPINE BESYLATE 10 MG: 5 TABLET ORAL at 08:59

## 2023-03-17 RX ADMIN — FERROUS SULFATE TAB 325 MG (65 MG ELEMENTAL FE) 325 MG: 325 (65 FE) TAB at 07:11

## 2023-03-17 RX ADMIN — SODIUM CHLORIDE, PRESERVATIVE FREE 10 ML: 5 INJECTION INTRAVENOUS at 17:18

## 2023-03-17 NOTE — PROGRESS NOTES
The patient is transferring to [facility] when bed is available. Transfer center ([phone]). Kamila Reyna at transfer center stated they will call 2N nurses station when bed is available.      Elsa Anderson RN/CRM

## 2023-03-17 NOTE — PROGRESS NOTES
Progress note    Date of Service:  3/16/2023        History of Presenting Illness:   José Miguel Nicole is a 75 y.o. is transferred to [facility] from [address] [place] [address] for symptomatic left pleural effusion. Patient had left thoracentesis in January 2023 when he was admitted at [facility]. Patient had CABG at [facility] in October-November. Subjective/interval history.  -Patient was lying comfortably, on room air when I see him this afternoon. His wife at the bedside. He already had thoracentesis and feeling normal.  He is eager to go home. Patient to be kept overnight for IV fluid due to unresolved hypernatremia    Assessment and plan   Dyspnea due to recurrent left pleural effusion. I suspect this is complication of post CABG. He had thoracentesis in January 2023, fluid analysis negative for infection. Status post thoracentesis today. Patient stable respiratory wise. I have discussed with patient and his wife to return to his cardiac surgery team at [facility] to discuss the situation.  -Last echo was on 1/16/2023 LVEF 50 to 55%, left ventricular size was normal    Hypernatremia likely due to due to poor oral intake, continue hypotonic fluid. BMP in the a.m. Elevated troponin possible due to demand ischemia versus renal insufficiency  -Patient denied left-sided chest pain    Hx of CAD s/p CABG in November 2022.   Recurrent left sided thoracentesis likely a complication of CABG  -Stable, continue home Coreg Lipitor, hold aspirin for now for thoracentesis    CKD stage III  -Creatinine is stable  -Avoid nephrotoxin  -Monitor renal function test    HTN poorly controlled  -Continue Coreg, Norvasc and as needed hydralazine, monitor BP    History of CVA  -stable, continue Lipitor, hold aspirin    HLD  -Continue Lipitor        DIET: ADULT DIET Regular; 4 carb choices (60 gm/meal)  DIET ONE TIME MESSAGE   ISOLATION PRECAUTIONS: There are currently no Active Isolations  CODE STATUS: Full code  DVT PROPHYLAXIS: SCDs  FUNCTIONAL STATUS PRIOR TO HOSPITALIZATION: Fully active and ambulatory; able to carry on all self-care without restriction. Ambulatory status/function: Ambulates with assistance:  Aline Howe and Walker   EARLY MOBILITY ASSESSMENT:     ANTICIPATED DISCHARGE: Greater than 48 hours. ANTICIPATED DISPOSITION: Home  EMERGENCY CONTACT/SURROGATE DECISION MAKER: Neel Lopez, spouse, [phone]    REVIEW OF SYSTEMS:  A comprehensive review of systems was negative except for that written in the History of Present Illness. Past Medical History:   Diagnosis Date    CAD (coronary artery disease)     10/31/22: NSTEMI sees [facility] cardiology, may be getting CABG    Fractured rib 03/2021    from a fall per pt on 5/11/2021    High cholesterol     Hypertension     per pt on 5/11/2021    Stroke ([facility] [address] 2022      Past Surgical History:   Procedure Laterality Date    COLONOSCOPY N/A 9/27/2022    COLONOSCOPY performed by Raisa Davis MD at [address]      per pt on 5/11/2021     Prior to Admission medications    Medication Sig Start Date End Date Taking? Authorizing Provider   aspirin delayed-release 81 mg tablet Take 81 mg by mouth daily. Yes Provider, Historical   atorvastatin (LIPITOR) 80 mg tablet Take 80 mg by mouth nightly. Yes Provider, Historical   ferrous sulfate 325 mg (65 mg iron) tablet Take 325 mg by mouth Before breakfast and dinner. Yes Provider, Historical   pantoprazole (PROTONIX) 40 mg tablet Take 40 mg by mouth daily. Yes Provider, Historical   carvediloL (COREG) 25 mg tablet Take 25 mg by mouth two (2) times a day. 12/26/22  Yes Provider, Historical   amLODIPine (NORVASC) 10 mg tablet TAKE 1 TABLET EVERY DAY 10/18/22   Remington Forrester NP     Allergies   Allergen Reactions    Clopidogrel Not Reported This Time      History reviewed. No pertinent family history. Social History:  reports that he has been smoking cigarettes.  He has been smoking an average of .5 packs per day. He has never used smokeless tobacco. He reports that he does not currently use alcohol after a past usage of about 1.0 standard drink per week. He reports that he does not currently use drugs. Social Determinants of Health     Tobacco Use: High Risk    Smoking Tobacco Use: Every Day    Smokeless Tobacco Use: Never    Passive Exposure: Not on file   Alcohol Use: Not on file   Financial Resource Strain: Not on file   Food Insecurity: Not on file   Transportation Needs: Not on file   Physical Activity: Not on file   Stress: Not on file   Social Connections: Not on file   Intimate Partner Violence: Not on file   Depression: Not at risk    PHQ-2 Score: 2   Housing Stability: Not on file        Medications were reconciled to the best of my ability given all available resources at the time of admission. Route is PO if not otherwise noted. Family and social history were personally reviewed, all pertinent and relevant details are outlined as above. Objective:   Visit Vitals  BP (!) 143/68 (BP 1 Location: Right upper arm, BP Patient Position: At rest)   Pulse 77   Temp 98.5 °F (36.9 °C)   Resp 18   Ht 5' 8\" (1.727 m)   Wt 65.3 kg (143 lb 14.4 oz)   SpO2 94%   BMI 21.88 kg/m²      O2 Device: None (Room air)    PHYSICAL EXAM:   General: On room air, alert oriented x3. HEENT: PEERL, EOMI, moist mucus membranes  Neck: Supple, no JVD, no meningeal signs  Chest: On room air. Symmetrical air entry bilaterally. Minimally diminished entry on the left posterior lower lung field.   CVS: RRR, S1 S2 heard, no murmurs/rubs/gallops  Abd: Soft, non-tender, non-distended, +bowel sounds   Ext: No clubbing, no cyanosis, no edema  Neuro/Psych: Pleasant mood and affect, chest and well-oriented, CN 2-12 grossly intact, sensory grossly within normal limit, Strength 5/5 in all extremities, DTR 1+ x 4  Cap refill: Brisk, less than 3 seconds  Pulses: 2+, symmetric in all extremities  Skin: Warm, dry, without rashes or lesions    Data Review:   I have independently reviewed and interpreted patient's lab and all other diagnostic data    Abnormal Labs Reviewed   CBC W/O DIFF - Abnormal; Notable for the following components:       Result Value    RBC 3.46 (*)     HGB 10.1 (*)     HCT 32.5 (*)     RDW 16.5 (*)     All other components within normal limits   METABOLIC PANEL, COMPREHENSIVE - Abnormal; Notable for the following components:    Sodium 147 (*)     Chloride 115 (*)     Glucose 113 (*)     Creatinine 1.56 (*)     BUN/Creatinine ratio 11 (*)     eGFR 48 (*)     Calcium 8.4 (*)     ALT (SGPT) 10 (*)     Protein, total 5.4 (*)     Albumin 2.4 (*)     A-G Ratio 0.8 (*)     All other components within normal limits   NT-PRO BNP - Abnormal; Notable for the following components:    NT pro-BNP 7,630 (*)     All other components within normal limits   CELL COUNT, BODY FLUID - Abnormal; Notable for the following components:    FLUID RBC CT. >100 (*)     FLD LYMPHS 92 (*)     FLD MONO/MACROPHAGES 8 (*)     All other components within normal limits   METABOLIC PANEL, BASIC - Abnormal; Notable for the following components:    Sodium 147 (*)     Chloride 113 (*)     Glucose 117 (*)     Creatinine 1.74 (*)     BUN/Creatinine ratio 11 (*)     eGFR 42 (*)     All other components within normal limits   METABOLIC PANEL, BASIC - Abnormal; Notable for the following components:    Chloride 113 (*)     Anion gap 4 (*)     Glucose 108 (*)     BUN 21 (*)     Creatinine 1.72 (*)     eGFR 43 (*)     Calcium 8.4 (*)     All other components within normal limits       All Micro Results       Procedure Component Value Units Date/Time    CULTURE, BODY FLUID Amrita Copeland STAIN [[id number]] Collected: 03/15/23 1100    Order Status: Completed Specimen: Pleural Fluid Updated: 03/16/23 1411     Special Requests: NO SPECIAL REQUESTS        GRAM STAIN FEW WBCS SEEN         NO ORGANISMS SEEN        Culture result: NO GROWTH THUS FAR       CULTURE, BODY FLUID W Mira Amaral STAIN [[id number]]     Order Status: Sent Specimen: Body Fluid from Pleural Fluid             IMAGING:   CT CHEST WO CONT   Final Result   1. Left basilar hydropneumothorax. Left apical hydropneumothorax. 2.  Left lower lobe airspace disease suspicious for pneumonia. XR CHEST PORT   Final Result      Left pneumothorax following thoracentesis related to incomplete expansion of the   left lower lobe. [facility]   Final Result   Technically successful ultrasound guided left thoracentesis with evacuation of   1800 ml of fluid. A post procedure chest x-ray is pending. ECG/ECHO:    Results for orders placed or performed during the hospital encounter of 03/14/23   EKG, 12 LEAD, INITIAL   Result Value Ref Range    Ventricular Rate 63 BPM    Atrial Rate 63 BPM    P-R Interval 180 ms    QRS Duration 84 ms    Q-T Interval 434 ms    QTC Calculation (Bezet) 444 ms    Calculated P Axis 56 degrees    Calculated R Axis 35 degrees    Calculated T Axis -151 degrees    Diagnosis       Normal sinus rhythm  Possible Left atrial enlargement  Left ventricular hypertrophy with repolarization abnormality  Abnormal ECG  When compared with ECG of 19-JAN-2023 01:37,  No significant change was found  Confirmed by Star Kahn M.D., Aaron Mai ([id number]) on 3/14/2023 12:40:08 PM            Notes reviewed from all clinical/nonclinical/nursing services involved in patient's clinical care. Care coordination discussions were held with appropriate clinical/nonclinical/ nursing providers based on care coordination needs. Signed By: Sidney Mckinney MD     March 16, 2023         Please note that this dictation may have been completed with Dragon, the computer voice recognition software. Quite often unanticipated grammatical, syntax, homophones, and other interpretive errors are inadvertently transcribed by the computer software. Please disregard these errors.   Please excuse any errors that have escaped final proofreading.

## 2023-03-17 NOTE — PROGRESS NOTES
Progress note    Date of Service:  3/17/2023        History of Presenting Illness:   Mau Miller. is a 75 y.o. is transferred to [facility] from [facility] for symptomatic left pleural effusion. Patient had left thoracentesis in January 2023 when he was admitted at [facility]. Patient had CABG at [facility] in October-November. Subjective/interval history.  -Patient was lying comfortably, on room air when I see him this afternoon. His wife at the bedside. He already had thoracentesis and feeling normal.  He is eager to go home. Patient to be kept overnight for IV fluid due to unresolved hypernatremia    Assessment and plan   Dyspnea due to recurrent left pleural effusion. I suspect this is complication of post CABG. He had thoracentesis in January 2023, fluid analysis negative for infection. Status post thoracentesis today. Patient stable respiratory wise. I have discussed with patient and his wife to return to his cardiac surgery team at [facility] to discuss the situation.  -Last echo was on 1/16/2023 LVEF 50 to 55%, left ventricular size was normal  -CT chest shows hydropneumothorax  -Spoke to Pulmonary, the patient needs to be transferred to a facility where they have CT surgery    Hypernatremia likely due to due to poor oral intake, continue hypotonic fluid. Elevated troponin possible due to demand ischemia versus renal insufficiency  -Patient denied left-sided chest pain    Hx of CAD s/p CABG in November 2022.   Recurrent left sided thoracentesis likely a complication of CABG  -Stable, continue home Coreg Lipitor, hold aspirin for now for thoracentesis    CKD stage III: stable  HTN poorly controlled: Continue Coreg, Norvasc and as needed hydralazine, monitor BP  History of CVA: stable, continue Lipitor, hold aspirin  HLD: Continue Lipitor        DIET: ADULT DIET Regular; 4 carb choices (60 gm/meal)  DIET ONE TIME MESSAGE   ISOLATION PRECAUTIONS: There are currently no Active Isolations  CODE STATUS: Full code  DVT PROPHYLAXIS: SCDs  FUNCTIONAL STATUS PRIOR TO HOSPITALIZATION: Fully active and ambulatory; able to carry on all self-care without restriction. Ambulatory status/function: Ambulates with assistance:  Geo Cano and Walker   EARLY MOBILITY ASSESSMENT:     ANTICIPATED DISCHARGE: Greater than 48 hours. ANTICIPATED DISPOSITION: Home  EMERGENCY CONTACT/SURROGATE DECISION MAKER: Tommy Quintanilla, spouse, [phone]    REVIEW OF SYSTEMS:  A comprehensive review of systems was negative except for that written in the History of Present Illness. Past Medical History:   Diagnosis Date    CAD (coronary artery disease)     10/31/22: NSTEMI sees [facility] cardiology, may be getting CABG    Fractured rib 03/2021    from a fall per pt on 5/11/2021    High cholesterol     Hypertension     per pt on 5/11/2021    Stroke ([facility] [address] 2022      Past Surgical History:   Procedure Laterality Date    COLONOSCOPY N/A 9/27/2022    COLONOSCOPY performed by De Prasad MD at [address]      per pt on 5/11/2021     Prior to Admission medications    Medication Sig Start Date End Date Taking? Authorizing Provider   aspirin delayed-release 81 mg tablet Take 81 mg by mouth daily. Yes Provider, Historical   atorvastatin (LIPITOR) 80 mg tablet Take 80 mg by mouth nightly. Yes Provider, Historical   ferrous sulfate 325 mg (65 mg iron) tablet Take 325 mg by mouth Before breakfast and dinner. Yes Provider, Historical   pantoprazole (PROTONIX) 40 mg tablet Take 40 mg by mouth daily. Yes Provider, Historical   carvediloL (COREG) 25 mg tablet Take 25 mg by mouth two (2) times a day. 12/26/22  Yes Provider, Historical   amLODIPine (NORVASC) 10 mg tablet TAKE 1 TABLET EVERY DAY 10/18/22   Babar Escamilla NP     Allergies   Allergen Reactions    Clopidogrel Not Reported This Time      History reviewed. No pertinent family history.    Social History:  reports that he has been smoking cigarettes. He has been smoking an average of .5 packs per day. He has never used smokeless tobacco. He reports that he does not currently use alcohol after a past usage of about 1.0 standard drink per week. He reports that he does not currently use drugs. Social Determinants of Health     Tobacco Use: High Risk    Smoking Tobacco Use: Every Day    Smokeless Tobacco Use: Never    Passive Exposure: Not on file   Alcohol Use: Not on file   Financial Resource Strain: Not on file   Food Insecurity: Not on file   Transportation Needs: Not on file   Physical Activity: Not on file   Stress: Not on file   Social Connections: Not on file   Intimate Partner Violence: Not on file   Depression: Not at risk    PHQ-2 Score: 2   Housing Stability: Not on file        Medications were reconciled to the best of my ability given all available resources at the time of admission. Route is PO if not otherwise noted. Family and social history were personally reviewed, all pertinent and relevant details are outlined as above. Objective:   Visit Vitals  BP (!) 157/75 (BP 1 Location: Right upper arm, BP Patient Position: At rest)   Pulse 66   Temp 98.2 °F (36.8 °C)   Resp 19   Ht 5' 8\" (1.727 m)   Wt 65.3 kg (143 lb 14.4 oz)   SpO2 95%   BMI 21.88 kg/m²      O2 Device: None (Room air)    PHYSICAL EXAM:   General: On room air, alert oriented x3. HEENT: PEERL, EOMI, moist mucus membranes  Neck: Supple, no JVD, no meningeal signs  Chest: On room air. Symmetrical air entry bilaterally. Minimally diminished entry on the left posterior lower lung field.   CVS: RRR, S1 S2 heard, no murmurs/rubs/gallops  Abd: Soft, non-tender, non-distended, +bowel sounds   Ext: No clubbing, no cyanosis, no edema  Neuro/Psych: Pleasant mood and affect, chest and well-oriented, CN 2-12 grossly intact, sensory grossly within normal limit, Strength 5/5 in all extremities, DTR 1+ x 4  Cap refill: Brisk, less than 3 seconds  Pulses: 2+, symmetric in all extremities  Skin: Warm, dry, without rashes or lesions    Data Review:   I have independently reviewed and interpreted patient's lab and all other diagnostic data    Abnormal Labs Reviewed   CBC W/O DIFF - Abnormal; Notable for the following components:       Result Value    RBC 3.46 (*)     HGB 10.1 (*)     HCT 32.5 (*)     RDW 16.5 (*)     All other components within normal limits   METABOLIC PANEL, COMPREHENSIVE - Abnormal; Notable for the following components:    Sodium 147 (*)     Chloride 115 (*)     Glucose 113 (*)     Creatinine 1.56 (*)     BUN/Creatinine ratio 11 (*)     eGFR 48 (*)     Calcium 8.4 (*)     ALT (SGPT) 10 (*)     Protein, total 5.4 (*)     Albumin 2.4 (*)     A-G Ratio 0.8 (*)     All other components within normal limits   NT-PRO BNP - Abnormal; Notable for the following components:    NT pro-BNP 7,630 (*)     All other components within normal limits   CELL COUNT, BODY FLUID - Abnormal; Notable for the following components:    FLUID RBC CT. >100 (*)     FLD LYMPHS 92 (*)     FLD MONO/MACROPHAGES 8 (*)     All other components within normal limits   METABOLIC PANEL, BASIC - Abnormal; Notable for the following components:    Sodium 147 (*)     Chloride 113 (*)     Glucose 117 (*)     Creatinine 1.74 (*)     BUN/Creatinine ratio 11 (*)     eGFR 42 (*)     All other components within normal limits   METABOLIC PANEL, BASIC - Abnormal; Notable for the following components:    Chloride 113 (*)     Anion gap 4 (*)     Glucose 108 (*)     BUN 21 (*)     Creatinine 1.72 (*)     eGFR 43 (*)     Calcium 8.4 (*)     All other components within normal limits       All Micro Results       Procedure Component Value Units Date/Time    CULTURE, BODY FLUID Sheela Arce STAIN [[id number]] Collected: 03/15/23 1100    Order Status: Completed Specimen: Pleural Fluid Updated: 03/16/23 1411     Special Requests: NO SPECIAL REQUESTS        GRAM STAIN FEW WBCS SEEN         NO ORGANISMS SEEN        Culture result: NO GROWTH THUS FAR       CULTURE, BODY FLUID W Sydney Esquivel [[id number]] Collected: 03/14/23 2000    Order Status: Canceled Specimen: Body Fluid from Pleural Fluid             IMAGING:   CT CHEST WO CONT   Final Result   1. Left basilar hydropneumothorax. Left apical hydropneumothorax. 2.  Left lower lobe airspace disease suspicious for pneumonia. XR CHEST PORT   Final Result      Left pneumothorax following thoracentesis related to incomplete expansion of the   left lower lobe. [facility]   Final Result   Technically successful ultrasound guided left thoracentesis with evacuation of   1800 ml of fluid. A post procedure chest x-ray is pending. ECG/ECHO:    Results for orders placed or performed during the hospital encounter of 03/14/23   EKG, 12 LEAD, INITIAL   Result Value Ref Range    Ventricular Rate 63 BPM    Atrial Rate 63 BPM    P-R Interval 180 ms    QRS Duration 84 ms    Q-T Interval 434 ms    QTC Calculation (Bezet) 444 ms    Calculated P Axis 56 degrees    Calculated R Axis 35 degrees    Calculated T Axis -151 degrees    Diagnosis       Normal sinus rhythm  Possible Left atrial enlargement  Left ventricular hypertrophy with repolarization abnormality  Abnormal ECG  When compared with ECG of 19-JAN-2023 01:37,  No significant change was found  Confirmed by Adal Choi M.D., [facility] ([id number]) on 3/14/2023 12:40:08 PM            Notes reviewed from all clinical/nonclinical/nursing services involved in patient's clinical care. Care coordination discussions were held with appropriate clinical/nonclinical/ nursing providers based on care coordination needs. Signed By: Treva Germain MD     March 17, 2023         Please note that this dictation may have been completed with Dragon, the [employer] voice recognition software.   Quite often unanticipated grammatical, syntax, homophones, and other interpretive errors are inadvertently transcribed by the computer software. Please disregard these errors. Please excuse any errors that have escaped final proofreading.

## 2023-03-17 NOTE — PROGRESS NOTES
Pulmonary, Critical Care, and Sleep Medicine~Progress Note    Name: Frank Mcleod. MRN: [id number]   : 1955 [facility]   Date: 3/17/2023 10:51 AM Admission: 3/14/2023     Impression Plan   Recurrent left pleural effusion. Suspect neoplasia. No signs of infection. 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 Elevated troponin  CKD stage III  HTN  CAD, s/p CABG  Hx of CVA  Follow pleural fluid path, still pending    O2 titration above 90%, on room air   Would benefit from outpatient PFTs and pulm follow up  Needs thoracic involvement for VATS pleurodesis and consideration of pleural bx  Discussed with hospitalist      Daily Progression:    3/17  CT scan  IMPRESSION  1. Left basilar hydropneumothorax. Left apical hydropneumothorax. 2.  Left lower lobe airspace disease suspicious for pneumonia. 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 3/15  Consult Note requested by hospitalist service. Patient is a very pleasant -American male who presented for admission secondary to a recurrent left pleural effusion with shortness of breath. He did have a thoracentesis in January at [facility].  1.6 L of a lymphocytic exudative pleural effusion was removed. Unfortunately no pathology was sent at that time, but cultures were and they were negative. Post tap the effusion was essentially gone. Chest image yesterday showed recurrence of left pleural effusion. CT scan in January did not reveal any lesions however there was a substantial amount of fluid and atelectatic parenchyma that would preclude any proper evaluation. He was an active smoker that recently stopped. He does not endorse any weight loss, hemoptysis or being followed by pulmonary doctor. He does not carry the diagnosis of COPD but does have emphysematous changes on the CT scan.     I have reviewed the labs and previous days notes. A comprehensive review of systems was negative. Past Medical History:   Diagnosis Date    CAD (coronary artery disease)     10/31/22: NSTEMI sees [facility] cardiology, may be getting CABG    Fractured rib 2021    from a fall per pt on 2021    High cholesterol     Hypertension     per pt on 2021    Stroke ([address]       Past Surgical History:   Procedure Laterality Date    COLONOSCOPY N/A 2022    COLONOSCOPY performed by Edwina Randhawa MD at [address]      per pt on 2021      Prior to Admission medications    Medication Sig Start Date End Date Taking? Authorizing Provider   aspirin delayed-release 81 mg tablet Take 81 mg by mouth daily. Yes Provider, Historical   atorvastatin (LIPITOR) 80 mg tablet Take 80 mg by mouth nightly. Yes Provider, Historical   ferrous sulfate 325 mg (65 mg iron) tablet Take 325 mg by mouth Before breakfast and dinner. Yes Provider, Historical   pantoprazole (PROTONIX) 40 mg tablet Take 40 mg by mouth daily. Yes Provider, Historical   carvediloL (COREG) 25 mg tablet Take 25 mg by mouth two (2) times a day. 22  Yes Provider, Historical   amLODIPine (NORVASC) 10 mg tablet TAKE 1 TABLET EVERY DAY 10/18/22   Suman Bell NP     Allergies   Allergen Reactions    Clopidogrel Not Reported This Time      Social History     Tobacco Use    Smoking status: Every Day     Packs/day: 0.50     Types: Cigarettes    Smokeless tobacco: Never   Substance Use Topics    Alcohol use: Not Currently     Alcohol/week: 1.0 standard drink     Types: 1 Cans of beer per week      History reviewed. No pertinent family history.   OBJECTIVE:     Vital Signs:     Visit Vitals  BP (!) 157/75 (BP 1 Location: Right upper arm, BP Patient Position: At rest)   Pulse 66   Temp 98.2 °F (36.8 °C)   Resp 19   Ht 5' 8\" (1.727 m)   Wt 65.3 kg (143 lb 14.4 oz)   SpO2 95%   BMI 21.88 kg/m²      Temp (24hrs), Av.4 °F (36.9 °C), Min:97.7 °F (36.5 °C), Max:98.8 °F (37.1 °C)     Intake/Output:     Last shift: No intake/output data recorded. Last 3 shifts: 03/15 1901 - 03/17 0700  In: -   Out: 100 [Urine:100]      No intake or output data in the 24 hours ending 03/17/23 1134      Physical Exam:                                        Exam Findings Other   General: No resp distress noted, appears stated age    [de-identified]:  No ulcers, JVD not elevated, no cervical LAD    Chest: No pectus deformity, normal chest rise b/l    HEART:  RRR, no murmurs/rubs/gallops    Lungs:  CTA b/l, no rhonchi/crackles/wheeze, diminished BS at bases    ABD: Soft/NT, non rigid mildly distended    EXT: No cyanosis/clubbing/edema, normal peripheral pulses    Skin: No rashes or ulcers, no mottling    Neuro: A/O x 3        Medications:  Current Facility-Administered Medications   Medication Dose Route Frequency    amLODIPine (NORVASC) tablet 10 mg  10 mg Oral DAILY    hydrALAZINE (APRESOLINE) 20 mg/mL injection 20 mg  20 mg IntraVENous Q6H PRN    dextrose 5% infusion  75 mL/hr IntraVENous CONTINUOUS    atorvastatin (LIPITOR) tablet 80 mg  80 mg Oral QHS    carvediloL (COREG) tablet 25 mg  25 mg Oral BID    cholecalciferol (VITAMIN D3) (1000 Units /25 mcg) tablet 1,000 Units  1,000 Units Oral DAILY    sodium chloride (NS) flush 5-40 mL  5-40 mL IntraVENous Q8H    sodium chloride (NS) flush 5-40 mL  5-40 mL IntraVENous PRN    albuterol-ipratropium (DUO-NEB) 2.5 MG-0.5 MG/3 ML  3 mL Nebulization Q4H PRN    ferrous sulfate tablet 325 mg  325 mg Oral ACB&D    pantoprazole (PROTONIX) tablet 40 mg  40 mg Oral DAILY       Labs:  ABG No results for input(s): PHI, PCO2I, PO2I, HCO3I, SO2I, FIO2I in the last 72 hours.      CBC Recent Labs     03/15/23  0625 03/14/23  1202   WBC 7.1 7.5   HGB 10.1* 10.9*   HCT 32.5* 34.0*    270   MCV 93.9 93.2   MCH 29.2 15.9          Metabolic  Panel Recent Labs     03/16/23  0345 03/15/23  1530 03/15/23  0625 03/14/23  1202    147* 147* 150*   K 3.7 4.1 3.6 4.0   * 113* 115* 113*   CO2 25 27 24 29   * 117* 113* 107*   BUN 21* 19 17 15   CREA 1.72* 1.74* 1.56* 1.74*   CA 8.4* 8.7 8.4* 8.1*   ALB  --   --  2.4* 2.5*   ALT  --   --  10* 13          Pertinent Labs                Vladimir Miller, PA-C  3/17/2023

## 2023-03-17 NOTE — PROGRESS NOTES
1730 received a call form [facility] transfer [place] that patient is going to [facility] to room 323. Report given to Lucy Le RN at 1800 phone number [phone]. ENT to [facility] is at [address] a call from [facility] that ETA is now at 0415.

## 2023-03-17 NOTE — PROGRESS NOTES
Spiritual Care Partner Volunteer visited patient at [facility] in [address] [place] [address] MED SURG on 3/17/2023   Documented by:  Rev. Matthew Yeh MDiv, [facility]  Staff

## 2023-03-18 VITALS
OXYGEN SATURATION: 98 % | BODY MASS INDEX: 21.81 KG/M2 | TEMPERATURE: 98.1 F | SYSTOLIC BLOOD PRESSURE: 161 MMHG | WEIGHT: 143.9 LBS | HEIGHT: 68 IN | DIASTOLIC BLOOD PRESSURE: 79 MMHG | RESPIRATION RATE: 16 BRPM | HEART RATE: 72 BPM

## 2023-03-18 NOTE — PROGRESS NOTES
Problem: Falls - Risk of  Goal: *Absence of Falls  Description: Document Matias Oneill Fall Risk and appropriate interventions in the flowsheet.   Outcome: Progressing Towards Goal  Note: Fall Risk Interventions:                                Problem: Patient Education: Go to Patient Education Activity  Goal: Patient/Family Education  Outcome: Progressing Towards Goal     Problem: General Medical Care Plan  Goal: *Vital signs within specified parameters  Outcome: Progressing Towards Goal  Goal: *Labs within defined limits  Outcome: Progressing Towards Goal  Goal: *Absence of infection signs and symptoms  Outcome: Progressing Towards Goal  Goal: *Optimal pain control at patient's stated goal  Outcome: Progressing Towards Goal  Goal: *Skin integrity maintained  Outcome: Progressing Towards Goal  Goal: *Fluid volume balance  Outcome: Progressing Towards Goal  Goal: *Optimize nutritional status  Outcome: Progressing Towards Goal  Goal: *Anxiety reduced or absent  Outcome: Progressing Towards Goal  Goal: *Progressive mobility and function (eg: ADL's)  Outcome: Progressing Towards Goal     Problem: Patient Education: Go to Patient Education Activity  Goal: Patient/Family Education  Outcome: Progressing Towards Goal     Problem: Discharge Planning  Goal: *Discharge to safe environment  Outcome: Progressing Towards Goal

## 2023-03-19 LAB
BACTERIA SPEC CULT: NORMAL
GRAM STN SPEC: NORMAL
GRAM STN SPEC: NORMAL
SERVICE CMNT-IMP: NORMAL

## 2023-03-21 NOTE — PROGRESS NOTES
Physician Progress Note      PATIENT:               Marlena Hicks  CSN #:                  [id number]  :                       1955  ADMIT DATE:       3/14/2023 4:03 PM  [address] DATE:        3/18/2023 4:29 AM  RESPONDING  PROVIDER #:        Vianney Ruiz MD          QUERY TEXT:    Dear attending,    Per documentation in the discharge summary- Dyspnea due to recurrent left pleural effusion. I suspect this is complication of post CABG. Per pulmonary- Recurrent left pleural effusion. Suspect neoplasia. No signs of infection -however, this was documented before the cytology results were back. If possible, please document in progress notes and discharge summary the relationship if any between the pleural effusion and the CABG:??  ?  The medical record reflects the following:?    Risk Factors: ? Hx. CABG    Clinical Indicators:3/15-Cytology- CYTOLOGIC INTERPRETATION:  Pleural Fluid, ThinPrep and cell block:  Reactive mesothelial cells and mixed, predominantly chronic inflammatory  cells. Cell block shows similar features. No cells diagnostic for malignancy  Per H&P- Shortness of breath due to moderate left pleural effusion unclear etiology  IR is consulted for thoracentesis for diagnostic and therapeutic  Last echo was on 2023 LVEF 50 to 55%, left ventricular size was normal  3/15-Per pulmonary- Recurrent left pleural effusion. Suspect neoplasia. No signs of infection  CT scan in January did not reveal any lesions however there was a substantial amount of fluid and atelectatic parenchyma that would preclude any proper evaluation. Per Discharge Summary -Dyspnea due to recurrent left pleural effusion. I suspect this is complication of post CABG. He had thoracentesis in 2023, fluid analysis negative for infection.   ?  Treatment: Thoracentesis, Consult pulmonologist, monitor cytology results      Thank you,    Kieran Crawley RN, BSN, [facility], [facility]  Clinical Documentation Improvement  [phone] or via Perfect Serve  Options provided:  -- pleural effusion ?is due to the CABG in 11/22  -- pleural effusion is not due to the procedure, but is due to, please specify cause  -- Other - I will add my own diagnosis  -- Disagree - Not applicable / Not valid  -- Disagree - Clinically unable to determine / Unknown  -- Refer to Clinical Documentation Reviewer    PROVIDER RESPONSE TEXT:    Patient has pleural effusion that is due to the CABG in 11/22.     Query created by: Cassia Johnson on 3/21/2023 8:19 AM      Electronically signed by:  Jj García MD 3/21/2023 8:22 AM

## 2023-04-16 ENCOUNTER — HOSPITAL ENCOUNTER (EMERGENCY)
Age: 68
Discharge: HOME | End: 2023-04-16
Attending: EMERGENCY MEDICINE
Payer: MEDICARE

## 2023-04-16 ENCOUNTER — APPOINTMENT (OUTPATIENT)
Dept: GENERAL RADIOLOGY | Age: 68
End: 2023-04-16
Attending: EMERGENCY MEDICINE
Payer: MEDICARE

## 2023-04-16 VITALS
TEMPERATURE: 99.1 F | BODY MASS INDEX: 19.85 KG/M2 | OXYGEN SATURATION: 97 % | WEIGHT: 131 LBS | HEIGHT: 68 IN | RESPIRATION RATE: 25 BRPM | SYSTOLIC BLOOD PRESSURE: 145 MMHG | DIASTOLIC BLOOD PRESSURE: 89 MMHG | HEART RATE: 70 BPM

## 2023-04-16 DIAGNOSIS — R07.89 CHEST WALL PAIN: Primary | ICD-10-CM

## 2023-04-16 LAB
ALBUMIN SERPL-MCNC: 2.6 G/DL (ref 3.5–5)
ALBUMIN/GLOB SERPL: 0.8 (ref 1.1–2.2)
ALP SERPL-CCNC: 108 U/L (ref 45–117)
ALT SERPL-CCNC: 17 U/L (ref 12–78)
ANION GAP SERPL CALC-SCNC: 2 MMOL/L (ref 5–15)
AST SERPL-CCNC: 19 U/L (ref 15–37)
ATRIAL RATE: 69 BPM
BASOPHILS # BLD: 0 K/UL (ref 0–0.1)
BASOPHILS NFR BLD: 0 % (ref 0–1)
BILIRUB SERPL-MCNC: 0.1 MG/DL (ref 0.2–1)
BUN SERPL-MCNC: 19 MG/DL (ref 6–20)
BUN/CREAT SERPL: 10 (ref 12–20)
CALCIUM SERPL-MCNC: 8.3 MG/DL (ref 8.5–10.1)
CALCULATED P AXIS, ECG09: 58 DEGREES
CALCULATED R AXIS, ECG10: 16 DEGREES
CALCULATED T AXIS, ECG11: 159 DEGREES
CHLORIDE SERPL-SCNC: 116 MMOL/L (ref 97–108)
CO2 SERPL-SCNC: 28 MMOL/L (ref 21–32)
COMMENT, HOLDF: NORMAL
CREAT SERPL-MCNC: 1.85 MG/DL (ref 0.7–1.3)
DIAGNOSIS, 93000: NORMAL
DIFFERENTIAL METHOD BLD: ABNORMAL
EOSINOPHIL # BLD: 0.3 K/UL (ref 0–0.4)
EOSINOPHIL NFR BLD: 3 % (ref 0–7)
ERYTHROCYTE [DISTWIDTH] IN BLOOD BY AUTOMATED COUNT: 16.4 % (ref 11.5–14.5)
GLOBULIN SER CALC-MCNC: 3.4 G/DL (ref 2–4)
GLUCOSE SERPL-MCNC: 89 MG/DL (ref 65–100)
HCT VFR BLD AUTO: 31.9 % (ref 36.6–50.3)
HGB BLD-MCNC: 9.6 G/DL (ref 12.1–17)
IMM GRANULOCYTES # BLD AUTO: 0 K/UL
IMM GRANULOCYTES NFR BLD AUTO: 0 %
LYMPHOCYTES # BLD: 2.7 K/UL (ref 0.8–3.5)
LYMPHOCYTES NFR BLD: 29 % (ref 12–49)
MAGNESIUM SERPL-MCNC: 1.7 MG/DL (ref 1.6–2.4)
MCH RBC QN AUTO: 29.4 PG (ref 26–34)
MCHC RBC AUTO-ENTMCNC: 30.1 G/DL (ref 30–36.5)
MCV RBC AUTO: 97.9 FL (ref 80–99)
MONOCYTES # BLD: 0.7 K/UL (ref 0–1)
MONOCYTES NFR BLD: 7 % (ref 5–13)
NEUTS SEG # BLD: 5.6 K/UL (ref 1.8–8)
NEUTS SEG NFR BLD: 61 % (ref 32–75)
NRBC # BLD: 0 K/UL (ref 0–0.01)
NRBC BLD-RTO: 0 PER 100 WBC
P-R INTERVAL, ECG05: 170 MS
PLATELET # BLD AUTO: 325 K/UL (ref 150–400)
PMV BLD AUTO: 11.1 FL (ref 8.9–12.9)
POTASSIUM SERPL-SCNC: 3.7 MMOL/L (ref 3.5–5.1)
PROT SERPL-MCNC: 6 G/DL (ref 6.4–8.2)
Q-T INTERVAL, ECG07: 382 MS
QRS DURATION, ECG06: 84 MS
QTC CALCULATION (BEZET), ECG08: 409 MS
RBC # BLD AUTO: 3.26 M/UL (ref 4.1–5.7)
RBC MORPH BLD: ABNORMAL
RBC MORPH BLD: ABNORMAL
SAMPLES BEING HELD,HOLD: NORMAL
SODIUM SERPL-SCNC: 146 MMOL/L (ref 136–145)
TROPONIN I SERPL HS-MCNC: 57 NG/L (ref 0–57)
VENTRICULAR RATE, ECG03: 69 BPM
WBC # BLD AUTO: 9.3 K/UL (ref 4.1–11.1)
WBC MORPH BLD: ABNORMAL

## 2023-04-16 PROCEDURE — 84484 ASSAY OF TROPONIN QUANT: CPT

## 2023-04-16 PROCEDURE — 71045 X-RAY EXAM CHEST 1 VIEW: CPT

## 2023-04-16 PROCEDURE — 36415 COLL VENOUS BLD VENIPUNCTURE: CPT

## 2023-04-16 PROCEDURE — 83735 ASSAY OF MAGNESIUM: CPT

## 2023-04-16 PROCEDURE — 85025 COMPLETE CBC W/AUTO DIFF WBC: CPT

## 2023-04-16 PROCEDURE — 99285 EMERGENCY DEPT VISIT HI MDM: CPT

## 2023-04-16 PROCEDURE — 80053 COMPREHEN METABOLIC PANEL: CPT

## 2023-04-16 PROCEDURE — 93005 ELECTROCARDIOGRAM TRACING: CPT

## 2023-04-16 RX ORDER — HYDROCODONE BITARTRATE AND ACETAMINOPHEN 5; 325 MG/1; MG/1
1 TABLET ORAL ONCE
Status: DISCONTINUED | OUTPATIENT
Start: 2023-04-16 | End: 2023-04-16 | Stop reason: HOSPADM

## 2023-05-23 ENCOUNTER — HOSPITAL ENCOUNTER (OUTPATIENT)
Facility: HOSPITAL | Age: 68
Discharge: HOME | End: 2023-05-26
Payer: MEDICARE

## 2023-05-23 DIAGNOSIS — J90 PLEURAL EFFUSION: ICD-10-CM

## 2023-05-23 PROCEDURE — 71046 X-RAY EXAM CHEST 2 VIEWS: CPT

## 2023-06-19 ENCOUNTER — HOSPITAL ENCOUNTER (OUTPATIENT)
Facility: HOSPITAL | Age: 68
Discharge: HOME | End: 2023-06-21
Attending: PODIATRIST
Payer: MEDICARE

## 2023-06-19 DIAGNOSIS — M79.669 CALF PAIN, UNSPECIFIED LATERALITY: ICD-10-CM

## 2023-06-19 DIAGNOSIS — R09.89 DIMINISHED PULSE: ICD-10-CM

## 2023-06-19 LAB
VAS LEFT ABI: 0.7
VAS LEFT ARM BP: 180 MMHG
VAS LEFT DORSALIS PEDIS BP: 124 MMHG
VAS LEFT PTA BP: 131 MMHG
VAS LEFT TBI: 0.47
VAS LEFT TOE PRESSURE: 89 MMHG
VAS RIGHT ABI: 0.97
VAS RIGHT ARM BP: 188 MMHG
VAS RIGHT DORSALIS PEDIS BP: 122 MMHG
VAS RIGHT PTA BP: 182 MMHG
VAS RIGHT TBI: 0.49
VAS RIGHT TOE PRESSURE: 93 MMHG

## 2023-06-19 PROCEDURE — 93922 UPR/L XTREMITY ART 2 LEVELS: CPT | Performed by: INTERNAL MEDICINE

## 2023-06-19 PROCEDURE — 93922 UPR/L XTREMITY ART 2 LEVELS: CPT

## 2023-07-08 ENCOUNTER — HOSPITAL ENCOUNTER (INPATIENT)
Facility: HOSPITAL | Age: 68
LOS: 4 days | Discharge: HOME HEALTH | DRG: 291 | End: 2023-07-12
Attending: STUDENT IN AN ORGANIZED HEALTH CARE EDUCATION/TRAINING PROGRAM | Admitting: INTERNAL MEDICINE
Payer: MEDICARE

## 2023-07-08 ENCOUNTER — APPOINTMENT (OUTPATIENT)
Facility: HOSPITAL | Age: 68
DRG: 291 | End: 2023-07-08
Payer: MEDICARE

## 2023-07-08 DIAGNOSIS — J90 PLEURAL EFFUSION ON LEFT: ICD-10-CM

## 2023-07-08 DIAGNOSIS — J81.0 ACUTE PULMONARY EDEMA (HCC): Primary | ICD-10-CM

## 2023-07-08 DIAGNOSIS — J90 RECURRENT PLEURAL EFFUSION ON LEFT: ICD-10-CM

## 2023-07-08 DIAGNOSIS — R06.02 SHORTNESS OF BREATH: ICD-10-CM

## 2023-07-08 DIAGNOSIS — E87.6 HYPOKALEMIA: ICD-10-CM

## 2023-07-08 DIAGNOSIS — R77.8 ELEVATED TROPONIN: ICD-10-CM

## 2023-07-08 LAB
ALBUMIN SERPL-MCNC: 2.6 G/DL (ref 3.5–5)
ALBUMIN/GLOB SERPL: 0.7 (ref 1.1–2.2)
ALP SERPL-CCNC: 94 U/L (ref 45–117)
ALT SERPL-CCNC: 11 U/L (ref 12–78)
ANION GAP SERPL CALC-SCNC: 5 MMOL/L (ref 5–15)
AST SERPL-CCNC: 19 U/L (ref 15–37)
BASOPHILS # BLD: 0 K/UL (ref 0–0.1)
BASOPHILS NFR BLD: 1 % (ref 0–1)
BILIRUB SERPL-MCNC: 0.4 MG/DL (ref 0.2–1)
BUN SERPL-MCNC: 16 MG/DL (ref 6–20)
BUN/CREAT SERPL: 8 (ref 12–20)
CALCIUM SERPL-MCNC: 8.3 MG/DL (ref 8.5–10.1)
CHLORIDE SERPL-SCNC: 115 MMOL/L (ref 97–108)
CO2 SERPL-SCNC: 25 MMOL/L (ref 21–32)
COMMENT:: NORMAL
CREAT SERPL-MCNC: 2 MG/DL (ref 0.7–1.3)
DIFFERENTIAL METHOD BLD: ABNORMAL
EKG ATRIAL RATE: 69 BPM
EKG DIAGNOSIS: NORMAL
EKG P AXIS: 70 DEGREES
EKG P-R INTERVAL: 166 MS
EKG Q-T INTERVAL: 428 MS
EKG QRS DURATION: 90 MS
EKG QTC CALCULATION (BAZETT): 458 MS
EKG R AXIS: 47 DEGREES
EKG T AXIS: 203 DEGREES
EKG VENTRICULAR RATE: 69 BPM
EOSINOPHIL # BLD: 0.4 K/UL (ref 0–0.4)
EOSINOPHIL NFR BLD: 6 % (ref 0–7)
ERYTHROCYTE [DISTWIDTH] IN BLOOD BY AUTOMATED COUNT: 15.1 % (ref 11.5–14.5)
EST. AVERAGE GLUCOSE BLD GHB EST-MCNC: 100 MG/DL
GLOBULIN SER CALC-MCNC: 4 G/DL (ref 2–4)
GLUCOSE SERPL-MCNC: 110 MG/DL (ref 65–100)
HBA1C MFR BLD: 5.1 % (ref 4–5.6)
HCT VFR BLD AUTO: 31.6 % (ref 36.6–50.3)
HGB BLD-MCNC: 10.3 G/DL (ref 12.1–17)
IMM GRANULOCYTES # BLD AUTO: 0 K/UL (ref 0–0.04)
IMM GRANULOCYTES NFR BLD AUTO: 0 % (ref 0–0.5)
LYMPHOCYTES # BLD: 1.4 K/UL (ref 0.8–3.5)
LYMPHOCYTES NFR BLD: 22 % (ref 12–49)
MAGNESIUM SERPL-MCNC: 1.9 MG/DL (ref 1.6–2.4)
MCH RBC QN AUTO: 29.9 PG (ref 26–34)
MCHC RBC AUTO-ENTMCNC: 32.6 G/DL (ref 30–36.5)
MCV RBC AUTO: 91.9 FL (ref 80–99)
MONOCYTES # BLD: 0.5 K/UL (ref 0–1)
MONOCYTES NFR BLD: 8 % (ref 5–13)
NEUTS SEG # BLD: 4.1 K/UL (ref 1.8–8)
NEUTS SEG NFR BLD: 63 % (ref 32–75)
NRBC # BLD: 0 K/UL (ref 0–0.01)
NRBC BLD-RTO: 0 PER 100 WBC
NT PRO BNP: ABNORMAL PG/ML
PLATELET # BLD AUTO: 324 K/UL (ref 150–400)
PMV BLD AUTO: 11 FL (ref 8.9–12.9)
POTASSIUM SERPL-SCNC: 3 MMOL/L (ref 3.5–5.1)
PROT SERPL-MCNC: 6.6 G/DL (ref 6.4–8.2)
RBC # BLD AUTO: 3.44 M/UL (ref 4.1–5.7)
SODIUM SERPL-SCNC: 145 MMOL/L (ref 136–145)
SPECIMEN HOLD: NORMAL
TROPONIN I SERPL HS-MCNC: 126 NG/L (ref 0–57)
TROPONIN I SERPL HS-MCNC: 71 NG/L (ref 0–57)
TSH SERPL DL<=0.05 MIU/L-ACNC: 2.54 UIU/ML (ref 0.36–3.74)
WBC # BLD AUTO: 6.5 K/UL (ref 4.1–11.1)

## 2023-07-08 PROCEDURE — 2580000003 HC RX 258: Performed by: INTERNAL MEDICINE

## 2023-07-08 PROCEDURE — 6370000000 HC RX 637 (ALT 250 FOR IP): Performed by: INTERNAL MEDICINE

## 2023-07-08 PROCEDURE — 84484 ASSAY OF TROPONIN QUANT: CPT

## 2023-07-08 PROCEDURE — 80053 COMPREHEN METABOLIC PANEL: CPT

## 2023-07-08 PROCEDURE — 6360000002 HC RX W HCPCS: Performed by: STUDENT IN AN ORGANIZED HEALTH CARE EDUCATION/TRAINING PROGRAM

## 2023-07-08 PROCEDURE — 2060000000 HC ICU INTERMEDIATE R&B

## 2023-07-08 PROCEDURE — 76770 US EXAM ABDO BACK WALL COMP: CPT

## 2023-07-08 PROCEDURE — 6360000002 HC RX W HCPCS: Performed by: INTERNAL MEDICINE

## 2023-07-08 PROCEDURE — 83735 ASSAY OF MAGNESIUM: CPT

## 2023-07-08 PROCEDURE — 99285 EMERGENCY DEPT VISIT HI MDM: CPT

## 2023-07-08 PROCEDURE — 84443 ASSAY THYROID STIM HORMONE: CPT

## 2023-07-08 PROCEDURE — 83036 HEMOGLOBIN GLYCOSYLATED A1C: CPT

## 2023-07-08 PROCEDURE — 85025 COMPLETE CBC W/AUTO DIFF WBC: CPT

## 2023-07-08 PROCEDURE — 83880 ASSAY OF NATRIURETIC PEPTIDE: CPT

## 2023-07-08 PROCEDURE — 6370000000 HC RX 637 (ALT 250 FOR IP): Performed by: STUDENT IN AN ORGANIZED HEALTH CARE EDUCATION/TRAINING PROGRAM

## 2023-07-08 PROCEDURE — 71046 X-RAY EXAM CHEST 2 VIEWS: CPT

## 2023-07-08 PROCEDURE — 36415 COLL VENOUS BLD VENIPUNCTURE: CPT

## 2023-07-08 RX ORDER — POTASSIUM CHLORIDE 7.45 MG/ML
10 INJECTION INTRAVENOUS PRN
Status: DISCONTINUED | OUTPATIENT
Start: 2023-07-08 | End: 2023-07-12 | Stop reason: HOSPADM

## 2023-07-08 RX ORDER — SODIUM CHLORIDE 0.9 % (FLUSH) 0.9 %
5-40 SYRINGE (ML) INJECTION PRN
Status: DISCONTINUED | OUTPATIENT
Start: 2023-07-08 | End: 2023-07-12 | Stop reason: HOSPADM

## 2023-07-08 RX ORDER — ASPIRIN 81 MG/1
81 TABLET ORAL DAILY
Status: DISCONTINUED | OUTPATIENT
Start: 2023-07-09 | End: 2023-07-12 | Stop reason: HOSPADM

## 2023-07-08 RX ORDER — POLYETHYLENE GLYCOL 3350 17 G/17G
17 POWDER, FOR SOLUTION ORAL DAILY PRN
Status: DISCONTINUED | OUTPATIENT
Start: 2023-07-08 | End: 2023-07-12 | Stop reason: HOSPADM

## 2023-07-08 RX ORDER — ACETAMINOPHEN 650 MG/1
650 SUPPOSITORY RECTAL EVERY 6 HOURS PRN
Status: DISCONTINUED | OUTPATIENT
Start: 2023-07-08 | End: 2023-07-12 | Stop reason: HOSPADM

## 2023-07-08 RX ORDER — SODIUM CHLORIDE 9 MG/ML
INJECTION, SOLUTION INTRAVENOUS PRN
Status: DISCONTINUED | OUTPATIENT
Start: 2023-07-08 | End: 2023-07-12 | Stop reason: HOSPADM

## 2023-07-08 RX ORDER — FERROUS SULFATE 325(65) MG
325 TABLET ORAL
Status: DISCONTINUED | OUTPATIENT
Start: 2023-07-08 | End: 2023-07-12 | Stop reason: HOSPADM

## 2023-07-08 RX ORDER — ONDANSETRON 4 MG/1
4 TABLET, ORALLY DISINTEGRATING ORAL EVERY 8 HOURS PRN
Status: DISCONTINUED | OUTPATIENT
Start: 2023-07-08 | End: 2023-07-12 | Stop reason: HOSPADM

## 2023-07-08 RX ORDER — ACETAMINOPHEN 325 MG/1
650 TABLET ORAL EVERY 6 HOURS PRN
Status: DISCONTINUED | OUTPATIENT
Start: 2023-07-08 | End: 2023-07-12 | Stop reason: HOSPADM

## 2023-07-08 RX ORDER — AMLODIPINE BESYLATE 5 MG/1
10 TABLET ORAL DAILY
Status: DISCONTINUED | OUTPATIENT
Start: 2023-07-08 | End: 2023-07-12 | Stop reason: HOSPADM

## 2023-07-08 RX ORDER — ASPIRIN 81 MG/1
162 TABLET, CHEWABLE ORAL ONCE
Status: COMPLETED | OUTPATIENT
Start: 2023-07-08 | End: 2023-07-08

## 2023-07-08 RX ORDER — ONDANSETRON 2 MG/ML
4 INJECTION INTRAMUSCULAR; INTRAVENOUS EVERY 6 HOURS PRN
Status: DISCONTINUED | OUTPATIENT
Start: 2023-07-08 | End: 2023-07-12 | Stop reason: HOSPADM

## 2023-07-08 RX ORDER — PANTOPRAZOLE SODIUM 40 MG/1
40 TABLET, DELAYED RELEASE ORAL DAILY
Status: DISCONTINUED | OUTPATIENT
Start: 2023-07-08 | End: 2023-07-12 | Stop reason: HOSPADM

## 2023-07-08 RX ORDER — FUROSEMIDE 10 MG/ML
40 INJECTION INTRAMUSCULAR; INTRAVENOUS 2 TIMES DAILY
Status: DISCONTINUED | OUTPATIENT
Start: 2023-07-08 | End: 2023-07-11

## 2023-07-08 RX ORDER — MAGNESIUM SULFATE IN WATER 40 MG/ML
2000 INJECTION, SOLUTION INTRAVENOUS PRN
Status: DISCONTINUED | OUTPATIENT
Start: 2023-07-08 | End: 2023-07-12 | Stop reason: HOSPADM

## 2023-07-08 RX ORDER — CARVEDILOL 12.5 MG/1
25 TABLET ORAL 2 TIMES DAILY
Status: DISCONTINUED | OUTPATIENT
Start: 2023-07-08 | End: 2023-07-12 | Stop reason: HOSPADM

## 2023-07-08 RX ORDER — POTASSIUM CHLORIDE 750 MG/1
40 TABLET, FILM COATED, EXTENDED RELEASE ORAL ONCE
Status: COMPLETED | OUTPATIENT
Start: 2023-07-08 | End: 2023-07-08

## 2023-07-08 RX ORDER — FUROSEMIDE 10 MG/ML
40 INJECTION INTRAMUSCULAR; INTRAVENOUS ONCE
Status: COMPLETED | OUTPATIENT
Start: 2023-07-08 | End: 2023-07-08

## 2023-07-08 RX ORDER — POTASSIUM CHLORIDE 750 MG/1
40 TABLET, FILM COATED, EXTENDED RELEASE ORAL PRN
Status: DISCONTINUED | OUTPATIENT
Start: 2023-07-08 | End: 2023-07-12 | Stop reason: HOSPADM

## 2023-07-08 RX ORDER — ATORVASTATIN CALCIUM 40 MG/1
80 TABLET, FILM COATED ORAL NIGHTLY
Status: DISCONTINUED | OUTPATIENT
Start: 2023-07-08 | End: 2023-07-12 | Stop reason: HOSPADM

## 2023-07-08 RX ORDER — SODIUM CHLORIDE 0.9 % (FLUSH) 0.9 %
5-40 SYRINGE (ML) INJECTION EVERY 12 HOURS SCHEDULED
Status: DISCONTINUED | OUTPATIENT
Start: 2023-07-08 | End: 2023-07-12 | Stop reason: HOSPADM

## 2023-07-08 RX ORDER — ASPIRIN 81 MG/1
81 TABLET ORAL DAILY
Status: DISCONTINUED | OUTPATIENT
Start: 2023-07-08 | End: 2023-07-08

## 2023-07-08 RX ADMIN — ATORVASTATIN CALCIUM 80 MG: 40 TABLET, FILM COATED ORAL at 22:32

## 2023-07-08 RX ADMIN — CARVEDILOL 25 MG: 12.5 TABLET, FILM COATED ORAL at 23:52

## 2023-07-08 RX ADMIN — FUROSEMIDE 40 MG: 10 INJECTION, SOLUTION INTRAMUSCULAR; INTRAVENOUS at 22:32

## 2023-07-08 RX ADMIN — FUROSEMIDE 40 MG: 10 INJECTION, SOLUTION INTRAMUSCULAR; INTRAVENOUS at 12:32

## 2023-07-08 RX ADMIN — POTASSIUM CHLORIDE 40 MEQ: 750 TABLET, FILM COATED, EXTENDED RELEASE ORAL at 11:09

## 2023-07-08 RX ADMIN — PANTOPRAZOLE SODIUM 40 MG: 40 TABLET, DELAYED RELEASE ORAL at 14:06

## 2023-07-08 RX ADMIN — CARVEDILOL 25 MG: 12.5 TABLET, FILM COATED ORAL at 14:06

## 2023-07-08 RX ADMIN — SODIUM CHLORIDE, PRESERVATIVE FREE 10 ML: 5 INJECTION INTRAVENOUS at 22:33

## 2023-07-08 RX ADMIN — AMLODIPINE BESYLATE 10 MG: 5 TABLET ORAL at 14:06

## 2023-07-08 RX ADMIN — ASPIRIN 81 MG CHEWABLE TABLET 162 MG: 81 TABLET CHEWABLE at 12:32

## 2023-07-08 RX ADMIN — ACETAMINOPHEN 650 MG: 325 TABLET ORAL at 20:38

## 2023-07-08 ASSESSMENT — PAIN DESCRIPTION - ORIENTATION
ORIENTATION: RIGHT
ORIENTATION: RIGHT

## 2023-07-08 ASSESSMENT — PAIN SCALES - GENERAL
PAINLEVEL_OUTOF10: 0
PAINLEVEL_OUTOF10: 4
PAINLEVEL_OUTOF10: 4
PAINLEVEL_OUTOF10: 0
PAINLEVEL_OUTOF10: 0

## 2023-07-08 ASSESSMENT — PAIN DESCRIPTION - PAIN TYPE: TYPE: CHRONIC PAIN

## 2023-07-08 ASSESSMENT — PAIN DESCRIPTION - LOCATION
LOCATION: OTHER (COMMENT)
LOCATION: OTHER (COMMENT)

## 2023-07-09 LAB
ALBUMIN SERPL-MCNC: 2.6 G/DL (ref 3.5–5)
ANION GAP SERPL CALC-SCNC: 6 MMOL/L (ref 5–15)
BASOPHILS # BLD: 0 K/UL (ref 0–0.1)
BASOPHILS NFR BLD: 1 % (ref 0–1)
BUN SERPL-MCNC: 19 MG/DL (ref 6–20)
BUN/CREAT SERPL: 10 (ref 12–20)
CALCIUM SERPL-MCNC: 8.4 MG/DL (ref 8.5–10.1)
CHLORIDE SERPL-SCNC: 112 MMOL/L (ref 97–108)
CHOLEST SERPL-MCNC: 100 MG/DL
CO2 SERPL-SCNC: 26 MMOL/L (ref 21–32)
CREAT SERPL-MCNC: 1.95 MG/DL (ref 0.7–1.3)
DIFFERENTIAL METHOD BLD: ABNORMAL
EKG ATRIAL RATE: 69 BPM
EKG DIAGNOSIS: NORMAL
EKG P-R INTERVAL: 104 MS
EKG Q-T INTERVAL: 446 MS
EKG QRS DURATION: 120 MS
EKG QTC CALCULATION (BAZETT): 477 MS
EKG R AXIS: 109 DEGREES
EKG T AXIS: -86 DEGREES
EKG VENTRICULAR RATE: 69 BPM
EOSINOPHIL # BLD: 0.5 K/UL (ref 0–0.4)
EOSINOPHIL NFR BLD: 7 % (ref 0–7)
ERYTHROCYTE [DISTWIDTH] IN BLOOD BY AUTOMATED COUNT: 15 % (ref 11.5–14.5)
GLUCOSE SERPL-MCNC: 96 MG/DL (ref 65–100)
HCT VFR BLD AUTO: 31.7 % (ref 36.6–50.3)
HDLC SERPL-MCNC: 35 MG/DL
HDLC SERPL: 2.9 (ref 0–5)
HGB BLD-MCNC: 10 G/DL (ref 12.1–17)
IMM GRANULOCYTES # BLD AUTO: 0 K/UL (ref 0–0.04)
IMM GRANULOCYTES NFR BLD AUTO: 0 % (ref 0–0.5)
LDLC SERPL CALC-MCNC: 49.2 MG/DL (ref 0–100)
LYMPHOCYTES # BLD: 1.6 K/UL (ref 0.8–3.5)
LYMPHOCYTES NFR BLD: 23 % (ref 12–49)
MAGNESIUM SERPL-MCNC: 2 MG/DL (ref 1.6–2.4)
MCH RBC QN AUTO: 29.2 PG (ref 26–34)
MCHC RBC AUTO-ENTMCNC: 31.5 G/DL (ref 30–36.5)
MCV RBC AUTO: 92.7 FL (ref 80–99)
MONOCYTES # BLD: 0.7 K/UL (ref 0–1)
MONOCYTES NFR BLD: 10 % (ref 5–13)
NEUTS SEG # BLD: 4.1 K/UL (ref 1.8–8)
NEUTS SEG NFR BLD: 59 % (ref 32–75)
NRBC # BLD: 0 K/UL (ref 0–0.01)
NRBC BLD-RTO: 0 PER 100 WBC
PHOSPHATE SERPL-MCNC: 3.4 MG/DL (ref 2.6–4.7)
PLATELET # BLD AUTO: 331 K/UL (ref 150–400)
PMV BLD AUTO: 11.2 FL (ref 8.9–12.9)
POTASSIUM SERPL-SCNC: 2.7 MMOL/L (ref 3.5–5.1)
RBC # BLD AUTO: 3.42 M/UL (ref 4.1–5.7)
SODIUM SERPL-SCNC: 144 MMOL/L (ref 136–145)
TRIGL SERPL-MCNC: 79 MG/DL
VLDLC SERPL CALC-MCNC: 15.8 MG/DL
WBC # BLD AUTO: 7 K/UL (ref 4.1–11.1)

## 2023-07-09 PROCEDURE — 6360000002 HC RX W HCPCS: Performed by: INTERNAL MEDICINE

## 2023-07-09 PROCEDURE — 80061 LIPID PANEL: CPT

## 2023-07-09 PROCEDURE — 97116 GAIT TRAINING THERAPY: CPT

## 2023-07-09 PROCEDURE — 2580000003 HC RX 258: Performed by: INTERNAL MEDICINE

## 2023-07-09 PROCEDURE — 6370000000 HC RX 637 (ALT 250 FOR IP): Performed by: HOSPITALIST

## 2023-07-09 PROCEDURE — 6370000000 HC RX 637 (ALT 250 FOR IP): Performed by: NURSE PRACTITIONER

## 2023-07-09 PROCEDURE — 83735 ASSAY OF MAGNESIUM: CPT

## 2023-07-09 PROCEDURE — 36415 COLL VENOUS BLD VENIPUNCTURE: CPT

## 2023-07-09 PROCEDURE — 85025 COMPLETE CBC W/AUTO DIFF WBC: CPT

## 2023-07-09 PROCEDURE — 2060000000 HC ICU INTERMEDIATE R&B

## 2023-07-09 PROCEDURE — 80069 RENAL FUNCTION PANEL: CPT

## 2023-07-09 PROCEDURE — 6370000000 HC RX 637 (ALT 250 FOR IP): Performed by: INTERNAL MEDICINE

## 2023-07-09 PROCEDURE — 97165 OT EVAL LOW COMPLEX 30 MIN: CPT

## 2023-07-09 PROCEDURE — 97161 PT EVAL LOW COMPLEX 20 MIN: CPT

## 2023-07-09 PROCEDURE — 97535 SELF CARE MNGMENT TRAINING: CPT

## 2023-07-09 RX ORDER — POTASSIUM CHLORIDE 750 MG/1
40 TABLET, FILM COATED, EXTENDED RELEASE ORAL ONCE
Status: COMPLETED | OUTPATIENT
Start: 2023-07-09 | End: 2023-07-09

## 2023-07-09 RX ORDER — SPIRONOLACTONE 25 MG/1
25 TABLET ORAL DAILY
Status: DISCONTINUED | OUTPATIENT
Start: 2023-07-09 | End: 2023-07-12 | Stop reason: HOSPADM

## 2023-07-09 RX ORDER — POTASSIUM CHLORIDE 750 MG/1
40 TABLET, FILM COATED, EXTENDED RELEASE ORAL 2 TIMES DAILY
Status: DISCONTINUED | OUTPATIENT
Start: 2023-07-09 | End: 2023-07-12 | Stop reason: HOSPADM

## 2023-07-09 RX ADMIN — FUROSEMIDE 40 MG: 10 INJECTION, SOLUTION INTRAMUSCULAR; INTRAVENOUS at 09:31

## 2023-07-09 RX ADMIN — CARVEDILOL 25 MG: 12.5 TABLET, FILM COATED ORAL at 20:50

## 2023-07-09 RX ADMIN — POTASSIUM CHLORIDE 40 MEQ: 750 TABLET, FILM COATED, EXTENDED RELEASE ORAL at 20:49

## 2023-07-09 RX ADMIN — ASPIRIN 81 MG: 81 TABLET, COATED ORAL at 09:30

## 2023-07-09 RX ADMIN — CARVEDILOL 25 MG: 12.5 TABLET, FILM COATED ORAL at 09:29

## 2023-07-09 RX ADMIN — ACETAMINOPHEN 650 MG: 325 TABLET ORAL at 09:30

## 2023-07-09 RX ADMIN — POTASSIUM CHLORIDE 40 MEQ: 750 TABLET, FILM COATED, EXTENDED RELEASE ORAL at 09:30

## 2023-07-09 RX ADMIN — POTASSIUM CHLORIDE 40 MEQ: 750 TABLET, FILM COATED, EXTENDED RELEASE ORAL at 06:29

## 2023-07-09 RX ADMIN — FERROUS SULFATE TAB 325 MG (65 MG ELEMENTAL FE) 325 MG: 325 (65 FE) TAB at 16:28

## 2023-07-09 RX ADMIN — SPIRONOLACTONE 25 MG: 25 TABLET ORAL at 16:27

## 2023-07-09 RX ADMIN — FUROSEMIDE 40 MG: 10 INJECTION, SOLUTION INTRAMUSCULAR; INTRAVENOUS at 20:51

## 2023-07-09 RX ADMIN — SODIUM CHLORIDE, PRESERVATIVE FREE 10 ML: 5 INJECTION INTRAVENOUS at 09:32

## 2023-07-09 RX ADMIN — ATORVASTATIN CALCIUM 80 MG: 40 TABLET, FILM COATED ORAL at 20:50

## 2023-07-09 RX ADMIN — SODIUM CHLORIDE, PRESERVATIVE FREE 10 ML: 5 INJECTION INTRAVENOUS at 20:51

## 2023-07-09 RX ADMIN — FERROUS SULFATE TAB 325 MG (65 MG ELEMENTAL FE) 325 MG: 325 (65 FE) TAB at 06:28

## 2023-07-09 RX ADMIN — PANTOPRAZOLE SODIUM 40 MG: 40 TABLET, DELAYED RELEASE ORAL at 09:30

## 2023-07-09 RX ADMIN — EMPAGLIFLOZIN 10 MG: 10 TABLET, FILM COATED ORAL at 16:28

## 2023-07-09 RX ADMIN — AMLODIPINE BESYLATE 10 MG: 5 TABLET ORAL at 09:30

## 2023-07-09 ASSESSMENT — PAIN DESCRIPTION - LOCATION
LOCATION: BACK

## 2023-07-09 ASSESSMENT — PAIN SCALES - GENERAL
PAINLEVEL_OUTOF10: 5
PAINLEVEL_OUTOF10: 7
PAINLEVEL_OUTOF10: 8
PAINLEVEL_OUTOF10: 8
PAINLEVEL_OUTOF10: 0
PAINLEVEL_OUTOF10: 0

## 2023-07-09 ASSESSMENT — PAIN DESCRIPTION - ORIENTATION
ORIENTATION: RIGHT
ORIENTATION: RIGHT

## 2023-07-09 ASSESSMENT — PAIN DESCRIPTION - DESCRIPTORS: DESCRIPTORS: ACHING

## 2023-07-10 ENCOUNTER — APPOINTMENT (OUTPATIENT)
Facility: HOSPITAL | Age: 68
DRG: 291 | End: 2023-07-10
Attending: INTERNAL MEDICINE
Payer: MEDICARE

## 2023-07-10 ENCOUNTER — APPOINTMENT (OUTPATIENT)
Facility: HOSPITAL | Age: 68
DRG: 291 | End: 2023-07-10
Payer: MEDICARE

## 2023-07-10 LAB
ALBUMIN SERPL-MCNC: 2.8 G/DL (ref 3.5–5)
ANION GAP SERPL CALC-SCNC: 6 MMOL/L (ref 5–15)
BASOPHILS # BLD: 0.1 K/UL (ref 0–0.1)
BASOPHILS NFR BLD: 1 % (ref 0–1)
BUN SERPL-MCNC: 20 MG/DL (ref 6–20)
BUN/CREAT SERPL: 9 (ref 12–20)
CALCIUM SERPL-MCNC: 9.1 MG/DL (ref 8.5–10.1)
CHLORIDE SERPL-SCNC: 110 MMOL/L (ref 97–108)
CO2 SERPL-SCNC: 26 MMOL/L (ref 21–32)
CREAT SERPL-MCNC: 2.21 MG/DL (ref 0.7–1.3)
DIFFERENTIAL METHOD BLD: ABNORMAL
ECHO AO ROOT DIAM: 2.9 CM
ECHO AO ROOT INDEX: 1.6 CM/M2
ECHO AR MAX VEL PISA: 4.3 M/S
ECHO AV PEAK GRADIENT: 8 MMHG
ECHO AV PEAK VELOCITY: 1.4 M/S
ECHO AV REGURGITANT PHT: 733.5 MILLISECOND
ECHO AV VELOCITY RATIO: 0.79
ECHO BSA: 1.82 M2
ECHO EST RA PRESSURE: 3 MMHG
ECHO LA DIAMETER INDEX: 1.99 CM/M2
ECHO LA DIAMETER: 3.6 CM
ECHO LA TO AORTIC ROOT RATIO: 1.24
ECHO LA VOL 2C: 52 ML (ref 18–58)
ECHO LA VOL 2C: 54 ML (ref 18–58)
ECHO LA VOL 4C: 42 ML (ref 18–58)
ECHO LA VOL 4C: 43 ML (ref 18–58)
ECHO LA VOL BP: 47 ML (ref 18–58)
ECHO LA VOL/BSA BIPLANE: 26 ML/M2 (ref 16–34)
ECHO LA VOLUME AREA LENGTH: 49 ML
ECHO LA VOLUME INDEX AREA LENGTH: 27 ML/M2 (ref 16–34)
ECHO LV E' LATERAL VELOCITY: 7 CM/S
ECHO LV E' SEPTAL VELOCITY: 3 CM/S
ECHO LV FRACTIONAL SHORTENING: 29 % (ref 28–44)
ECHO LV INTERNAL DIMENSION DIASTOLE INDEX: 2.71 CM/M2
ECHO LV INTERNAL DIMENSION DIASTOLIC: 4.9 CM (ref 4.2–5.9)
ECHO LV INTERNAL DIMENSION SYSTOLIC INDEX: 1.93 CM/M2
ECHO LV INTERNAL DIMENSION SYSTOLIC: 3.5 CM
ECHO LV IVSD: 1.1 CM (ref 0.6–1)
ECHO LV MASS 2D: 226.4 G (ref 88–224)
ECHO LV MASS INDEX 2D: 125.1 G/M2 (ref 49–115)
ECHO LV POSTERIOR WALL DIASTOLIC: 1.3 CM (ref 0.6–1)
ECHO LV RELATIVE WALL THICKNESS RATIO: 0.53
ECHO LVOT PEAK GRADIENT: 5 MMHG
ECHO LVOT PEAK VELOCITY: 1.1 M/S
ECHO MV A VELOCITY: 0.83 M/S
ECHO MV AREA PHT: 2.9 CM2
ECHO MV E DECELERATION TIME (DT): 264.2 MS
ECHO MV E VELOCITY: 0.87 M/S
ECHO MV E/A RATIO: 1.05
ECHO MV E/E' LATERAL: 12.43
ECHO MV E/E' RATIO (AVERAGED): 20.71
ECHO MV E/E' SEPTAL: 29
ECHO MV PRESSURE HALF TIME (PHT): 76.6 MS
ECHO RIGHT VENTRICULAR SYSTOLIC PRESSURE (RVSP): 30 MMHG
ECHO RV TAPSE: 1.7 CM (ref 1.7–?)
ECHO TV REGURGITANT MAX VELOCITY: 2.58 M/S
ECHO TV REGURGITANT PEAK GRADIENT: 27 MMHG
EOSINOPHIL # BLD: 0.5 K/UL (ref 0–0.4)
EOSINOPHIL NFR BLD: 6 % (ref 0–7)
ERYTHROCYTE [DISTWIDTH] IN BLOOD BY AUTOMATED COUNT: 15 % (ref 11.5–14.5)
GLUCOSE SERPL-MCNC: 86 MG/DL (ref 65–100)
HCT VFR BLD AUTO: 38.2 % (ref 36.6–50.3)
HGB BLD-MCNC: 12.1 G/DL (ref 12.1–17)
IMM GRANULOCYTES # BLD AUTO: 0 K/UL (ref 0–0.04)
IMM GRANULOCYTES NFR BLD AUTO: 0 % (ref 0–0.5)
LYMPHOCYTES # BLD: 2.2 K/UL (ref 0.8–3.5)
LYMPHOCYTES NFR BLD: 25 % (ref 12–49)
MAGNESIUM SERPL-MCNC: 1.9 MG/DL (ref 1.6–2.4)
MCH RBC QN AUTO: 29.4 PG (ref 26–34)
MCHC RBC AUTO-ENTMCNC: 31.7 G/DL (ref 30–36.5)
MCV RBC AUTO: 92.9 FL (ref 80–99)
MONOCYTES # BLD: 0.7 K/UL (ref 0–1)
MONOCYTES NFR BLD: 8 % (ref 5–13)
NEUTS SEG # BLD: 5.2 K/UL (ref 1.8–8)
NEUTS SEG NFR BLD: 60 % (ref 32–75)
NRBC # BLD: 0 K/UL (ref 0–0.01)
NRBC BLD-RTO: 0 PER 100 WBC
PHOSPHATE SERPL-MCNC: 3.4 MG/DL (ref 2.6–4.7)
PLATELET # BLD AUTO: 389 K/UL (ref 150–400)
PMV BLD AUTO: 10.9 FL (ref 8.9–12.9)
POTASSIUM SERPL-SCNC: 3.5 MMOL/L (ref 3.5–5.1)
RBC # BLD AUTO: 4.11 M/UL (ref 4.1–5.7)
SODIUM SERPL-SCNC: 142 MMOL/L (ref 136–145)
WBC # BLD AUTO: 8.6 K/UL (ref 4.1–11.1)

## 2023-07-10 PROCEDURE — 6360000002 HC RX W HCPCS: Performed by: PHYSICIAN ASSISTANT

## 2023-07-10 PROCEDURE — 85025 COMPLETE CBC W/AUTO DIFF WBC: CPT

## 2023-07-10 PROCEDURE — 6370000000 HC RX 637 (ALT 250 FOR IP): Performed by: INTERNAL MEDICINE

## 2023-07-10 PROCEDURE — 94640 AIRWAY INHALATION TREATMENT: CPT

## 2023-07-10 PROCEDURE — 2060000000 HC ICU INTERMEDIATE R&B

## 2023-07-10 PROCEDURE — 6370000000 HC RX 637 (ALT 250 FOR IP): Performed by: PHYSICIAN ASSISTANT

## 2023-07-10 PROCEDURE — 93306 TTE W/DOPPLER COMPLETE: CPT

## 2023-07-10 PROCEDURE — 80069 RENAL FUNCTION PANEL: CPT

## 2023-07-10 PROCEDURE — 71250 CT THORAX DX C-: CPT

## 2023-07-10 PROCEDURE — 83735 ASSAY OF MAGNESIUM: CPT

## 2023-07-10 PROCEDURE — 36415 COLL VENOUS BLD VENIPUNCTURE: CPT

## 2023-07-10 PROCEDURE — 97116 GAIT TRAINING THERAPY: CPT

## 2023-07-10 PROCEDURE — 6370000000 HC RX 637 (ALT 250 FOR IP): Performed by: HOSPITALIST

## 2023-07-10 PROCEDURE — 2580000003 HC RX 258: Performed by: INTERNAL MEDICINE

## 2023-07-10 RX ORDER — ARFORMOTEROL TARTRATE 15 UG/2ML
15 SOLUTION RESPIRATORY (INHALATION) 2 TIMES DAILY
Status: DISCONTINUED | OUTPATIENT
Start: 2023-07-10 | End: 2023-07-12 | Stop reason: HOSPADM

## 2023-07-10 RX ORDER — IPRATROPIUM BROMIDE AND ALBUTEROL SULFATE 2.5; .5 MG/3ML; MG/3ML
1 SOLUTION RESPIRATORY (INHALATION) 3 TIMES DAILY
Status: DISCONTINUED | OUTPATIENT
Start: 2023-07-10 | End: 2023-07-12 | Stop reason: HOSPADM

## 2023-07-10 RX ORDER — BUDESONIDE 0.5 MG/2ML
0.5 INHALANT ORAL 2 TIMES DAILY
Status: DISCONTINUED | OUTPATIENT
Start: 2023-07-10 | End: 2023-07-12 | Stop reason: HOSPADM

## 2023-07-10 RX ADMIN — ATORVASTATIN CALCIUM 80 MG: 40 TABLET, FILM COATED ORAL at 20:53

## 2023-07-10 RX ADMIN — CARVEDILOL 25 MG: 12.5 TABLET, FILM COATED ORAL at 09:08

## 2023-07-10 RX ADMIN — AMLODIPINE BESYLATE 10 MG: 5 TABLET ORAL at 09:09

## 2023-07-10 RX ADMIN — ASPIRIN 81 MG: 81 TABLET, COATED ORAL at 09:09

## 2023-07-10 RX ADMIN — IPRATROPIUM BROMIDE AND ALBUTEROL SULFATE 1 DOSE: .5; 3 SOLUTION RESPIRATORY (INHALATION) at 15:50

## 2023-07-10 RX ADMIN — POTASSIUM CHLORIDE 40 MEQ: 750 TABLET, FILM COATED, EXTENDED RELEASE ORAL at 09:08

## 2023-07-10 RX ADMIN — IPRATROPIUM BROMIDE AND ALBUTEROL SULFATE 1 DOSE: .5; 3 SOLUTION RESPIRATORY (INHALATION) at 21:04

## 2023-07-10 RX ADMIN — FERROUS SULFATE TAB 325 MG (65 MG ELEMENTAL FE) 325 MG: 325 (65 FE) TAB at 08:14

## 2023-07-10 RX ADMIN — ARFORMOTEROL TARTRATE 15 MCG: 15 SOLUTION RESPIRATORY (INHALATION) at 21:04

## 2023-07-10 RX ADMIN — BUDESONIDE 500 MCG: 0.5 INHALANT RESPIRATORY (INHALATION) at 21:04

## 2023-07-10 RX ADMIN — SODIUM CHLORIDE, PRESERVATIVE FREE 10 ML: 5 INJECTION INTRAVENOUS at 20:53

## 2023-07-10 RX ADMIN — POTASSIUM CHLORIDE 40 MEQ: 750 TABLET, FILM COATED, EXTENDED RELEASE ORAL at 20:53

## 2023-07-10 RX ADMIN — CARVEDILOL 25 MG: 12.5 TABLET, FILM COATED ORAL at 20:53

## 2023-07-10 RX ADMIN — EMPAGLIFLOZIN 10 MG: 10 TABLET, FILM COATED ORAL at 09:08

## 2023-07-10 RX ADMIN — SPIRONOLACTONE 25 MG: 25 TABLET ORAL at 09:08

## 2023-07-10 RX ADMIN — PANTOPRAZOLE SODIUM 40 MG: 40 TABLET, DELAYED RELEASE ORAL at 09:09

## 2023-07-10 RX ADMIN — FERROUS SULFATE TAB 325 MG (65 MG ELEMENTAL FE) 325 MG: 325 (65 FE) TAB at 16:00

## 2023-07-10 ASSESSMENT — PAIN SCALES - GENERAL: PAINLEVEL_OUTOF10: 0

## 2023-07-10 NOTE — DISCHARGE INSTRUCTIONS
Will start taking an inhaler  : 2 puffs every day for your breathing, should call pulmonologist office to follow up, will need outpatient lung function test in the office   Daily weight, call your doctor if gain weight > 2 lbs 3    DIET: cardiac diet      ACTIVITY: activity as tolerated          Radiology      DISCHARGE MEDICATIONS:   See Medication Reconciliation Form    It is important that you take the medication exactly as they are prescribed. Keep your medication in the bottles provided by the pharmacist and keep a list of the medication names, dosages, and times to be taken in your wallet. Do not take other medications without consulting your doctor. NOTIFY YOUR PHYSICIAN FOR ANY OF THE FOLLOWING:   Fever over 101 degrees for 24 hours. Chest pain, shortness of breath, fever, chills, nausea, vomiting, diarrhea, change in mentation, falling, weakness, bleeding. Severe pain or pain not relieved by medications. Or, any other signs or symptoms that you may have questions about.       DISPOSITION:    Home With:   OT  PT  HH  RN       SNF/Inpatient Rehab/LTAC    Independent/assisted living    Hospice   x Other:     CDMP Checked:   Yes x     PROBLEM LIST Updated:  Yes x       Signed:   Eileen Nava MD  7/12/2023  10:23 AM

## 2023-07-10 NOTE — CONSULTS
Pulmonary, Critical Care, and Sleep Medicine~Consult Note    Name: Larry Redd. MRN: [id number]   : 1955 Hospital: [address]   Date: 7/10/2023 11:39 AM Admission: 2023     Impression Plan   Recurrent left pleural effusion, developed hydropneumothorax. Has had 2 thoracenteses and 1 VATS decortication this year alone. Ongoing dyspnea. Query on treated obstructive lung disease? Plus heart failure  HFpEF  Likely has underlying COPD, former smoker  Coronary artery disease, status post CABG  History of CVA  Chronic kidney disease  Hypertension Echo pending  Start bronchodilators today hopefully this will help his shortness of breath  Agree with CT chest to evaluate   Reluctant to undergo thoracentesis at this time without evaluation with CT chest.  We will need outpatient PFTs  Discussed with cards and hospitalist   O2 titration above 90%      Daily Progression:    Consult Note requested by cards     Patient presented on 2023 worsening shortness of breath. He underwent diuretic therapy and has minimal improvement. Noted creatinine bump with diuretics that has precluded aggressive treatment. Currently he is on room air but does have shortness of breath extensively when he ambulates. No sick contacts or signs of infection at this time. He has had 2 thoracenteses since being in the year 1 in  and March both yielding lymphocytic exudates. Initial 1 1.6 L which was removed and the second 1 1.8 L was removed. Pathology was negative on tap. He was laterally transferred to [facility] under the care of Dr. Rola Newton who did a VATS decortication in March for his left hydropneumothorax. There was incomplete expansion but that did the best they could. He has seen Dr. Bessie Horner in follow-up in April. Chest x-ray in May showed a residual left pleural effusion. On admission today he had a mild reaccumulation but honestly looks comparable to the May chest film.   CT

## 2023-07-10 NOTE — PLAN OF CARE
Problem: Physical Therapy - Adult  Goal: By Discharge: Performs mobility at highest level of function for planned discharge setting. See evaluation for individualized goals. Description: FUNCTIONAL STATUS PRIOR TO ADMISSION: Patient was modified independent using a rollator and single point cane for functional mobility. HOME SUPPORT PRIOR TO ADMISSION: The patient lived with spouse and daughter but did not require assistance. Physical Therapy Goals  Initiated 7/9/2023  1. Patient will move from supine to sit and sit to supine and roll side to side in bed with modified independence within 7 day(s). 2.  Patient will perform sit to stand with modified independence within 7 day(s). 3.  Patient will transfer from bed to chair and chair to bed with modified independence using the least restrictive device within 7 day(s). 4.  Patient will ambulate with modified independence for 150 feet with the least restrictive device within 7 day(s). Outcome: Progressing     PHYSICAL THERAPY TREATMENT    Patient: Bowen Reynoso (76 y.o. male)  Date: 7/10/2023  Diagnosis: Shortness of breath [R06.02]  Hypokalemia [E87.6]  Acute pulmonary edema (HCC) [J81.0]  Elevated troponin [R77.8]  Pleural effusion on left [J90]  Recurrent pleural effusion on left [J90] Acute pulmonary edema (HCC)      Precautions: Fall Risk                    ASSESSMENT:  Patient continues to benefit from skilled PT services and is progressing towards goals. Patient mobilizes with CGA-SBA. Patient ambulated 100' with RW, mildly decreased dynamic balance and step clearance. Patient continues to benefit from acute PT to progress activity tolerance, balance, gait capacity, and indep. PLAN:  Patient continues to benefit from skilled intervention to address the above impairments. Continue treatment per established plan of care. Recommendation for discharge: (in order for the patient to meet his/her long term goals):  Therapy 2 days/week

## 2023-07-11 LAB
ALBUMIN SERPL-MCNC: 2.5 G/DL (ref 3.5–5)
ANION GAP SERPL CALC-SCNC: 5 MMOL/L (ref 5–15)
BASOPHILS # BLD: 0 K/UL (ref 0–0.1)
BASOPHILS NFR BLD: 1 % (ref 0–1)
BUN SERPL-MCNC: 22 MG/DL (ref 6–20)
BUN/CREAT SERPL: 10 (ref 12–20)
CALCIUM SERPL-MCNC: 8.6 MG/DL (ref 8.5–10.1)
CHLORIDE SERPL-SCNC: 113 MMOL/L (ref 97–108)
CO2 SERPL-SCNC: 24 MMOL/L (ref 21–32)
CREAT SERPL-MCNC: 2.23 MG/DL (ref 0.7–1.3)
DIFFERENTIAL METHOD BLD: ABNORMAL
EOSINOPHIL # BLD: 0.5 K/UL (ref 0–0.4)
EOSINOPHIL NFR BLD: 7 % (ref 0–7)
ERYTHROCYTE [DISTWIDTH] IN BLOOD BY AUTOMATED COUNT: 15.3 % (ref 11.5–14.5)
GLUCOSE SERPL-MCNC: 93 MG/DL (ref 65–100)
HCT VFR BLD AUTO: 35.3 % (ref 36.6–50.3)
HGB BLD-MCNC: 10.9 G/DL (ref 12.1–17)
IMM GRANULOCYTES # BLD AUTO: 0 K/UL (ref 0–0.04)
IMM GRANULOCYTES NFR BLD AUTO: 0 % (ref 0–0.5)
LYMPHOCYTES # BLD: 2 K/UL (ref 0.8–3.5)
LYMPHOCYTES NFR BLD: 28 % (ref 12–49)
MAGNESIUM SERPL-MCNC: 2 MG/DL (ref 1.6–2.4)
MCH RBC QN AUTO: 29.1 PG (ref 26–34)
MCHC RBC AUTO-ENTMCNC: 30.9 G/DL (ref 30–36.5)
MCV RBC AUTO: 94.4 FL (ref 80–99)
MONOCYTES # BLD: 0.7 K/UL (ref 0–1)
MONOCYTES NFR BLD: 11 % (ref 5–13)
NEUTS SEG # BLD: 3.8 K/UL (ref 1.8–8)
NEUTS SEG NFR BLD: 53 % (ref 32–75)
NRBC # BLD: 0 K/UL (ref 0–0.01)
NRBC BLD-RTO: 0 PER 100 WBC
NT PRO BNP: 3657 PG/ML
PHOSPHATE SERPL-MCNC: 4.2 MG/DL (ref 2.6–4.7)
PLATELET # BLD AUTO: 345 K/UL (ref 150–400)
PMV BLD AUTO: 10.7 FL (ref 8.9–12.9)
POTASSIUM SERPL-SCNC: 4.1 MMOL/L (ref 3.5–5.1)
RBC # BLD AUTO: 3.74 M/UL (ref 4.1–5.7)
SODIUM SERPL-SCNC: 142 MMOL/L (ref 136–145)
WBC # BLD AUTO: 7 K/UL (ref 4.1–11.1)

## 2023-07-11 PROCEDURE — 94640 AIRWAY INHALATION TREATMENT: CPT

## 2023-07-11 PROCEDURE — 83735 ASSAY OF MAGNESIUM: CPT

## 2023-07-11 PROCEDURE — 6370000000 HC RX 637 (ALT 250 FOR IP): Performed by: INTERNAL MEDICINE

## 2023-07-11 PROCEDURE — 83880 ASSAY OF NATRIURETIC PEPTIDE: CPT

## 2023-07-11 PROCEDURE — 80069 RENAL FUNCTION PANEL: CPT

## 2023-07-11 PROCEDURE — 6370000000 HC RX 637 (ALT 250 FOR IP): Performed by: HOSPITALIST

## 2023-07-11 PROCEDURE — 2060000000 HC ICU INTERMEDIATE R&B

## 2023-07-11 PROCEDURE — 6370000000 HC RX 637 (ALT 250 FOR IP): Performed by: PHYSICIAN ASSISTANT

## 2023-07-11 PROCEDURE — 2580000003 HC RX 258: Performed by: INTERNAL MEDICINE

## 2023-07-11 PROCEDURE — 36415 COLL VENOUS BLD VENIPUNCTURE: CPT

## 2023-07-11 PROCEDURE — 6360000002 HC RX W HCPCS: Performed by: PHYSICIAN ASSISTANT

## 2023-07-11 PROCEDURE — 85025 COMPLETE CBC W/AUTO DIFF WBC: CPT

## 2023-07-11 RX ORDER — FUROSEMIDE 20 MG/1
20 TABLET ORAL DAILY
Status: DISCONTINUED | OUTPATIENT
Start: 2023-07-11 | End: 2023-07-12 | Stop reason: HOSPADM

## 2023-07-11 RX ADMIN — POTASSIUM CHLORIDE 40 MEQ: 750 TABLET, FILM COATED, EXTENDED RELEASE ORAL at 22:31

## 2023-07-11 RX ADMIN — EMPAGLIFLOZIN 10 MG: 10 TABLET, FILM COATED ORAL at 10:17

## 2023-07-11 RX ADMIN — ATORVASTATIN CALCIUM 80 MG: 40 TABLET, FILM COATED ORAL at 22:31

## 2023-07-11 RX ADMIN — IPRATROPIUM BROMIDE AND ALBUTEROL SULFATE 1 DOSE: .5; 3 SOLUTION RESPIRATORY (INHALATION) at 07:57

## 2023-07-11 RX ADMIN — FERROUS SULFATE TAB 325 MG (65 MG ELEMENTAL FE) 325 MG: 325 (65 FE) TAB at 16:26

## 2023-07-11 RX ADMIN — ARFORMOTEROL TARTRATE 15 MCG: 15 SOLUTION RESPIRATORY (INHALATION) at 22:02

## 2023-07-11 RX ADMIN — FERROUS SULFATE TAB 325 MG (65 MG ELEMENTAL FE) 325 MG: 325 (65 FE) TAB at 06:07

## 2023-07-11 RX ADMIN — CARVEDILOL 25 MG: 12.5 TABLET, FILM COATED ORAL at 22:31

## 2023-07-11 RX ADMIN — CARVEDILOL 25 MG: 12.5 TABLET, FILM COATED ORAL at 10:17

## 2023-07-11 RX ADMIN — IPRATROPIUM BROMIDE AND ALBUTEROL SULFATE 1 DOSE: .5; 3 SOLUTION RESPIRATORY (INHALATION) at 22:02

## 2023-07-11 RX ADMIN — FUROSEMIDE 20 MG: 20 TABLET ORAL at 10:17

## 2023-07-11 RX ADMIN — SODIUM CHLORIDE, PRESERVATIVE FREE 10 ML: 5 INJECTION INTRAVENOUS at 22:32

## 2023-07-11 RX ADMIN — BUDESONIDE 500 MCG: 0.5 INHALANT RESPIRATORY (INHALATION) at 22:02

## 2023-07-11 RX ADMIN — AMLODIPINE BESYLATE 10 MG: 5 TABLET ORAL at 10:17

## 2023-07-11 RX ADMIN — BUDESONIDE 500 MCG: 0.5 INHALANT RESPIRATORY (INHALATION) at 07:57

## 2023-07-11 RX ADMIN — ARFORMOTEROL TARTRATE 15 MCG: 15 SOLUTION RESPIRATORY (INHALATION) at 07:57

## 2023-07-11 RX ADMIN — IPRATROPIUM BROMIDE AND ALBUTEROL SULFATE 1 DOSE: .5; 3 SOLUTION RESPIRATORY (INHALATION) at 17:17

## 2023-07-11 RX ADMIN — POTASSIUM CHLORIDE 40 MEQ: 750 TABLET, FILM COATED, EXTENDED RELEASE ORAL at 10:17

## 2023-07-11 RX ADMIN — PANTOPRAZOLE SODIUM 40 MG: 40 TABLET, DELAYED RELEASE ORAL at 10:17

## 2023-07-11 RX ADMIN — SPIRONOLACTONE 25 MG: 25 TABLET ORAL at 10:17

## 2023-07-11 RX ADMIN — ASPIRIN 81 MG: 81 TABLET, COATED ORAL at 10:17

## 2023-07-11 ASSESSMENT — PAIN SCALES - GENERAL
PAINLEVEL_OUTOF10: 0
PAINLEVEL_OUTOF10: 0

## 2023-07-11 NOTE — CONSULTS
Nutrition Education    Educated on CHF education  Learners: Patient  Readiness: Acceptance  Method: Explanation and Handout  Response: Verbalizes Understanding and Needs Reinforcement  Contact name and number provided. Pt reports wife cooks most of his food and she watches his salt intake. Pt does eat out occasionally, discussed lower sodium options when out.      Anna Fernandes, [address] [facility]  Contact Number: [phone]

## 2023-07-11 NOTE — NURSE NAVIGATOR
Heart Failure Consult noted. Heart Failure Nurse Navigator rounds completed. HF NN provided introduction to self and nurse navigator role. Living With Heart Failure booklet given to patient, along with weight calendar, and magnet. Reviewed low salt diet, signs and symptoms of heart failure and daily weights. Patient states he has a bathroom scale at home and can weight himself. He lives with his wife. She provides transportation to appointments and has his prescriptions filled. He does not know the name of his cardiologist at [facility]. [id number] [facility] cardiology no record of patient. Patient was seen by Cardiac Surgery, Dr. Tito Benz. Called [facility] patient has only been seen in the hospital.   Advised patient that follow up appointment will be scheduled and placed on Discharge Instructions prior to discharge. Requested Care Management Specialist schedule a PCP follow up. Patient given [facility] flyer . Will continue to follow until discharge.       Time spent with patient discussing HF education:  20 minutes
Heart Failure Nurse Navigator Chart Review performed. Echo pending. Consult noted. Waiting definitve diagnosis of heart failure. Full note to follow.
2022 AHA/ACC/HFSA Guideline for the Management of Heart Failure: A Report of the [facility] on Clinical Practice Guidelines. Circulation 2022; [id number]. and 2017 AHA/ACC/HRS guideline for management of patients with ventricular arrhythmias and the prevention of sudden cardiac death: A Report of the [facility] of Cardiology/American Heart Association Task Force on Clinical Practice Guidelines and the Heart Rhythm Society.  Heart Rhythm, Vol 15, No 10, October 2018 *Class of Recommendation: Class 1 (strong), Class 2a (moderate), Class 2b (weak), Class 3 (not recommended, potentially harmful)

## 2023-07-12 VITALS
OXYGEN SATURATION: 98 % | DIASTOLIC BLOOD PRESSURE: 65 MMHG | SYSTOLIC BLOOD PRESSURE: 125 MMHG | HEIGHT: 68 IN | TEMPERATURE: 97.8 F | WEIGHT: 150.13 LBS | HEART RATE: 66 BPM | RESPIRATION RATE: 13 BRPM | BODY MASS INDEX: 22.75 KG/M2

## 2023-07-12 LAB
ALBUMIN SERPL-MCNC: 2.6 G/DL (ref 3.5–5)
ALBUMIN/GLOB SERPL: 0.7 (ref 1.1–2.2)
ALP SERPL-CCNC: 85 U/L (ref 45–117)
ALT SERPL-CCNC: 11 U/L (ref 12–78)
ANION GAP SERPL CALC-SCNC: 4 MMOL/L (ref 5–15)
AST SERPL-CCNC: 14 U/L (ref 15–37)
BILIRUB SERPL-MCNC: 0.3 MG/DL (ref 0.2–1)
BUN SERPL-MCNC: 24 MG/DL (ref 6–20)
BUN/CREAT SERPL: 11 (ref 12–20)
CALCIUM SERPL-MCNC: 8.8 MG/DL (ref 8.5–10.1)
CHLORIDE SERPL-SCNC: 113 MMOL/L (ref 97–108)
CO2 SERPL-SCNC: 25 MMOL/L (ref 21–32)
CREAT SERPL-MCNC: 2.21 MG/DL (ref 0.7–1.3)
ERYTHROCYTE [DISTWIDTH] IN BLOOD BY AUTOMATED COUNT: 15.3 % (ref 11.5–14.5)
GLOBULIN SER CALC-MCNC: 3.8 G/DL (ref 2–4)
GLUCOSE SERPL-MCNC: 99 MG/DL (ref 65–100)
HCT VFR BLD AUTO: 33.5 % (ref 36.6–50.3)
HGB BLD-MCNC: 10.5 G/DL (ref 12.1–17)
MCH RBC QN AUTO: 29.5 PG (ref 26–34)
MCHC RBC AUTO-ENTMCNC: 31.3 G/DL (ref 30–36.5)
MCV RBC AUTO: 94.1 FL (ref 80–99)
NRBC # BLD: 0 K/UL (ref 0–0.01)
NRBC BLD-RTO: 0 PER 100 WBC
PLATELET # BLD AUTO: 352 K/UL (ref 150–400)
PMV BLD AUTO: 10.7 FL (ref 8.9–12.9)
POTASSIUM SERPL-SCNC: 4.3 MMOL/L (ref 3.5–5.1)
PROT SERPL-MCNC: 6.4 G/DL (ref 6.4–8.2)
RBC # BLD AUTO: 3.56 M/UL (ref 4.1–5.7)
SODIUM SERPL-SCNC: 142 MMOL/L (ref 136–145)
WBC # BLD AUTO: 8 K/UL (ref 4.1–11.1)

## 2023-07-12 PROCEDURE — 6370000000 HC RX 637 (ALT 250 FOR IP): Performed by: HOSPITALIST

## 2023-07-12 PROCEDURE — 97116 GAIT TRAINING THERAPY: CPT

## 2023-07-12 PROCEDURE — 85027 COMPLETE CBC AUTOMATED: CPT

## 2023-07-12 PROCEDURE — 94640 AIRWAY INHALATION TREATMENT: CPT

## 2023-07-12 PROCEDURE — 6370000000 HC RX 637 (ALT 250 FOR IP): Performed by: PHYSICIAN ASSISTANT

## 2023-07-12 PROCEDURE — 6370000000 HC RX 637 (ALT 250 FOR IP): Performed by: INTERNAL MEDICINE

## 2023-07-12 PROCEDURE — 6360000002 HC RX W HCPCS: Performed by: PHYSICIAN ASSISTANT

## 2023-07-12 PROCEDURE — 80053 COMPREHEN METABOLIC PANEL: CPT

## 2023-07-12 PROCEDURE — 36415 COLL VENOUS BLD VENIPUNCTURE: CPT

## 2023-07-12 RX ORDER — SPIRONOLACTONE 25 MG/1
25 TABLET ORAL DAILY
Qty: 30 TABLET | Refills: 3 | Status: SHIPPED | OUTPATIENT
Start: 2023-07-13

## 2023-07-12 RX ORDER — FUROSEMIDE 20 MG/1
20 TABLET ORAL DAILY
Qty: 60 TABLET | Refills: 3 | Status: SHIPPED | OUTPATIENT
Start: 2023-07-13

## 2023-07-12 RX ADMIN — CARVEDILOL 25 MG: 12.5 TABLET, FILM COATED ORAL at 08:49

## 2023-07-12 RX ADMIN — BUDESONIDE 500 MCG: 0.5 INHALANT RESPIRATORY (INHALATION) at 07:52

## 2023-07-12 RX ADMIN — SPIRONOLACTONE 25 MG: 25 TABLET ORAL at 08:49

## 2023-07-12 RX ADMIN — EMPAGLIFLOZIN 10 MG: 10 TABLET, FILM COATED ORAL at 08:49

## 2023-07-12 RX ADMIN — ASPIRIN 81 MG: 81 TABLET, COATED ORAL at 08:49

## 2023-07-12 RX ADMIN — FUROSEMIDE 20 MG: 20 TABLET ORAL at 08:49

## 2023-07-12 RX ADMIN — ARFORMOTEROL TARTRATE 15 MCG: 15 SOLUTION RESPIRATORY (INHALATION) at 07:52

## 2023-07-12 RX ADMIN — FERROUS SULFATE TAB 325 MG (65 MG ELEMENTAL FE) 325 MG: 325 (65 FE) TAB at 07:17

## 2023-07-12 RX ADMIN — AMLODIPINE BESYLATE 10 MG: 5 TABLET ORAL at 08:49

## 2023-07-12 RX ADMIN — POTASSIUM CHLORIDE 40 MEQ: 750 TABLET, FILM COATED, EXTENDED RELEASE ORAL at 08:49

## 2023-07-12 RX ADMIN — PANTOPRAZOLE SODIUM 40 MG: 40 TABLET, DELAYED RELEASE ORAL at 08:49

## 2023-07-12 RX ADMIN — IPRATROPIUM BROMIDE AND ALBUTEROL SULFATE 1 DOSE: .5; 3 SOLUTION RESPIRATORY (INHALATION) at 07:52

## 2023-07-12 ASSESSMENT — PAIN SCALES - GENERAL
PAINLEVEL_OUTOF10: 0

## 2023-07-12 NOTE — PLAN OF CARE
Problem: Physical Therapy - Adult  Goal: By Discharge: Performs mobility at highest level of function for planned discharge setting. See evaluation for individualized goals. Description: FUNCTIONAL STATUS PRIOR TO ADMISSION: Patient was modified independent using a rollator and single point cane for functional mobility. HOME SUPPORT PRIOR TO ADMISSION: The patient lived with spouse and daughter but did not require assistance. Physical Therapy Goals  Initiated 7/9/2023  1. Patient will move from supine to sit and sit to supine and roll side to side in bed with modified independence within 7 day(s). 2.  Patient will perform sit to stand with modified independence within 7 day(s). 3.  Patient will transfer from bed to chair and chair to bed with modified independence using the least restrictive device within 7 day(s). 4.  Patient will ambulate with modified independence for 150 feet with the least restrictive device within 7 day(s). Outcome: Progressing     PHYSICAL THERAPY TREATMENT    Patient: Robert Arzate (76 y.o. male)  Date: 7/12/2023  Diagnosis: Shortness of breath [R06.02]  Hypokalemia [E87.6]  Acute pulmonary edema (HCC) [J81.0]  Elevated troponin [R77.8]  Pleural effusion on left [J90]  Recurrent pleural effusion on left [J90] Acute pulmonary edema (HCC)      Precautions: Fall Risk                    ASSESSMENT:  Patient continues to benefit from skilled PT services and is progressing towards goals. Patient continues to demonstrate mobility at [facility] level. Patient worked on standing balance and standing tolerance performing ADL's in the bathroom for ~10 mins with SBA. Patient is functioning close to baseline mobility status but would continue to benefit from acute PT to progress activity tolerance further in preparation for discharge home. PLAN:  Patient continues to benefit from skilled intervention to address the above impairments.   Continue treatment per

## 2023-07-12 NOTE — PLAN OF CARE
Problem: Discharge Planning  Goal: Discharge to home or other facility with appropriate resources  Outcome: Progressing     Problem: Pain  Goal: Verbalizes/displays adequate comfort level or baseline comfort level  Outcome: Progressing  Flowsheets (Taken 7/12/2023 0213)  Verbalizes/displays adequate comfort level or baseline comfort level:   Encourage patient to monitor pain and request assistance   Assess pain using appropriate pain scale     Problem: Safety - Adult  Goal: Free from fall injury  Outcome: Progressing  Flowsheets (Taken 7/12/2023 0213)  Free From Fall Injury: Instruct family/caregiver on patient safety     Problem: ABCDS Injury Assessment  Goal: Absence of physical injury  Outcome: Progressing  Flowsheets (Taken 7/12/2023 0213)  Absence of Physical Injury: Implement safety measures based on patient assessment

## 2023-07-12 NOTE — DISCHARGE SUMMARY
Discharge Summary       PATIENT ID: Ruddy Mariee MRN: [id number]   YOB: 1955    DATE OF ADMISSION: 7/8/2023  9:01 AM    DATE OF DISCHARGE: 7/12/2023   PRIMARY CARE PROVIDER: BETTY Sheridan NP     ATTENDING PHYSICIAN: Mildred Vera md   DISCHARGING PROVIDER: Mildred Vera MD    To contact this individual call [phone] and ask the  to page. If unavailable ask to be transferred the Adult Hospitalist Department. CONSULTATIONS: IP CONSULT TO HEART FAILURE NURSE/COORDINATOR  IP CONSULT TO DIETITIAN  IP CONSULT TO CARDIOLOGY  IP CONSULT TO SPIRITUAL SERVICES  IP CONSULT TO SPIRITUAL SERVICES  IP CONSULT TO PULMONOLOGY    PROCEDURES/SURGERIES: * No surgery found *    ADMITTING [address], [address] COURSE:   Ruddy Mariee is a 75 y.o. male with CAD s/p CABG ([facility]) c/b recurrent L pleural effusion s/p thoracentesis, recurrent falls, LLE PAD, HTN, HLD, h/o stroke presented from home due to SOB x1 day. SOB is similar to last time he had pleural effusion. He denies CP, f/c/n/v, HA, lightheadedness, cough, abdominal pain, diarrhea, constipation, dysuria, leg swelling. He does not take a fluid medication. He reports a fall about 1 week go but no head trauma/LOC. Pt has has post-CABG persistent L pleural effusion s/p thoracentesis in March 2023 c/b development of hemopneumothorax requiring Thoracic Surgery eval and pt was transferred to [facility].      In the ED, troponin was 126 (previously 57), BNP elevated above previous, Cr 2, K 3, and CXR showed recurrent L pleural effusion. He received K 40mEq po x1, ASA 162mg po x1, and furosemide 40mg IV x1. Pt reports feeling better now.            DISCHARGE DIAGNOSES / PLAN:      Acute pulmonary edema likely due to   Acute on chronic HFpEF  Recurrent L pleural effusion with h/o thoracentesis c/b hemopneumothorax  Furosemide 40mg IV BID and monitor renal function: diuretics on hold 7/10 due to increasing cr     TTE pending   Cardiology consulted

## 2023-07-12 NOTE — CARE COORDINATION
Transition of Care: home with home health PT/OT/SN; Darin Villegas accepted; info on the avs    Transport Plan: in car with wife    RUR: 18%    1245: CM noted [address] consult; met with patient at bedside; he had no preference for [address]; sent referrals to [facility], Darin Villegas, [facility] via [employer]; patient stated his spouse will transport him home later today    1315: Darin Villegas accepted; info on the avs      Prior Level of Functioning: independent; lives in one level home with spouse  Disposition: home with home health    Follow up appointments: specialist/pcp  DME needed: none  Transportation at discharge: in car with spouse  IM/IMM Medicare/[place] letter given: 2nd one on 7/12  Is patient a  and connected with VA? no     Caregiver Contact: wife- [facility]- [phone]  Discharge Caregiver contacted prior to discharge? Patient stated he talked to wife on phone and she is coming at [phone] [phone] today to transport home  Care Conference needed? no  Barriers to discharge:  none       07/12/23 1304   Condition of Participation: Discharge Planning   The Plan for Transition of Care is related to the following treatment goals: [address]   The Patient and/or Patient Representative was provided with a Choice of Provider? Patient   The Patient and/Or Patient Representative agree with the Discharge Plan? Yes   Freedom of Choice list was provided with basic dialogue that supports the patient's individualized plan of care/goals, treatment preferences, and shares the quality data associated with the providers?   Yes       CM following  Janette Hodgkin, RN

## 2023-07-12 NOTE — PLAN OF CARE
Problem: Physical Therapy - Adult  Goal: By Discharge: Performs mobility at highest level of function for planned discharge setting. See evaluation for individualized goals. Description: FUNCTIONAL STATUS PRIOR TO ADMISSION: Patient was modified independent using a rollator and single point cane for functional mobility. HOME SUPPORT PRIOR TO ADMISSION: The patient lived with spouse and daughter but did not require assistance. Physical Therapy Goals  Initiated 7/9/2023  1. Patient will move from supine to sit and sit to supine and roll side to side in bed with modified independence within 7 day(s). 2.  Patient will perform sit to stand with modified independence within 7 day(s). 3.  Patient will transfer from bed to chair and chair to bed with modified independence using the least restrictive device within 7 day(s). 4.  Patient will ambulate with modified independence for 150 feet with the least restrictive device within 7 day(s).        7/12/2023 1005 by Dave Springer, PT  Outcome: Progressing

## 2023-07-13 ENCOUNTER — TELEPHONE (OUTPATIENT)
Facility: HOSPITAL | Age: 68
End: 2023-07-13

## 2023-07-14 ENCOUNTER — TELEPHONE (OUTPATIENT)
Facility: HOSPITAL | Age: 68
End: 2023-07-14

## 2023-07-27 ENCOUNTER — TELEPHONE (OUTPATIENT)
Facility: CLINIC | Age: 68
End: 2023-07-27

## 2023-07-27 NOTE — PROGRESS NOTES
Subjective:   Jose Ackerman is a 75 y.o.  male with past medical history including CAD s/p CABG x1 at [facility] in November 2022, HFpEF, recurrent left pleural effusion (thought to be due to CABG surgery), hypertension, hypercholesterolemia, CVA (left parietal infarct in May 2021; remote right parietal lobe infarcts; moderate generalized cerebrovascular disease), CKD III, PAD, COPD, chronic tobacco use, and diabetes presents the clinic today for posthospitalization follow-up. It appears that Mr. Sadie Wilson was recently admitted to [facility] from 7/8 to 7/12 for acute shortness of breath consistent with his recurrent pleural effusions. He was found to have a mildly elevated high-sensitivity troponin that trended flat and a markedly elevated proBNP on admission (greater than 17,000). His CT scan showed small bilateral pleural effusions not amenable to thoracentesis. He underwent echocardiogram which showed normal EF, seemingly normal diastolic dysfunction, and moderate mitral regurgitation. He was diuresed and started on spironolactone and Jardiance. He was seen by his primary care physician, Dr. Verlean Osler who repeated a few labs on 7/18 which were remarkable for:  -Creatinine 2.1  -K+ 4.0  -Mag 1.6  -Sodium 141  -Albumin 3.3  -Hemoglobin 11.2  -Hemoglobin A1c 5.9  -LDL cholesterol 65 (much improved from recent labs in the hospital)    He presents the clinic today with no acute complaints aside from chronic shortness of breath with seems at his baseline as well as chronic right quadricep pain that does not seem to be improving with taking gabapentin that was prescribed to him last month. He denies any neuropathic component to his pain. He has been compliant with all of his medications and apparently had his antihypertensives reduced a little bit by Dr. Verlean Osler since he got released from [facility].     He appears quite thin and frail but apparently he has been eating and

## 2023-07-27 NOTE — TELEPHONE ENCOUNTER
[facility] requesting [facility] verbal order for OT starting Aug 13, going in once a week for 5 wks, she can be reach at [phone]

## 2023-07-28 ENCOUNTER — OFFICE VISIT (OUTPATIENT)
Age: 68
End: 2023-07-28
Payer: MEDICARE

## 2023-07-28 VITALS
DIASTOLIC BLOOD PRESSURE: 62 MMHG | BODY MASS INDEX: 22.47 KG/M2 | SYSTOLIC BLOOD PRESSURE: 104 MMHG | WEIGHT: 143.2 LBS | HEART RATE: 58 BPM | HEIGHT: 67 IN | OXYGEN SATURATION: 97 %

## 2023-07-28 DIAGNOSIS — I50.32 CHRONIC HEART FAILURE WITH PRESERVED EJECTION FRACTION (HCC): Primary | ICD-10-CM

## 2023-07-28 DIAGNOSIS — N18.32 STAGE 3B CHRONIC KIDNEY DISEASE (HCC): ICD-10-CM

## 2023-07-28 DIAGNOSIS — I10 PRIMARY HYPERTENSION: ICD-10-CM

## 2023-07-28 DIAGNOSIS — J44.9 CHRONIC OBSTRUCTIVE PULMONARY DISEASE, UNSPECIFIED COPD TYPE (HCC): ICD-10-CM

## 2023-07-28 DIAGNOSIS — M79.604 PAIN OF RIGHT LOWER EXTREMITY: ICD-10-CM

## 2023-07-28 DIAGNOSIS — E78.00 HYPERCHOLESTEROLEMIA: ICD-10-CM

## 2023-07-28 DIAGNOSIS — Z86.73 HISTORY OF CVA (CEREBROVASCULAR ACCIDENT): ICD-10-CM

## 2023-07-28 DIAGNOSIS — I34.0 MODERATE MITRAL REGURGITATION: ICD-10-CM

## 2023-07-28 DIAGNOSIS — Z95.1 S/P CABG X 1: ICD-10-CM

## 2023-07-28 DIAGNOSIS — I25.10 CAD IN NATIVE ARTERY: ICD-10-CM

## 2023-07-28 PROCEDURE — 99205 OFFICE O/P NEW HI 60 MIN: CPT | Performed by: NURSE PRACTITIONER

## 2023-07-28 PROCEDURE — G8420 CALC BMI NORM PARAMETERS: HCPCS | Performed by: NURSE PRACTITIONER

## 2023-07-28 PROCEDURE — 1123F ACP DISCUSS/DSCN MKR DOCD: CPT | Performed by: NURSE PRACTITIONER

## 2023-07-28 PROCEDURE — 3078F DIAST BP <80 MM HG: CPT | Performed by: NURSE PRACTITIONER

## 2023-07-28 PROCEDURE — 1111F DSCHRG MED/CURRENT MED MERGE: CPT | Performed by: NURSE PRACTITIONER

## 2023-07-28 PROCEDURE — 3074F SYST BP LT 130 MM HG: CPT | Performed by: NURSE PRACTITIONER

## 2023-07-28 PROCEDURE — 3017F COLORECTAL CA SCREEN DOC REV: CPT | Performed by: NURSE PRACTITIONER

## 2023-07-28 PROCEDURE — 1036F TOBACCO NON-USER: CPT | Performed by: NURSE PRACTITIONER

## 2023-07-28 PROCEDURE — G8427 DOCREV CUR MEDS BY ELIG CLIN: HCPCS | Performed by: NURSE PRACTITIONER

## 2023-07-28 PROCEDURE — 3023F SPIROM DOC REV: CPT | Performed by: NURSE PRACTITIONER

## 2023-07-28 RX ORDER — LOSARTAN POTASSIUM 100 MG/1
100 TABLET ORAL DAILY
COMMUNITY
Start: 2023-07-13

## 2023-07-28 RX ORDER — CARVEDILOL 12.5 MG/1
12.5 TABLET ORAL 2 TIMES DAILY
Qty: 90 TABLET | Refills: 2 | Status: SHIPPED | OUTPATIENT
Start: 2023-07-28

## 2023-07-28 RX ORDER — HYDROCHLOROTHIAZIDE 25 MG/1
25 TABLET ORAL DAILY
COMMUNITY
Start: 2023-07-13

## 2023-07-28 RX ORDER — GABAPENTIN 100 MG/1
CAPSULE ORAL
COMMUNITY
Start: 2023-06-15

## 2023-07-28 RX ORDER — LIDOCAINE 50 MG/G
OINTMENT TOPICAL 3 TIMES DAILY PRN
Qty: 2500 G | Refills: 1 | Status: SHIPPED | OUTPATIENT
Start: 2023-07-28

## 2023-07-28 ASSESSMENT — PATIENT HEALTH QUESTIONNAIRE - PHQ9
SUM OF ALL RESPONSES TO PHQ QUESTIONS 1-9: 0
SUM OF ALL RESPONSES TO PHQ QUESTIONS 1-9: 0
1. LITTLE INTEREST OR PLEASURE IN DOING THINGS: 0
2. FEELING DOWN, DEPRESSED OR HOPELESS: 0
SUM OF ALL RESPONSES TO PHQ QUESTIONS 1-9: 0
SUM OF ALL RESPONSES TO PHQ QUESTIONS 1-9: 0
SUM OF ALL RESPONSES TO PHQ9 QUESTIONS 1 & 2: 0

## 2023-07-28 ASSESSMENT — ENCOUNTER SYMPTOMS: SHORTNESS OF BREATH: 1

## 2023-07-28 ASSESSMENT — VISUAL ACUITY: OU: 1

## 2023-07-28 NOTE — PATIENT INSTRUCTIONS
It was nice meeting you today Mr. Mary Stanley. You seem to be doing pretty well overall since being discharged from the hospital.    You do seem to be a little bit fatigued and I am wondering if this is because you are on a slightly too high of a dose of carvedilol. I am going to reduce this medication from 25 mg twice daily to 12.5 mg twice daily to see if this makes you feel a little bit better. Continue to be as physically active as possible. I have prescribed you 2 creams to see if this helps with some of the pain in your right leg. You can apply them several times a day as needed to see if it helps. One of them might require prior authorization, which means and will be ready for at least a few days. We will try to get that situated as soon as possible. Plan on seeing me again for a follow-up visit in approximately 1 month. If anything changes and you need to be seen sooner, call our office immediately and I will do my best to get you in sooner or give you as much guidance as I can over the phone.

## 2023-07-31 NOTE — TELEPHONE ENCOUNTER
Yuki Martinez from [facility] called requesting a verbal order for OT starting Aug 13, going in once a week for 5 wks, she can be reach at [phone] she needs verbal order from provider.

## 2023-08-01 NOTE — TELEPHONE ENCOUNTER
Spoke elysia Mata=- approval given for OT but pt NEEDS APPT W ME. He no-showed last one and has not been seen in a year  Please call pt to set this up

## 2023-08-10 ENCOUNTER — HOSPITAL ENCOUNTER (INPATIENT)
Facility: HOSPITAL | Age: 68
LOS: 4 days | Discharge: HOME HEALTH | DRG: 683 | End: 2023-08-14
Attending: EMERGENCY MEDICINE | Admitting: INTERNAL MEDICINE
Payer: MEDICARE

## 2023-08-10 DIAGNOSIS — E86.0 DEHYDRATION: ICD-10-CM

## 2023-08-10 DIAGNOSIS — N18.30 STAGE 3 CHRONIC KIDNEY DISEASE, UNSPECIFIED WHETHER STAGE 3A OR 3B CKD (HCC): ICD-10-CM

## 2023-08-10 DIAGNOSIS — E87.6 HYPOKALEMIA: Primary | ICD-10-CM

## 2023-08-10 PROBLEM — N17.9 ACUTE KIDNEY INJURY SUPERIMPOSED ON CHRONIC KIDNEY DISEASE (HCC): Status: ACTIVE | Noted: 2023-08-10

## 2023-08-10 PROBLEM — N18.9 ACUTE KIDNEY INJURY SUPERIMPOSED ON CHRONIC KIDNEY DISEASE (HCC): Status: ACTIVE | Noted: 2023-08-10

## 2023-08-10 LAB
ALBUMIN SERPL-MCNC: 3.1 G/DL (ref 3.5–5)
ALBUMIN/GLOB SERPL: 0.8 (ref 1.1–2.2)
ALP SERPL-CCNC: 75 U/L (ref 45–117)
ALT SERPL-CCNC: 17 U/L (ref 12–78)
ANION GAP SERPL CALC-SCNC: 10 MMOL/L (ref 5–15)
AST SERPL-CCNC: 16 U/L (ref 15–37)
BASOPHILS # BLD: 0.1 K/UL (ref 0–0.1)
BASOPHILS NFR BLD: 1 % (ref 0–1)
BILIRUB SERPL-MCNC: 0.4 MG/DL (ref 0.2–1)
BUN SERPL-MCNC: 30 MG/DL (ref 6–20)
BUN/CREAT SERPL: 13 (ref 12–20)
CALCIUM SERPL-MCNC: 8.4 MG/DL (ref 8.5–10.1)
CHLORIDE SERPL-SCNC: 109 MMOL/L (ref 97–108)
CO2 SERPL-SCNC: 24 MMOL/L (ref 21–32)
COMMENT:: NORMAL
CREAT SERPL-MCNC: 2.35 MG/DL (ref 0.7–1.3)
DIFFERENTIAL METHOD BLD: ABNORMAL
EOSINOPHIL # BLD: 0.5 K/UL (ref 0–0.4)
EOSINOPHIL NFR BLD: 7 % (ref 0–7)
ERYTHROCYTE [DISTWIDTH] IN BLOOD BY AUTOMATED COUNT: 14.3 % (ref 11.5–14.5)
GLOBULIN SER CALC-MCNC: 3.7 G/DL (ref 2–4)
GLUCOSE SERPL-MCNC: 93 MG/DL (ref 65–100)
HCT VFR BLD AUTO: 38.6 % (ref 36.6–50.3)
HGB BLD-MCNC: 12.1 G/DL (ref 12.1–17)
IMM GRANULOCYTES # BLD AUTO: 0 K/UL (ref 0–0.04)
IMM GRANULOCYTES NFR BLD AUTO: 0 % (ref 0–0.5)
LYMPHOCYTES # BLD: 2.3 K/UL (ref 0.8–3.5)
LYMPHOCYTES NFR BLD: 33 % (ref 12–49)
MCH RBC QN AUTO: 29.6 PG (ref 26–34)
MCHC RBC AUTO-ENTMCNC: 31.3 G/DL (ref 30–36.5)
MCV RBC AUTO: 94.4 FL (ref 80–99)
MONOCYTES # BLD: 0.5 K/UL (ref 0–1)
MONOCYTES NFR BLD: 7 % (ref 5–13)
NEUTS SEG # BLD: 3.6 K/UL (ref 1.8–8)
NEUTS SEG NFR BLD: 52 % (ref 32–75)
NRBC # BLD: 0 K/UL (ref 0–0.01)
NRBC BLD-RTO: 0 PER 100 WBC
PLATELET # BLD AUTO: 224 K/UL (ref 150–400)
PMV BLD AUTO: 11.9 FL (ref 8.9–12.9)
POTASSIUM SERPL-SCNC: 2.9 MMOL/L (ref 3.5–5.1)
PROT SERPL-MCNC: 6.8 G/DL (ref 6.4–8.2)
RBC # BLD AUTO: 4.09 M/UL (ref 4.1–5.7)
SODIUM SERPL-SCNC: 143 MMOL/L (ref 136–145)
SPECIMEN HOLD: NORMAL
TROPONIN I SERPL HS-MCNC: 67 NG/L (ref 0–76)
WBC # BLD AUTO: 6.8 K/UL (ref 4.1–11.1)

## 2023-08-10 PROCEDURE — 2060000000 HC ICU INTERMEDIATE R&B

## 2023-08-10 PROCEDURE — 80053 COMPREHEN METABOLIC PANEL: CPT

## 2023-08-10 PROCEDURE — 93005 ELECTROCARDIOGRAM TRACING: CPT | Performed by: EMERGENCY MEDICINE

## 2023-08-10 PROCEDURE — 99285 EMERGENCY DEPT VISIT HI MDM: CPT

## 2023-08-10 PROCEDURE — 36415 COLL VENOUS BLD VENIPUNCTURE: CPT

## 2023-08-10 PROCEDURE — 84484 ASSAY OF TROPONIN QUANT: CPT

## 2023-08-10 PROCEDURE — 85025 COMPLETE CBC W/AUTO DIFF WBC: CPT

## 2023-08-10 ASSESSMENT — ENCOUNTER SYMPTOMS
BLOOD IN STOOL: 0
TROUBLE SWALLOWING: 0
SHORTNESS OF BREATH: 0
RHINORRHEA: 0
COUGH: 0
COLOR CHANGE: 0
BACK PAIN: 0
NAUSEA: 0
VOMITING: 0
SINUS PRESSURE: 0
SINUS PAIN: 0
DIARRHEA: 1
SORE THROAT: 0
ABDOMINAL PAIN: 1
WHEEZING: 0
EYES NEGATIVE: 1
STRIDOR: 0
CHEST TIGHTNESS: 0

## 2023-08-10 ASSESSMENT — PAIN - FUNCTIONAL ASSESSMENT: PAIN_FUNCTIONAL_ASSESSMENT: NONE - DENIES PAIN

## 2023-08-10 NOTE — ED TRIAGE NOTES
Patient brought from home by EMS. Reports onset of generalized weakness about an hour ago. Felt weak and fell to the floor. No LOC. Denies pain at this time. Equal , no pronator drift, symmetrical smile, Aox4. . Patient's physical therapist was on scene and is concerned that he may be having episodes of low blood pressure.

## 2023-08-10 NOTE — ED PROVIDER NOTES
[address] EMERGENCY [address] ENCOUNTER      Pt Name: Larry Redd. MRN: [id number]  [address] [facility], [place] 1955  Date of evaluation: 8/10/2023  Provider: Arianna Carroll MD    CHIEF COMPLAINT       Chief Complaint   Patient presents with    Fatigue         HISTORY OF PRESENT ILLNESS    HPI    Is a 48-year-old male with a history of chronic pulmonary edema, renal disease and bladder cancer. He was seen by Dr. Chris Macias today in the office for routine visit and his labs from yesterday demonstrated that he was developing renal failure. He sent the patient to the hospital for further evaluation and admission. The patient has been having a lot of diarrhea over the last week. He is not eating. There is probably some component of dehydration here. There is been no fever or chill. Was admitted to this hospital in early July of this year for pulmonary edema. His lab work from yesterday he was noted to have lower potassium worsening BUN and creatinine. Review of External Medical Records:     Nursing Notes were reviewed. REVIEW OF SYSTEMS       Review of Systems   Constitutional:  Positive for activity change, appetite change and fatigue. Negative for chills and fever. HENT:  Negative for congestion, ear pain, rhinorrhea, sinus pressure, sinus pain, sore throat and trouble swallowing. Eyes: Negative. Respiratory:  Negative for cough, chest tightness, shortness of breath, wheezing and stridor. Cardiovascular:  Negative for chest pain, palpitations and leg swelling. Gastrointestinal:  Positive for abdominal pain and diarrhea. Negative for blood in stool, nausea and vomiting. Endocrine: Negative. Genitourinary:  Negative for decreased urine volume, difficulty urinating, flank pain, frequency and hematuria. Musculoskeletal:  Negative for back pain, joint swelling and neck pain. Skin:  Negative for color change, pallor and rash. Neurological:  Positive for weakness.  Negative for SpO2: 99%   Weight: 64.4 kg (142 lb)   Height: 1.727 m (5' 8\")           Medical Decision Making  69-year-old male with history of being sent in by nephrology today for admission to the hospitalist service. There was concern for worsening renal failure, dehydration and failure to thrive. Patient is denying any fever or chills and has not had any chest pain, shortness of breath, nausea or vomiting. He has had numerous episodes of diarrhea. He has not been eating or drinking well. He is just generally not been able to get up and do very much over the last 2 weeks. Seen by Faby Abraham today for routine visit and was sent here for admission from the office. Amount and/or Complexity of Data Reviewed  Labs: ordered. Decision-making details documented in ED Course. Radiology: ordered and independent interpretation performed. Decision-making details documented in ED Course. ECG/medicine tests: ordered and independent interpretation performed. Decision-making details documented in ED Course. Risk  Decision regarding hospitalization. REASSESSMENT     ED Course as of 08/10/23 1247   Thu Aug 10, 2023   1055 EKG 1050: Rate 55, LVH with secondary repolarization abnormality and J point elevated in septal leads. No significant change from last tracing.  [DK]   1237 Sodium: 143 [RP]      ED Course User Index  [DK] Renee King MD  [RP] Jer Palmer MD     Patient's labs are noted. His renal function does not appear to be substantively different from prior levels of BUN and creatinine. It is slightly worse. Potassium is 2.9. Will give him a dose of potassium and consult the hospitalist for admission per request of nephrology. Perfect Serve Consult for Admission  12:52 PM    ED Room Number: ER08/08  Patient Name and age:  Jean Carlos Mcneal. 75 y.o.  male  Working Diagnosis:   1. Hypokalemia    2. Dehydration    3.  Stage 3 chronic kidney disease, unspecified whether stage 3a or 3b CKD ([address])

## 2023-08-10 NOTE — ED NOTES
Bedside and Verbal shift change report given to Roger Lomax (oncoming nurse) by [facility] (offgoing nurse). Report included the following information Nurse Handoff Report, Index, ED Encounter Summary, ED SBAR, and Adult Overview.        Marlo Primrose, RN  08/10/23 5373

## 2023-08-11 ENCOUNTER — APPOINTMENT (OUTPATIENT)
Facility: HOSPITAL | Age: 68
DRG: 683 | End: 2023-08-11
Payer: MEDICARE

## 2023-08-11 LAB
ALBUMIN SERPL-MCNC: 2.8 G/DL (ref 3.5–5)
ALBUMIN/GLOB SERPL: 0.8 (ref 1.1–2.2)
ALP SERPL-CCNC: 71 U/L (ref 45–117)
ALT SERPL-CCNC: 16 U/L (ref 12–78)
AMPHET UR QL SCN: NEGATIVE
ANION GAP SERPL CALC-SCNC: 7 MMOL/L (ref 5–15)
APPEARANCE UR: CLEAR
AST SERPL-CCNC: 13 U/L (ref 15–37)
BACTERIA URNS QL MICRO: NEGATIVE /HPF
BARBITURATES UR QL SCN: NEGATIVE
BASOPHILS # BLD: 0 K/UL (ref 0–0.1)
BASOPHILS NFR BLD: 1 % (ref 0–1)
BENZODIAZ UR QL: NEGATIVE
BILIRUB SERPL-MCNC: 0.3 MG/DL (ref 0.2–1)
BILIRUB UR QL: NEGATIVE
BUN SERPL-MCNC: 34 MG/DL (ref 6–20)
BUN/CREAT SERPL: 16 (ref 12–20)
CALCIUM SERPL-MCNC: 8.1 MG/DL (ref 8.5–10.1)
CANNABINOIDS UR QL SCN: NEGATIVE
CHLORIDE SERPL-SCNC: 111 MMOL/L (ref 97–108)
CO2 SERPL-SCNC: 25 MMOL/L (ref 21–32)
COCAINE UR QL SCN: NEGATIVE
COLOR UR: ABNORMAL
CREAT SERPL-MCNC: 2.08 MG/DL (ref 0.7–1.3)
DIFFERENTIAL METHOD BLD: ABNORMAL
EOSINOPHIL # BLD: 0.4 K/UL (ref 0–0.4)
EOSINOPHIL NFR BLD: 6 % (ref 0–7)
EPITH CASTS URNS QL MICRO: ABNORMAL /LPF
ERYTHROCYTE [DISTWIDTH] IN BLOOD BY AUTOMATED COUNT: 14.2 % (ref 11.5–14.5)
GLOBULIN SER CALC-MCNC: 3.5 G/DL (ref 2–4)
GLUCOSE SERPL-MCNC: 80 MG/DL (ref 65–100)
GLUCOSE UR STRIP.AUTO-MCNC: 500 MG/DL
HCT VFR BLD AUTO: 36.9 % (ref 36.6–50.3)
HGB BLD-MCNC: 11.7 G/DL (ref 12.1–17)
HGB UR QL STRIP: NEGATIVE
HYALINE CASTS URNS QL MICRO: ABNORMAL /LPF (ref 0–5)
IMM GRANULOCYTES # BLD AUTO: 0 K/UL (ref 0–0.04)
IMM GRANULOCYTES NFR BLD AUTO: 0 % (ref 0–0.5)
KETONES UR QL STRIP.AUTO: NEGATIVE MG/DL
LEUKOCYTE ESTERASE UR QL STRIP.AUTO: NEGATIVE
LYMPHOCYTES # BLD: 2.3 K/UL (ref 0.8–3.5)
LYMPHOCYTES NFR BLD: 35 % (ref 12–49)
Lab: NORMAL
MAGNESIUM SERPL-MCNC: 1.6 MG/DL (ref 1.6–2.4)
MCH RBC QN AUTO: 29.6 PG (ref 26–34)
MCHC RBC AUTO-ENTMCNC: 31.7 G/DL (ref 30–36.5)
MCV RBC AUTO: 93.4 FL (ref 80–99)
METHADONE UR QL: NEGATIVE
MONOCYTES # BLD: 0.5 K/UL (ref 0–1)
MONOCYTES NFR BLD: 8 % (ref 5–13)
NEUTS SEG # BLD: 3.3 K/UL (ref 1.8–8)
NEUTS SEG NFR BLD: 50 % (ref 32–75)
NITRITE UR QL STRIP.AUTO: NEGATIVE
NRBC # BLD: 0 K/UL (ref 0–0.01)
NRBC BLD-RTO: 0 PER 100 WBC
NT PRO BNP: 1509 PG/ML
OPIATES UR QL: NEGATIVE
PCP UR QL: NEGATIVE
PH UR STRIP: 5 (ref 5–8)
PHOSPHATE SERPL-MCNC: 3.7 MG/DL (ref 2.6–4.7)
PLATELET # BLD AUTO: 223 K/UL (ref 150–400)
PMV BLD AUTO: 11.5 FL (ref 8.9–12.9)
POTASSIUM SERPL-SCNC: 3 MMOL/L (ref 3.5–5.1)
PROT SERPL-MCNC: 6.3 G/DL (ref 6.4–8.2)
PROT UR STRIP-MCNC: 100 MG/DL
RBC # BLD AUTO: 3.95 M/UL (ref 4.1–5.7)
RBC #/AREA URNS HPF: ABNORMAL /HPF (ref 0–5)
SODIUM SERPL-SCNC: 143 MMOL/L (ref 136–145)
SP GR UR REFRACTOMETRY: 1.01 (ref 1–1.03)
TROPONIN I SERPL HS-MCNC: 76 NG/L (ref 0–76)
TSH SERPL DL<=0.05 MIU/L-ACNC: 3.53 UIU/ML (ref 0.36–3.74)
URINE CULTURE IF INDICATED: ABNORMAL
UROBILINOGEN UR QL STRIP.AUTO: 0.2 EU/DL (ref 0.2–1)
WBC # BLD AUTO: 6.6 K/UL (ref 4.1–11.1)
WBC URNS QL MICRO: ABNORMAL /HPF (ref 0–4)

## 2023-08-11 PROCEDURE — 97116 GAIT TRAINING THERAPY: CPT

## 2023-08-11 PROCEDURE — 85025 COMPLETE CBC W/AUTO DIFF WBC: CPT

## 2023-08-11 PROCEDURE — 97166 OT EVAL MOD COMPLEX 45 MIN: CPT

## 2023-08-11 PROCEDURE — 84443 ASSAY THYROID STIM HORMONE: CPT

## 2023-08-11 PROCEDURE — 2060000000 HC ICU INTERMEDIATE R&B

## 2023-08-11 PROCEDURE — 80053 COMPREHEN METABOLIC PANEL: CPT

## 2023-08-11 PROCEDURE — 97530 THERAPEUTIC ACTIVITIES: CPT

## 2023-08-11 PROCEDURE — 6370000000 HC RX 637 (ALT 250 FOR IP): Performed by: INTERNAL MEDICINE

## 2023-08-11 PROCEDURE — 84484 ASSAY OF TROPONIN QUANT: CPT

## 2023-08-11 PROCEDURE — 81001 URINALYSIS AUTO W/SCOPE: CPT

## 2023-08-11 PROCEDURE — 71045 X-RAY EXAM CHEST 1 VIEW: CPT

## 2023-08-11 PROCEDURE — 36415 COLL VENOUS BLD VENIPUNCTURE: CPT

## 2023-08-11 PROCEDURE — 6360000002 HC RX W HCPCS: Performed by: INTERNAL MEDICINE

## 2023-08-11 PROCEDURE — 74176 CT ABD & PELVIS W/O CONTRAST: CPT

## 2023-08-11 PROCEDURE — 83880 ASSAY OF NATRIURETIC PEPTIDE: CPT

## 2023-08-11 PROCEDURE — 83735 ASSAY OF MAGNESIUM: CPT

## 2023-08-11 PROCEDURE — 97161 PT EVAL LOW COMPLEX 20 MIN: CPT

## 2023-08-11 PROCEDURE — 6370000000 HC RX 637 (ALT 250 FOR IP): Performed by: HOSPITALIST

## 2023-08-11 PROCEDURE — 80307 DRUG TEST PRSMV CHEM ANLYZR: CPT

## 2023-08-11 PROCEDURE — 84100 ASSAY OF PHOSPHORUS: CPT

## 2023-08-11 PROCEDURE — 2580000003 HC RX 258: Performed by: INTERNAL MEDICINE

## 2023-08-11 RX ORDER — FERROUS SULFATE 325(65) MG
325 TABLET ORAL
Status: DISCONTINUED | OUTPATIENT
Start: 2023-08-11 | End: 2023-08-14 | Stop reason: HOSPADM

## 2023-08-11 RX ORDER — SODIUM CHLORIDE 9 MG/ML
INJECTION, SOLUTION INTRAVENOUS PRN
Status: DISCONTINUED | OUTPATIENT
Start: 2023-08-11 | End: 2023-08-14 | Stop reason: HOSPADM

## 2023-08-11 RX ORDER — AMLODIPINE BESYLATE 5 MG/1
10 TABLET ORAL DAILY
Status: DISCONTINUED | OUTPATIENT
Start: 2023-08-11 | End: 2023-08-14 | Stop reason: HOSPADM

## 2023-08-11 RX ORDER — SODIUM CHLORIDE 0.9 % (FLUSH) 0.9 %
5-40 SYRINGE (ML) INJECTION EVERY 12 HOURS SCHEDULED
Status: DISCONTINUED | OUTPATIENT
Start: 2023-08-11 | End: 2023-08-14 | Stop reason: HOSPADM

## 2023-08-11 RX ORDER — ONDANSETRON 4 MG/1
4 TABLET, ORALLY DISINTEGRATING ORAL EVERY 8 HOURS PRN
Status: DISCONTINUED | OUTPATIENT
Start: 2023-08-11 | End: 2023-08-14 | Stop reason: HOSPADM

## 2023-08-11 RX ORDER — GABAPENTIN 100 MG/1
100 CAPSULE ORAL NIGHTLY
Status: DISCONTINUED | OUTPATIENT
Start: 2023-08-11 | End: 2023-08-14 | Stop reason: HOSPADM

## 2023-08-11 RX ORDER — ATORVASTATIN CALCIUM 40 MG/1
80 TABLET, FILM COATED ORAL NIGHTLY
Status: DISCONTINUED | OUTPATIENT
Start: 2023-08-11 | End: 2023-08-14 | Stop reason: HOSPADM

## 2023-08-11 RX ORDER — PANTOPRAZOLE SODIUM 40 MG/1
40 TABLET, DELAYED RELEASE ORAL DAILY
Status: DISCONTINUED | OUTPATIENT
Start: 2023-08-11 | End: 2023-08-14 | Stop reason: HOSPADM

## 2023-08-11 RX ORDER — SODIUM CHLORIDE 0.9 % (FLUSH) 0.9 %
5-40 SYRINGE (ML) INJECTION PRN
Status: DISCONTINUED | OUTPATIENT
Start: 2023-08-11 | End: 2023-08-14 | Stop reason: HOSPADM

## 2023-08-11 RX ORDER — ACETAMINOPHEN 650 MG/1
650 SUPPOSITORY RECTAL EVERY 6 HOURS PRN
Status: DISCONTINUED | OUTPATIENT
Start: 2023-08-11 | End: 2023-08-14 | Stop reason: HOSPADM

## 2023-08-11 RX ORDER — CARVEDILOL 12.5 MG/1
12.5 TABLET ORAL 2 TIMES DAILY
Status: DISCONTINUED | OUTPATIENT
Start: 2023-08-11 | End: 2023-08-14 | Stop reason: HOSPADM

## 2023-08-11 RX ORDER — SODIUM CHLORIDE, SODIUM LACTATE, POTASSIUM CHLORIDE, CALCIUM CHLORIDE 600; 310; 30; 20 MG/100ML; MG/100ML; MG/100ML; MG/100ML
INJECTION, SOLUTION INTRAVENOUS CONTINUOUS
Status: DISCONTINUED | OUTPATIENT
Start: 2023-08-11 | End: 2023-08-14 | Stop reason: HOSPADM

## 2023-08-11 RX ORDER — ACETAMINOPHEN 325 MG/1
650 TABLET ORAL EVERY 6 HOURS PRN
Status: DISCONTINUED | OUTPATIENT
Start: 2023-08-11 | End: 2023-08-14 | Stop reason: HOSPADM

## 2023-08-11 RX ORDER — POTASSIUM CHLORIDE 7.45 MG/ML
10 INJECTION INTRAVENOUS
Status: COMPLETED | OUTPATIENT
Start: 2023-08-11 | End: 2023-08-11

## 2023-08-11 RX ORDER — POTASSIUM CHLORIDE 750 MG/1
40 TABLET, FILM COATED, EXTENDED RELEASE ORAL ONCE
Status: COMPLETED | OUTPATIENT
Start: 2023-08-11 | End: 2023-08-11

## 2023-08-11 RX ORDER — POLYETHYLENE GLYCOL 3350 17 G/17G
17 POWDER, FOR SOLUTION ORAL DAILY PRN
Status: DISCONTINUED | OUTPATIENT
Start: 2023-08-11 | End: 2023-08-14 | Stop reason: HOSPADM

## 2023-08-11 RX ORDER — ONDANSETRON 2 MG/ML
4 INJECTION INTRAMUSCULAR; INTRAVENOUS EVERY 6 HOURS PRN
Status: DISCONTINUED | OUTPATIENT
Start: 2023-08-11 | End: 2023-08-14 | Stop reason: HOSPADM

## 2023-08-11 RX ORDER — ASPIRIN 81 MG/1
162 TABLET ORAL DAILY
Status: DISCONTINUED | OUTPATIENT
Start: 2023-08-11 | End: 2023-08-14 | Stop reason: HOSPADM

## 2023-08-11 RX ORDER — IPRATROPIUM BROMIDE AND ALBUTEROL SULFATE 2.5; .5 MG/3ML; MG/3ML
1 SOLUTION RESPIRATORY (INHALATION) EVERY 4 HOURS PRN
Status: DISCONTINUED | OUTPATIENT
Start: 2023-08-11 | End: 2023-08-14 | Stop reason: HOSPADM

## 2023-08-11 RX ORDER — HEPARIN SODIUM 5000 [USP'U]/ML
5000 INJECTION, SOLUTION INTRAVENOUS; SUBCUTANEOUS EVERY 8 HOURS SCHEDULED
Status: DISCONTINUED | OUTPATIENT
Start: 2023-08-11 | End: 2023-08-14 | Stop reason: HOSPADM

## 2023-08-11 RX ADMIN — SODIUM CHLORIDE, PRESERVATIVE FREE 10 ML: 5 INJECTION INTRAVENOUS at 09:05

## 2023-08-11 RX ADMIN — POTASSIUM CHLORIDE 40 MEQ: 750 TABLET, FILM COATED, EXTENDED RELEASE ORAL at 10:41

## 2023-08-11 RX ADMIN — AMLODIPINE BESYLATE 10 MG: 5 TABLET ORAL at 09:04

## 2023-08-11 RX ADMIN — CARVEDILOL 12.5 MG: 12.5 TABLET, FILM COATED ORAL at 20:15

## 2023-08-11 RX ADMIN — HEPARIN SODIUM 5000 UNITS: 5000 INJECTION INTRAVENOUS; SUBCUTANEOUS at 13:40

## 2023-08-11 RX ADMIN — POTASSIUM CHLORIDE 10 MEQ: 10 INJECTION, SOLUTION INTRAVENOUS at 05:51

## 2023-08-11 RX ADMIN — POTASSIUM CHLORIDE 10 MEQ: 10 INJECTION, SOLUTION INTRAVENOUS at 06:41

## 2023-08-11 RX ADMIN — HEPARIN SODIUM 5000 UNITS: 5000 INJECTION INTRAVENOUS; SUBCUTANEOUS at 04:47

## 2023-08-11 RX ADMIN — SODIUM CHLORIDE, POTASSIUM CHLORIDE, SODIUM LACTATE AND CALCIUM CHLORIDE: 600; 310; 30; 20 INJECTION, SOLUTION INTRAVENOUS at 16:39

## 2023-08-11 RX ADMIN — PANTOPRAZOLE SODIUM 40 MG: 40 TABLET, DELAYED RELEASE ORAL at 09:05

## 2023-08-11 RX ADMIN — GABAPENTIN 100 MG: 100 CAPSULE ORAL at 20:15

## 2023-08-11 RX ADMIN — FERROUS SULFATE TAB 325 MG (65 MG ELEMENTAL FE) 325 MG: 325 (65 FE) TAB at 16:38

## 2023-08-11 RX ADMIN — SODIUM CHLORIDE, PRESERVATIVE FREE 10 ML: 5 INJECTION INTRAVENOUS at 20:15

## 2023-08-11 RX ADMIN — POTASSIUM CHLORIDE 10 MEQ: 10 INJECTION, SOLUTION INTRAVENOUS at 07:59

## 2023-08-11 RX ADMIN — CARVEDILOL 12.5 MG: 12.5 TABLET, FILM COATED ORAL at 04:43

## 2023-08-11 RX ADMIN — POTASSIUM CHLORIDE 10 MEQ: 10 INJECTION, SOLUTION INTRAVENOUS at 04:07

## 2023-08-11 RX ADMIN — ATORVASTATIN CALCIUM 80 MG: 40 TABLET, FILM COATED ORAL at 20:14

## 2023-08-11 RX ADMIN — FERROUS SULFATE TAB 325 MG (65 MG ELEMENTAL FE) 325 MG: 325 (65 FE) TAB at 06:42

## 2023-08-11 RX ADMIN — SODIUM CHLORIDE, POTASSIUM CHLORIDE, SODIUM LACTATE AND CALCIUM CHLORIDE: 600; 310; 30; 20 INJECTION, SOLUTION INTRAVENOUS at 04:07

## 2023-08-11 RX ADMIN — ASPIRIN 162 MG: 81 TABLET, COATED ORAL at 09:04

## 2023-08-11 NOTE — H&P
[address] [place] [address]  HISTORY AND PHYSICAL    Name:  Sheldon Mishra  MR#:  [id number]  :  1955  ACCOUNT #:  [de-identified]  ADMIT DATE:  08/10/2023      The patient was seen, evaluated, and admitted by me on 08/10/2023. PRIMARY CARE PHYSICIAN:  Renee Blanc MD    SOURCE OF INFORMATION:  The patient and review of ED and old electronic medical records. CHIEF COMPLAINT:  Fatigue. HISTORY OF PRESENT ILLNESS:  This is a 50-year-old man with past medical history significant for congestive heart failure with preserved ejection fraction; hypertension; dyslipidemia; COPD; coronary artery disease, status post CABG; status post CVA; and recurrent left pleural effusion; presented at the emergency room with fatigue. This started a couple of days ago and progressively getting worse. The patient was seen by his nephrologist, lab work was obtained and the lab work shows worsening renal function and because of that, the patient was sent to the emergency room for further evaluation. The patient has also been complaining of diarrhea which started a few days ago as well. The patient stated that he has been having multiple episodes of diarrhea. No blood or mucus in the stool. The patient denies associated fever, rigors, or chills. .  When the patient arrived at the emergency room, the worsening renal function was confirmed. The patient was also found to have hypokalemia. The patient was referred to the hospitalist service for admission. He was recently admitted to this hospital from the 2023 to 2023. The patient was admitted with shortness of breath attributed to congestive heart failure as well as recurrent left pleural effusion. PAST MEDICAL HISTORY:  1. COPD. 2.  Coronary artery disease, status post CABG. 3.  Dyslipidemia. 4.  Hypertension. 5.  Congestive heart failure with preserved ejection fraction. 6.  Chronic kidney disease.     ALLERGIES:  THE PATIENT IS ALLERGIC TO resume preadmission medication except for losartan and hydrochlorothiazide because of the acute-on-chronic kidney disease. 5.  Dyslipidemia: We will continue with Lipitor. 6.  COPD without acute exacerbation: We will place the patient on DuoNeb as needed. 7.  Coronary artery disease, status post CABG:  We will check serial cardiac markers to rule out acute myocardial infarction. OTHER ISSUES:  Code status: The patient is a full code. We will place the patient on heparin for DVT prophylaxis. FUNCTIONAL STATUS PRIOR TO ADMISSION:  The patient came from home. The patient is ambulatory with assistive device. COVID PRECAUTION:  The patient was wearing a face mask. I was wearing a face mask and gloves for this patient's encounter.         Jessie Rashid MD      [id number]  D:  08/11/2023 2:16  T:  08/11/2023 3:53  JOB #:  [id number]  CC:  Renee Blanc MD

## 2023-08-11 NOTE — CARE COORDINATION
Care Management Initial Assessment       RUR:  Readmission? Yes - patient discharged from [facility] on 7/12 with Gene. 1st IM letter given? Yes - 1st IMM delivered 8/11    Transition of Care: Plan for dsicahrge home with family and resume HH with [address]. Orders sent to [facility] via [address]. Patient lives with his wife and daughter. Daughter works from home. Patient is independent with ADLs and owns cane and walker. Wife will transport patient home at discharge. Main contacts: spouse Warren Ayaal [phone]         08/11/23 1152   Service Assessment   Patient Orientation Alert and Oriented   Cognition Alert   History Provided By Patient   Primary Caregiver Self   Support Systems Spouse/Significant Other;Children   PCP Verified by CM Yes   Last Visit to PCP Within last 3 months   Prior Functional Level Independent in ADLs/IADLs   Current Functional Level Independent in ADLs/IADLs   Can patient return to prior living arrangement Yes   Ability to make needs known: Good   Family able to assist with home care needs: Yes   Social/Functional History   Lives With Spouse;Daughter   Type of [address] [place] [address] Dr One level   Home Access Ramped entrance   Bathroom Shower/Tub Tub/Shower unit   Receives Help From Family   ADL Assistance Independent   Active  No   Mode of Transportation Family   Occupation Retired   Discharge Planning   Type of [facility] Prior To Admission Demar Galeano   Current DME Prior to [address] Discharge   Mode of Transport at Discharge Other (see comment)  (family/car)   Confirm Follow Up Transport Family   Condition of Participation: Discharge Planning   The Plan for Transition of Care is related to the following treatment goals: home health   The Patient and/or Patient Representative was provided with a Choice of Provider?  Patient   The Patient and/Or Patient Representative agree with the Discharge Plan? Yes   Freedom of Choice list was provided with basic dialogue that supports the patient's individualized plan of care/goals, treatment preferences, and shares the quality data associated with the providers?   Yes

## 2023-08-11 NOTE — CARE COORDINATION
08/11/23 1201   Readmission Assessment   Number of Days since last admission? 8-30 days   Previous Disposition Home with Home Health   Who is being Interviewed Patient   What was the patient's/caregiver's perception as to why they think they needed to return back to the hospital? Other (Comment)  (weakness)   Did you visit your Primary Care Physician after you left the hospital, before you returned this time? Yes   Did you see a specialist, such as Cardiac, Pulmonary, Orthopedic Physician, etc. after you left the hospital? No   Who advised the patient to return to the hospital? Self-referral   Does the patient report anything that got in the way of taking their medications? No   In our efforts to provide the best possible care to you and others like you, can you think of anything that we could have done to help you after you left the hospital the first time, so that you might not have needed to return so soon?  Other (Comment)  (N/A)

## 2023-08-11 NOTE — PROGRESS NOTES
Orders received, chart reviewed and patient evaluated by occupational therapy. Pending progression with skilled acute occupational therapy, recommend:  No skilled occupational therapy    Recommend with nursing patient to complete as able in order to maintain strength, endurance and independence: OOB to chair 3x/day, ADLs with supervision/setup and mobilizing to the bathroom for toileting with 1 assist and RW. Thank you for your assistance. Full evaluation to follow.

## 2023-08-11 NOTE — ED NOTES
TRANSFER - OUT REPORT:    Verbal report given to [address] [place] [address] on Anne Woods.  being transferred to  for routine progression of patient care       Report consisted of patient's Situation, Background, Assessment and   Recommendations(SBAR). Information from the following report(s) Nurse Handoff Report, ED SBAR, [facility], Cardiac Rhythm  , and Neuro Assessment was reviewed with the receiving nurse. Fieldon Fall Assessment:    Presents to emergency department  because of falls (Syncope, seizure, or loss of consciousness): Yes  Age > 79: No  Altered Mental Status, Intoxication with alcohol or substance confusion (Disorientation, impaired judgment, poor safety awaremess, or inability to follow instructions): No  Impaired Mobility: Ambulates or transfers with assistive devices or assistance; Unable to ambulate or transer.: Yes  Nursing Judgement: Yes          Lines:   Peripheral IV 08/10/23 Left Antecubital (Active)   Site Assessment Clean, dry & intact 08/10/23 1048   Dressing Intervention New 08/10/23 1048        Opportunity for questions and clarification was provided.       Patient transported with:  Monitor and Registered Nurse           Shanique Alvarado RN  08/11/23 7000

## 2023-08-12 LAB
ALBUMIN SERPL-MCNC: 2.8 G/DL (ref 3.5–5)
ALBUMIN/GLOB SERPL: 0.8 (ref 1.1–2.2)
ALP SERPL-CCNC: 69 U/L (ref 45–117)
ALT SERPL-CCNC: 16 U/L (ref 12–78)
ANION GAP SERPL CALC-SCNC: 7 MMOL/L (ref 5–15)
AST SERPL-CCNC: 16 U/L (ref 15–37)
BASOPHILS # BLD: 0.1 K/UL (ref 0–0.1)
BASOPHILS NFR BLD: 1 % (ref 0–1)
BILIRUB SERPL-MCNC: 0.4 MG/DL (ref 0.2–1)
BUN SERPL-MCNC: 35 MG/DL (ref 6–20)
BUN/CREAT SERPL: 17 (ref 12–20)
CALCIUM SERPL-MCNC: 8.8 MG/DL (ref 8.5–10.1)
CHLORIDE SERPL-SCNC: 109 MMOL/L (ref 97–108)
CO2 SERPL-SCNC: 24 MMOL/L (ref 21–32)
CREAT SERPL-MCNC: 2.1 MG/DL (ref 0.7–1.3)
DIFFERENTIAL METHOD BLD: ABNORMAL
EKG ATRIAL RATE: 55 BPM
EKG DIAGNOSIS: NORMAL
EKG P AXIS: 58 DEGREES
EKG P-R INTERVAL: 196 MS
EKG Q-T INTERVAL: 440 MS
EKG QRS DURATION: 82 MS
EKG QTC CALCULATION (BAZETT): 420 MS
EKG R AXIS: 8 DEGREES
EKG T AXIS: 153 DEGREES
EKG VENTRICULAR RATE: 55 BPM
EOSINOPHIL # BLD: 0.4 K/UL (ref 0–0.4)
EOSINOPHIL NFR BLD: 7 % (ref 0–7)
ERYTHROCYTE [DISTWIDTH] IN BLOOD BY AUTOMATED COUNT: 13.9 % (ref 11.5–14.5)
GLOBULIN SER CALC-MCNC: 3.6 G/DL (ref 2–4)
GLUCOSE SERPL-MCNC: 105 MG/DL (ref 65–100)
HCT VFR BLD AUTO: 34.8 % (ref 36.6–50.3)
HGB BLD-MCNC: 11.1 G/DL (ref 12.1–17)
IMM GRANULOCYTES # BLD AUTO: 0 K/UL (ref 0–0.04)
IMM GRANULOCYTES NFR BLD AUTO: 0 % (ref 0–0.5)
LYMPHOCYTES # BLD: 2.3 K/UL (ref 0.8–3.5)
LYMPHOCYTES NFR BLD: 36 % (ref 12–49)
MCH RBC QN AUTO: 29.4 PG (ref 26–34)
MCHC RBC AUTO-ENTMCNC: 31.9 G/DL (ref 30–36.5)
MCV RBC AUTO: 92.3 FL (ref 80–99)
MONOCYTES # BLD: 0.6 K/UL (ref 0–1)
MONOCYTES NFR BLD: 10 % (ref 5–13)
NEUTS SEG # BLD: 2.9 K/UL (ref 1.8–8)
NEUTS SEG NFR BLD: 46 % (ref 32–75)
NRBC # BLD: 0 K/UL (ref 0–0.01)
NRBC BLD-RTO: 0 PER 100 WBC
NT PRO BNP: 1625 PG/ML
PLATELET # BLD AUTO: 222 K/UL (ref 150–400)
PMV BLD AUTO: 12 FL (ref 8.9–12.9)
POTASSIUM SERPL-SCNC: 3.3 MMOL/L (ref 3.5–5.1)
PROT SERPL-MCNC: 6.4 G/DL (ref 6.4–8.2)
RBC # BLD AUTO: 3.77 M/UL (ref 4.1–5.7)
SODIUM SERPL-SCNC: 140 MMOL/L (ref 136–145)
WBC # BLD AUTO: 6.3 K/UL (ref 4.1–11.1)

## 2023-08-12 PROCEDURE — 6370000000 HC RX 637 (ALT 250 FOR IP): Performed by: HOSPITALIST

## 2023-08-12 PROCEDURE — 93010 ELECTROCARDIOGRAM REPORT: CPT | Performed by: SPECIALIST

## 2023-08-12 PROCEDURE — 6370000000 HC RX 637 (ALT 250 FOR IP): Performed by: INTERNAL MEDICINE

## 2023-08-12 PROCEDURE — 6360000002 HC RX W HCPCS: Performed by: INTERNAL MEDICINE

## 2023-08-12 PROCEDURE — 2060000000 HC ICU INTERMEDIATE R&B

## 2023-08-12 PROCEDURE — 2580000003 HC RX 258: Performed by: INTERNAL MEDICINE

## 2023-08-12 PROCEDURE — 36415 COLL VENOUS BLD VENIPUNCTURE: CPT

## 2023-08-12 PROCEDURE — 85025 COMPLETE CBC W/AUTO DIFF WBC: CPT

## 2023-08-12 PROCEDURE — 83880 ASSAY OF NATRIURETIC PEPTIDE: CPT

## 2023-08-12 PROCEDURE — 80053 COMPREHEN METABOLIC PANEL: CPT

## 2023-08-12 RX ORDER — POTASSIUM CHLORIDE 750 MG/1
40 TABLET, FILM COATED, EXTENDED RELEASE ORAL ONCE
Status: COMPLETED | OUTPATIENT
Start: 2023-08-12 | End: 2023-08-12

## 2023-08-12 RX ADMIN — POTASSIUM CHLORIDE 40 MEQ: 750 TABLET, FILM COATED, EXTENDED RELEASE ORAL at 08:39

## 2023-08-12 RX ADMIN — CARVEDILOL 12.5 MG: 12.5 TABLET, FILM COATED ORAL at 08:40

## 2023-08-12 RX ADMIN — ATORVASTATIN CALCIUM 80 MG: 40 TABLET, FILM COATED ORAL at 20:57

## 2023-08-12 RX ADMIN — FERROUS SULFATE TAB 325 MG (65 MG ELEMENTAL FE) 325 MG: 325 (65 FE) TAB at 06:24

## 2023-08-12 RX ADMIN — HEPARIN SODIUM 5000 UNITS: 5000 INJECTION INTRAVENOUS; SUBCUTANEOUS at 15:55

## 2023-08-12 RX ADMIN — PANTOPRAZOLE SODIUM 40 MG: 40 TABLET, DELAYED RELEASE ORAL at 08:39

## 2023-08-12 RX ADMIN — SODIUM CHLORIDE, PRESERVATIVE FREE 10 ML: 5 INJECTION INTRAVENOUS at 20:57

## 2023-08-12 RX ADMIN — ASPIRIN 162 MG: 81 TABLET, COATED ORAL at 08:39

## 2023-08-12 RX ADMIN — HEPARIN SODIUM 5000 UNITS: 5000 INJECTION INTRAVENOUS; SUBCUTANEOUS at 06:24

## 2023-08-12 RX ADMIN — HEPARIN SODIUM 5000 UNITS: 5000 INJECTION INTRAVENOUS; SUBCUTANEOUS at 20:57

## 2023-08-12 RX ADMIN — CARVEDILOL 12.5 MG: 12.5 TABLET, FILM COATED ORAL at 20:57

## 2023-08-12 RX ADMIN — AMLODIPINE BESYLATE 10 MG: 5 TABLET ORAL at 08:39

## 2023-08-12 RX ADMIN — GABAPENTIN 100 MG: 100 CAPSULE ORAL at 20:57

## 2023-08-12 RX ADMIN — SODIUM CHLORIDE, PRESERVATIVE FREE 10 ML: 5 INJECTION INTRAVENOUS at 08:40

## 2023-08-12 RX ADMIN — FERROUS SULFATE TAB 325 MG (65 MG ELEMENTAL FE) 325 MG: 325 (65 FE) TAB at 15:55

## 2023-08-12 ASSESSMENT — PAIN SCALES - GENERAL: PAINLEVEL_OUTOF10: 0

## 2023-08-12 NOTE — PROGRESS NOTES
Bedside shift change report given to [address]  (oncoming nurse) by Christa Foster RN  (offgoing nurse). Report included the following information Nurse Handoff Report, Intake/Output, MAR, and Recent Results.

## 2023-08-12 NOTE — PROGRESS NOTES
[address] [facility]  Hospitalist Group                                                                                          Hospitalist Progress Note  Kevin Lyons MD  Answering service: [phone] from in house phone        Date of Service:  2023  NAME:  Christy Hutchinson :  1955  MRN:  [id number]       Admission Summary: This is a 59-year-old man with past medical history significant for congestive heart failure with preserved ejection fraction; hypertension; dyslipidemia; COPD; coronary artery disease, status post CABG; status post CVA; and recurrent left pleural effusion; presented at the emergency room with fatigue. This started a couple of days ago and progressively getting worse. The patient was seen by his nephrologist, lab work was obtained and the lab work shows worsening renal function and because of that, the patient was sent to the emergency room for further evaluation. The patient has also been complaining of diarrhea which started a few days ago as well. The patient stated that he has been having multiple episodes of diarrhea. No blood or mucus in the stool. The patient denies associated fever, rigors, or chills. .  When the patient arrived at the emergency room, the worsening renal function was confirmed. The patient was also found to have hypokalemia. The patient was referred to the hospitalist service for admission. He was recently admitted to this hospital from the 2023 to 2023. The patient was admitted with shortness of breath attributed to congestive heart failure as well as recurrent left pleural effusion    Interval history / Subjective:     Patient was seen and examined at the bedside. He said he feels better. No left side chest pain, or difficulty breathing.      Assessment & Plan:     RODNEY on CKD stage III  -likely due to volume depletion  -creatinine stable  -on ringer lactate at 75 ml/hr   -hold Lasix, Jardiance, ______________________________________________________________________  EXPECTED LENGTH OF STAY: Unable to retrieve estimated LOS  ACTUAL LENGTH OF STAY:          2                 Gómez Love MD

## 2023-08-12 NOTE — PROGRESS NOTES
[facility]         NAME:Shawn Rocha   YXO:[id number]   OCJ:5/63/5132     Labs reviewed  Cr stable  K low    Plan  Continue ivf  Kcl 40 meq po  Check bmp in am      Dehydration [E86.0]  Hypokalemia [E87.6]  Acute kidney injury superimposed on chronic kidney disease ([address]) [N17.9, N18.9]  Stage 3 chronic kidney disease, unspecified whether stage 3a or 3b CKD ([address]) [N18.30]     Dominga Ji MD  [facility]  [facility]  [address]  Phone - [phone]         Fax - [phone] [facility] Office  [address], [address] [facility], [address] [place] [address]  Phone - [phone]        Fax - [phone]

## 2023-08-13 LAB
ALBUMIN SERPL-MCNC: 2.7 G/DL (ref 3.5–5)
ALBUMIN/GLOB SERPL: 0.8 (ref 1.1–2.2)
ALP SERPL-CCNC: 69 U/L (ref 45–117)
ALT SERPL-CCNC: 15 U/L (ref 12–78)
ANION GAP SERPL CALC-SCNC: 6 MMOL/L (ref 5–15)
AST SERPL-CCNC: 16 U/L (ref 15–37)
BILIRUB SERPL-MCNC: 0.4 MG/DL (ref 0.2–1)
BUN SERPL-MCNC: 31 MG/DL (ref 6–20)
BUN/CREAT SERPL: 16 (ref 12–20)
CALCIUM SERPL-MCNC: 8.6 MG/DL (ref 8.5–10.1)
CHLORIDE SERPL-SCNC: 112 MMOL/L (ref 97–108)
CO2 SERPL-SCNC: 23 MMOL/L (ref 21–32)
CREAT SERPL-MCNC: 1.94 MG/DL (ref 0.7–1.3)
ERYTHROCYTE [DISTWIDTH] IN BLOOD BY AUTOMATED COUNT: 13.8 % (ref 11.5–14.5)
GLOBULIN SER CALC-MCNC: 3.5 G/DL (ref 2–4)
GLUCOSE SERPL-MCNC: 83 MG/DL (ref 65–100)
HCT VFR BLD AUTO: 33 % (ref 36.6–50.3)
HGB BLD-MCNC: 10.5 G/DL (ref 12.1–17)
MAGNESIUM SERPL-MCNC: 1.6 MG/DL (ref 1.6–2.4)
MCH RBC QN AUTO: 29.4 PG (ref 26–34)
MCHC RBC AUTO-ENTMCNC: 31.8 G/DL (ref 30–36.5)
MCV RBC AUTO: 92.4 FL (ref 80–99)
NRBC # BLD: 0 K/UL (ref 0–0.01)
NRBC BLD-RTO: 0 PER 100 WBC
PLATELET # BLD AUTO: 212 K/UL (ref 150–400)
PMV BLD AUTO: 11.7 FL (ref 8.9–12.9)
POTASSIUM SERPL-SCNC: 3.8 MMOL/L (ref 3.5–5.1)
PROT SERPL-MCNC: 6.2 G/DL (ref 6.4–8.2)
RBC # BLD AUTO: 3.57 M/UL (ref 4.1–5.7)
SODIUM SERPL-SCNC: 141 MMOL/L (ref 136–145)
WBC # BLD AUTO: 5.7 K/UL (ref 4.1–11.1)

## 2023-08-13 PROCEDURE — 80053 COMPREHEN METABOLIC PANEL: CPT

## 2023-08-13 PROCEDURE — 36415 COLL VENOUS BLD VENIPUNCTURE: CPT

## 2023-08-13 PROCEDURE — 85027 COMPLETE CBC AUTOMATED: CPT

## 2023-08-13 PROCEDURE — 2060000000 HC ICU INTERMEDIATE R&B

## 2023-08-13 PROCEDURE — 2580000003 HC RX 258: Performed by: INTERNAL MEDICINE

## 2023-08-13 PROCEDURE — 6370000000 HC RX 637 (ALT 250 FOR IP): Performed by: INTERNAL MEDICINE

## 2023-08-13 PROCEDURE — 6360000002 HC RX W HCPCS: Performed by: INTERNAL MEDICINE

## 2023-08-13 PROCEDURE — 83735 ASSAY OF MAGNESIUM: CPT

## 2023-08-13 RX ADMIN — HEPARIN SODIUM 5000 UNITS: 5000 INJECTION INTRAVENOUS; SUBCUTANEOUS at 06:25

## 2023-08-13 RX ADMIN — CARVEDILOL 12.5 MG: 12.5 TABLET, FILM COATED ORAL at 20:48

## 2023-08-13 RX ADMIN — GABAPENTIN 100 MG: 100 CAPSULE ORAL at 20:48

## 2023-08-13 RX ADMIN — FERROUS SULFATE TAB 325 MG (65 MG ELEMENTAL FE) 325 MG: 325 (65 FE) TAB at 06:25

## 2023-08-13 RX ADMIN — HEPARIN SODIUM 5000 UNITS: 5000 INJECTION INTRAVENOUS; SUBCUTANEOUS at 15:07

## 2023-08-13 RX ADMIN — SODIUM CHLORIDE, PRESERVATIVE FREE 10 ML: 5 INJECTION INTRAVENOUS at 08:42

## 2023-08-13 RX ADMIN — FERROUS SULFATE TAB 325 MG (65 MG ELEMENTAL FE) 325 MG: 325 (65 FE) TAB at 15:07

## 2023-08-13 RX ADMIN — PANTOPRAZOLE SODIUM 40 MG: 40 TABLET, DELAYED RELEASE ORAL at 08:41

## 2023-08-13 RX ADMIN — ATORVASTATIN CALCIUM 80 MG: 40 TABLET, FILM COATED ORAL at 20:48

## 2023-08-13 RX ADMIN — ASPIRIN 162 MG: 81 TABLET, COATED ORAL at 08:41

## 2023-08-13 RX ADMIN — CARVEDILOL 12.5 MG: 12.5 TABLET, FILM COATED ORAL at 08:41

## 2023-08-13 RX ADMIN — HEPARIN SODIUM 5000 UNITS: 5000 INJECTION INTRAVENOUS; SUBCUTANEOUS at 20:49

## 2023-08-13 RX ADMIN — AMLODIPINE BESYLATE 10 MG: 5 TABLET ORAL at 08:41

## 2023-08-13 ASSESSMENT — PAIN SCALES - GENERAL
PAINLEVEL_OUTOF10: 0

## 2023-08-13 NOTE — PROGRESS NOTES
[address] [facility]  Hospitalist Group                                                                                          Hospitalist Progress Note  Angelina Mckeon MD  Answering service: [phone] from in house phone        Date of Service:  2023  NAME:  Precious Carey. :  1955  MRN:  [id number]       Admission Summary: This is a 74-year-old man with past medical history significant for congestive heart failure with preserved ejection fraction; hypertension; dyslipidemia; COPD; coronary artery disease, status post CABG; status post CVA; and recurrent left pleural effusion; presented at the emergency room with fatigue. This started a couple of days ago and progressively getting worse. The patient was seen by his nephrologist, lab work was obtained and the lab work shows worsening renal function and because of that, the patient was sent to the emergency room for further evaluation. The patient has also been complaining of diarrhea which started a few days ago as well. The patient stated that he has been having multiple episodes of diarrhea. No blood or mucus in the stool. The patient denies associated fever, rigors, or chills. .  When the patient arrived at the emergency room, the worsening renal function was confirmed. The patient was also found to have hypokalemia. The patient was referred to the hospitalist service for admission. He was recently admitted to this hospital from the 2023 to 2023. The patient was admitted with shortness of breath attributed to congestive heart failure as well as recurrent left pleural effusion    Interval history / Subjective:     Patient was seen and examined at the bedside. He said he feels better. No left side chest pain, or difficulty breathing.      Assessment & Plan:     RODNEY on CKD stage III  -likely due to volume depletion  -creatinine improving  -on ringer lactate at 75 ml/hr   -held Lasix, Jardiance, examined them on 8/13/2023 as outlined below:          General : alert x 3, awake, no acute distress,   HEENT: PEERL, EOMI, moist mucus membrane, TM clear  Neck: supple, no JVD, no meningeal signs  Chest: Clear to auscultation bilaterally   CVS: S1 S2 heard, Capillary refill less than 2 seconds  Abd: soft/ non tender, non distended, BS physiological,   Ext: no clubbing, no cyanosis, no edema, brisk 2+ DP pulses  Neuro/Psych: pleasant mood and affect, CN 2-12 grossly intact, sensory grossly within normal limit, Strength 5/5 in all extremities, DTR 1+ x 4  Skin: warm     Data Review:    Review and/or order of clinical lab test  Review and/or order of tests in the radiology section of CPT  Review and/or order of tests in the medicine section of CPT      I have personally and independently reviewed all pertinent labs, diagnostic studies, imaging, and have provided independent interpretation of the same. Labs:     Recent Labs     08/12/23 0403 08/13/23 0512   WBC 6.3 5.7   HGB 11.1* 10.5*   HCT 34.8* 33.0*    212       Recent Labs     08/11/23  0343 08/12/23 0403 08/13/23  0512    140 141   K 3.0* 3.3* 3.8   * 109* 112*   CO2 25 24 23   BUN 34* 35* 31*   MG 1.6  --  1.6   PHOS 3.7  --   --        Recent Labs     08/11/23 0343 08/12/23 0403 08/13/23  0512   ALT 16 16 15   GLOB 3.5 3.6 3.5       No results for input(s): INR, APTT in the last 72 hours. Invalid input(s): PTP   No results for input(s): TIBC, FERR in the last 72 hours. Invalid input(s): FE, PSAT   No results found for: FOL, RBCF   No results for input(s): PH, PCO2, PO2 in the last 72 hours. No results for input(s): CPK in the last 72 hours.     Invalid input(s): CPKMB, CKNDX, TROIQ  Lab Results   Component Value Date/Time    CHOL 100 07/09/2023 03:46 AM    HDL 35 07/09/2023 03:46 AM     Lab Results   Component Value Date/Time    GLUCPOC 142 08/01/2022 11:56 AM    GLUCPOC 112 03/16/2022 08:46 AM    GLUCPOC 129 09/15/2021 08:52

## 2023-08-14 VITALS
TEMPERATURE: 97.5 F | HEIGHT: 68 IN | BODY MASS INDEX: 21.52 KG/M2 | WEIGHT: 142 LBS | DIASTOLIC BLOOD PRESSURE: 66 MMHG | OXYGEN SATURATION: 100 % | SYSTOLIC BLOOD PRESSURE: 156 MMHG | HEART RATE: 60 BPM | RESPIRATION RATE: 20 BRPM

## 2023-08-14 LAB
ALBUMIN SERPL-MCNC: 2.6 G/DL (ref 3.5–5)
ALBUMIN/GLOB SERPL: 0.8 (ref 1.1–2.2)
ALP SERPL-CCNC: 70 U/L (ref 45–117)
ALT SERPL-CCNC: 16 U/L (ref 12–78)
ANION GAP SERPL CALC-SCNC: 5 MMOL/L (ref 5–15)
AST SERPL-CCNC: 16 U/L (ref 15–37)
BILIRUB SERPL-MCNC: 0.4 MG/DL (ref 0.2–1)
BUN SERPL-MCNC: 26 MG/DL (ref 6–20)
BUN/CREAT SERPL: 14 (ref 12–20)
CALCIUM SERPL-MCNC: 8.6 MG/DL (ref 8.5–10.1)
CHLORIDE SERPL-SCNC: 113 MMOL/L (ref 97–108)
CO2 SERPL-SCNC: 23 MMOL/L (ref 21–32)
CREAT SERPL-MCNC: 1.84 MG/DL (ref 0.7–1.3)
ERYTHROCYTE [DISTWIDTH] IN BLOOD BY AUTOMATED COUNT: 13.9 % (ref 11.5–14.5)
GLOBULIN SER CALC-MCNC: 3.4 G/DL (ref 2–4)
GLUCOSE SERPL-MCNC: 120 MG/DL (ref 65–100)
HCT VFR BLD AUTO: 32.9 % (ref 36.6–50.3)
HGB BLD-MCNC: 10.4 G/DL (ref 12.1–17)
MCH RBC QN AUTO: 29.4 PG (ref 26–34)
MCHC RBC AUTO-ENTMCNC: 31.6 G/DL (ref 30–36.5)
MCV RBC AUTO: 92.9 FL (ref 80–99)
NRBC # BLD: 0 K/UL (ref 0–0.01)
NRBC BLD-RTO: 0 PER 100 WBC
PLATELET # BLD AUTO: 215 K/UL (ref 150–400)
PMV BLD AUTO: 11.6 FL (ref 8.9–12.9)
POTASSIUM SERPL-SCNC: 3.9 MMOL/L (ref 3.5–5.1)
PROT SERPL-MCNC: 6 G/DL (ref 6.4–8.2)
RBC # BLD AUTO: 3.54 M/UL (ref 4.1–5.7)
SODIUM SERPL-SCNC: 141 MMOL/L (ref 136–145)
WBC # BLD AUTO: 6.3 K/UL (ref 4.1–11.1)

## 2023-08-14 PROCEDURE — 6370000000 HC RX 637 (ALT 250 FOR IP): Performed by: INTERNAL MEDICINE

## 2023-08-14 PROCEDURE — 2580000003 HC RX 258: Performed by: INTERNAL MEDICINE

## 2023-08-14 PROCEDURE — 36415 COLL VENOUS BLD VENIPUNCTURE: CPT

## 2023-08-14 PROCEDURE — 6360000002 HC RX W HCPCS: Performed by: INTERNAL MEDICINE

## 2023-08-14 PROCEDURE — 85027 COMPLETE CBC AUTOMATED: CPT

## 2023-08-14 PROCEDURE — 80053 COMPREHEN METABOLIC PANEL: CPT

## 2023-08-14 RX ADMIN — AMLODIPINE BESYLATE 10 MG: 5 TABLET ORAL at 09:00

## 2023-08-14 RX ADMIN — SODIUM CHLORIDE, POTASSIUM CHLORIDE, SODIUM LACTATE AND CALCIUM CHLORIDE: 600; 310; 30; 20 INJECTION, SOLUTION INTRAVENOUS at 00:25

## 2023-08-14 RX ADMIN — FERROUS SULFATE TAB 325 MG (65 MG ELEMENTAL FE) 325 MG: 325 (65 FE) TAB at 06:17

## 2023-08-14 RX ADMIN — CARVEDILOL 12.5 MG: 12.5 TABLET, FILM COATED ORAL at 09:02

## 2023-08-14 RX ADMIN — SODIUM CHLORIDE, PRESERVATIVE FREE 10 ML: 5 INJECTION INTRAVENOUS at 09:03

## 2023-08-14 RX ADMIN — HEPARIN SODIUM 5000 UNITS: 5000 INJECTION INTRAVENOUS; SUBCUTANEOUS at 06:17

## 2023-08-14 RX ADMIN — PANTOPRAZOLE SODIUM 40 MG: 40 TABLET, DELAYED RELEASE ORAL at 09:02

## 2023-08-14 RX ADMIN — ASPIRIN 162 MG: 81 TABLET, COATED ORAL at 08:58

## 2023-08-14 ASSESSMENT — PAIN SCALES - GENERAL
PAINLEVEL_OUTOF10: 0

## 2023-08-14 NOTE — PLAN OF CARE
Problem: Discharge Planning  Goal: Discharge to home or other facility with appropriate resources  Outcome: Completed  Flowsheets (Taken 8/14/2023 1914 by Virginia Gray, RN)  Discharge to home or other facility with appropriate resources:   Identify barriers to discharge with patient and caregiver   Identify discharge learning needs (meds, wound care, etc)     Problem: Safety - Adult  Goal: Free from fall injury  Outcome: Completed  Flowsheets (Taken 8/14/2023 0813 by Virginia Gray, RN)  Free From Fall Injury: Instruct family/caregiver on patient safety     Problem: Chronic Conditions and Co-morbidities  Goal: Patient's chronic conditions and co-morbidity symptoms are monitored and maintained or improved  Outcome: Completed  Flowsheets (Taken 8/14/2023 0812 by Virginia Gray RN)  Care Plan - Patient's Chronic Conditions and Co-Morbidity Symptoms are Monitored and Maintained or Improved: Monitor and assess patient's chronic conditions and comorbid symptoms for stability, deterioration, or improvement     Problem: Skin/Tissue Integrity  Goal: Absence of new skin breakdown  Description: 1. Monitor for areas of redness and/or skin breakdown  2. Assess vascular access sites hourly  3. Every 4-6 hours minimum:  Change oxygen saturation probe site  4. Every 4-6 hours:  If on nasal continuous positive airway pressure, respiratory therapy assess nares and determine need for appliance change or resting period.   Outcome: Completed     Problem: Metabolic/Fluid and Electrolytes - Adult  Goal: Electrolytes maintained within normal limits  Outcome: Completed  Flowsheets (Taken 8/14/2023 0918 by Virginia Gray RN)  Electrolytes maintained within normal limits:   Monitor labs and assess patient for signs and symptoms of electrolyte imbalances   Monitor response to electrolyte replacements, including repeat lab results as appropriate  Goal: Hemodynamic stability and optimal renal function maintained  Outcome:

## 2023-08-14 NOTE — DISCHARGE INSTRUCTIONS
Discharge Instructions       PATIENT ID: Carlton Martin MRN: [id number]   YOB: 1955    DATE OF ADMISSION: 8/10/2023   DATE OF DISCHARGE: 8/14/2023    PRIMARY CARE PROVIDER: Daisy Villa     ATTENDING PHYSICIAN: Nikos Rosenberg MD   DISCHARGING PROVIDER: Nikos Rosenberg MD    To contact this individual call [phone] and ask the  to page. If unavailable ask to be transferred the Adult Hospitalist Department. DISCHARGE DIAGNOSES RODNEY on CKD stage III    CONSULTATIONS: [unfilled]    PROCEDURES/SURGERIES: * No surgery found *    PENDING TEST RESULTS:   At the time of discharge the following test results are still pending:     [facility]   [facility]     ADDITIONAL CARE RECOMMENDATIONS: F/U WITH NEPRO LOGY IN 1 WEEK    DIET: renal diet    ACTIVITY: activity as tolerated    WOUND CARE:     EQUIPMENT needed:       DISCHARGE MEDICATIONS:   See Medication Reconciliation Form    It is important that you take the medication exactly as they are prescribed. Keep your medication in the bottles provided by the pharmacist and keep a list of the medication names, dosages, and times to be taken in your wallet. Do not take other medications without consulting your doctor. NOTIFY YOUR PHYSICIAN FOR ANY OF THE FOLLOWING:   Fever over 101 degrees for 24 hours. Chest pain, shortness of breath, fever, chills, nausea, vomiting, diarrhea, change in mentation, falling, weakness, bleeding. Severe pain or pain not relieved by medications. Or, any other signs or symptoms that you may have questions about. DISPOSITION:   X Home With:   OT  PT  HH  RN       SNF/Inpatient Rehab/LTAC    Independent/assisted living    Hospice    Other:     CDMP Checked: Yes X     PROBLEM LIST Updated:   Yes X       Signed:   Nikos Rosenberg MD  8/14/2023  11:48 AM

## 2023-08-14 NOTE — DISCHARGE SUMMARY
Discharge Summary       PATIENT ID: Selene Teixeira MRN: [id number]   YOB: 1955    DATE OF ADMISSION: 8/10/2023 12:22 PM    DATE OF DISCHARGE: 8/14/23   PRIMARY CARE PROVIDER: Mateusz Oquendo MD     ATTENDING PHYSICIAN: Annemarie Johnson  DISCHARGING PROVIDER: Annemarie Johnson MD    To contact this individual call [phone] and ask the  to page. If unavailable ask to be transferred the Adult Hospitalist Department. CONSULTATIONS: IP CONSULT TO NEPHROLOGY  IP CONSULT TO CASE MANAGEMENT    PROCEDURES/SURGERIES: * No surgery found *    ADMITTING DIAGNOSES & HOSPITAL COURSE: RODNEY on CKD 3    This is a 65-year-old man with past medical history significant for congestive heart failure with preserved ejection fraction; hypertension; dyslipidemia; COPD; coronary artery disease, status post CABG; status post CVA; and recurrent left pleural effusion; presented at the emergency room with fatigue. This started a couple of days ago and progressively getting worse. The patient was seen by his nephrologist, lab work was obtained and the lab work shows worsening renal function and because of that, the patient was sent to the emergency room for further evaluation. The patient has also been complaining of diarrhea which started a few days ago as well. The patient stated that he has been having multiple episodes of diarrhea. No blood or mucus in the stool. The patient denies associated fever, rigors, or chills. .  When the patient arrived at the emergency room, the worsening renal function was confirmed. The patient was also found to have hypokalemia. The patient was referred to the hospitalist service for admission. He was recently admitted to this hospital from the 07/08/2023 to 07/12/2023.   The patient was admitted with shortness of breath attributed to congestive heart failure as well as recurrent left pleural effusion    RODNEY on CKD 3 likely due to volume depletion in the setting of using

## 2023-08-14 NOTE — PROGRESS NOTES
Attempted to schedule hospital follow up PCP appointment. Unable to reach anyone, left voicemail. Pending patient discharge.  Noa Yanely, Care Management Assistant

## 2023-08-14 NOTE — PROGRESS NOTES
[facility]   [address], [address] [facility], [address]  Phone: [phone]   [phone]    [email or web address]     Nephrology Progress Note    Patient Name : Sheldon Mishra.  : 1955     MRN : [id number]  Date of Admission : 8/10/2023  Date of Servive : 23    CC: Follow up for RODNEY        Assessment and Plan   RODNEY on CKD:  -Prerenal azotemia from SGL 2 inhibitors, diuretics  -Compounded by ARB use  -Improved with IVF  -Okay for discharge of SGL 2 inhibitor and ARB for now  -We will follow-up in 1 to 2 weeks with labs    CKD stage IIIb  -Baseline creatinine 1.5 to 1.7 mg/dL  -Needs outpatient follow-up    CAD, Hx of CABG  HFpEF  -Stable    HTN  -BP control    Hx of CVA  COPD  Hx of pleural effusions     Interval History:  Patient seen and examined. Renal function back to baseline. He denies any shortness of breath, chest pain, nausea or vomiting. BP stable  Anticipated discharge today      Review of Systems: A comprehensive review of systems was negative.     Current Medications:   Current Facility-Administered Medications   Medication Dose Route Frequency    amLODIPine (NORVASC) tablet 10 mg  10 mg Oral Daily    aspirin EC tablet 162 mg  162 mg Oral Daily    atorvastatin (LIPITOR) tablet 80 mg  80 mg Oral Nightly    carvedilol (COREG) tablet 12.5 mg  12.5 mg Oral BID    ferrous sulfate (IRON 325) tablet 325 mg  325 mg Oral BID AC    gabapentin (NEURONTIN) capsule 100 mg  100 mg Oral Nightly    pantoprazole (PROTONIX) tablet 40 mg  40 mg Oral Daily    sodium chloride flush 0.9 % injection 5-40 mL  5-40 mL IntraVENous 2 times per day    sodium chloride flush 0.9 % injection 5-40 mL  5-40 mL IntraVENous PRN    0.9 % sodium chloride infusion   IntraVENous PRN    ondansetron (ZOFRAN-ODT) disintegrating tablet 4 mg  4 mg Oral Q8H PRN    Or    ondansetron (ZOFRAN) injection 4 mg  4 mg IntraVENous Q6H PRN    polyethylene glycol (GLYCOLAX) packet 17 g  17

## 2023-08-14 NOTE — PROGRESS NOTES
Bedside shift change report given to [address] [place] [address] RN  (oncoming nurse) by Redd Wayne  (offgoing nurse). Report included the following information Nurse Handoff Report, Intake/Output, MAR, and Recent Results.

## 2023-08-27 ASSESSMENT — VISUAL ACUITY: OU: 1

## 2023-08-27 NOTE — PROGRESS NOTES
High cholesterol     Hypertension     per pt on 2021    MI (myocardial infarction) [facility])     Recurrent pleural effusion on left     Stroke [facility])      No family history on file. Past Surgical History:   Procedure Laterality Date    COLONOSCOPY N/A 2022    COLONOSCOPY performed by Andrae Stroud MD at [address] [facility]RAFT  2022    UPPER GASTROINTESTINAL ENDOSCOPY      per pt on 2021     Social History     Tobacco Use    Smoking status: Former     Packs/day: 0.50     Types: Cigarettes     Quit date: 10/2022     Years since quittin.9    Smokeless tobacco: Never   Substance Use Topics    Alcohol use: Not Currently     Alcohol/week: 1.0 standard drink        Lab Results   Component Value Date/Time    CHOL 100 2023 03:46 AM    HDL 35 2023 03:46 AM    HDLPOC 44 2021 10:41 AM        Lab Results   Component Value Date/Time    UUW7PVYJ 4.9 2022 08:46 AM        Lab Results   Component Value Date/Time     2023 05:03 AM    K 3.9 2023 05:03 AM     2023 05:03 AM    CO2 23 2023 05:03 AM    BUN 26 2023 05:03 AM    GFRAA 53 2022 03:00 PM    GLOB 3.4 2023 05:03 AM    ALT 16 2023 05:03 AM    AST 16 2023 05:03 AM        Lab Results   Component Value Date/Time    WBC 6.3 2023 05:03 AM    HGB 10.4 2023 05:03 AM    HCT 32.9 2023 05:03 AM     2023 05:03 AM    MCV 92.9 2023 05:03 AM        There were no vitals taken for this visit. There is no height or weight on file to calculate BMI. Review of Systems   Constitutional:  Positive for fatigue. Respiratory:  Negative for shortness of breath. Cardiovascular:  Negative for chest pain, palpitations and leg swelling. Neurological:  Negative for dizziness and syncope. All other systems reviewed and are negative. Physical Exam  Vitals reviewed.    Constitutional:       Appearance: He is

## 2023-08-28 ENCOUNTER — OFFICE VISIT (OUTPATIENT)
Age: 68
End: 2023-08-28
Payer: MEDICARE

## 2023-08-28 VITALS
BODY MASS INDEX: 23.04 KG/M2 | WEIGHT: 152 LBS | HEIGHT: 68 IN | HEART RATE: 52 BPM | DIASTOLIC BLOOD PRESSURE: 84 MMHG | OXYGEN SATURATION: 99 % | SYSTOLIC BLOOD PRESSURE: 160 MMHG

## 2023-08-28 DIAGNOSIS — I34.0 MODERATE MITRAL REGURGITATION: ICD-10-CM

## 2023-08-28 DIAGNOSIS — R54 FRAILTY SYNDROME IN GERIATRIC PATIENT: ICD-10-CM

## 2023-08-28 DIAGNOSIS — I50.32 CHRONIC HEART FAILURE WITH PRESERVED EJECTION FRACTION (HCC): Primary | ICD-10-CM

## 2023-08-28 DIAGNOSIS — E78.00 HYPERCHOLESTEROLEMIA: ICD-10-CM

## 2023-08-28 DIAGNOSIS — M79.10 MYALGIA: ICD-10-CM

## 2023-08-28 DIAGNOSIS — I25.10 CAD IN NATIVE ARTERY: ICD-10-CM

## 2023-08-28 DIAGNOSIS — Z86.73 HISTORY OF CVA (CEREBROVASCULAR ACCIDENT): ICD-10-CM

## 2023-08-28 DIAGNOSIS — Z95.1 S/P CABG X 1: ICD-10-CM

## 2023-08-28 DIAGNOSIS — N18.32 STAGE 3B CHRONIC KIDNEY DISEASE (HCC): ICD-10-CM

## 2023-08-28 DIAGNOSIS — I10 PRIMARY HYPERTENSION: ICD-10-CM

## 2023-08-28 DIAGNOSIS — J44.9 CHRONIC OBSTRUCTIVE PULMONARY DISEASE, UNSPECIFIED COPD TYPE (HCC): ICD-10-CM

## 2023-08-28 PROCEDURE — 3023F SPIROM DOC REV: CPT | Performed by: NURSE PRACTITIONER

## 2023-08-28 PROCEDURE — 1036F TOBACCO NON-USER: CPT | Performed by: NURSE PRACTITIONER

## 2023-08-28 PROCEDURE — G8427 DOCREV CUR MEDS BY ELIG CLIN: HCPCS | Performed by: NURSE PRACTITIONER

## 2023-08-28 PROCEDURE — 1111F DSCHRG MED/CURRENT MED MERGE: CPT | Performed by: NURSE PRACTITIONER

## 2023-08-28 PROCEDURE — 3017F COLORECTAL CA SCREEN DOC REV: CPT | Performed by: NURSE PRACTITIONER

## 2023-08-28 PROCEDURE — G8420 CALC BMI NORM PARAMETERS: HCPCS | Performed by: NURSE PRACTITIONER

## 2023-08-28 PROCEDURE — 1123F ACP DISCUSS/DSCN MKR DOCD: CPT | Performed by: NURSE PRACTITIONER

## 2023-08-28 PROCEDURE — 3079F DIAST BP 80-89 MM HG: CPT | Performed by: NURSE PRACTITIONER

## 2023-08-28 PROCEDURE — 3077F SYST BP >= 140 MM HG: CPT | Performed by: NURSE PRACTITIONER

## 2023-08-28 PROCEDURE — 99214 OFFICE O/P EST MOD 30 MIN: CPT | Performed by: NURSE PRACTITIONER

## 2023-08-28 ASSESSMENT — PATIENT HEALTH QUESTIONNAIRE - PHQ9
SUM OF ALL RESPONSES TO PHQ QUESTIONS 1-9: 0
SUM OF ALL RESPONSES TO PHQ9 QUESTIONS 1 & 2: 0
1. LITTLE INTEREST OR PLEASURE IN DOING THINGS: 0
SUM OF ALL RESPONSES TO PHQ QUESTIONS 1-9: 0
2. FEELING DOWN, DEPRESSED OR HOPELESS: 0
SUM OF ALL RESPONSES TO PHQ QUESTIONS 1-9: 0
SUM OF ALL RESPONSES TO PHQ QUESTIONS 1-9: 0

## 2023-08-28 ASSESSMENT — ENCOUNTER SYMPTOMS: SHORTNESS OF BREATH: 0

## 2023-08-29 ENCOUNTER — TELEPHONE (OUTPATIENT)
Age: 68
End: 2023-08-29

## 2023-08-29 LAB
ANION GAP SERPL CALC-SCNC: 8 MMOL/L (ref 5–15)
BUN SERPL-MCNC: 17 MG/DL (ref 6–20)
BUN/CREAT SERPL: 8 (ref 12–20)
CALCIUM SERPL-MCNC: 9 MG/DL (ref 8.5–10.1)
CHLORIDE SERPL-SCNC: 113 MMOL/L (ref 97–108)
CK SERPL-CCNC: 324 U/L (ref 39–308)
CO2 SERPL-SCNC: 23 MMOL/L (ref 21–32)
CREAT SERPL-MCNC: 2.2 MG/DL (ref 0.7–1.3)
GLUCOSE SERPL-MCNC: 77 MG/DL (ref 65–100)
POTASSIUM SERPL-SCNC: 3.6 MMOL/L (ref 3.5–5.1)
SODIUM SERPL-SCNC: 144 MMOL/L (ref 136–145)

## 2023-08-29 RX ORDER — LOSARTAN POTASSIUM 50 MG/1
50 TABLET ORAL DAILY
Qty: 30 TABLET | Refills: 2 | Status: SHIPPED | OUTPATIENT
Start: 2023-08-29

## 2023-08-29 NOTE — TELEPHONE ENCOUNTER
Per spouse Munday was to call back regarding what type of medications he was going to put patient back on-        Thanks -

## 2023-08-29 NOTE — TELEPHONE ENCOUNTER
Called patient back to discuss lab results. Creatinine 2.2 with potassium of 3.9. His creatinine has had quite a bit of fluctuation over the last 3 years, and I believe his baseline is approximately 2.0. CK only mildly elevated and not clinically significant for myositis/myopathy. Will keep in mind to recheck in the future for any change in trend. Due to his markedly above goal BP, I recommended restarting losartan at 50 mg. He has plans to follow-up with nephrology in September. I stressed the importance of regular food and fluid intake and physical activity as tolerated.

## 2023-09-12 ENCOUNTER — TELEPHONE (OUTPATIENT)
Facility: CLINIC | Age: 68
End: 2023-09-12

## 2023-09-12 NOTE — TELEPHONE ENCOUNTER
Rep called to notify patient was recently discharged from the hospital. After being re-evaluated, he will start tx with them 2x a week for 3 weeks, then 1x a week for the next 3 weeks afterwards.

## 2023-09-15 ENCOUNTER — HOSPITAL ENCOUNTER (INPATIENT)
Facility: HOSPITAL | Age: 68
LOS: 4 days | Discharge: HOME HEALTH | DRG: 291 | End: 2023-09-19
Attending: EMERGENCY MEDICINE | Admitting: HOSPITALIST
Payer: MEDICARE

## 2023-09-15 ENCOUNTER — APPOINTMENT (OUTPATIENT)
Facility: HOSPITAL | Age: 68
DRG: 291 | End: 2023-09-15
Payer: MEDICARE

## 2023-09-15 DIAGNOSIS — J96.01 ACUTE RESPIRATORY FAILURE WITH HYPOXIA (HCC): Primary | ICD-10-CM

## 2023-09-15 DIAGNOSIS — I16.1 HYPERTENSIVE EMERGENCY: ICD-10-CM

## 2023-09-15 DIAGNOSIS — I50.9 ACUTE ON CHRONIC CONGESTIVE HEART FAILURE, UNSPECIFIED HEART FAILURE TYPE (HCC): ICD-10-CM

## 2023-09-15 PROBLEM — I50.31 ACUTE HEART FAILURE WITH NORMAL EJECTION FRACTION (HCC): Status: ACTIVE | Noted: 2023-09-15

## 2023-09-15 LAB
ALBUMIN SERPL-MCNC: 3 G/DL (ref 3.5–5)
ALBUMIN/GLOB SERPL: 0.8 (ref 1.1–2.2)
ALP SERPL-CCNC: 112 U/L (ref 45–117)
ALT SERPL-CCNC: 17 U/L (ref 12–78)
ANION GAP SERPL CALC-SCNC: 6 MMOL/L (ref 5–15)
AST SERPL-CCNC: 42 U/L (ref 15–37)
BASOPHILS # BLD: 0.1 K/UL (ref 0–0.1)
BASOPHILS NFR BLD: 1 % (ref 0–1)
BILIRUB SERPL-MCNC: 0.7 MG/DL (ref 0.2–1)
BUN SERPL-MCNC: 18 MG/DL (ref 6–20)
BUN/CREAT SERPL: 8 (ref 12–20)
CALCIUM SERPL-MCNC: 8.4 MG/DL (ref 8.5–10.1)
CHLORIDE SERPL-SCNC: 112 MMOL/L (ref 97–108)
CO2 SERPL-SCNC: 25 MMOL/L (ref 21–32)
COMMENT:: NORMAL
CREAT SERPL-MCNC: 2.28 MG/DL (ref 0.7–1.3)
DIFFERENTIAL METHOD BLD: ABNORMAL
EKG ATRIAL RATE: 70 BPM
EKG DIAGNOSIS: NORMAL
EKG P AXIS: 61 DEGREES
EKG P-R INTERVAL: 190 MS
EKG Q-T INTERVAL: 432 MS
EKG QRS DURATION: 90 MS
EKG QTC CALCULATION (BAZETT): 466 MS
EKG R AXIS: 39 DEGREES
EKG T AXIS: 171 DEGREES
EKG VENTRICULAR RATE: 70 BPM
EOSINOPHIL # BLD: 0.3 K/UL (ref 0–0.4)
EOSINOPHIL NFR BLD: 4 % (ref 0–7)
ERYTHROCYTE [DISTWIDTH] IN BLOOD BY AUTOMATED COUNT: 14.9 % (ref 11.5–14.5)
EST. AVERAGE GLUCOSE BLD GHB EST-MCNC: 117 MG/DL
GLOBULIN SER CALC-MCNC: 3.8 G/DL (ref 2–4)
GLUCOSE BLD STRIP.AUTO-MCNC: 127 MG/DL (ref 65–117)
GLUCOSE BLD STRIP.AUTO-MCNC: 80 MG/DL (ref 65–117)
GLUCOSE BLD STRIP.AUTO-MCNC: 91 MG/DL (ref 65–117)
GLUCOSE SERPL-MCNC: 98 MG/DL (ref 65–100)
HBA1C MFR BLD: 5.7 % (ref 4–5.6)
HCT VFR BLD AUTO: 36.2 % (ref 36.6–50.3)
HGB BLD-MCNC: 11.2 G/DL (ref 12.1–17)
IMM GRANULOCYTES # BLD AUTO: 0 K/UL (ref 0–0.04)
IMM GRANULOCYTES NFR BLD AUTO: 0 % (ref 0–0.5)
LACTATE SERPL-SCNC: 1 MMOL/L (ref 0.4–2)
LYMPHOCYTES # BLD: 1.5 K/UL (ref 0.8–3.5)
LYMPHOCYTES NFR BLD: 17 % (ref 12–49)
MCH RBC QN AUTO: 29.3 PG (ref 26–34)
MCHC RBC AUTO-ENTMCNC: 30.9 G/DL (ref 30–36.5)
MCV RBC AUTO: 94.8 FL (ref 80–99)
MONOCYTES # BLD: 0.6 K/UL (ref 0–1)
MONOCYTES NFR BLD: 7 % (ref 5–13)
NEUTS SEG # BLD: 6.4 K/UL (ref 1.8–8)
NEUTS SEG NFR BLD: 71 % (ref 32–75)
NRBC # BLD: 0 K/UL (ref 0–0.01)
NRBC BLD-RTO: 0 PER 100 WBC
NT PRO BNP: ABNORMAL PG/ML
PLATELET # BLD AUTO: 275 K/UL (ref 150–400)
PMV BLD AUTO: 11.5 FL (ref 8.9–12.9)
POTASSIUM SERPL-SCNC: 4.8 MMOL/L (ref 3.5–5.1)
PROT SERPL-MCNC: 6.8 G/DL (ref 6.4–8.2)
RBC # BLD AUTO: 3.82 M/UL (ref 4.1–5.7)
SARS-COV-2 RDRP RESP QL NAA+PROBE: NOT DETECTED
SERVICE CMNT-IMP: ABNORMAL
SERVICE CMNT-IMP: NORMAL
SERVICE CMNT-IMP: NORMAL
SODIUM SERPL-SCNC: 143 MMOL/L (ref 136–145)
SOURCE: NORMAL
SPECIMEN HOLD: NORMAL
TROPONIN I SERPL HS-MCNC: 273 NG/L (ref 0–76)
TROPONIN I SERPL HS-MCNC: 275 NG/L (ref 0–76)
TROPONIN I SERPL HS-MCNC: 279 NG/L (ref 0–76)
TROPONIN I SERPL HS-MCNC: 306 NG/L (ref 0–76)
TSH SERPL DL<=0.05 MIU/L-ACNC: 2.83 UIU/ML (ref 0.36–3.74)
WBC # BLD AUTO: 8.9 K/UL (ref 4.1–11.1)

## 2023-09-15 PROCEDURE — 2500000003 HC RX 250 WO HCPCS: Performed by: HOSPITALIST

## 2023-09-15 PROCEDURE — 93005 ELECTROCARDIOGRAM TRACING: CPT | Performed by: EMERGENCY MEDICINE

## 2023-09-15 PROCEDURE — 96375 TX/PRO/DX INJ NEW DRUG ADDON: CPT

## 2023-09-15 PROCEDURE — 83605 ASSAY OF LACTIC ACID: CPT

## 2023-09-15 PROCEDURE — 2500000003 HC RX 250 WO HCPCS: Performed by: EMERGENCY MEDICINE

## 2023-09-15 PROCEDURE — 94640 AIRWAY INHALATION TREATMENT: CPT

## 2023-09-15 PROCEDURE — 99223 1ST HOSP IP/OBS HIGH 75: CPT | Performed by: NURSE PRACTITIONER

## 2023-09-15 PROCEDURE — 2060000000 HC ICU INTERMEDIATE R&B

## 2023-09-15 PROCEDURE — 6360000002 HC RX W HCPCS: Performed by: HOSPITALIST

## 2023-09-15 PROCEDURE — 2580000003 HC RX 258: Performed by: HOSPITALIST

## 2023-09-15 PROCEDURE — 96374 THER/PROPH/DIAG INJ IV PUSH: CPT

## 2023-09-15 PROCEDURE — 82962 GLUCOSE BLOOD TEST: CPT

## 2023-09-15 PROCEDURE — 6370000000 HC RX 637 (ALT 250 FOR IP): Performed by: EMERGENCY MEDICINE

## 2023-09-15 PROCEDURE — 99285 EMERGENCY DEPT VISIT HI MDM: CPT

## 2023-09-15 PROCEDURE — 85025 COMPLETE CBC W/AUTO DIFF WBC: CPT

## 2023-09-15 PROCEDURE — 6360000002 HC RX W HCPCS: Performed by: EMERGENCY MEDICINE

## 2023-09-15 PROCEDURE — 71045 X-RAY EXAM CHEST 1 VIEW: CPT

## 2023-09-15 PROCEDURE — 80053 COMPREHEN METABOLIC PANEL: CPT

## 2023-09-15 PROCEDURE — 84443 ASSAY THYROID STIM HORMONE: CPT

## 2023-09-15 PROCEDURE — 36415 COLL VENOUS BLD VENIPUNCTURE: CPT

## 2023-09-15 PROCEDURE — 83880 ASSAY OF NATRIURETIC PEPTIDE: CPT

## 2023-09-15 PROCEDURE — 6370000000 HC RX 637 (ALT 250 FOR IP): Performed by: HOSPITALIST

## 2023-09-15 PROCEDURE — 87635 SARS-COV-2 COVID-19 AMP PRB: CPT

## 2023-09-15 PROCEDURE — 83036 HEMOGLOBIN GLYCOSYLATED A1C: CPT

## 2023-09-15 PROCEDURE — 84484 ASSAY OF TROPONIN QUANT: CPT

## 2023-09-15 RX ORDER — NITROGLYCERIN 20 MG/100ML
5-200 INJECTION INTRAVENOUS CONTINUOUS
Status: DISCONTINUED | OUTPATIENT
Start: 2023-09-15 | End: 2023-09-19 | Stop reason: HOSPADM

## 2023-09-15 RX ORDER — AMLODIPINE BESYLATE 5 MG/1
5 TABLET ORAL DAILY
Status: DISCONTINUED | OUTPATIENT
Start: 2023-09-15 | End: 2023-09-16

## 2023-09-15 RX ORDER — CARVEDILOL 12.5 MG/1
25 TABLET ORAL 2 TIMES DAILY WITH MEALS
Status: DISCONTINUED | OUTPATIENT
Start: 2023-09-15 | End: 2023-09-17

## 2023-09-15 RX ORDER — IPRATROPIUM BROMIDE AND ALBUTEROL SULFATE 2.5; .5 MG/3ML; MG/3ML
1 SOLUTION RESPIRATORY (INHALATION) EVERY 4 HOURS PRN
Status: DISCONTINUED | OUTPATIENT
Start: 2023-09-15 | End: 2023-09-19 | Stop reason: HOSPADM

## 2023-09-15 RX ORDER — ENOXAPARIN SODIUM 100 MG/ML
40 INJECTION SUBCUTANEOUS DAILY
Status: DISCONTINUED | OUTPATIENT
Start: 2023-09-16 | End: 2023-09-18

## 2023-09-15 RX ORDER — ONDANSETRON 2 MG/ML
4 INJECTION INTRAMUSCULAR; INTRAVENOUS EVERY 6 HOURS PRN
Status: DISCONTINUED | OUTPATIENT
Start: 2023-09-15 | End: 2023-09-19 | Stop reason: HOSPADM

## 2023-09-15 RX ORDER — POTASSIUM CHLORIDE 750 MG/1
40 TABLET, FILM COATED, EXTENDED RELEASE ORAL PRN
Status: DISCONTINUED | OUTPATIENT
Start: 2023-09-15 | End: 2023-09-19 | Stop reason: HOSPADM

## 2023-09-15 RX ORDER — INSULIN LISPRO 100 [IU]/ML
0-4 INJECTION, SOLUTION INTRAVENOUS; SUBCUTANEOUS
Status: DISCONTINUED | OUTPATIENT
Start: 2023-09-15 | End: 2023-09-19 | Stop reason: HOSPADM

## 2023-09-15 RX ORDER — ASPIRIN 81 MG/1
162 TABLET ORAL DAILY
Status: DISCONTINUED | OUTPATIENT
Start: 2023-09-15 | End: 2023-09-19 | Stop reason: HOSPADM

## 2023-09-15 RX ORDER — BUMETANIDE 0.25 MG/ML
1 INJECTION INTRAMUSCULAR; INTRAVENOUS ONCE
Status: COMPLETED | OUTPATIENT
Start: 2023-09-15 | End: 2023-09-15

## 2023-09-15 RX ORDER — ATORVASTATIN CALCIUM 80 MG/1
80 TABLET, FILM COATED ORAL DAILY
COMMUNITY

## 2023-09-15 RX ORDER — ONDANSETRON 4 MG/1
4 TABLET, ORALLY DISINTEGRATING ORAL EVERY 8 HOURS PRN
Status: DISCONTINUED | OUTPATIENT
Start: 2023-09-15 | End: 2023-09-19 | Stop reason: HOSPADM

## 2023-09-15 RX ORDER — POTASSIUM CHLORIDE 7.45 MG/ML
10 INJECTION INTRAVENOUS PRN
Status: DISCONTINUED | OUTPATIENT
Start: 2023-09-15 | End: 2023-09-19 | Stop reason: HOSPADM

## 2023-09-15 RX ORDER — ACETAMINOPHEN 650 MG/1
650 SUPPOSITORY RECTAL EVERY 6 HOURS PRN
Status: DISCONTINUED | OUTPATIENT
Start: 2023-09-15 | End: 2023-09-19 | Stop reason: HOSPADM

## 2023-09-15 RX ORDER — ATORVASTATIN CALCIUM 40 MG/1
80 TABLET, FILM COATED ORAL DAILY
Status: DISCONTINUED | OUTPATIENT
Start: 2023-09-15 | End: 2023-09-19 | Stop reason: HOSPADM

## 2023-09-15 RX ORDER — PANTOPRAZOLE SODIUM 40 MG/1
40 TABLET, DELAYED RELEASE ORAL
Status: DISCONTINUED | OUTPATIENT
Start: 2023-09-16 | End: 2023-09-19 | Stop reason: HOSPADM

## 2023-09-15 RX ORDER — SPIRONOLACTONE 25 MG/1
25 TABLET ORAL DAILY
Status: ON HOLD | COMMUNITY
End: 2023-09-19 | Stop reason: HOSPADM

## 2023-09-15 RX ORDER — NITROGLYCERIN 0.4 MG/1
0.4 TABLET SUBLINGUAL EVERY 5 MIN PRN
Status: DISCONTINUED | OUTPATIENT
Start: 2023-09-15 | End: 2023-09-19 | Stop reason: HOSPADM

## 2023-09-15 RX ORDER — ACETAMINOPHEN 325 MG/1
650 TABLET ORAL EVERY 6 HOURS PRN
Status: DISCONTINUED | OUTPATIENT
Start: 2023-09-15 | End: 2023-09-19 | Stop reason: HOSPADM

## 2023-09-15 RX ORDER — SODIUM CHLORIDE 0.9 % (FLUSH) 0.9 %
5-40 SYRINGE (ML) INJECTION EVERY 12 HOURS SCHEDULED
Status: DISCONTINUED | OUTPATIENT
Start: 2023-09-15 | End: 2023-09-19 | Stop reason: HOSPADM

## 2023-09-15 RX ORDER — FERROUS SULFATE 325(65) MG
325 TABLET ORAL
Status: DISCONTINUED | OUTPATIENT
Start: 2023-09-15 | End: 2023-09-19 | Stop reason: HOSPADM

## 2023-09-15 RX ORDER — BUMETANIDE 0.25 MG/ML
1 INJECTION INTRAMUSCULAR; INTRAVENOUS 2 TIMES DAILY
Status: DISCONTINUED | OUTPATIENT
Start: 2023-09-15 | End: 2023-09-18

## 2023-09-15 RX ORDER — MAGNESIUM SULFATE IN WATER 40 MG/ML
2000 INJECTION, SOLUTION INTRAVENOUS PRN
Status: DISCONTINUED | OUTPATIENT
Start: 2023-09-15 | End: 2023-09-19 | Stop reason: HOSPADM

## 2023-09-15 RX ORDER — INSULIN LISPRO 100 [IU]/ML
0-4 INJECTION, SOLUTION INTRAVENOUS; SUBCUTANEOUS NIGHTLY
Status: DISCONTINUED | OUTPATIENT
Start: 2023-09-15 | End: 2023-09-19 | Stop reason: HOSPADM

## 2023-09-15 RX ORDER — SODIUM CHLORIDE 9 MG/ML
INJECTION, SOLUTION INTRAVENOUS PRN
Status: DISCONTINUED | OUTPATIENT
Start: 2023-09-15 | End: 2023-09-19 | Stop reason: HOSPADM

## 2023-09-15 RX ORDER — SODIUM CHLORIDE 0.9 % (FLUSH) 0.9 %
5-40 SYRINGE (ML) INJECTION PRN
Status: DISCONTINUED | OUTPATIENT
Start: 2023-09-15 | End: 2023-09-19 | Stop reason: HOSPADM

## 2023-09-15 RX ORDER — POLYETHYLENE GLYCOL 3350 17 G/17G
17 POWDER, FOR SOLUTION ORAL DAILY PRN
Status: DISCONTINUED | OUTPATIENT
Start: 2023-09-15 | End: 2023-09-19 | Stop reason: HOSPADM

## 2023-09-15 RX ORDER — DEXTROSE MONOHYDRATE 100 MG/ML
INJECTION, SOLUTION INTRAVENOUS CONTINUOUS PRN
Status: DISCONTINUED | OUTPATIENT
Start: 2023-09-15 | End: 2023-09-19 | Stop reason: HOSPADM

## 2023-09-15 RX ADMIN — FERROUS SULFATE TAB 325 MG (65 MG ELEMENTAL FE) 325 MG: 325 (65 FE) TAB at 17:34

## 2023-09-15 RX ADMIN — BUMETANIDE 1 MG: 0.25 INJECTION INTRAMUSCULAR; INTRAVENOUS at 06:42

## 2023-09-15 RX ADMIN — ARFORMOTEROL TARTRATE: 15 SOLUTION RESPIRATORY (INHALATION) at 19:51

## 2023-09-15 RX ADMIN — EMPAGLIFLOZIN 10 MG: 10 TABLET, FILM COATED ORAL at 12:22

## 2023-09-15 RX ADMIN — SODIUM CHLORIDE, PRESERVATIVE FREE 10 ML: 5 INJECTION INTRAVENOUS at 17:38

## 2023-09-15 RX ADMIN — AMLODIPINE BESYLATE 5 MG: 5 TABLET ORAL at 11:05

## 2023-09-15 RX ADMIN — ATORVASTATIN CALCIUM 80 MG: 40 TABLET, FILM COATED ORAL at 11:05

## 2023-09-15 RX ADMIN — CARVEDILOL 25 MG: 12.5 TABLET, FILM COATED ORAL at 18:46

## 2023-09-15 RX ADMIN — ARFORMOTEROL TARTRATE: 15 SOLUTION RESPIRATORY (INHALATION) at 11:54

## 2023-09-15 RX ADMIN — ACETAMINOPHEN 650 MG: 325 TABLET ORAL at 10:09

## 2023-09-15 RX ADMIN — NITROGLYCERIN 30 MCG/MIN: 20 INJECTION INTRAVENOUS at 22:33

## 2023-09-15 RX ADMIN — NITROGLYCERIN 5 MCG/MIN: 20 INJECTION INTRAVENOUS at 08:01

## 2023-09-15 RX ADMIN — ASPIRIN 162 MG: 81 TABLET, COATED ORAL at 11:05

## 2023-09-15 RX ADMIN — SODIUM CHLORIDE, PRESERVATIVE FREE 10 ML: 5 INJECTION INTRAVENOUS at 21:44

## 2023-09-15 RX ADMIN — FERROUS SULFATE TAB 325 MG (65 MG ELEMENTAL FE) 325 MG: 325 (65 FE) TAB at 11:05

## 2023-09-15 RX ADMIN — NITROGLYCERIN 1 INCH: 20 OINTMENT TOPICAL at 06:43

## 2023-09-15 RX ADMIN — CARVEDILOL 25 MG: 12.5 TABLET, FILM COATED ORAL at 09:40

## 2023-09-15 RX ADMIN — BUMETANIDE 1 MG: 0.25 INJECTION INTRAMUSCULAR; INTRAVENOUS at 17:34

## 2023-09-15 RX ADMIN — NITROGLYCERIN 50 MCG/MIN: 20 INJECTION INTRAVENOUS at 20:37

## 2023-09-15 ASSESSMENT — PAIN - FUNCTIONAL ASSESSMENT: PAIN_FUNCTIONAL_ASSESSMENT: NONE - DENIES PAIN

## 2023-09-15 NOTE — H&P
History and Physical    Date of Service:  9/15/2023  Primary Care Provider: BETTY Weber NP  Source of information: The patient, Chart review, and Spouse/family member  -I extensively reviewed notes from his recent hospitalization, notes from his recent outpatient cardiology follow-up, ED notes. Chief Complaint: Shortness of Breath      History of Presenting Illness:   Smitha Jorgensen is a 75 y.o. male with history significant for CAD status post CABG November 8392 complicated by recurrent left pleural effusion, HFpEF presented with worsening dyspnea. He has been having progressive dyspnea and fatigue. He was recently seen by his outpatient cardiology team where goals of care, palliative versus curative approach were discussed. Upon arrival, EMS found him hypoxic with SPO2 in the 70s. During my exam, patient was lying comfortably, on oxygen. He denied chest pain, fever or chills, or cough. He reports being compliant with his medications. He was recently hospitalized from 8/10 - 8/14/2023 for CHF exacerbation and on discharge he was taken off of frusemide, HCTZ, losartan and spironolactone due to worsening renal function. Other significant/relevant PMH include CVA: Left portal and remote right parietal infarcts; CKD 3; PAD; COPD and chronic tobacco abuse; diabetes    Upon evaluation in the ED he was found to be very hypertensive initial /86 and SBP persisted in the 200 range and he was put on  Nitro drip  Elevated troponin, 306 repeat 275. Of note, his troponin when he was admitted a month ago were in the 60-70 range. EKG ischemic  NT-proBNP 15,867  CXR showed small left pleural effusion and mild pulm edema   echocardiogram on 7/2023 with normal EF, mild to moderate MR    Rx in the ED: 1 mg IV Bumex, nitro drip. REVIEW OF SYSTEMS:  A comprehensive review of systems was negative except for that written in the History of Present Illness.      Past Medical History:

## 2023-09-15 NOTE — ED NOTES
Care assumed from Dr. Codi Strange, 70-year-old male with history of heart failure with preserved ejection fraction, chronic kidney disease, presenting with complaints of respiratory distress, noted to be extremely hypertensive. Minimally improved blood pressure status post diuretic and Nitropaste, requiring nitroglycerin drip. Elevated BNP, cardiac enzymes, chest x-ray reflecting pulmonary edema and pleural effusion previously seen. Chart review indicates preserved ejection fraction on last echo, possible MR. Symptoms improved with oxygen by nasal cannula, and blood pressure downtrending status post nitroglycerin drip. Plan to consult the hospitalist for admission. Perfect Serve Consult for Admission  8:29 AM    ED Room Number: ER07/07  Patient Name and age:  Anne Woods. 75 y.o.  male  Working Diagnosis:   1. Acute respiratory failure with hypoxia ([address])    2. Hypertensive emergency    3. Acute on chronic congestive heart failure, unspecified heart failure type ([address])        COVID-19 Suspicion: No  Sepsis present:  No  Reassessment needed: Yes  Code Status:  Full Code  Readmission: Yes  Isolation Requirements: no  Recommended Level of Care: step down  Department: [facility] [place] Adult ED - [phone]  Consulting Provider: d/w Dr. Hiram King      Total critical care time spent exclusive of procedures:  90 minutes.         Jake Ly MD  09/15/23 1440

## 2023-09-15 NOTE — ED TRIAGE NOTES
Patient arrives from home with wife for shortness of breath that began yesterday and is worse with any exertion. Patient 78% on room air on arrival. Denies any chest pain.

## 2023-09-15 NOTE — ED NOTES
Bedside and Verbal shift change report given to [facility] (oncoming nurse) by Mery Ham (offgoing nurse). Report included the following information Nurse Handoff Report, Intake/Output, MAR, and Recent Results.         Sami De La Garza RN  09/15/23 0800

## 2023-09-15 NOTE — NURSE NAVIGATOR
HEART FAILURE NURSE NAVIGATOR NOTE  [address] [facility]    Patient chart was reviewed by Heart Failure (HF) Nurse Navigators for compliance of prescribed treatment with guidelines directed medical therapy (GDMT) and HF database completed. Please, review beneath recommendations for symptomatic patients with HF with Preserved Ejection Fraction ? 50% (HFpEF) for your consideration when taking care of this patient. Current HF Medical Therapy:      Name Marge Mensah.  1955   LVEF 55/60%   NYHA Functional Class IV   ARNi/ACEi/ARB    Aldosterone Antagonist    SGLT2 inhibitor Jardiance 10 mg daily   Consulting Cardiologist Dr. Dinh Garcia ([facility])     Recommendations for HF Management:    For patients with HFpEF ? 50%, consider adding the following GDMT, if appropriate:  SGLT2 inhibitor [Class 2a]  ARNi or ARB [Class 2b]  Aldosterone antagonist [Class 2b]  Adjust antihypertensive therapy [Class 1]  Adjust diuretic dose at discharge if hospitalized for volume overload [Class 1]  For patient with hyperkalemia while on RAASi > 5.5, consider adding potassium binders (patiromer, sodium zirconium cycosilicate) [Class 2a]    Patients with suspected cardiac amyloidosis (older > 61years old with LVH > 1.2cm and/or any other signs of amyloidosis) should be offered screening labs and imaging [Class 1]: (a) serum gammopathy profile and UPEP with immunofixation, and (b) PYP test. If PYP test is positive patient should have genetic testing done for inherited ATTR amyloidosis. If any findings are positive or you need genetic testing ordered, please, consider in-patient consultation or referral to [address] [place] [address]. Note that the following medications may be potentially harmful in heart failure [Class 3].   Calcium channel blockers (doxazosin, diltiazem, verapamil, nifedipine)  Antiarrhythmics (flecanide, disopyrimide, sotalol, dronedarone)  Diabetes medications

## 2023-09-15 NOTE — ED PROVIDER NOTES
[facility] EMERGENCY [address] ENCOUNTER      Pt Name: Bhavesh Salazar. MRN: [id number]  Birthdate 1955  Date of evaluation: 9/15/2023  Provider: Aaron Whitley MD    CHIEF COMPLAINT       Chief Complaint   Patient presents with    Shortness of Breath         HISTORY OF PRESENT ILLNESS   (Location/Symptom, Timing/Onset, Context/Setting, Quality, Duration, Modifying Factors, Severity)  Note limiting factors. 70-year-old with a history of hypertension, hyperlipidemia, coronary disease status post MI, CHF with preserved EF, anemia, chronic kidney disease, recurrent left pleural effusion, stroke. He presents with complaints of a 1 day history of increasing dyspnea. His O2 sats were in the 70s on room air upon arrival.  He denies chest pain. He has had a dry cough for about a month. He feels weak. He denies fever, vomiting, diarrhea. He was admitted last month for worsening renal function. He was admitted 2 months ago with dyspnea and was found to have an acute exacerbation of heart failure (with preserved ejection fraction). Review of External Medical Records:     Nursing Notes were reviewed. REVIEW OF SYSTEMS    (2-9 systems for level 4, 10 or more for level 5)     Review of Systems    Except as noted above the remainder of the review of systems was reviewed and negative.        PAST MEDICAL HISTORY     Past Medical History:   Diagnosis Date    Anemia     CAD (coronary artery disease)     10/31/22: NSTEMI sees [facility] cardiology, may be getting CABG    CKD (chronic kidney disease) stage 3, GFR 30-59 ml/min ([facility])     COPD (chronic obstructive pulmonary disease) ([address])     Fractured rib 03/2021    from a fall per pt on 5/11/2021    High cholesterol     Hypertension     per pt on 5/11/2021    MI (myocardial infarction) [facility])     Recurrent pleural effusion on left     Stroke [facility]) 2022         SURGICAL HISTORY       Past Surgical History:   Procedure Laterality Date    COLONOSCOPY N/A

## 2023-09-15 NOTE — ED NOTES
AOX4. Respiration even and unlabored. Pt on 4L NC. Reported improving on his breathing. Sinus rhythm in the monitor. Denied chest pain. Nitro drip infusing at 60 mcg/min /59. Last BM yesterday. Pt using urinal to void. Skin seem intact. Able to reposition self. Pt reported able to ambulate with a walker.       Luis Rowland RN  09/15/23 3494

## 2023-09-15 NOTE — DISCHARGE INSTRUCTIONS
Download the Heart Failure [place] Yanely: Search in your [employer] (Android) or [employer] Yanely Store (Yooliphone): Search for- HF [place] Yanely.    [email or web address]    HF [place] is a brand-new phone yanely that helps you track daily symptoms, vitals, mood, energy level and more. You can even add your heart failure care team members to view your data and monitor your condition at home.     HF [place] lets you:  Track symptoms, medications and more  Share health information with your health care team  Connect with others living with heart failure

## 2023-09-16 PROBLEM — Z71.89 DNR (DO NOT RESUSCITATE) DISCUSSION: Status: ACTIVE | Noted: 2023-09-16

## 2023-09-16 PROBLEM — Z71.89 ADVANCED CARE PLANNING/COUNSELING DISCUSSION: Status: ACTIVE | Noted: 2023-09-16

## 2023-09-16 PROBLEM — Z51.5 PALLIATIVE CARE ENCOUNTER: Status: ACTIVE | Noted: 2023-09-16

## 2023-09-16 PROBLEM — J96.01 ACUTE RESPIRATORY FAILURE WITH HYPOXIA (HCC): Status: ACTIVE | Noted: 2023-09-16

## 2023-09-16 PROBLEM — Z71.89 GOALS OF CARE, COUNSELING/DISCUSSION: Status: ACTIVE | Noted: 2023-09-16

## 2023-09-16 LAB
ANION GAP SERPL CALC-SCNC: 6 MMOL/L (ref 5–15)
ANION GAP SERPL CALC-SCNC: 7 MMOL/L (ref 5–15)
BUN SERPL-MCNC: 22 MG/DL (ref 6–20)
BUN SERPL-MCNC: 23 MG/DL (ref 6–20)
BUN/CREAT SERPL: 10 (ref 12–20)
BUN/CREAT SERPL: 10 (ref 12–20)
CALCIUM SERPL-MCNC: 8.3 MG/DL (ref 8.5–10.1)
CALCIUM SERPL-MCNC: 8.3 MG/DL (ref 8.5–10.1)
CHLORIDE SERPL-SCNC: 109 MMOL/L (ref 97–108)
CHLORIDE SERPL-SCNC: 109 MMOL/L (ref 97–108)
CHOLEST SERPL-MCNC: 101 MG/DL
CO2 SERPL-SCNC: 26 MMOL/L (ref 21–32)
CO2 SERPL-SCNC: 27 MMOL/L (ref 21–32)
COMMENT:: NORMAL
CREAT SERPL-MCNC: 2.19 MG/DL (ref 0.7–1.3)
CREAT SERPL-MCNC: 2.27 MG/DL (ref 0.7–1.3)
GLUCOSE BLD STRIP.AUTO-MCNC: 101 MG/DL (ref 65–117)
GLUCOSE BLD STRIP.AUTO-MCNC: 116 MG/DL (ref 65–117)
GLUCOSE BLD STRIP.AUTO-MCNC: 118 MG/DL (ref 65–117)
GLUCOSE BLD STRIP.AUTO-MCNC: 92 MG/DL (ref 65–117)
GLUCOSE SERPL-MCNC: 102 MG/DL (ref 65–100)
GLUCOSE SERPL-MCNC: 102 MG/DL (ref 65–100)
HDLC SERPL-MCNC: 36 MG/DL
HDLC SERPL: 2.8 (ref 0–5)
LDLC SERPL CALC-MCNC: 48.2 MG/DL (ref 0–100)
MAGNESIUM SERPL-MCNC: 1.7 MG/DL (ref 1.6–2.4)
NT PRO BNP: ABNORMAL PG/ML
POTASSIUM SERPL-SCNC: 2.5 MMOL/L (ref 3.5–5.1)
POTASSIUM SERPL-SCNC: 3.3 MMOL/L (ref 3.5–5.1)
SERVICE CMNT-IMP: ABNORMAL
SERVICE CMNT-IMP: NORMAL
SODIUM SERPL-SCNC: 141 MMOL/L (ref 136–145)
SODIUM SERPL-SCNC: 143 MMOL/L (ref 136–145)
SPECIMEN HOLD: NORMAL
TRIGL SERPL-MCNC: 84 MG/DL
VLDLC SERPL CALC-MCNC: 16.8 MG/DL

## 2023-09-16 PROCEDURE — 6370000000 HC RX 637 (ALT 250 FOR IP): Performed by: HOSPITALIST

## 2023-09-16 PROCEDURE — 2060000000 HC ICU INTERMEDIATE R&B

## 2023-09-16 PROCEDURE — 94640 AIRWAY INHALATION TREATMENT: CPT

## 2023-09-16 PROCEDURE — 83880 ASSAY OF NATRIURETIC PEPTIDE: CPT

## 2023-09-16 PROCEDURE — 82962 GLUCOSE BLOOD TEST: CPT

## 2023-09-16 PROCEDURE — 80048 BASIC METABOLIC PNL TOTAL CA: CPT

## 2023-09-16 PROCEDURE — 36415 COLL VENOUS BLD VENIPUNCTURE: CPT

## 2023-09-16 PROCEDURE — 6360000002 HC RX W HCPCS: Performed by: HOSPITALIST

## 2023-09-16 PROCEDURE — 2700000000 HC OXYGEN THERAPY PER DAY

## 2023-09-16 PROCEDURE — 6370000000 HC RX 637 (ALT 250 FOR IP): Performed by: INTERNAL MEDICINE

## 2023-09-16 PROCEDURE — 80061 LIPID PANEL: CPT

## 2023-09-16 PROCEDURE — 83735 ASSAY OF MAGNESIUM: CPT

## 2023-09-16 PROCEDURE — 6360000002 HC RX W HCPCS: Performed by: EMERGENCY MEDICINE

## 2023-09-16 PROCEDURE — 2500000003 HC RX 250 WO HCPCS: Performed by: HOSPITALIST

## 2023-09-16 PROCEDURE — 6370000000 HC RX 637 (ALT 250 FOR IP)

## 2023-09-16 PROCEDURE — 2580000003 HC RX 258: Performed by: HOSPITALIST

## 2023-09-16 RX ORDER — AMLODIPINE BESYLATE 5 MG/1
10 TABLET ORAL DAILY
Status: DISCONTINUED | OUTPATIENT
Start: 2023-09-17 | End: 2023-09-19 | Stop reason: HOSPADM

## 2023-09-16 RX ORDER — AMLODIPINE BESYLATE 5 MG/1
5 TABLET ORAL ONCE
Status: COMPLETED | OUTPATIENT
Start: 2023-09-16 | End: 2023-09-16

## 2023-09-16 RX ADMIN — AMLODIPINE BESYLATE 5 MG: 5 TABLET ORAL at 11:24

## 2023-09-16 RX ADMIN — ATORVASTATIN CALCIUM 80 MG: 40 TABLET, FILM COATED ORAL at 08:50

## 2023-09-16 RX ADMIN — AMLODIPINE BESYLATE 5 MG: 5 TABLET ORAL at 08:50

## 2023-09-16 RX ADMIN — SODIUM CHLORIDE, PRESERVATIVE FREE 10 ML: 5 INJECTION INTRAVENOUS at 20:29

## 2023-09-16 RX ADMIN — EMPAGLIFLOZIN 10 MG: 10 TABLET, FILM COATED ORAL at 08:50

## 2023-09-16 RX ADMIN — SODIUM CHLORIDE, PRESERVATIVE FREE 10 ML: 5 INJECTION INTRAVENOUS at 08:51

## 2023-09-16 RX ADMIN — ASPIRIN 162 MG: 81 TABLET, COATED ORAL at 08:50

## 2023-09-16 RX ADMIN — ARFORMOTEROL TARTRATE: 15 SOLUTION RESPIRATORY (INHALATION) at 07:31

## 2023-09-16 RX ADMIN — FERROUS SULFATE TAB 325 MG (65 MG ELEMENTAL FE) 325 MG: 325 (65 FE) TAB at 06:37

## 2023-09-16 RX ADMIN — CARVEDILOL 25 MG: 12.5 TABLET, FILM COATED ORAL at 08:53

## 2023-09-16 RX ADMIN — NITROGLYCERIN 15 MCG/MIN: 20 INJECTION INTRAVENOUS at 17:55

## 2023-09-16 RX ADMIN — CARVEDILOL 25 MG: 12.5 TABLET, FILM COATED ORAL at 19:16

## 2023-09-16 RX ADMIN — NITROGLYCERIN 50 MCG/MIN: 20 INJECTION INTRAVENOUS at 01:35

## 2023-09-16 RX ADMIN — ENOXAPARIN SODIUM 40 MG: 100 INJECTION SUBCUTANEOUS at 08:50

## 2023-09-16 RX ADMIN — PANTOPRAZOLE SODIUM 40 MG: 40 TABLET, DELAYED RELEASE ORAL at 06:37

## 2023-09-16 RX ADMIN — BUMETANIDE 1 MG: 0.25 INJECTION INTRAMUSCULAR; INTRAVENOUS at 20:28

## 2023-09-16 RX ADMIN — FERROUS SULFATE TAB 325 MG (65 MG ELEMENTAL FE) 325 MG: 325 (65 FE) TAB at 17:55

## 2023-09-16 RX ADMIN — ACETAMINOPHEN 650 MG: 325 TABLET ORAL at 08:49

## 2023-09-16 RX ADMIN — ARFORMOTEROL TARTRATE: 15 SOLUTION RESPIRATORY (INHALATION) at 20:56

## 2023-09-16 RX ADMIN — POTASSIUM BICARBONATE 40 MEQ: 782 TABLET, EFFERVESCENT ORAL at 06:37

## 2023-09-16 RX ADMIN — BUMETANIDE 1 MG: 0.25 INJECTION INTRAMUSCULAR; INTRAVENOUS at 08:51

## 2023-09-16 ASSESSMENT — PAIN SCALES - GENERAL
PAINLEVEL_OUTOF10: 0
PAINLEVEL_OUTOF10: 7
PAINLEVEL_OUTOF10: 0

## 2023-09-16 ASSESSMENT — PAIN DESCRIPTION - LOCATION: LOCATION: HEAD

## 2023-09-16 NOTE — CONSULTS
CARDIOLOGY CONSULT                 Assessment:     Assessment:       Principal Problem:    Acute heart failure with normal ejection fraction (HCC)  Resolved Problems:    * No resolved hospital problems. *       Plan:     CAD with  NSTEMI in 11/2022 with CABGx1.  2. Hx Multifactorial dyspnea including with large L pleural effusion with lung  consolidation. S/p thoracentesis. 3. Hypertension. 4. Hypercholesterolemia. 5. 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  6. History of stroke. Left  and remote right parietal infarcts;  7. Plavix allergy stated. 8. HFpEF:  worsened by HTN urgency  probnp 7630           Cxr  Small left pleural effusion with underlying atelectasis and mild pulmonary  edema. Diuresis  wti bumex 1 bid iv   net 1975 out      Continue jardiance. 9. HTN  bp 210 s. Improved bp 170s    increase amlodipine to 10 qd   10  RODNEY   cr 2.19         Subjective:    Date of  Admission: 9/15/2023  6:20 AM     Admission type:Emergency    Fabiola Scott is a 75 y.o. male admitted for Acute respiratory failure with hypoxia ([address]) [J96.01]  Hypertensive emergency [I16.1]  Acute heart failure with normal ejection fraction ([address]) [I50.31]  Acute on chronic congestive heart failure, unspecified heart failure type [facility]) [I50.9] male with history significant for CAD status post CABG November 4414 complicated by recurrent left pleural effusion, HFpEF presented with worsening dyspnea. He has been having progressive dyspnea and fatigue. He was recently seen by his outpatient cardiology team where goals of care, palliative versus curative approach were discussed. Upon arrival, EMS found him hypoxic with SPO2 in the 70s    recently hospitalized from 8/10 - 8/14/2023 for CHF exacerbation and on discharge he was taken off of frusemide, HCTZ, losartan and spironolactone due to worsening renal function.     Patient Active Problem List    Diagnosis Date Noted    Acute heart failure with normal ejection fraction ([address])
NEPHROLOGY CONSULT NOTE     Patient: Robert Arzate MRN: [id number]  PCP: BETTY Soto - NP   :     1955  Age:   75 y.o. Sex:  male      Referring physician: Mely Reilly MD  Reason for consultation: 75 y.o. male with Acute respiratory failure with hypoxia ([address]) [J96.01]  Hypertensive emergency [I16.1]  Acute heart failure with normal ejection fraction (HCC) [I50.31]  Acute on chronic congestive heart failure, unspecified heart failure type ([address]) [P19.9] complicated by RODNEY   Admission Date: 9/15/2023  6:20 AM  LOS: 1 day      ASSESSMENT and PLAN :   CKD 3b:  - Cr at baseline  - cont IV diuretics  - daily labs and I/Os    Hypokalemia:  - replete PRN    Hypertensive emergency:  - on IV nitro  - wean off as able, resume home BP meds    Fluid overload:  - cont IV diuretics     CAD, Hx of CABG  HFpEF  -Stable     HTN  -BP control     Hx of CVA  COPD  Hx of pleural effusions     Active Problems / Assessment AAActive  :   Principal Problem:    Acute heart failure with normal ejection fraction (HCC)  Active Problems:    Palliative care encounter    Acute respiratory failure with hypoxia ([address])    DNR (do not resuscitate) discussion    Advanced care planning/counseling discussion    Goals of care, counseling/discussion  Resolved Problems:    * No resolved hospital problems. *       Subjective:   HPI: Robert Arzate is a 75 y.o.  male who has been admitted to the hospital for worsening SOB, nausea and diarrhea. Found to have SBP in the 200s. Cr stable at his baseline. CXR showing fluid overload and effusion. He was started on IV diuretics. Cr has remained stable. K low this AM.  Ongoing SOB. On nitro drip. BP controlled. No n/v/d since arrival.  No fevers or chills.     Past Medical Hx:   Past Medical History:   Diagnosis Date    Anemia     CAD (coronary artery disease)     10/31/22: NSTEMI sees [facility] cardiology, may be getting CABG    CKD (chronic kidney disease) stage 3, GFR
Nutrition Education    Consult appreciated. Spoke with pt while still in ED. He reports good appetite, tries to avoid adding salt to his foods but when he does it is \"just a small amount\". Encouraged pt to avoid adding salt completely. He also admits to eating fast food about 3x/week. Recommended he work on cutting this down to 1x/week. Reviewed other high Na+ foods that are common, he does like to eat ham and cheese sandwiches so we discussed lower Na+ deli meat and alternatives. Wife not present for education but pt states he will relay this to her. Also noted that pt recently received CHF diet education in July of this year. Educated on CHF diet  Learners: Patient  Readiness: Acceptance  Method: Explanation  Response: Needs Reinforcement  Contact name and number provided.     Sara Burnette RD  Contact via [employer]
09/15/2023 06:41 AM     Lab Results   Component Value Date/Time    INR 1.3 01/13/2023 03:55 AM      No results found for: \"IRON\", \"TIBC\", \"IBCT\", \"FERR\"   No results found for: \"PH\", \"PCO2\", \"PO2\"  No components found for: \"GLPOC\"   Lab Results   Component Value Date/Time    TROPONINI <0.05 05/14/2021 06:02 AM              Only check if applicable and billing time based rather than MDM    []   The total encounter time on this service date was  minutes which was spent performing a face-to-face encounter and personally completing the provider-level activities documented in the note. This includes time spent prior to the visit and after the visit in direct care of the patient. This time does not include time spent in any separately reportable services.

## 2023-09-16 NOTE — ED NOTES
TRANSFER - OUT REPORT:    Verbal report given to [facility] ([facility]) on [address]  being transferred to [facility] for routine progression of patient care       Report consisted of patient's Situation, Background, Assessment and   Recommendations(SBAR). Information from the following report(s) Nurse Handoff Report, Index, ED Encounter Summary, ED SBAR, Adult Overview, Intake/Output, MAR, Recent Results, Med Rec Status, and Cardiac Rhythm SR  was reviewed with the receiving nurse. [place] Fall Assessment:    Presents to emergency department  because of falls (Syncope, seizure, or loss of consciousness): No  Age > 70: No  Altered Mental Status, Intoxication with alcohol or substance confusion (Disorientation, impaired judgment, poor safety awaremess, or inability to follow instructions): No  Impaired Mobility: Ambulates or transfers with assistive devices or assistance; Unable to ambulate or transer.: Yes  Nursing Judgement: Yes          Lines:   Peripheral IV 09/15/23 Right Antecubital (Active)        Opportunity for questions and clarification was provided.       Patient transported with:  Monitor and Registered Nurse          Kenney Gutierrez RN  09/15/23 5332

## 2023-09-16 NOTE — PLAN OF CARE
3868 Report received from [address] [place] [address] on pt being admitted from ER to [facility] [phone] pt arrived to unit. Assessment & vitals completed. Dual skin assessment performed by this RN and Feliz Stevens, RN. No impairments noted. Nitro gtt verified w/ ED RN.     0600 LORENA Braun notified potassium is 2.5. Orders received for Effer-k now (see MAR) & to repeat BMP at noon. 0730 Bedside shift change report given to [address] (oncoming nurse) by Wagner Gilbert (offgoing nurse). Report included the following information Nurse Handoff Report, Adult Overview, Intake/Output, MAR, Recent Results, and Cardiac Rhythm NSR .      Care Plan:  Problem: Discharge Planning  Goal: Discharge to home or other facility with appropriate resources  9/16/2023 0120 by Osei Rodriguez RN  Outcome: Progressing  9/16/2023 0119 by Osei Rodriguez RN  Outcome: Progressing  Flowsheets (Taken 9/15/2023 2307)  Discharge to home or other facility with appropriate resources: Identify barriers to discharge with patient and caregiver  Problem: Pain  Goal: Verbalizes/displays adequate comfort level or baseline comfort level  9/16/2023 0120 by Osei Rodriguez RN  Outcome: Progressing  9/16/2023 0119 by Osei Rodriguez RN  Outcome: Progressing  Flowsheets (Taken 9/15/2023 2307)  Verbalizes/displays adequate comfort level or baseline comfort level: Encourage patient to monitor pain and request assistance  Problem: Safety - Adult  Goal: Free from fall injury  9/16/2023 0120 by Osei Rodriguez RN  Outcome: Progressing  9/16/2023 0119 by Osei Rodriguez RN  Outcome: Progressing  Problem: ABCDS Injury Assessment  Goal: Absence of physical injury  9/16/2023 0120 by Osei Rodriguez RN  Outcome: Progressing  9/16/2023 0119 by Osei Rodriguez RN  Outcome: Progressing  Problem: Chronic Conditions and Co-morbidities  Goal: Patient's chronic conditions and co-morbidity symptoms are monitored and maintained or improved  Outcome: Progressing

## 2023-09-17 LAB
ANION GAP SERPL CALC-SCNC: 3 MMOL/L (ref 5–15)
ANION GAP SERPL CALC-SCNC: 6 MMOL/L (ref 5–15)
BUN SERPL-MCNC: 20 MG/DL (ref 6–20)
BUN SERPL-MCNC: 24 MG/DL (ref 6–20)
BUN/CREAT SERPL: 10 (ref 12–20)
BUN/CREAT SERPL: 10 (ref 12–20)
CALCIUM SERPL-MCNC: 7.6 MG/DL (ref 8.5–10.1)
CALCIUM SERPL-MCNC: 7.8 MG/DL (ref 8.5–10.1)
CHLORIDE SERPL-SCNC: 109 MMOL/L (ref 97–108)
CHLORIDE SERPL-SCNC: 111 MMOL/L (ref 97–108)
CO2 SERPL-SCNC: 26 MMOL/L (ref 21–32)
CO2 SERPL-SCNC: 30 MMOL/L (ref 21–32)
COMMENT:: NORMAL
CREAT SERPL-MCNC: 2.1 MG/DL (ref 0.7–1.3)
CREAT SERPL-MCNC: 2.45 MG/DL (ref 0.7–1.3)
GLUCOSE BLD STRIP.AUTO-MCNC: 109 MG/DL (ref 65–117)
GLUCOSE BLD STRIP.AUTO-MCNC: 178 MG/DL (ref 65–117)
GLUCOSE BLD STRIP.AUTO-MCNC: 178 MG/DL (ref 65–117)
GLUCOSE BLD STRIP.AUTO-MCNC: 96 MG/DL (ref 65–117)
GLUCOSE SERPL-MCNC: 104 MG/DL (ref 65–100)
GLUCOSE SERPL-MCNC: 138 MG/DL (ref 65–100)
MAGNESIUM SERPL-MCNC: 1.4 MG/DL (ref 1.6–2.4)
POTASSIUM SERPL-SCNC: 2.5 MMOL/L (ref 3.5–5.1)
POTASSIUM SERPL-SCNC: 3.2 MMOL/L (ref 3.5–5.1)
SERVICE CMNT-IMP: ABNORMAL
SERVICE CMNT-IMP: ABNORMAL
SERVICE CMNT-IMP: NORMAL
SERVICE CMNT-IMP: NORMAL
SODIUM SERPL-SCNC: 142 MMOL/L (ref 136–145)
SODIUM SERPL-SCNC: 143 MMOL/L (ref 136–145)
SPECIMEN HOLD: NORMAL

## 2023-09-17 PROCEDURE — 6370000000 HC RX 637 (ALT 250 FOR IP): Performed by: INTERNAL MEDICINE

## 2023-09-17 PROCEDURE — 6360000002 HC RX W HCPCS: Performed by: HOSPITALIST

## 2023-09-17 PROCEDURE — 94640 AIRWAY INHALATION TREATMENT: CPT

## 2023-09-17 PROCEDURE — 6370000000 HC RX 637 (ALT 250 FOR IP): Performed by: FAMILY MEDICINE

## 2023-09-17 PROCEDURE — 2500000003 HC RX 250 WO HCPCS: Performed by: HOSPITALIST

## 2023-09-17 PROCEDURE — 2060000000 HC ICU INTERMEDIATE R&B

## 2023-09-17 PROCEDURE — 2580000003 HC RX 258: Performed by: HOSPITALIST

## 2023-09-17 PROCEDURE — 82962 GLUCOSE BLOOD TEST: CPT

## 2023-09-17 PROCEDURE — 36415 COLL VENOUS BLD VENIPUNCTURE: CPT

## 2023-09-17 PROCEDURE — 6370000000 HC RX 637 (ALT 250 FOR IP)

## 2023-09-17 PROCEDURE — 83735 ASSAY OF MAGNESIUM: CPT

## 2023-09-17 PROCEDURE — 80048 BASIC METABOLIC PNL TOTAL CA: CPT

## 2023-09-17 PROCEDURE — 6370000000 HC RX 637 (ALT 250 FOR IP): Performed by: HOSPITALIST

## 2023-09-17 RX ORDER — POTASSIUM CHLORIDE 750 MG/1
10 TABLET, FILM COATED, EXTENDED RELEASE ORAL ONCE
Status: COMPLETED | OUTPATIENT
Start: 2023-09-17 | End: 2023-09-17

## 2023-09-17 RX ORDER — LABETALOL 100 MG/1
200 TABLET, FILM COATED ORAL EVERY 12 HOURS SCHEDULED
Status: DISCONTINUED | OUTPATIENT
Start: 2023-09-17 | End: 2023-09-19

## 2023-09-17 RX ADMIN — POTASSIUM CHLORIDE 10 MEQ: 750 TABLET, EXTENDED RELEASE ORAL at 08:45

## 2023-09-17 RX ADMIN — ARFORMOTEROL TARTRATE: 15 SOLUTION RESPIRATORY (INHALATION) at 21:56

## 2023-09-17 RX ADMIN — EMPAGLIFLOZIN 10 MG: 10 TABLET, FILM COATED ORAL at 08:38

## 2023-09-17 RX ADMIN — LABETALOL HYDROCHLORIDE 200 MG: 100 TABLET, FILM COATED ORAL at 10:35

## 2023-09-17 RX ADMIN — SODIUM CHLORIDE, PRESERVATIVE FREE 10 ML: 5 INJECTION INTRAVENOUS at 08:40

## 2023-09-17 RX ADMIN — ASPIRIN 162 MG: 81 TABLET, COATED ORAL at 08:38

## 2023-09-17 RX ADMIN — ENOXAPARIN SODIUM 40 MG: 100 INJECTION SUBCUTANEOUS at 08:38

## 2023-09-17 RX ADMIN — ARFORMOTEROL TARTRATE: 15 SOLUTION RESPIRATORY (INHALATION) at 07:48

## 2023-09-17 RX ADMIN — FERROUS SULFATE TAB 325 MG (65 MG ELEMENTAL FE) 325 MG: 325 (65 FE) TAB at 06:40

## 2023-09-17 RX ADMIN — CARVEDILOL 25 MG: 12.5 TABLET, FILM COATED ORAL at 08:38

## 2023-09-17 RX ADMIN — POTASSIUM BICARBONATE 40 MEQ: 782 TABLET, EFFERVESCENT ORAL at 20:09

## 2023-09-17 RX ADMIN — SODIUM CHLORIDE, PRESERVATIVE FREE 10 ML: 5 INJECTION INTRAVENOUS at 20:10

## 2023-09-17 RX ADMIN — ATORVASTATIN CALCIUM 80 MG: 40 TABLET, FILM COATED ORAL at 08:38

## 2023-09-17 RX ADMIN — PANTOPRAZOLE SODIUM 40 MG: 40 TABLET, DELAYED RELEASE ORAL at 06:40

## 2023-09-17 RX ADMIN — BUMETANIDE 1 MG: 0.25 INJECTION INTRAMUSCULAR; INTRAVENOUS at 20:09

## 2023-09-17 RX ADMIN — FERROUS SULFATE TAB 325 MG (65 MG ELEMENTAL FE) 325 MG: 325 (65 FE) TAB at 15:50

## 2023-09-17 RX ADMIN — POTASSIUM BICARBONATE 40 MEQ: 782 TABLET, EFFERVESCENT ORAL at 06:40

## 2023-09-17 RX ADMIN — AMLODIPINE BESYLATE 10 MG: 5 TABLET ORAL at 08:38

## 2023-09-17 RX ADMIN — LABETALOL HYDROCHLORIDE 200 MG: 100 TABLET, FILM COATED ORAL at 20:08

## 2023-09-17 ASSESSMENT — PAIN SCALES - GENERAL
PAINLEVEL_OUTOF10: 0

## 2023-09-17 NOTE — PLAN OF CARE
1930 Bedside shift change report given to Arizona Sensor (oncoming nurse) by Vickey Pizano (offgoing nurse). Report included the following information Nurse Handoff Report, Adult Overview, Intake/Output, MAR, Recent Results, and Cardiac Rhythm NSR .     0530 potassium is 2.5, orders received to give Effer-K (see MAR). 0730 Bedside shift change report given to Vickey Pizano (oncoming nurse) by [place] Sensor (offgoing nurse). Report included the following information Nurse Handoff Report, Adult Overview, Intake/Output, MAR, Recent Results, and Cardiac Rhythm NSR .      Care Plan:  Problem: Discharge Planning  Goal: Discharge to home or other facility with appropriate resources  9/17/2023 0849 by Alex Ndiaye RN  Outcome: Progressing  9/16/2023 2155 by Anai Lorenz RN  Outcome: Progressing  Flowsheets  Taken 9/16/2023 2031 by Alex Ndiaye RN  Discharge to home or other facility with appropriate resources: Identify barriers to discharge with patient and caregiver  Taken 9/16/2023 0800 by Anai Lorenz RN  Discharge to home or other facility with appropriate resources: Identify barriers to discharge with patient and caregiver  Problem: Pain  Goal: Verbalizes/displays adequate comfort level or baseline comfort level  9/17/2023 0849 by Alex Ndiaye RN  Outcome: Progressing  9/16/2023 2155 by Anai Lorenz RN  Outcome: Progressing  Flowsheets  Taken 9/16/2023 2031 by Alex Ndiaye RN  Verbalizes/displays adequate comfort level or baseline comfort level: Encourage patient to monitor pain and request assistance  Taken 9/16/2023 1508 by Anai Lorenz RN  Verbalizes/displays adequate comfort level or baseline comfort level: Encourage patient to monitor pain and request assistance  Taken 9/16/2023 0800 by Anai Lorenz RN  Verbalizes/displays adequate comfort level or baseline comfort level: Encourage patient to monitor pain and request assistance  Problem: Safety - Adult  Goal: Free from fall

## 2023-09-18 LAB
ANION GAP SERPL CALC-SCNC: 7 MMOL/L (ref 5–15)
BUN SERPL-MCNC: 24 MG/DL (ref 6–20)
BUN/CREAT SERPL: 10 (ref 12–20)
CALCIUM SERPL-MCNC: 7.9 MG/DL (ref 8.5–10.1)
CHLORIDE SERPL-SCNC: 109 MMOL/L (ref 97–108)
CO2 SERPL-SCNC: 27 MMOL/L (ref 21–32)
COMMENT:: NORMAL
CREAT SERPL-MCNC: 2.52 MG/DL (ref 0.7–1.3)
GLUCOSE BLD STRIP.AUTO-MCNC: 102 MG/DL (ref 65–117)
GLUCOSE BLD STRIP.AUTO-MCNC: 103 MG/DL (ref 65–117)
GLUCOSE BLD STRIP.AUTO-MCNC: 135 MG/DL (ref 65–117)
GLUCOSE BLD STRIP.AUTO-MCNC: 177 MG/DL (ref 65–117)
GLUCOSE SERPL-MCNC: 139 MG/DL (ref 65–100)
MAGNESIUM SERPL-MCNC: 1.5 MG/DL (ref 1.6–2.4)
POTASSIUM SERPL-SCNC: 2.9 MMOL/L (ref 3.5–5.1)
SERVICE CMNT-IMP: ABNORMAL
SERVICE CMNT-IMP: ABNORMAL
SERVICE CMNT-IMP: NORMAL
SERVICE CMNT-IMP: NORMAL
SODIUM SERPL-SCNC: 143 MMOL/L (ref 136–145)
SPECIMEN HOLD: NORMAL

## 2023-09-18 PROCEDURE — 6370000000 HC RX 637 (ALT 250 FOR IP): Performed by: INTERNAL MEDICINE

## 2023-09-18 PROCEDURE — 82962 GLUCOSE BLOOD TEST: CPT

## 2023-09-18 PROCEDURE — 83735 ASSAY OF MAGNESIUM: CPT

## 2023-09-18 PROCEDURE — 2060000000 HC ICU INTERMEDIATE R&B

## 2023-09-18 PROCEDURE — 94640 AIRWAY INHALATION TREATMENT: CPT

## 2023-09-18 PROCEDURE — 6360000002 HC RX W HCPCS: Performed by: HOSPITALIST

## 2023-09-18 PROCEDURE — 2580000003 HC RX 258: Performed by: HOSPITALIST

## 2023-09-18 PROCEDURE — 92610 EVALUATE SWALLOWING FUNCTION: CPT

## 2023-09-18 PROCEDURE — 6360000002 HC RX W HCPCS: Performed by: INTERNAL MEDICINE

## 2023-09-18 PROCEDURE — 80048 BASIC METABOLIC PNL TOTAL CA: CPT

## 2023-09-18 PROCEDURE — 36415 COLL VENOUS BLD VENIPUNCTURE: CPT

## 2023-09-18 PROCEDURE — 6370000000 HC RX 637 (ALT 250 FOR IP): Performed by: HOSPITALIST

## 2023-09-18 RX ORDER — BUMETANIDE 1 MG/1
1 TABLET ORAL 2 TIMES DAILY
Status: DISCONTINUED | OUTPATIENT
Start: 2023-09-18 | End: 2023-09-19 | Stop reason: HOSPADM

## 2023-09-18 RX ORDER — ENOXAPARIN SODIUM 100 MG/ML
30 INJECTION SUBCUTANEOUS DAILY
Status: DISCONTINUED | OUTPATIENT
Start: 2023-09-19 | End: 2023-09-19 | Stop reason: HOSPADM

## 2023-09-18 RX ORDER — MAGNESIUM SULFATE 1 G/100ML
1000 INJECTION INTRAVENOUS ONCE
Status: COMPLETED | OUTPATIENT
Start: 2023-09-18 | End: 2023-09-18

## 2023-09-18 RX ADMIN — LABETALOL HYDROCHLORIDE 200 MG: 100 TABLET, FILM COATED ORAL at 09:24

## 2023-09-18 RX ADMIN — POTASSIUM BICARBONATE 40 MEQ: 782 TABLET, EFFERVESCENT ORAL at 14:16

## 2023-09-18 RX ADMIN — ARFORMOTEROL TARTRATE: 15 SOLUTION RESPIRATORY (INHALATION) at 21:38

## 2023-09-18 RX ADMIN — SODIUM CHLORIDE, PRESERVATIVE FREE 10 ML: 5 INJECTION INTRAVENOUS at 23:08

## 2023-09-18 RX ADMIN — PANTOPRAZOLE SODIUM 40 MG: 40 TABLET, DELAYED RELEASE ORAL at 06:35

## 2023-09-18 RX ADMIN — MAGNESIUM SULFATE HEPTAHYDRATE 1000 MG: 1 INJECTION, SOLUTION INTRAVENOUS at 10:36

## 2023-09-18 RX ADMIN — EMPAGLIFLOZIN 10 MG: 10 TABLET, FILM COATED ORAL at 09:24

## 2023-09-18 RX ADMIN — POTASSIUM BICARBONATE 40 MEQ: 782 TABLET, EFFERVESCENT ORAL at 09:26

## 2023-09-18 RX ADMIN — ATORVASTATIN CALCIUM 80 MG: 40 TABLET, FILM COATED ORAL at 09:23

## 2023-09-18 RX ADMIN — POTASSIUM BICARBONATE 40 MEQ: 782 TABLET, EFFERVESCENT ORAL at 12:01

## 2023-09-18 RX ADMIN — BUMETANIDE 1 MG: 1 TABLET ORAL at 23:12

## 2023-09-18 RX ADMIN — FERROUS SULFATE TAB 325 MG (65 MG ELEMENTAL FE) 325 MG: 325 (65 FE) TAB at 06:35

## 2023-09-18 RX ADMIN — BUMETANIDE 1 MG: 1 TABLET ORAL at 09:24

## 2023-09-18 RX ADMIN — SODIUM CHLORIDE, PRESERVATIVE FREE 10 ML: 5 INJECTION INTRAVENOUS at 10:36

## 2023-09-18 RX ADMIN — AMLODIPINE BESYLATE 10 MG: 5 TABLET ORAL at 09:24

## 2023-09-18 RX ADMIN — LABETALOL HYDROCHLORIDE 200 MG: 100 TABLET, FILM COATED ORAL at 23:08

## 2023-09-18 RX ADMIN — POTASSIUM CHLORIDE 10 MEQ: 10 INJECTION, SOLUTION INTRAVENOUS at 06:36

## 2023-09-18 RX ADMIN — FERROUS SULFATE TAB 325 MG (65 MG ELEMENTAL FE) 325 MG: 325 (65 FE) TAB at 14:16

## 2023-09-18 RX ADMIN — ENOXAPARIN SODIUM 40 MG: 100 INJECTION SUBCUTANEOUS at 09:24

## 2023-09-18 RX ADMIN — ASPIRIN 162 MG: 81 TABLET, COATED ORAL at 09:24

## 2023-09-18 ASSESSMENT — PAIN SCALES - GENERAL: PAINLEVEL_OUTOF10: 0

## 2023-09-18 NOTE — NURSE NAVIGATOR
Heart Failure Nurse Navigator rounds completed. HF NN provided introduction to self and nurse navigator role. Patient declined printed materials as he has been given them previously. HF NN did give patient/spouse the card with the HF video QR codes and explained they are informational short videos, 3 minutes or less, and explained how to view the videos. Reviewed importance of daily weights with patient. Mr. Lisbet Knott stated he needed a new scale. HF NN provided a complimentary scale to patient. Explained the importance of daily weights and if he notices a gain of 3 lbs in a day or 5 lbs over night, he should call cardiologist to make aware and to get instructions. This is important as it can help keep him out of the hospital if fluid gain is noticed in the beginning. Patient/spouse verbalize understanding. Patient's wife states that she does all the cooking in the household. She states that she does not let Mr. Lisbet Knott use the salt shaker very much. HF NN reviewed with patient the importance of limiting sodium so as not to contribute to a HF exacerbation. Advised patient that follow up appointment will be scheduled and placed on Discharge Instructions prior to discharge. Patient given "[employer] flyer and is agreeable to services as needed. Patient provided with contact information for HF NN and encouraged to call with questions. Will continue to follow until discharge.         Time spent with patient discussing HF education:  20 minutes

## 2023-09-18 NOTE — CARE COORDINATION
Care Management Initial Assessment       RUR: 21% - high  Readmission? No  1st IM letter given? Yes - 9/15/23  1st  letter given: No    CM briefly spoke with patient re initial assessment. Patient requested CM to speak with his wife, Leonardo Spencer. CM called and spoke with patient's wife, Karla Cotton. Mrs. Sridhar Cho confirmed that patient is open with home health services through [facility]. Additionally, patient was scheduled to have SLP evaluate him at home through [facility] this week. Patient's new PCP is Dr. Carlos Michele and patient last saw this provider on 9/5. Patient is independent but predominately sedentary and \"holds onto furniture\" around the house. She is agreeable for therapy to see him while inpatient if appropriate and wants REBECCA with [facility]. [facility] [address] [facility]. 1400 - Provided patient's wife with Financial Aide Application and advised SNAP application can be found at local DSS or [employer]. CM will continue to follow. 09/18/23 1150   Service Assessment   Patient Orientation Alert and Oriented   Cognition Alert   History Provided By [facility]   Primary Caregiver Self   Support Systems Spouse/Significant Other;Children   Patient's Healthcare Decision Maker is: Legal Next of Kin   PCP Verified by CM Yes   Last Visit to PCP Within last 3 months   Prior Functional Level Independent in ADLs/IADLs   Current Functional Level Independent in ADLs/IADLs   Can patient return to prior living arrangement Yes   Ability to make needs known: Fair   Family able to assist with home care needs: Yes   Would you like for me to discuss the discharge plan with any other family members/significant others, and if so, who?  Yes  (wife - Karla Cotton)   Financial Resources  Resources None   CM/SW Referral ADLs/IADLs;Functions dependency needs   Social/Functional History   Lives With Spouse;Daughter   Type of [address] [place] [address]

## 2023-09-18 NOTE — PLAN OF CARE
1930 Bedside shift change report given to Ramone Kim (oncoming nurse) by Luis Miguel Bojorquez (offgoing nurse). Report included the following information Nurse Handoff Report, Adult Overview, Intake/Output, MAR, Recent Results, and Cardiac Rhythm NSR .     0730 Bedside shift change report given to TODD AGUERO (oncoming nurse) by Ramone Kim (offgoing nurse). Report included the following information Nurse Handoff Report, Adult Overview, Intake/Output, MAR, Recent Results, and Cardiac Rhythm NSR .      Care Plan:  Problem: Discharge Planning  Goal: Discharge to home or other facility with appropriate resources  9/17/2023 2025 by Eneida Sandoval RN  Outcome: Progressing  9/17/2023 2013 by Kallie Steen RN  Outcome: Skip Screen (Taken 9/17/2023 2008 by Eneida Sandoval RN)  Discharge to home or other facility with appropriate resources: Identify barriers to discharge with patient and caregiver  9/17/2023 0849 by Eneida Sandoval RN  Outcome: Progressing  Flowsheets (Taken 9/17/2023 0800 by Kallie Steen, RN)  Discharge to home or other facility with appropriate resources: Identify barriers to discharge with patient and caregiver  Problem: Pain  Goal: Verbalizes/displays adequate comfort level or baseline comfort level  9/17/2023 2025 by Eneida Sandoval RN  Outcome: Progressing  9/17/2023 2013 by Kallie Steen RN  Outcome: Progressing  [address] (Taken 9/17/2023 2008 by Eneida Sandoval RN)  Verbalizes/displays adequate comfort level or baseline comfort level: Encourage patient to monitor pain and request assistance  9/17/2023 0849 by Eneida Sandoval RN  Outcome: Progressing  Flowsheets (Taken 9/17/2023 0800 by Kallie Steen, RN)  Verbalizes/displays adequate comfort level or baseline comfort level:   Encourage patient to monitor pain and request assistance   Assess pain using appropriate pain scale  Problem: Safety - Adult  Goal: Free from fall injury  9/17/2023 2025 by Eneida Sandoval RN  Outcome:

## 2023-09-19 VITALS
RESPIRATION RATE: 21 BRPM | BODY MASS INDEX: 23.05 KG/M2 | HEART RATE: 60 BPM | TEMPERATURE: 97.4 F | OXYGEN SATURATION: 98 % | WEIGHT: 152.12 LBS | DIASTOLIC BLOOD PRESSURE: 66 MMHG | SYSTOLIC BLOOD PRESSURE: 139 MMHG | HEIGHT: 68 IN

## 2023-09-19 LAB
ANION GAP SERPL CALC-SCNC: 3 MMOL/L (ref 5–15)
BUN SERPL-MCNC: 21 MG/DL (ref 6–20)
BUN/CREAT SERPL: 8 (ref 12–20)
CALCIUM SERPL-MCNC: 8.6 MG/DL (ref 8.5–10.1)
CHLORIDE SERPL-SCNC: 108 MMOL/L (ref 97–108)
CO2 SERPL-SCNC: 31 MMOL/L (ref 21–32)
CREAT SERPL-MCNC: 2.52 MG/DL (ref 0.7–1.3)
GLUCOSE BLD STRIP.AUTO-MCNC: 126 MG/DL (ref 65–117)
GLUCOSE BLD STRIP.AUTO-MCNC: 153 MG/DL (ref 65–117)
GLUCOSE SERPL-MCNC: 95 MG/DL (ref 65–100)
MAGNESIUM SERPL-MCNC: 1.9 MG/DL (ref 1.6–2.4)
NT PRO BNP: 4366 PG/ML
POTASSIUM SERPL-SCNC: 3.6 MMOL/L (ref 3.5–5.1)
SERVICE CMNT-IMP: ABNORMAL
SERVICE CMNT-IMP: ABNORMAL
SODIUM SERPL-SCNC: 142 MMOL/L (ref 136–145)

## 2023-09-19 PROCEDURE — 6360000002 HC RX W HCPCS: Performed by: HOSPITALIST

## 2023-09-19 PROCEDURE — 6370000000 HC RX 637 (ALT 250 FOR IP): Performed by: HOSPITALIST

## 2023-09-19 PROCEDURE — 94640 AIRWAY INHALATION TREATMENT: CPT

## 2023-09-19 PROCEDURE — 83880 ASSAY OF NATRIURETIC PEPTIDE: CPT

## 2023-09-19 PROCEDURE — 2580000003 HC RX 258: Performed by: HOSPITALIST

## 2023-09-19 PROCEDURE — 83735 ASSAY OF MAGNESIUM: CPT

## 2023-09-19 PROCEDURE — 97161 PT EVAL LOW COMPLEX 20 MIN: CPT

## 2023-09-19 PROCEDURE — 6370000000 HC RX 637 (ALT 250 FOR IP): Performed by: INTERNAL MEDICINE

## 2023-09-19 PROCEDURE — 82962 GLUCOSE BLOOD TEST: CPT

## 2023-09-19 PROCEDURE — 36415 COLL VENOUS BLD VENIPUNCTURE: CPT

## 2023-09-19 PROCEDURE — 97116 GAIT TRAINING THERAPY: CPT

## 2023-09-19 PROCEDURE — 80048 BASIC METABOLIC PNL TOTAL CA: CPT

## 2023-09-19 RX ORDER — LABETALOL 200 MG/1
400 TABLET, FILM COATED ORAL EVERY 12 HOURS SCHEDULED
Qty: 120 TABLET | Refills: 0 | Status: SHIPPED | OUTPATIENT
Start: 2023-09-19

## 2023-09-19 RX ORDER — LABETALOL 100 MG/1
400 TABLET, FILM COATED ORAL EVERY 12 HOURS SCHEDULED
Status: DISCONTINUED | OUTPATIENT
Start: 2023-09-19 | End: 2023-09-19 | Stop reason: HOSPADM

## 2023-09-19 RX ORDER — BUMETANIDE 1 MG/1
1 TABLET ORAL 2 TIMES DAILY
Qty: 30 TABLET | Refills: 0 | Status: SHIPPED | OUTPATIENT
Start: 2023-09-19

## 2023-09-19 RX ADMIN — ARFORMOTEROL TARTRATE: 15 SOLUTION RESPIRATORY (INHALATION) at 09:05

## 2023-09-19 RX ADMIN — ASPIRIN 162 MG: 81 TABLET, COATED ORAL at 08:49

## 2023-09-19 RX ADMIN — POTASSIUM BICARBONATE 40 MEQ: 782 TABLET, EFFERVESCENT ORAL at 10:22

## 2023-09-19 RX ADMIN — EMPAGLIFLOZIN 10 MG: 10 TABLET, FILM COATED ORAL at 08:49

## 2023-09-19 RX ADMIN — BUMETANIDE 1 MG: 1 TABLET ORAL at 08:49

## 2023-09-19 RX ADMIN — PANTOPRAZOLE SODIUM 40 MG: 40 TABLET, DELAYED RELEASE ORAL at 07:19

## 2023-09-19 RX ADMIN — AMLODIPINE BESYLATE 10 MG: 5 TABLET ORAL at 08:49

## 2023-09-19 RX ADMIN — ATORVASTATIN CALCIUM 80 MG: 40 TABLET, FILM COATED ORAL at 08:49

## 2023-09-19 RX ADMIN — SODIUM CHLORIDE, PRESERVATIVE FREE 10 ML: 5 INJECTION INTRAVENOUS at 08:50

## 2023-09-19 RX ADMIN — ENOXAPARIN SODIUM 30 MG: 100 INJECTION SUBCUTANEOUS at 08:49

## 2023-09-19 RX ADMIN — LABETALOL HYDROCHLORIDE 200 MG: 100 TABLET, FILM COATED ORAL at 08:49

## 2023-09-19 RX ADMIN — FERROUS SULFATE TAB 325 MG (65 MG ELEMENTAL FE) 325 MG: 325 (65 FE) TAB at 07:19

## 2023-09-19 NOTE — PLAN OF CARE
Problem: Physical Therapy - Adult  Goal: By Discharge: Performs mobility at highest level of function for planned discharge setting. See evaluation for individualized goals. Description: FUNCTIONAL STATUS PRIOR TO ADMISSION: Patient was modified independent using a rollator vs SPC for functional mobility. HOME SUPPORT PRIOR TO ADMISSION: The patient lived with his wife who assist PRN. Physical Therapy Goals  Initiated 9/19/2023  1. Patient will move from supine to sit and sit to supine, scoot up and down, and roll side to side in bed with modified independence within 7 day(s). 2.  Patient will perform sit to stand with modified independence within 7 day(s). 3.  Patient will transfer from bed to chair and chair to bed with modified independence using the least restrictive device within 7 day(s). 4.  Patient will ambulate with modified independence for 150 feet with the least restrictive device within 7 day(s). 5.  Patient will ascend/descend 4 stairs with right handrail(s) with modified independence within 7 day(s). Outcome: Progressing   PHYSICAL THERAPY EVALUATION    Patient: Brian Hillman (76 y.o. male)  Date: 9/19/2023  Primary Diagnosis: Acute respiratory failure with hypoxia (HCC) [J96.01]  Hypertensive emergency [I16.1]  Acute heart failure with normal ejection fraction (HCC) [I50.31]  Acute on chronic congestive heart failure, unspecified heart failure type ([address]) [I50.9]       Precautions: Fall Risk, Bed Alarm                    ASSESSMENT :   DEFICITS/IMPAIRMENTS:   The patient is limited by decreased activity tolerance, endurance following admission for respiratory failure and HTN emergency. Participated in bed mobility, ambulation, ADLs in bathroom, and stair training. Overall SBA-supervision for mobility, no cues for safe use of rollator. Recommend HHPT resumption. Based on the impairments listed above recommend resume HHPT.     Patient will benefit from skilled intervention

## 2023-09-19 NOTE — NURSE NAVIGATOR
Follow up scheduled with [facility] with Dr. Bony Dotson on 9/22/23 at 9:30. Appointment on After Visit Summary.

## 2023-09-20 NOTE — CARE COORDINATION
RUR: 21% - high  Readmission? No  1st IM letter given? Yes - 9/15/23  1st  letter given: No     CM briefly spoke with patient re initial assessment. Patient requested CM to speak with his wife, Kelsey Richardson. CM called and spoke with patient's wife, Jassi Hart. Mrs. Camille Ontiveros confirmed that patient is open with home health services through [facility]. Additionally, patient was scheduled to have SLP evaluate him at home through [facility] this week. Patient's new PCP is Dr. Joane Dance and patient last saw this provider on 9/5. Patient is independent but predominately sedentary and \"holds onto furniture\" around the house. She is agreeable for therapy to see him while inpatient if appropriate and wants REBECCA with [facility]. [facility] [address] [facility]. 1400 - Provided patient's wife with Financial Aide Application and advised SNAP application can be found at local DSS or [employer] and [employer]. Update-discharging home today with family and home health resumption. [facility] has accepted for home health services. Wife at bedside to transport. CM participated in [facility] Discharge with bedside nursing and   attending, that includes: Follow up appointments with PCP or specialist  Review of medications  Dispatch health information  Education on symptom management    All questions answered and patient concerns addressed SMAART-E checklist completed and placed in appropriate folder.

## 2023-09-20 NOTE — ADT AUTH CERT
Updated progress notes      El Saba MD  Physician  Nephrology  Progress Notes     Signed  Date of Service:  2023  9:52 AM     Signed        Expand All Collapse All                                                                                                                                                                                                     [facility]   [address], [address] [facility], [address] [facility]  Phone: [phone]   [phone]    [email or web address]     Nephrology Progress Note     Patient Name : Man Mcfarland.  : 1955     MRN : [id number]  Date of Admission : 9/15/2023         Date of Servive : 23     CC: Follow up for ckd             Assessment and Plan    CKD 3b:  - Cr at baseline  - Continue p.o. Bumex  - continue Ottis Cheeks for discharge from renal standpoint when BP optimally controlled  - daily labs and I/Os     Hypokalemia:  -Added KCl 40 mEq daily     Hypertensive emergency:  -Increased labetalol dose     CAD, Hx of CABG  HFpEF  -Stable     HTN  -BP control     Hx of CVA  COPD  Hx of pleural effusions      Interval History:  Patient seen and examined. He reports being compliant with his antihypertensives at home prior to admission  His wife administers his medications. She was not at bedside to discuss  Coreg was changed to labetalol yesterday. BP is trending down. Denies any new symptoms. Review of Systems: A comprehensive review of systems was negative.      Current Medications:          Current Facility-Administered Medications   Medication Dose Route Frequency    labetalol (NORMODYNE) tablet 400 mg  400 mg Oral 2 times per day    potassium bicarb-citric acid (EFFER-K) effervescent tablet 40 mEq  40 mEq Oral Daily    bumetanide (BUMEX) tablet 1 mg  1 mg Oral BID    enoxaparin Sodium (LOVENOX) injection 30 mg  30 mg SubCUTAneous Daily    amLODIPine (NORVASC) tablet 10 mg  10 mg Oral Daily
Updated progress notes 09/18/23    Kerry Coleman MD  Physician  Cardiology  Progress Notes     Signed  Date of Service:  9/18/2023 10:31 AM     Signed        Expand All Collapse All                                                                                                                                    Cardiology Progress Note                                        Admit Date: 9/15/2023     Assessment/Plan:      CAD with  NSTEMI in 11/2022 with CABGx1.  2. Hx Multifactorial dyspnea including with large L pleural effusion with lung  consolidation. S/p thoracentesis. 3. Hypertension. :    add labetolol 200 bid  ( stop coreg)   4. Hypercholesterolemia. 5. 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  6. History of stroke. Left  and remote right parietal infarcts;  7. Plavix allergy stated. 8. HFpEF:  worsened by HTN urgency  probnp 7630           Cxr  Small left pleural effusion with underlying atelectasis and mild pulmonary  edema. Cont diuresis  Continue other cardiac meds. 9. HTN  bp 210 s.      Continue current regimen  10  RODNEY   cr 2.10         Mabeline Peers. is a 75 y.o. male with       PROBLEM LIST:       Patient Active Problem List     Diagnosis Date Noted    Palliative care encounter 09/16/2023    Acute respiratory failure with hypoxia ([address]) 09/16/2023    DNR (do not resuscitate) discussion 09/16/2023    Advanced care planning/counseling discussion 09/16/2023    Goals of care, counseling/discussion 09/16/2023    Acute heart failure with normal ejection fraction ([address]) 09/15/2023    Acute kidney injury superimposed on chronic kidney disease ([address]) 08/10/2023    Acute pulmonary edema ([address]) 07/08/2023    Pleural effusion on left 03/14/2023    Acute respiratory distress 01/12/2023    Pleural effusion 01/12/2023    COVID 01/12/2023    CKD (chronic kidney disease) stage 4, GFR 15-29 ml/min ([address]) 08/01/2022    Chronic renal disease, stage III ([address]) 08/01/2022    Bilateral carotid artery stenosis 04/12/2022
Admitted from ED 2236   09/19   Discharged [id number]
Christophe Murdock MD                  ED to Hosp-Admission (Discharged) on 9/15/2023    ED to Hosp-Admission (Discharged) on 9/15/2023      Detailed Report    Note shared with patient  Progress Notes Info    Author Note Status Last Update User Last Update Date/Time   Yeny Tristan MD Signed Yeny Tristan MD 9/18/2023 11:11 AM     Chart Review Routing History    No routing history on file.

## 2023-09-21 ASSESSMENT — VISUAL ACUITY: OU: 1

## 2023-09-22 ENCOUNTER — OFFICE VISIT (OUTPATIENT)
Age: 68
End: 2023-09-22
Payer: MEDICARE

## 2023-09-22 VITALS
SYSTOLIC BLOOD PRESSURE: 120 MMHG | BODY MASS INDEX: 21.42 KG/M2 | HEIGHT: 69 IN | DIASTOLIC BLOOD PRESSURE: 78 MMHG | WEIGHT: 144.6 LBS | OXYGEN SATURATION: 98 % | HEART RATE: 72 BPM

## 2023-09-22 DIAGNOSIS — Z95.1 S/P CABG X 1: ICD-10-CM

## 2023-09-22 DIAGNOSIS — R54 FRAILTY: ICD-10-CM

## 2023-09-22 DIAGNOSIS — Z86.73 HISTORY OF CVA (CEREBROVASCULAR ACCIDENT): ICD-10-CM

## 2023-09-22 DIAGNOSIS — J44.9 CHRONIC OBSTRUCTIVE PULMONARY DISEASE, UNSPECIFIED COPD TYPE (HCC): ICD-10-CM

## 2023-09-22 DIAGNOSIS — I50.32 CHRONIC HEART FAILURE WITH PRESERVED EJECTION FRACTION (HCC): Primary | ICD-10-CM

## 2023-09-22 DIAGNOSIS — I25.10 CAD IN NATIVE ARTERY: ICD-10-CM

## 2023-09-22 DIAGNOSIS — I50.32 CHRONIC HEART FAILURE WITH PRESERVED EJECTION FRACTION (HCC): ICD-10-CM

## 2023-09-22 DIAGNOSIS — I10 PRIMARY HYPERTENSION: ICD-10-CM

## 2023-09-22 DIAGNOSIS — E78.00 HYPERCHOLESTEROLEMIA: ICD-10-CM

## 2023-09-22 DIAGNOSIS — N18.4 CKD (CHRONIC KIDNEY DISEASE), STAGE IV (HCC): ICD-10-CM

## 2023-09-22 PROCEDURE — G8427 DOCREV CUR MEDS BY ELIG CLIN: HCPCS | Performed by: NURSE PRACTITIONER

## 2023-09-22 PROCEDURE — 3074F SYST BP LT 130 MM HG: CPT | Performed by: NURSE PRACTITIONER

## 2023-09-22 PROCEDURE — 1111F DSCHRG MED/CURRENT MED MERGE: CPT | Performed by: NURSE PRACTITIONER

## 2023-09-22 PROCEDURE — 3023F SPIROM DOC REV: CPT | Performed by: NURSE PRACTITIONER

## 2023-09-22 PROCEDURE — G8420 CALC BMI NORM PARAMETERS: HCPCS | Performed by: NURSE PRACTITIONER

## 2023-09-22 PROCEDURE — 1123F ACP DISCUSS/DSCN MKR DOCD: CPT | Performed by: NURSE PRACTITIONER

## 2023-09-22 PROCEDURE — 99214 OFFICE O/P EST MOD 30 MIN: CPT | Performed by: NURSE PRACTITIONER

## 2023-09-22 PROCEDURE — 3078F DIAST BP <80 MM HG: CPT | Performed by: NURSE PRACTITIONER

## 2023-09-22 PROCEDURE — 1036F TOBACCO NON-USER: CPT | Performed by: NURSE PRACTITIONER

## 2023-09-22 PROCEDURE — 3017F COLORECTAL CA SCREEN DOC REV: CPT | Performed by: NURSE PRACTITIONER

## 2023-09-22 RX ORDER — POTASSIUM CHLORIDE 3 G/15ML
SOLUTION ORAL
COMMUNITY
Start: 2023-09-14

## 2023-09-22 ASSESSMENT — PATIENT HEALTH QUESTIONNAIRE - PHQ9
1. LITTLE INTEREST OR PLEASURE IN DOING THINGS: 0
SUM OF ALL RESPONSES TO PHQ QUESTIONS 1-9: 0
2. FEELING DOWN, DEPRESSED OR HOPELESS: 0
SUM OF ALL RESPONSES TO PHQ QUESTIONS 1-9: 0
SUM OF ALL RESPONSES TO PHQ9 QUESTIONS 1 & 2: 0
SUM OF ALL RESPONSES TO PHQ QUESTIONS 1-9: 0
SUM OF ALL RESPONSES TO PHQ QUESTIONS 1-9: 0

## 2023-09-22 ASSESSMENT — ENCOUNTER SYMPTOMS: SHORTNESS OF BREATH: 0

## 2023-09-22 NOTE — PROGRESS NOTES
conversation went well and patient / wife will readdress this topic with me if they decide they would like to take more of a comfort approach as opposed to curative approach with his care moving forward.  -I emphasized the importance of increased time spent out of bed to slow further functional decline. Prior to this visit I have reviewed key aspects of the patient's medical record to optimize medical decision making. This includes, but is not limited to, prior office visits and emergency room encounters (if applicable). I have reviewed pertinent diagnostic test results (when available), vital signs, weights & and ambulatory blood pressure readings (when available), as well as personal and family medical history to develop an individualized care plan. I have spent time discussing both pharmacological and nonpharmacological treatment and preventative care measures for optimizing cardiovascular health and wellness. This includes, but is not limited to: obtaining regular physical activity as tolerated, achieving ideal weight and blood pressure, healthy eating habits, smoking cessation, limiting or eliminating alcohol intake, and avoidance of recreational drug use. I have emphasized the importance of adherence to the current medication regimen as well as routine follow up for preventative care measures.       Nicko Ramirez NP  [facility] at [address], [address], [address] [place] [address], [address] [place] [address] Drive: [phone]  F: [phone]

## 2023-09-22 NOTE — PATIENT INSTRUCTIONS
Given your diagnosis of heart failure here some of the recommendations I have for you to help prevent fluid from building up on your body and to keep you feeling better:    1. Take all of your medications EXACTLY as prescribed. If you are having any bad side effects, problems affording your prescriptions, or any questions about the medications that you are on please let me know during our follow-up visits or call our office to discuss these concerns over the phone. 2.  It is extremely important to [address]. Often times, the early signs of worsening heart failure could be weight gain. I recommend that you weigh yourself wearing nothing but underwear every morning after you have urinated / defecated and before you have had anything to eat or drink. If you gain more than 2 pounds within a 24-hour period call our office immediately for guidance  If you gain more than 5 pounds within 1 week call our office immediately for guidance. 3.  Try to limit the amount of sodium (salt) in your diet. The more salt you have, the more water will remain inside of your body, which may make you feel worse. While it is very difficult to do, try to have less than 2500 mg of sodium daily. This will help to prevent you from being hospitalized from heart failure. Foods that are generally high in sodium include canned foods, soups, processed deli meats, chips, and salted nuts. If you would like to learn more about low-sodium food options there is a lot of good information available on the Internet. In general, you should try to have baked foods instead of deep-fried foods and you should try your absolute best to have fruits and vegetables every day because these food sources are rich in vitamins and nutrients and contain very little salt/sodium. If you love the taste of salt, you can look into trying salt free spices that can be found in grocery stores and on websites like [employer].     4.  You want to make

## 2023-09-23 LAB
ANION GAP SERPL CALC-SCNC: 11 MMOL/L (ref 5–15)
BUN SERPL-MCNC: 20 MG/DL (ref 6–20)
BUN/CREAT SERPL: 7 (ref 12–20)
CALCIUM SERPL-MCNC: 8.7 MG/DL (ref 8.5–10.1)
CHLORIDE SERPL-SCNC: 107 MMOL/L (ref 97–108)
CO2 SERPL-SCNC: 21 MMOL/L (ref 21–32)
CREAT SERPL-MCNC: 2.83 MG/DL (ref 0.7–1.3)
GLUCOSE SERPL-MCNC: 98 MG/DL (ref 65–100)
POTASSIUM SERPL-SCNC: 5.7 MMOL/L (ref 3.5–5.1)
SODIUM SERPL-SCNC: 139 MMOL/L (ref 136–145)

## 2023-09-25 ENCOUNTER — TELEPHONE (OUTPATIENT)
Age: 68
End: 2023-09-25

## 2023-09-25 DIAGNOSIS — E87.5 HYPERKALEMIA: Primary | ICD-10-CM

## 2023-09-25 NOTE — TELEPHONE ENCOUNTER
BMP shows progressive creatinine elevation. Potassium 5.7. I spoke with his wife over the phone and recommended that he immediately stop the potassium supplementation and recheck this lab value either tomorrow or Wednesday. If potassium level raised to above 6.0 he might require treatment in the emergency room. Wife understood significance of this lab value and risk of arrhythmias/cardiac arrest potassium continues to elevate.

## 2023-09-26 DIAGNOSIS — E87.5 HYPERKALEMIA: ICD-10-CM

## 2023-09-27 ENCOUNTER — TELEPHONE (OUTPATIENT)
Age: 68
End: 2023-09-27

## 2023-09-27 LAB
ANION GAP SERPL CALC-SCNC: 6 MMOL/L (ref 5–15)
BUN SERPL-MCNC: 29 MG/DL (ref 6–20)
BUN/CREAT SERPL: 10 (ref 12–20)
CALCIUM SERPL-MCNC: 9.1 MG/DL (ref 8.5–10.1)
CHLORIDE SERPL-SCNC: 106 MMOL/L (ref 97–108)
CO2 SERPL-SCNC: 27 MMOL/L (ref 21–32)
CREAT SERPL-MCNC: 2.93 MG/DL (ref 0.7–1.3)
GLUCOSE SERPL-MCNC: 102 MG/DL (ref 65–100)
POTASSIUM SERPL-SCNC: 4.2 MMOL/L (ref 3.5–5.1)
SODIUM SERPL-SCNC: 139 MMOL/L (ref 136–145)

## 2023-09-28 NOTE — TELEPHONE ENCOUNTER
Pt wife Guy Andrews called and wanted to know about his lab results. There was no PHI form in his chart with her name on it. I explained that we need to speak with [employer] himself. She stated she would call us back when she gets home. Spoke with pt after 2 identifiers. Advised per Jayne Hunt NP that patient can continue his normal medications but DO NOT take potassium. Pt verbalized understanding with no further questions. \"In regards to Mr. Mckeon Ring labs, his potassium has lowered from 5.7-4.2, which is great. We should instruct him to continue all of his medications exactly as prescribed, BUT he SHOULD NOT take the potassium supplements. \"    Pt wife called and pt verbally gave permission for wife to receive message from Jayne Hunt NP. Message relayed as above-both verbalized understanding and no further questions-will STOP potassium.

## 2023-10-02 ENCOUNTER — TELEPHONE (OUTPATIENT)
Age: 68
End: 2023-10-02

## 2023-10-02 DIAGNOSIS — I50.32 CHRONIC HEART FAILURE WITH PRESERVED EJECTION FRACTION (HCC): Primary | ICD-10-CM

## 2023-10-02 RX ORDER — BUMETANIDE 1 MG/1
1 TABLET ORAL 2 TIMES DAILY
Qty: 180 TABLET | Refills: 1 | Status: SHIPPED | OUTPATIENT
Start: 2023-10-02

## 2023-10-02 NOTE — TELEPHONE ENCOUNTER
Spoke with pt wife-verbal authorization to speak with wife-confirmed . In response to wife question re Bumex-yes he needs to be taking this 1 mg twice a day. Wife also requested a refill as he only has 2 pills left. RX for Bumex 1 mg #180 1 refill Take twice a day-sent to [facility].     Per Verbal Order by MD/NP    Future Appointments   Date Time Provider [facility] [address]   2024  9:20 AM BETTY Elizabeth - NP [facility]

## 2023-12-06 ENCOUNTER — TRANSCRIBE ORDERS (OUTPATIENT)
Facility: HOSPITAL | Age: 68
End: 2023-12-06

## 2023-12-06 ENCOUNTER — HOSPITAL ENCOUNTER (OUTPATIENT)
Facility: HOSPITAL | Age: 68
Discharge: HOME | End: 2023-12-09
Attending: INTERNAL MEDICINE
Payer: MEDICARE

## 2023-12-06 DIAGNOSIS — J90 PLEURAL EFFUSION, BACTERIAL: ICD-10-CM

## 2023-12-06 DIAGNOSIS — J90 PLEURAL EFFUSION, BACTERIAL: Primary | ICD-10-CM

## 2023-12-06 PROCEDURE — 71046 X-RAY EXAM CHEST 2 VIEWS: CPT

## 2024-01-05 ENCOUNTER — APPOINTMENT (OUTPATIENT)
Facility: HOSPITAL | Age: 69
DRG: 871 | End: 2024-01-05
Payer: MEDICARE

## 2024-01-05 ENCOUNTER — HOSPITAL ENCOUNTER (INPATIENT)
Facility: HOSPITAL | Age: 69
LOS: 5 days | Discharge: HOME | DRG: 871 | End: 2024-01-10
Attending: EMERGENCY MEDICINE | Admitting: INTERNAL MEDICINE
Payer: MEDICARE

## 2024-01-05 DIAGNOSIS — I21.4 NSTEMI (NON-ST ELEVATED MYOCARDIAL INFARCTION) (HCC): ICD-10-CM

## 2024-01-05 DIAGNOSIS — I21.4 ACUTE NON-Q WAVE NON-ST ELEVATION MYOCARDIAL INFARCTION (NSTEMI) (HCC): Primary | ICD-10-CM

## 2024-01-05 DIAGNOSIS — R50.9 ACUTE FEBRILE ILLNESS: ICD-10-CM

## 2024-01-05 LAB
ALBUMIN SERPL-MCNC: 3.1 G/DL (ref 3.5–5)
ALBUMIN/GLOB SERPL: 0.8 (ref 1.1–2.2)
ALP SERPL-CCNC: 122 U/L (ref 45–117)
ALT SERPL-CCNC: 18 U/L (ref 12–78)
ANION GAP BLD CALC-SCNC: 1 (ref 10–20)
ANION GAP SERPL CALC-SCNC: 4 MMOL/L (ref 5–15)
APPEARANCE UR: CLEAR
APTT PPP: 26.8 SEC (ref 22.1–31)
AST SERPL-CCNC: 23 U/L (ref 15–37)
BACTERIA URNS QL MICRO: NEGATIVE /HPF
BASE DEFICIT BLD-SCNC: 1 MMOL/L
BASOPHILS # BLD: 0.1 K/UL (ref 0–0.1)
BASOPHILS NFR BLD: 1 % (ref 0–1)
BILIRUB SERPL-MCNC: 0.4 MG/DL (ref 0.2–1)
BILIRUB UR QL: NEGATIVE
BUN SERPL-MCNC: 16 MG/DL (ref 6–20)
BUN/CREAT SERPL: 6 (ref 12–20)
CA-I BLD-MCNC: 0.86 MMOL/L (ref 1.12–1.32)
CALCIUM SERPL-MCNC: 8.5 MG/DL (ref 8.5–10.1)
CHLORIDE BLD-SCNC: 119 MMOL/L (ref 100–108)
CHLORIDE SERPL-SCNC: 110 MMOL/L (ref 97–108)
CO2 BLD-SCNC: 24 MMOL/L (ref 19–24)
CO2 SERPL-SCNC: 28 MMOL/L (ref 21–32)
COLOR UR: ABNORMAL
CREAT SERPL-MCNC: 2.52 MG/DL (ref 0.7–1.3)
CREAT UR-MCNC: 1.6 MG/DL (ref 0.6–1.3)
DIFFERENTIAL METHOD BLD: ABNORMAL
EKG ATRIAL RATE: 103 BPM
EKG DIAGNOSIS: NORMAL
EKG P AXIS: 58 DEGREES
EKG P-R INTERVAL: 200 MS
EKG Q-T INTERVAL: 332 MS
EKG QRS DURATION: 84 MS
EKG QTC CALCULATION (BAZETT): 434 MS
EKG R AXIS: 15 DEGREES
EKG T AXIS: 157 DEGREES
EKG VENTRICULAR RATE: 103 BPM
EOSINOPHIL # BLD: 0.3 K/UL (ref 0–0.4)
EOSINOPHIL NFR BLD: 2 % (ref 0–7)
EPITH CASTS URNS QL MICRO: ABNORMAL /LPF
ERYTHROCYTE [DISTWIDTH] IN BLOOD BY AUTOMATED COUNT: 14.9 % (ref 11.5–14.5)
FLUAV AG NPH QL IA: NEGATIVE
FLUBV AG NOSE QL IA: NEGATIVE
GLOBULIN SER CALC-MCNC: 4.1 G/DL (ref 2–4)
GLUCOSE BLD STRIP.AUTO-MCNC: 79 MG/DL (ref 74–106)
GLUCOSE SERPL-MCNC: 106 MG/DL (ref 65–100)
GLUCOSE UR STRIP.AUTO-MCNC: >1000 MG/DL
HCO3 BLDA-SCNC: 24 MMOL/L
HCT VFR BLD AUTO: 40.5 % (ref 36.6–50.3)
HGB BLD-MCNC: 13.1 G/DL (ref 12.1–17)
HGB UR QL STRIP: ABNORMAL
IMM GRANULOCYTES # BLD AUTO: 0 K/UL (ref 0–0.04)
IMM GRANULOCYTES NFR BLD AUTO: 0 % (ref 0–0.5)
INR PPP: 1.1 (ref 0.9–1.1)
KETONES UR QL STRIP.AUTO: NEGATIVE MG/DL
LACTATE BLD-SCNC: 1.74 MMOL/L (ref 0.4–2)
LEUKOCYTE ESTERASE UR QL STRIP.AUTO: NEGATIVE
LYMPHOCYTES # BLD: 0.6 K/UL (ref 0.8–3.5)
LYMPHOCYTES NFR BLD: 5 % (ref 12–49)
MAGNESIUM SERPL-MCNC: 1.4 MG/DL (ref 1.6–2.4)
MCH RBC QN AUTO: 30 PG (ref 26–34)
MCHC RBC AUTO-ENTMCNC: 32.3 G/DL (ref 30–36.5)
MCV RBC AUTO: 92.7 FL (ref 80–99)
MONOCYTES # BLD: 0.9 K/UL (ref 0–1)
MONOCYTES NFR BLD: 7 % (ref 5–13)
NEUTS SEG # BLD: 10.6 K/UL (ref 1.8–8)
NEUTS SEG NFR BLD: 85 % (ref 32–75)
NITRITE UR QL STRIP.AUTO: NEGATIVE
NRBC # BLD: 0 K/UL (ref 0–0.01)
NRBC BLD-RTO: 0 PER 100 WBC
NT PRO BNP: 3673 PG/ML
PCO2 BLDV: 40.9 MMHG (ref 41–51)
PH BLDV: 7.38 (ref 7.32–7.42)
PH UR STRIP: 5.5 (ref 5–8)
PLATELET # BLD AUTO: 306 K/UL (ref 150–400)
PMV BLD AUTO: 11.2 FL (ref 8.9–12.9)
PO2 BLDV: 35 MMHG (ref 25–40)
POTASSIUM BLD-SCNC: 8.4 MMOL/L (ref 3.5–5.5)
POTASSIUM SERPL-SCNC: 3.1 MMOL/L (ref 3.5–5.1)
PROT SERPL-MCNC: 7.2 G/DL (ref 6.4–8.2)
PROT UR STRIP-MCNC: >300 MG/DL
PROTHROMBIN TIME: 11.5 SEC (ref 9–11.1)
RBC # BLD AUTO: 4.37 M/UL (ref 4.1–5.7)
RBC #/AREA URNS HPF: ABNORMAL /HPF (ref 0–5)
RBC MORPH BLD: ABNORMAL
SAO2 % BLD: 67 %
SARS-COV-2 RDRP RESP QL NAA+PROBE: NOT DETECTED
SERVICE CMNT-IMP: ABNORMAL
SODIUM BLD-SCNC: 144 MMOL/L (ref 136–145)
SODIUM SERPL-SCNC: 142 MMOL/L (ref 136–145)
SOURCE: NORMAL
SP GR UR REFRACTOMETRY: 1.02
SPECIMEN SITE: ABNORMAL
THERAPEUTIC RANGE: NORMAL SECS (ref 58–77)
TROPONIN I SERPL HS-MCNC: 379 NG/L (ref 0–76)
TROPONIN I SERPL HS-MCNC: 528 NG/L (ref 0–76)
UFH PPP CHRO-ACNC: <0.1 IU/ML
URINE CULTURE IF INDICATED: ABNORMAL
UROBILINOGEN UR QL STRIP.AUTO: 0.2 EU/DL (ref 0.2–1)
WBC # BLD AUTO: 12.5 K/UL (ref 4.1–11.1)
WBC URNS QL MICRO: ABNORMAL /HPF (ref 0–4)

## 2024-01-05 PROCEDURE — 87804 INFLUENZA ASSAY W/OPTIC: CPT

## 2024-01-05 PROCEDURE — 99285 EMERGENCY DEPT VISIT HI MDM: CPT

## 2024-01-05 PROCEDURE — 80053 COMPREHEN METABOLIC PANEL: CPT

## 2024-01-05 PROCEDURE — 84132 ASSAY OF SERUM POTASSIUM: CPT

## 2024-01-05 PROCEDURE — 6370000000 HC RX 637 (ALT 250 FOR IP): Performed by: EMERGENCY MEDICINE

## 2024-01-05 PROCEDURE — 71045 X-RAY EXAM CHEST 1 VIEW: CPT

## 2024-01-05 PROCEDURE — 93010 ELECTROCARDIOGRAM REPORT: CPT | Performed by: SPECIALIST

## 2024-01-05 PROCEDURE — 83880 ASSAY OF NATRIURETIC PEPTIDE: CPT

## 2024-01-05 PROCEDURE — 36415 COLL VENOUS BLD VENIPUNCTURE: CPT

## 2024-01-05 PROCEDURE — 85520 HEPARIN ASSAY: CPT

## 2024-01-05 PROCEDURE — 82330 ASSAY OF CALCIUM: CPT

## 2024-01-05 PROCEDURE — 81001 URINALYSIS AUTO W/SCOPE: CPT

## 2024-01-05 PROCEDURE — 93005 ELECTROCARDIOGRAM TRACING: CPT | Performed by: EMERGENCY MEDICINE

## 2024-01-05 PROCEDURE — 87635 SARS-COV-2 COVID-19 AMP PRB: CPT

## 2024-01-05 PROCEDURE — 87040 BLOOD CULTURE FOR BACTERIA: CPT

## 2024-01-05 PROCEDURE — 85610 PROTHROMBIN TIME: CPT

## 2024-01-05 PROCEDURE — 84295 ASSAY OF SERUM SODIUM: CPT

## 2024-01-05 PROCEDURE — 82803 BLOOD GASES ANY COMBINATION: CPT

## 2024-01-05 PROCEDURE — 84484 ASSAY OF TROPONIN QUANT: CPT

## 2024-01-05 PROCEDURE — 83735 ASSAY OF MAGNESIUM: CPT

## 2024-01-05 PROCEDURE — 82947 ASSAY GLUCOSE BLOOD QUANT: CPT

## 2024-01-05 PROCEDURE — 85025 COMPLETE CBC W/AUTO DIFF WBC: CPT

## 2024-01-05 PROCEDURE — 6370000000 HC RX 637 (ALT 250 FOR IP): Performed by: INTERNAL MEDICINE

## 2024-01-05 PROCEDURE — 6360000002 HC RX W HCPCS: Performed by: INTERNAL MEDICINE

## 2024-01-05 PROCEDURE — 85730 THROMBOPLASTIN TIME PARTIAL: CPT

## 2024-01-05 PROCEDURE — 2060000000 HC ICU INTERMEDIATE R&B

## 2024-01-05 PROCEDURE — 2580000003 HC RX 258: Performed by: INTERNAL MEDICINE

## 2024-01-05 RX ORDER — HEPARIN SODIUM 1000 [USP'U]/ML
4000 INJECTION, SOLUTION INTRAVENOUS; SUBCUTANEOUS PRN
Status: DISCONTINUED | OUTPATIENT
Start: 2024-01-05 | End: 2024-01-06

## 2024-01-05 RX ORDER — POTASSIUM CHLORIDE 20 MEQ/1
40 TABLET, EXTENDED RELEASE ORAL PRN
Status: DISCONTINUED | OUTPATIENT
Start: 2024-01-05 | End: 2024-01-06

## 2024-01-05 RX ORDER — HEPARIN SODIUM 1000 [USP'U]/ML
2000 INJECTION, SOLUTION INTRAVENOUS; SUBCUTANEOUS PRN
Status: DISCONTINUED | OUTPATIENT
Start: 2024-01-05 | End: 2024-01-06

## 2024-01-05 RX ORDER — ACETAMINOPHEN 325 MG/1
650 TABLET ORAL EVERY 6 HOURS PRN
Status: DISCONTINUED | OUTPATIENT
Start: 2024-01-05 | End: 2024-01-10 | Stop reason: HOSPADM

## 2024-01-05 RX ORDER — POLYETHYLENE GLYCOL 3350 17 G/17G
17 POWDER, FOR SOLUTION ORAL DAILY PRN
Status: DISCONTINUED | OUTPATIENT
Start: 2024-01-05 | End: 2024-01-10 | Stop reason: HOSPADM

## 2024-01-05 RX ORDER — LANOLIN ALCOHOL/MO/W.PET/CERES
3 CREAM (GRAM) TOPICAL NIGHTLY
Status: DISCONTINUED | OUTPATIENT
Start: 2024-01-05 | End: 2024-01-10 | Stop reason: HOSPADM

## 2024-01-05 RX ORDER — ACETAMINOPHEN 650 MG/1
650 SUPPOSITORY RECTAL EVERY 6 HOURS PRN
Status: DISCONTINUED | OUTPATIENT
Start: 2024-01-05 | End: 2024-01-10 | Stop reason: HOSPADM

## 2024-01-05 RX ORDER — LABETALOL 200 MG/1
400 TABLET, FILM COATED ORAL EVERY 12 HOURS SCHEDULED
Status: DISCONTINUED | OUTPATIENT
Start: 2024-01-05 | End: 2024-01-10 | Stop reason: HOSPADM

## 2024-01-05 RX ORDER — AMLODIPINE BESYLATE 5 MG/1
5 TABLET ORAL DAILY
Status: DISCONTINUED | OUTPATIENT
Start: 2024-01-05 | End: 2024-01-10 | Stop reason: HOSPADM

## 2024-01-05 RX ORDER — MAGNESIUM HYDROXIDE/ALUMINUM HYDROXICE/SIMETHICONE 120; 1200; 1200 MG/30ML; MG/30ML; MG/30ML
30 SUSPENSION ORAL EVERY 6 HOURS PRN
Status: DISCONTINUED | OUTPATIENT
Start: 2024-01-05 | End: 2024-01-10 | Stop reason: HOSPADM

## 2024-01-05 RX ORDER — HEPARIN SODIUM 1000 [USP'U]/ML
4000 INJECTION, SOLUTION INTRAVENOUS; SUBCUTANEOUS ONCE
Status: DISCONTINUED | OUTPATIENT
Start: 2024-01-05 | End: 2024-01-06

## 2024-01-05 RX ORDER — SODIUM CHLORIDE 0.9 % (FLUSH) 0.9 %
5-40 SYRINGE (ML) INJECTION EVERY 12 HOURS SCHEDULED
Status: DISCONTINUED | OUTPATIENT
Start: 2024-01-05 | End: 2024-01-10 | Stop reason: HOSPADM

## 2024-01-05 RX ORDER — MAGNESIUM SULFATE IN WATER 40 MG/ML
2000 INJECTION, SOLUTION INTRAVENOUS PRN
Status: DISCONTINUED | OUTPATIENT
Start: 2024-01-05 | End: 2024-01-06

## 2024-01-05 RX ORDER — MAGNESIUM SULFATE 1 G/100ML
1000 INJECTION INTRAVENOUS ONCE
Status: COMPLETED | OUTPATIENT
Start: 2024-01-05 | End: 2024-01-05

## 2024-01-05 RX ORDER — HEPARIN SODIUM 10000 [USP'U]/100ML
5-30 INJECTION, SOLUTION INTRAVENOUS CONTINUOUS
Status: DISCONTINUED | OUTPATIENT
Start: 2024-01-05 | End: 2024-01-06

## 2024-01-05 RX ORDER — FERROUS SULFATE 325(65) MG
325 TABLET ORAL EVERY OTHER DAY
Status: DISCONTINUED | OUTPATIENT
Start: 2024-01-05 | End: 2024-01-10 | Stop reason: HOSPADM

## 2024-01-05 RX ORDER — SODIUM CHLORIDE 9 MG/ML
INJECTION, SOLUTION INTRAVENOUS PRN
Status: DISCONTINUED | OUTPATIENT
Start: 2024-01-05 | End: 2024-01-10 | Stop reason: HOSPADM

## 2024-01-05 RX ORDER — ONDANSETRON 4 MG/1
4 TABLET, ORALLY DISINTEGRATING ORAL EVERY 8 HOURS PRN
Status: DISCONTINUED | OUTPATIENT
Start: 2024-01-05 | End: 2024-01-10 | Stop reason: HOSPADM

## 2024-01-05 RX ORDER — BUMETANIDE 1 MG/1
1 TABLET ORAL 2 TIMES DAILY
Status: DISCONTINUED | OUTPATIENT
Start: 2024-01-05 | End: 2024-01-10 | Stop reason: HOSPADM

## 2024-01-05 RX ORDER — LANOLIN ALCOHOL/MO/W.PET/CERES
CREAM (GRAM) TOPICAL
Status: DISPENSED
Start: 2024-01-05 | End: 2024-01-06

## 2024-01-05 RX ORDER — PANTOPRAZOLE SODIUM 40 MG/1
40 TABLET, DELAYED RELEASE ORAL DAILY
Status: DISCONTINUED | OUTPATIENT
Start: 2024-01-05 | End: 2024-01-10 | Stop reason: HOSPADM

## 2024-01-05 RX ORDER — ACETAMINOPHEN 500 MG
1000 TABLET ORAL
Status: COMPLETED | OUTPATIENT
Start: 2024-01-05 | End: 2024-01-05

## 2024-01-05 RX ORDER — ASPIRIN 81 MG/1
162 TABLET ORAL DAILY
Status: DISCONTINUED | OUTPATIENT
Start: 2024-01-05 | End: 2024-01-10 | Stop reason: HOSPADM

## 2024-01-05 RX ORDER — SODIUM CHLORIDE 0.9 % (FLUSH) 0.9 %
5-40 SYRINGE (ML) INJECTION PRN
Status: DISCONTINUED | OUTPATIENT
Start: 2024-01-05 | End: 2024-01-10 | Stop reason: HOSPADM

## 2024-01-05 RX ORDER — ONDANSETRON 2 MG/ML
4 INJECTION INTRAMUSCULAR; INTRAVENOUS EVERY 6 HOURS PRN
Status: DISCONTINUED | OUTPATIENT
Start: 2024-01-05 | End: 2024-01-10 | Stop reason: HOSPADM

## 2024-01-05 RX ORDER — ATORVASTATIN CALCIUM 40 MG/1
80 TABLET, FILM COATED ORAL DAILY
Status: DISCONTINUED | OUTPATIENT
Start: 2024-01-05 | End: 2024-01-10 | Stop reason: HOSPADM

## 2024-01-05 RX ORDER — LABETALOL HYDROCHLORIDE 5 MG/ML
10 INJECTION INTRAVENOUS EVERY 6 HOURS PRN
Status: DISCONTINUED | OUTPATIENT
Start: 2024-01-05 | End: 2024-01-10 | Stop reason: HOSPADM

## 2024-01-05 RX ORDER — POTASSIUM CHLORIDE 7.45 MG/ML
10 INJECTION INTRAVENOUS PRN
Status: DISCONTINUED | OUTPATIENT
Start: 2024-01-05 | End: 2024-01-06

## 2024-01-05 RX ADMIN — HEPARIN SODIUM 4000 UNITS: 1000 INJECTION INTRAVENOUS; SUBCUTANEOUS at 18:39

## 2024-01-05 RX ADMIN — ASPIRIN 162 MG: 81 TABLET, COATED ORAL at 18:58

## 2024-01-05 RX ADMIN — ACETAMINOPHEN 650 MG: 325 TABLET ORAL at 22:29

## 2024-01-05 RX ADMIN — MAGNESIUM SULFATE HEPTAHYDRATE 1000 MG: 1 INJECTION, SOLUTION INTRAVENOUS at 18:47

## 2024-01-05 RX ADMIN — PANTOPRAZOLE SODIUM 40 MG: 40 TABLET, DELAYED RELEASE ORAL at 18:49

## 2024-01-05 RX ADMIN — SODIUM CHLORIDE, PRESERVATIVE FREE 10 ML: 5 INJECTION INTRAVENOUS at 23:45

## 2024-01-05 RX ADMIN — HEPARIN SODIUM AND DEXTROSE 12 UNITS/KG/HR: 10000; 5 INJECTION INTRAVENOUS at 18:44

## 2024-01-05 RX ADMIN — AMLODIPINE BESYLATE 5 MG: 5 TABLET ORAL at 18:49

## 2024-01-05 RX ADMIN — ATORVASTATIN CALCIUM 80 MG: 40 TABLET, FILM COATED ORAL at 18:49

## 2024-01-05 RX ADMIN — POTASSIUM BICARBONATE 20 MEQ: 782 TABLET, EFFERVESCENT ORAL at 18:58

## 2024-01-05 RX ADMIN — FERROUS SULFATE TAB 325 MG (65 MG ELEMENTAL FE) 325 MG: 325 (65 FE) TAB at 18:49

## 2024-01-05 RX ADMIN — BUMETANIDE 1 MG: 1 TABLET ORAL at 22:29

## 2024-01-05 RX ADMIN — LABETALOL HYDROCHLORIDE 400 MG: 200 TABLET, FILM COATED ORAL at 21:27

## 2024-01-05 RX ADMIN — ACETAMINOPHEN 1000 MG: 500 TABLET ORAL at 13:21

## 2024-01-05 ASSESSMENT — PAIN - FUNCTIONAL ASSESSMENT: PAIN_FUNCTIONAL_ASSESSMENT: NONE - DENIES PAIN

## 2024-01-05 NOTE — ED TRIAGE NOTES
Pt presents to ED for chest tightness starting mid-morning. EMS reports they were originally called out for a left assist because his bilateral legs were weak and they gave out on his this morning. Pt denies chest tightness during triage. Alert and oriented to person, place. . Disoriented to year.     PMH: CHF, CABG, kidney problems    BG 120s for EMS.  systolic for EMS as well.

## 2024-01-05 NOTE — H&P
to baseline of around 2.5    Hypertension  GERD  Dyslipidemia  Diastolic congestive heart failure  -Continue PTA Norvasc, PPI, Lipitor  -Continue PTA Bumex and also Jardiance                    Medical Decision Making:   I personally reviewed labs: CBC, BMP  I personally reviewed imaging: Chest x-ray  I personally reviewed EKG:  Toxic drug monitoring: Monitor globin while on heparin drip  Discussed case with: ED provider. After discussion I am in agreement that acuity of patient's medical condition necessitates hospital stay.      Code Status: Full code  DVT Prophylaxis: Heparin drip  Baseline: From home, independent of ADLs    Subjective:   CHIEF COMPLAINT: Chest tightness, generalized weakness    HISTORY OF PRESENT ILLNESS:     Shawn Gross is a 68 y.o.  male with PMHx significant for past medical history of coronary artery s/p CABG, chronic kidney disease, COPD, hypertension, dyslipidemia, CVA is coming the hospital chief complaints of chest tightness and also generalized weakness.  Patient reports being usual travails until about 2 days ago when he started not feeling well.  He is reports having chest tightness this morning which is precordial, radiating to the left side of the chest, without any aggravating or relieving factors.  He also reports generalized weakness as well.  He reports that his legs gave away this morning but did not experience any focal weakness, tingling or numbness.    On arrival to the emergency department, he was noted a fever of 102.4, was tachycardic with a heart rate of 106, blood pressure was 182/87.  On labs noted to have a potassium of 3.1, creatinine of 2.5, magnesium of 1.4, troponin of 528.  WBC is 12.5, hemoglobin 13.1 and platelet count is 306.  COVID and flu were negative.  Initially there was concern for STEMI but patient was evaluated by cardiology and deemed that this is not a STEMI per ER physician.  We were asked to admit for work up and evaluation of the above problems.

## 2024-01-05 NOTE — ED PROVIDER NOTES
[facility] EMERGENCY DEPT  EMERGENCY DEPARTMENT ENCOUNTER       Pt Name: Shawn Gross Jr.  MRN: [id number]  Birthdate 1955  Date of evaluation: 2024  Provider: Daisy Morin MD   PCP: Lili Ramon APRN - NP  Note Started: 12:50 PM EST 24     CHIEF COMPLAINT       Chief Complaint   Patient presents with    Chest Pain        HISTORY OF PRESENT ILLNESS: 1 or more elements      History From: patient, History limited by: none     Shawn Gross Jr. is a 68 y.o. male who presents via EMS with generalized weakness and chest pain       Please See MDM for Additional Details of the HPI/PMH  Nursing Notes were all reviewed and agreed with or any disagreements were addressed in the HPI.     REVIEW OF SYSTEMS        Positives and Pertinent negatives as per HPI.    PAST HISTORY     Past Medical History:  Past Medical History:   Diagnosis Date    Anemia     CAD (coronary artery disease)     10/31/22: NSTEMI sees [facility] cardiology, may be getting CABG    CKD (chronic kidney disease) stage 3, GFR 30-59 ml/min ([facility])     COPD (chronic obstructive pulmonary disease) ([facility])     Fractured rib 2021    from a fall per pt on 2021    High cholesterol     Hypertension     per pt on 2021    MI (myocardial infarction) ([facility])     Recurrent pleural effusion on left     Stroke ([facility])        Past Surgical History:  Past Surgical History:   Procedure Laterality Date    COLONOSCOPY N/A 2022    COLONOSCOPY performed by Zane [facility]oria MD at [facility] MA[facility] OR    CORONARY ARTERY BYPASS GRAFT  2022    UPPER GASTROINTESTINAL ENDOSCOPY      per pt on 2021       Family History:  No family history on file.    Social History:  Social History     Tobacco Use    Smoking status: Former     Current packs/day: 0.00     Types: Cigarettes     Quit date: 10/2022     Years since quittin.2    Smokeless tobacco: Never   Substance Use Topics    Alcohol use: Not Currently     Alcohol/week: 1.0 standard drink of alcohol

## 2024-01-06 ENCOUNTER — APPOINTMENT (OUTPATIENT)
Facility: HOSPITAL | Age: 69
DRG: 871 | End: 2024-01-06
Payer: MEDICARE

## 2024-01-06 LAB
ANION GAP SERPL CALC-SCNC: 9 MMOL/L (ref 5–15)
BASOPHILS # BLD: 0 K/UL (ref 0–0.1)
BASOPHILS NFR BLD: 0 % (ref 0–1)
BUN SERPL-MCNC: 23 MG/DL (ref 6–20)
BUN/CREAT SERPL: 8 (ref 12–20)
CALCIUM SERPL-MCNC: 8 MG/DL (ref 8.5–10.1)
CHLORIDE SERPL-SCNC: 108 MMOL/L (ref 97–108)
CO2 SERPL-SCNC: 24 MMOL/L (ref 21–32)
CREAT SERPL-MCNC: 2.83 MG/DL (ref 0.7–1.3)
DIFFERENTIAL METHOD BLD: ABNORMAL
EOSINOPHIL # BLD: 0 K/UL (ref 0–0.4)
EOSINOPHIL NFR BLD: 0 % (ref 0–7)
ERYTHROCYTE [DISTWIDTH] IN BLOOD BY AUTOMATED COUNT: 15 % (ref 11.5–14.5)
GLUCOSE SERPL-MCNC: 134 MG/DL (ref 65–100)
HCT VFR BLD AUTO: 37.4 % (ref 36.6–50.3)
HGB BLD-MCNC: 12.3 G/DL (ref 12.1–17)
IMM GRANULOCYTES # BLD AUTO: 0 K/UL (ref 0–0.04)
IMM GRANULOCYTES NFR BLD AUTO: 0 % (ref 0–0.5)
LYMPHOCYTES # BLD: 0.7 K/UL (ref 0.8–3.5)
LYMPHOCYTES NFR BLD: 7 % (ref 12–49)
MAGNESIUM SERPL-MCNC: 2.4 MG/DL (ref 1.6–2.4)
MCH RBC QN AUTO: 30.3 PG (ref 26–34)
MCHC RBC AUTO-ENTMCNC: 32.9 G/DL (ref 30–36.5)
MCV RBC AUTO: 92.1 FL (ref 80–99)
MONOCYTES # BLD: 1 K/UL (ref 0–1)
MONOCYTES NFR BLD: 9 % (ref 5–13)
NEUTS SEG # BLD: 8.5 K/UL (ref 1.8–8)
NEUTS SEG NFR BLD: 83 % (ref 32–75)
NRBC # BLD: 0 K/UL (ref 0–0.01)
NRBC BLD-RTO: 0 PER 100 WBC
PLATELET # BLD AUTO: 268 K/UL (ref 150–400)
PMV BLD AUTO: 11.3 FL (ref 8.9–12.9)
POTASSIUM SERPL-SCNC: 2.9 MMOL/L (ref 3.5–5.1)
PROCALCITONIN SERPL-MCNC: 0.56 NG/ML
RBC # BLD AUTO: 4.06 M/UL (ref 4.1–5.7)
SODIUM SERPL-SCNC: 141 MMOL/L (ref 136–145)
UFH PPP CHRO-ACNC: 0.43 IU/ML
UFH PPP CHRO-ACNC: 0.64 IU/ML
WBC # BLD AUTO: 10.2 K/UL (ref 4.1–11.1)

## 2024-01-06 PROCEDURE — 83735 ASSAY OF MAGNESIUM: CPT

## 2024-01-06 PROCEDURE — 2580000003 HC RX 258: Performed by: INTERNAL MEDICINE

## 2024-01-06 PROCEDURE — 85025 COMPLETE CBC W/AUTO DIFF WBC: CPT

## 2024-01-06 PROCEDURE — 71250 CT THORAX DX C-: CPT

## 2024-01-06 PROCEDURE — 80048 BASIC METABOLIC PNL TOTAL CA: CPT

## 2024-01-06 PROCEDURE — 85520 HEPARIN ASSAY: CPT

## 2024-01-06 PROCEDURE — 2060000000 HC ICU INTERMEDIATE R&B

## 2024-01-06 PROCEDURE — 6360000002 HC RX W HCPCS: Performed by: INTERNAL MEDICINE

## 2024-01-06 PROCEDURE — 0202U NFCT DS 22 TRGT SARS-COV-2: CPT

## 2024-01-06 PROCEDURE — 36415 COLL VENOUS BLD VENIPUNCTURE: CPT

## 2024-01-06 PROCEDURE — 6370000000 HC RX 637 (ALT 250 FOR IP): Performed by: INTERNAL MEDICINE

## 2024-01-06 PROCEDURE — 84145 PROCALCITONIN (PCT): CPT

## 2024-01-06 PROCEDURE — 74176 CT ABD & PELVIS W/O CONTRAST: CPT

## 2024-01-06 RX ORDER — ENOXAPARIN SODIUM 100 MG/ML
30 INJECTION SUBCUTANEOUS DAILY
Status: DISCONTINUED | OUTPATIENT
Start: 2024-01-06 | End: 2024-01-10 | Stop reason: HOSPADM

## 2024-01-06 RX ORDER — POTASSIUM CHLORIDE 750 MG/1
40 TABLET, FILM COATED, EXTENDED RELEASE ORAL ONCE
Status: COMPLETED | OUTPATIENT
Start: 2024-01-06 | End: 2024-01-06

## 2024-01-06 RX ADMIN — AMLODIPINE BESYLATE 5 MG: 5 TABLET ORAL at 09:09

## 2024-01-06 RX ADMIN — EMPAGLIFLOZIN 10 MG: 10 TABLET, FILM COATED ORAL at 09:09

## 2024-01-06 RX ADMIN — LABETALOL HYDROCHLORIDE 400 MG: 200 TABLET, FILM COATED ORAL at 20:44

## 2024-01-06 RX ADMIN — ACETAMINOPHEN 650 MG: 325 TABLET ORAL at 06:17

## 2024-01-06 RX ADMIN — BUMETANIDE 1 MG: 1 TABLET ORAL at 09:09

## 2024-01-06 RX ADMIN — CEFEPIME 1000 MG: 1 INJECTION, POWDER, FOR SOLUTION INTRAMUSCULAR; INTRAVENOUS at 15:29

## 2024-01-06 RX ADMIN — ATORVASTATIN CALCIUM 80 MG: 40 TABLET, FILM COATED ORAL at 09:09

## 2024-01-06 RX ADMIN — ACETAMINOPHEN 650 MG: 325 TABLET ORAL at 20:41

## 2024-01-06 RX ADMIN — VANCOMYCIN HYDROCHLORIDE 1750 MG: 10 INJECTION, POWDER, LYOPHILIZED, FOR SOLUTION INTRAVENOUS at 12:57

## 2024-01-06 RX ADMIN — SODIUM CHLORIDE, PRESERVATIVE FREE 10 ML: 5 INJECTION INTRAVENOUS at 20:45

## 2024-01-06 RX ADMIN — POTASSIUM CHLORIDE 40 MEQ: 750 TABLET, FILM COATED, EXTENDED RELEASE ORAL at 13:41

## 2024-01-06 RX ADMIN — LABETALOL HYDROCHLORIDE 400 MG: 200 TABLET, FILM COATED ORAL at 09:09

## 2024-01-06 RX ADMIN — MELATONIN 3 MG: at 20:44

## 2024-01-06 RX ADMIN — PANTOPRAZOLE SODIUM 40 MG: 40 TABLET, DELAYED RELEASE ORAL at 09:09

## 2024-01-06 RX ADMIN — ENOXAPARIN SODIUM 30 MG: 100 INJECTION SUBCUTANEOUS at 20:43

## 2024-01-06 RX ADMIN — BUMETANIDE 1 MG: 1 TABLET ORAL at 20:41

## 2024-01-06 RX ADMIN — ASPIRIN 162 MG: 81 TABLET, COATED ORAL at 09:09

## 2024-01-06 RX ADMIN — SODIUM CHLORIDE, PRESERVATIVE FREE 10 ML: 5 INJECTION INTRAVENOUS at 09:10

## 2024-01-06 NOTE — CONSULTS
Pt seen and examined    Full consult dictated    Troponin elevation in the setting of sepsis, CKD    Conservative Rx  Echo    No additional recc at this time    D/C IV heparin

## 2024-01-06 NOTE — ED NOTES
Called [facility] to clarify that they are coming to get the patient and Bailee LONG stated that they were coming shortly to get the patient.

## 2024-01-06 NOTE — ED NOTES
2120: TRANSFER - OUT REPORT:    Verbal report given to Annia LONG on Shawn Gross Jr.  being transferred to [facility] for routine progression of patient care       Report consisted of patient's Situation, Background, Assessment and   Recommendations(SBAR).     Information from the following report(s) Nurse Handoff Report, ED SBAR, Recent Results, and Cardiac Rhythm Sinus Tach  was reviewed with the receiving nurse.    [place] Fall Assessment:    Presents to emergency department  because of falls (Syncope, seizure, or loss of consciousness): No  Age > 70: No  Altered Mental Status, Intoxication with alcohol or substance confusion (Disorientation, impaired judgment, poor safety awaremess, or inability to follow instructions): Yes  Impaired Mobility: Ambulates or transfers with assistive devices or assistance; Unable to ambulate or transer.: Yes  Nursing Judgement: Yes          Lines:   Peripheral IV 01/05/24 Right Antecubital (Active)   Site Assessment Clean, dry & intact 01/05/24 1306   Line Status Blood return noted;Normal saline locked;Specimen collected 01/05/24 1306   Phlebitis Assessment No symptoms 01/05/24 1306   Infiltration Assessment 0 01/05/24 1306       Peripheral IV 01/05/24 Left Cephalic (Active)        Opportunity for questions and clarification was provided.      Patient transported with:  Monitor and Registered Nurse

## 2024-01-06 NOTE — ED NOTES
Bedside and Verbal shift change report given to ETHAN Hernández (oncoming nurse) by ETHAN Munroe (offgoing nurse). Report included the following information Nurse Handoff Report, Index, ED Encounter Summary, ED SBAR, Adult Overview, MAR, Recent Results, Med Rec Status, and Neuro Assessment.      Oncoming RN aware that pt is A&O to place and self - reports that he walks w/o assist at home but has not been up during ER stay

## 2024-01-07 LAB
ALBUMIN SERPL-MCNC: 2.3 G/DL (ref 3.5–5)
ALBUMIN/GLOB SERPL: 0.6 (ref 1.1–2.2)
ALP SERPL-CCNC: 81 U/L (ref 45–117)
ALT SERPL-CCNC: 36 U/L (ref 12–78)
ANION GAP SERPL CALC-SCNC: 5 MMOL/L (ref 5–15)
AST SERPL-CCNC: 132 U/L (ref 15–37)
B PERT DNA SPEC QL NAA+PROBE: NOT DETECTED
BILIRUB SERPL-MCNC: 0.6 MG/DL (ref 0.2–1)
BORDETELLA PARAPERTUSSIS BY PCR: NOT DETECTED
BUN SERPL-MCNC: 34 MG/DL (ref 6–20)
BUN/CREAT SERPL: 10 (ref 12–20)
C PNEUM DNA SPEC QL NAA+PROBE: NOT DETECTED
CALCIUM SERPL-MCNC: 7.3 MG/DL (ref 8.5–10.1)
CHLORIDE SERPL-SCNC: 110 MMOL/L (ref 97–108)
CO2 SERPL-SCNC: 25 MMOL/L (ref 21–32)
CREAT SERPL-MCNC: 3.29 MG/DL (ref 0.7–1.3)
ERYTHROCYTE [DISTWIDTH] IN BLOOD BY AUTOMATED COUNT: 15.5 % (ref 11.5–14.5)
FLUAV H1 2009 PAND RNA SPEC QL NAA+PROBE: DETECTED
FLUBV RNA SPEC QL NAA+PROBE: NOT DETECTED
GLOBULIN SER CALC-MCNC: 3.6 G/DL (ref 2–4)
GLUCOSE SERPL-MCNC: 119 MG/DL (ref 65–100)
HADV DNA SPEC QL NAA+PROBE: NOT DETECTED
HCOV 229E RNA SPEC QL NAA+PROBE: NOT DETECTED
HCOV HKU1 RNA SPEC QL NAA+PROBE: NOT DETECTED
HCOV NL63 RNA SPEC QL NAA+PROBE: NOT DETECTED
HCOV OC43 RNA SPEC QL NAA+PROBE: NOT DETECTED
HCT VFR BLD AUTO: 32.6 % (ref 36.6–50.3)
HGB BLD-MCNC: 11 G/DL (ref 12.1–17)
HMPV RNA SPEC QL NAA+PROBE: NOT DETECTED
HPIV1 RNA SPEC QL NAA+PROBE: NOT DETECTED
HPIV2 RNA SPEC QL NAA+PROBE: NOT DETECTED
HPIV3 RNA SPEC QL NAA+PROBE: NOT DETECTED
HPIV4 RNA SPEC QL NAA+PROBE: NOT DETECTED
M PNEUMO DNA SPEC QL NAA+PROBE: NOT DETECTED
MCH RBC QN AUTO: 31.3 PG (ref 26–34)
MCHC RBC AUTO-ENTMCNC: 33.7 G/DL (ref 30–36.5)
MCV RBC AUTO: 92.6 FL (ref 80–99)
NRBC # BLD: 0.02 K/UL (ref 0–0.01)
NRBC BLD-RTO: 0.2 PER 100 WBC
PLATELET # BLD AUTO: 215 K/UL (ref 150–400)
PMV BLD AUTO: 11.9 FL (ref 8.9–12.9)
POTASSIUM SERPL-SCNC: 3.4 MMOL/L (ref 3.5–5.1)
PROT SERPL-MCNC: 5.9 G/DL (ref 6.4–8.2)
RBC # BLD AUTO: 3.52 M/UL (ref 4.1–5.7)
RSV RNA SPEC QL NAA+PROBE: NOT DETECTED
RV+EV RNA SPEC QL NAA+PROBE: NOT DETECTED
SARS-COV-2 RNA RESP QL NAA+PROBE: NOT DETECTED
SODIUM SERPL-SCNC: 140 MMOL/L (ref 136–145)
VANCOMYCIN SERPL-MCNC: 18.4 UG/ML
WBC # BLD AUTO: 8.5 K/UL (ref 4.1–11.1)

## 2024-01-07 PROCEDURE — 6360000002 HC RX W HCPCS: Performed by: INTERNAL MEDICINE

## 2024-01-07 PROCEDURE — 2580000003 HC RX 258: Performed by: INTERNAL MEDICINE

## 2024-01-07 PROCEDURE — 80053 COMPREHEN METABOLIC PANEL: CPT

## 2024-01-07 PROCEDURE — 85027 COMPLETE CBC AUTOMATED: CPT

## 2024-01-07 PROCEDURE — 2060000000 HC ICU INTERMEDIATE R&B

## 2024-01-07 PROCEDURE — 80202 ASSAY OF VANCOMYCIN: CPT

## 2024-01-07 PROCEDURE — 6370000000 HC RX 637 (ALT 250 FOR IP): Performed by: INTERNAL MEDICINE

## 2024-01-07 PROCEDURE — 36415 COLL VENOUS BLD VENIPUNCTURE: CPT

## 2024-01-07 RX ORDER — POTASSIUM CHLORIDE 750 MG/1
20 TABLET, FILM COATED, EXTENDED RELEASE ORAL ONCE
Status: COMPLETED | OUTPATIENT
Start: 2024-01-07 | End: 2024-01-07

## 2024-01-07 RX ORDER — OSELTAMIVIR PHOSPHATE 30 MG/1
30 CAPSULE ORAL DAILY
Status: DISCONTINUED | OUTPATIENT
Start: 2024-01-07 | End: 2024-01-10 | Stop reason: HOSPADM

## 2024-01-07 RX ADMIN — ACETAMINOPHEN 650 MG: 325 TABLET ORAL at 12:22

## 2024-01-07 RX ADMIN — CEFTRIAXONE 1000 MG: 1 INJECTION, POWDER, FOR SOLUTION INTRAMUSCULAR; INTRAVENOUS at 11:59

## 2024-01-07 RX ADMIN — PANTOPRAZOLE SODIUM 40 MG: 40 TABLET, DELAYED RELEASE ORAL at 08:55

## 2024-01-07 RX ADMIN — FERROUS SULFATE TAB 325 MG (65 MG ELEMENTAL FE) 325 MG: 325 (65 FE) TAB at 12:22

## 2024-01-07 RX ADMIN — ACETAMINOPHEN 650 MG: 325 TABLET ORAL at 04:53

## 2024-01-07 RX ADMIN — AZITHROMYCIN MONOHYDRATE 500 MG: 500 INJECTION, POWDER, LYOPHILIZED, FOR SOLUTION INTRAVENOUS at 12:03

## 2024-01-07 RX ADMIN — LABETALOL HYDROCHLORIDE 400 MG: 200 TABLET, FILM COATED ORAL at 21:23

## 2024-01-07 RX ADMIN — OSELTAMIVIR PHOSPHATE 30 MG: 30 CAPSULE ORAL at 10:00

## 2024-01-07 RX ADMIN — SODIUM CHLORIDE, PRESERVATIVE FREE 10 ML: 5 INJECTION INTRAVENOUS at 10:55

## 2024-01-07 RX ADMIN — CEFEPIME 1000 MG: 1 INJECTION, POWDER, FOR SOLUTION INTRAMUSCULAR; INTRAVENOUS at 04:35

## 2024-01-07 RX ADMIN — MELATONIN 3 MG: at 21:23

## 2024-01-07 RX ADMIN — ATORVASTATIN CALCIUM 80 MG: 40 TABLET, FILM COATED ORAL at 09:59

## 2024-01-07 RX ADMIN — SODIUM CHLORIDE, PRESERVATIVE FREE 10 ML: 5 INJECTION INTRAVENOUS at 21:43

## 2024-01-07 RX ADMIN — POTASSIUM CHLORIDE 20 MEQ: 750 TABLET, FILM COATED, EXTENDED RELEASE ORAL at 09:59

## 2024-01-07 RX ADMIN — ENOXAPARIN SODIUM 30 MG: 100 INJECTION SUBCUTANEOUS at 09:59

## 2024-01-07 RX ADMIN — ASPIRIN 162 MG: 81 TABLET, COATED ORAL at 09:59

## 2024-01-07 RX ADMIN — AMLODIPINE BESYLATE 5 MG: 5 TABLET ORAL at 09:59

## 2024-01-07 RX ADMIN — LABETALOL HYDROCHLORIDE 400 MG: 200 TABLET, FILM COATED ORAL at 09:59

## 2024-01-07 ASSESSMENT — PAIN SCALES - GENERAL
PAINLEVEL_OUTOF10: 0
PAINLEVEL_OUTOF10: 5

## 2024-01-07 ASSESSMENT — PAIN DESCRIPTION - LOCATION: LOCATION: HEAD

## 2024-01-07 ASSESSMENT — PAIN - FUNCTIONAL ASSESSMENT: PAIN_FUNCTIONAL_ASSESSMENT: ACTIVITIES ARE NOT PREVENTED

## 2024-01-07 ASSESSMENT — PAIN DESCRIPTION - DESCRIPTORS: DESCRIPTORS: ACHING

## 2024-01-07 NOTE — CONSULTS
[facility]  CONSULTATION    Name:  LINA TORRES  MR#:  [id number]  :  1955  ACCOUNT #:  [id number]  DATE OF SERVICE:  2024    REQUESTING PHYSICIAN:  Daisy Morin MD    REASON FOR CONSULTATION:  Evaluate abnormal troponin.    CHIEF COMPLAINT:  Fever.    HISTORY OF PRESENT ILLNESS:  The patient is a 68-year-old man with a history of coronary artery disease and a prior non-ST-elevation MI in 10/2022.  At that time, he apparently underwent a one vessel bypass at [facility].  He also has a history of diastolic heart failure, hypertension and dyslipidemia.  He has had multiple recent admissions to local hospitals.  He presented yesterday with complaints of fever and chest discomfort.  His initial troponin was 329 and increased to 528.  He was diagnosed with sepsis and was also noted to be tachycardic.  He was placed on broad-spectrum antibiotics.  Initial COVID and flu test were negative.  The patient was also placed on IV heparin and I was asked to see the patient for evaluation.  His EKG showed sinus rhythm with an LVH with strain pattern and extensive repolarization abnormalities.  It was basically unchanged from prior EKGs.    PAST MEDICAL HISTORY:  As noted above.  Also history of chronic kidney disease with a creatinine of around 2.5, COPD, recurrent left-sided pleural effusions, prior stroke.    SURGERY:  Prior bypass surgery 10/2022.    CURRENT MEDICATIONS:  1.  Labetalol.  2.  Aspirin.  3.  Norvasc.  4.  Bumex 1 mg p.o. b.i.d.  5.  Potassium chloride.  6.  Jardiance.  7.  Lipitor.  8.  IV vancomycin.  9.  Cefepime.  10.  Iron sulfate.  11.  Protonix.  12.  Melatonin.    SOCIAL HISTORY:  The patient is a former smoker.  He occasionally drinks alcohol.  He lives in [place].    FAMILY HISTORY:  Positive for heart disease in the patient's father.    REVIEW OF SYSTEMS:  As noted above, otherwise noncontributory.    PHYSICAL EXAMINATION:  GENERAL:  Reveals a chronically ill-appearing

## 2024-01-07 NOTE — CARE COORDINATION
Care Management Initial Assessment       RUR: 21%  Readmission? No  1st IM letter given? Yes   1st  letter given: No         01/07/24 1529   Service Assessment   Patient Orientation   (Pt was alert but confused)   Cognition Other (see comment)  (Pt was confused.)   History Provided By Spouse   Primary Caregiver Spouse  (Martha Gross-wife [phone])   Support Systems Spouse/Significant Other   PCP Verified by CM Yes  (Pt's PCP is Dr. Dino Dunaway)   Last Visit to PCP Within last 3 months   Prior Functional Level Independent in ADLs/IADLs   Current Functional Level Independent in ADLs/IADLs   Would you like for me to discuss the discharge plan with any other family members/significant others, and if so, who? Yes  (Martha Gross-wife- [phone])   Financial Resources Medicare   Community Resources None   Social/Functional History   Lives With Spouse  (Pt resides in an one level home with ramp.)   Type of Home House   Home Layout One level   Home Access Ramped entrance   Home Equipment Rollator;Cane   Active  No   Discharge Planning   Type of Residence House  (Home with Home Health vs SNF)   Patient expects to be discharged to: Unknown  (Home with Home Health vs SNF.)       CM called pt's room to complete initial assessment. Pt was confused and informed CM to call his wife. CM called pt's wife to complete initial assessment. CM verified pt's demographics, insurance and PCP.    Pt is a 69 y/o  male admitted to [facility] on 1/5/2024 for NSTEMI. Pt's PCP is Dr. Dino Faith. Pt uses [facility] pharmacy on [facility] for Rx. Pt resides in an one level home with ramp along with spouse. Pt does not drive. Pt is independent with ADL's and IADL's. Pt has a rollator and cane. Pt has had home health with [facility] and been in [facility]. No IPR in the past. Pt FULL code status and no ACP on file. Pt's wife will transport at d/c or pt will need medical transport at d/c.

## 2024-01-08 LAB
ANION GAP SERPL CALC-SCNC: 5 MMOL/L (ref 5–15)
BASOPHILS # BLD: 0 K/UL (ref 0–0.1)
BASOPHILS NFR BLD: 0 % (ref 0–1)
BUN SERPL-MCNC: 40 MG/DL (ref 6–20)
BUN/CREAT SERPL: 12 (ref 12–20)
CALCIUM SERPL-MCNC: 7.4 MG/DL (ref 8.5–10.1)
CHLORIDE SERPL-SCNC: 112 MMOL/L (ref 97–108)
CO2 SERPL-SCNC: 23 MMOL/L (ref 21–32)
CREAT SERPL-MCNC: 3.42 MG/DL (ref 0.7–1.3)
DIFFERENTIAL METHOD BLD: ABNORMAL
EOSINOPHIL # BLD: 0 K/UL (ref 0–0.4)
EOSINOPHIL NFR BLD: 1 % (ref 0–7)
ERYTHROCYTE [DISTWIDTH] IN BLOOD BY AUTOMATED COUNT: 15.4 % (ref 11.5–14.5)
GLUCOSE SERPL-MCNC: 104 MG/DL (ref 65–100)
HCT VFR BLD AUTO: 32.5 % (ref 36.6–50.3)
HGB BLD-MCNC: 10.4 G/DL (ref 12.1–17)
IMM GRANULOCYTES # BLD AUTO: 0 K/UL (ref 0–0.04)
IMM GRANULOCYTES NFR BLD AUTO: 0 % (ref 0–0.5)
LYMPHOCYTES # BLD: 1.1 K/UL (ref 0.8–3.5)
LYMPHOCYTES NFR BLD: 25 % (ref 12–49)
MAGNESIUM SERPL-MCNC: 1.8 MG/DL (ref 1.6–2.4)
MCH RBC QN AUTO: 29.4 PG (ref 26–34)
MCHC RBC AUTO-ENTMCNC: 32 G/DL (ref 30–36.5)
MCV RBC AUTO: 91.8 FL (ref 80–99)
MONOCYTES # BLD: 0.4 K/UL (ref 0–1)
MONOCYTES NFR BLD: 10 % (ref 5–13)
NEUTS SEG # BLD: 2.9 K/UL (ref 1.8–8)
NEUTS SEG NFR BLD: 64 % (ref 32–75)
NRBC # BLD: 0 K/UL (ref 0–0.01)
NRBC BLD-RTO: 0 PER 100 WBC
PLATELET # BLD AUTO: 196 K/UL (ref 150–400)
PMV BLD AUTO: 11.6 FL (ref 8.9–12.9)
POTASSIUM SERPL-SCNC: 2.5 MMOL/L (ref 3.5–5.1)
RBC # BLD AUTO: 3.54 M/UL (ref 4.1–5.7)
SODIUM SERPL-SCNC: 140 MMOL/L (ref 136–145)
VANCOMYCIN SERPL-MCNC: 13.9 UG/ML
WBC # BLD AUTO: 4.5 K/UL (ref 4.1–11.1)

## 2024-01-08 PROCEDURE — 83735 ASSAY OF MAGNESIUM: CPT

## 2024-01-08 PROCEDURE — 92610 EVALUATE SWALLOWING FUNCTION: CPT

## 2024-01-08 PROCEDURE — 2060000000 HC ICU INTERMEDIATE R&B

## 2024-01-08 PROCEDURE — 6370000000 HC RX 637 (ALT 250 FOR IP): Performed by: NURSE PRACTITIONER

## 2024-01-08 PROCEDURE — 85025 COMPLETE CBC W/AUTO DIFF WBC: CPT

## 2024-01-08 PROCEDURE — 80202 ASSAY OF VANCOMYCIN: CPT

## 2024-01-08 PROCEDURE — 6370000000 HC RX 637 (ALT 250 FOR IP): Performed by: INTERNAL MEDICINE

## 2024-01-08 PROCEDURE — 80048 BASIC METABOLIC PNL TOTAL CA: CPT

## 2024-01-08 PROCEDURE — 2580000003 HC RX 258: Performed by: INTERNAL MEDICINE

## 2024-01-08 PROCEDURE — 36415 COLL VENOUS BLD VENIPUNCTURE: CPT

## 2024-01-08 PROCEDURE — 6360000002 HC RX W HCPCS: Performed by: INTERNAL MEDICINE

## 2024-01-08 RX ORDER — LOPERAMIDE HYDROCHLORIDE 2 MG/1
2 CAPSULE ORAL 4 TIMES DAILY PRN
Status: DISCONTINUED | OUTPATIENT
Start: 2024-01-08 | End: 2024-01-10 | Stop reason: HOSPADM

## 2024-01-08 RX ORDER — POTASSIUM CHLORIDE 750 MG/1
40 TABLET, FILM COATED, EXTENDED RELEASE ORAL EVERY 4 HOURS
Status: DISPENSED | OUTPATIENT
Start: 2024-01-08 | End: 2024-01-08

## 2024-01-08 RX ORDER — DOXYCYCLINE HYCLATE 100 MG
100 TABLET ORAL EVERY 12 HOURS SCHEDULED
Status: DISCONTINUED | OUTPATIENT
Start: 2024-01-08 | End: 2024-01-10 | Stop reason: HOSPADM

## 2024-01-08 RX ORDER — SODIUM CHLORIDE 9 MG/ML
INJECTION, SOLUTION INTRAVENOUS CONTINUOUS
Status: ACTIVE | OUTPATIENT
Start: 2024-01-08 | End: 2024-01-08

## 2024-01-08 RX ORDER — POTASSIUM CHLORIDE 750 MG/1
40 TABLET, FILM COATED, EXTENDED RELEASE ORAL ONCE
Status: COMPLETED | OUTPATIENT
Start: 2024-01-08 | End: 2024-01-08

## 2024-01-08 RX ADMIN — ACETAMINOPHEN 650 MG: 325 TABLET ORAL at 21:54

## 2024-01-08 RX ADMIN — LABETALOL HYDROCHLORIDE 400 MG: 200 TABLET, FILM COATED ORAL at 09:18

## 2024-01-08 RX ADMIN — POTASSIUM CHLORIDE 40 MEQ: 750 TABLET, FILM COATED, EXTENDED RELEASE ORAL at 03:54

## 2024-01-08 RX ADMIN — POTASSIUM CHLORIDE 40 MEQ: 750 TABLET, FILM COATED, EXTENDED RELEASE ORAL at 09:18

## 2024-01-08 RX ADMIN — SODIUM CHLORIDE: 9 INJECTION, SOLUTION INTRAVENOUS at 12:35

## 2024-01-08 RX ADMIN — AMLODIPINE BESYLATE 5 MG: 5 TABLET ORAL at 09:18

## 2024-01-08 RX ADMIN — SODIUM CHLORIDE, PRESERVATIVE FREE 10 ML: 5 INJECTION INTRAVENOUS at 21:45

## 2024-01-08 RX ADMIN — DOXYCYCLINE HYCLATE 100 MG: 100 TABLET, COATED ORAL at 21:45

## 2024-01-08 RX ADMIN — ACETAMINOPHEN 650 MG: 325 TABLET ORAL at 09:17

## 2024-01-08 RX ADMIN — SODIUM CHLORIDE, PRESERVATIVE FREE 10 ML: 5 INJECTION INTRAVENOUS at 10:00

## 2024-01-08 RX ADMIN — LABETALOL HYDROCHLORIDE 400 MG: 200 TABLET, FILM COATED ORAL at 21:45

## 2024-01-08 RX ADMIN — ATORVASTATIN CALCIUM 80 MG: 40 TABLET, FILM COATED ORAL at 09:17

## 2024-01-08 RX ADMIN — PANTOPRAZOLE SODIUM 40 MG: 40 TABLET, DELAYED RELEASE ORAL at 09:17

## 2024-01-08 RX ADMIN — MELATONIN 3 MG: at 21:45

## 2024-01-08 RX ADMIN — CEFTRIAXONE 1000 MG: 1 INJECTION, POWDER, FOR SOLUTION INTRAMUSCULAR; INTRAVENOUS at 09:34

## 2024-01-08 RX ADMIN — OSELTAMIVIR PHOSPHATE 30 MG: 30 CAPSULE ORAL at 09:30

## 2024-01-08 RX ADMIN — LOPERAMIDE HYDROCHLORIDE 2 MG: 2 CAPSULE ORAL at 12:33

## 2024-01-08 RX ADMIN — ENOXAPARIN SODIUM 30 MG: 100 INJECTION SUBCUTANEOUS at 09:18

## 2024-01-08 RX ADMIN — ASPIRIN 162 MG: 81 TABLET, COATED ORAL at 09:17

## 2024-01-08 RX ADMIN — AZITHROMYCIN MONOHYDRATE 500 MG: 500 INJECTION, POWDER, LYOPHILIZED, FOR SOLUTION INTRAVENOUS at 09:37

## 2024-01-08 ASSESSMENT — PAIN DESCRIPTION - ONSET: ONSET: ON-GOING

## 2024-01-08 ASSESSMENT — PAIN DESCRIPTION - FREQUENCY: FREQUENCY: INTERMITTENT

## 2024-01-08 ASSESSMENT — PAIN DESCRIPTION - ORIENTATION: ORIENTATION: RIGHT

## 2024-01-08 ASSESSMENT — PAIN SCALES - GENERAL
PAINLEVEL_OUTOF10: 2
PAINLEVEL_OUTOF10: 5

## 2024-01-08 ASSESSMENT — PAIN DESCRIPTION - PAIN TYPE: TYPE: ACUTE PAIN

## 2024-01-08 ASSESSMENT — PAIN DESCRIPTION - DESCRIPTORS
DESCRIPTORS: ACHING
DESCRIPTORS: ACHING

## 2024-01-08 ASSESSMENT — PAIN DESCRIPTION - LOCATION
LOCATION: LEG
LOCATION: LEG

## 2024-01-08 ASSESSMENT — PAIN - FUNCTIONAL ASSESSMENT: PAIN_FUNCTIONAL_ASSESSMENT: ACTIVITIES ARE NOT PREVENTED

## 2024-01-09 LAB
ANION GAP SERPL CALC-SCNC: 6 MMOL/L (ref 5–15)
BASOPHILS # BLD: 0 K/UL (ref 0–0.1)
BASOPHILS NFR BLD: 0 % (ref 0–1)
BUN SERPL-MCNC: 38 MG/DL (ref 6–20)
BUN/CREAT SERPL: 11 (ref 12–20)
CALCIUM SERPL-MCNC: 7.6 MG/DL (ref 8.5–10.1)
CHLORIDE SERPL-SCNC: 114 MMOL/L (ref 97–108)
CO2 SERPL-SCNC: 21 MMOL/L (ref 21–32)
CREAT SERPL-MCNC: 3.37 MG/DL (ref 0.7–1.3)
DIFFERENTIAL METHOD BLD: ABNORMAL
EOSINOPHIL # BLD: 0.1 K/UL (ref 0–0.4)
EOSINOPHIL NFR BLD: 2 % (ref 0–7)
ERYTHROCYTE [DISTWIDTH] IN BLOOD BY AUTOMATED COUNT: 15.7 % (ref 11.5–14.5)
GLUCOSE SERPL-MCNC: 85 MG/DL (ref 65–100)
HCT VFR BLD AUTO: 31.3 % (ref 36.6–50.3)
HGB BLD-MCNC: 10.3 G/DL (ref 12.1–17)
IMM GRANULOCYTES # BLD AUTO: 0 K/UL (ref 0–0.04)
IMM GRANULOCYTES NFR BLD AUTO: 0 % (ref 0–0.5)
LYMPHOCYTES # BLD: 1.2 K/UL (ref 0.8–3.5)
LYMPHOCYTES NFR BLD: 22 % (ref 12–49)
MCH RBC QN AUTO: 30.2 PG (ref 26–34)
MCHC RBC AUTO-ENTMCNC: 32.9 G/DL (ref 30–36.5)
MCV RBC AUTO: 91.8 FL (ref 80–99)
MONOCYTES # BLD: 0.5 K/UL (ref 0–1)
MONOCYTES NFR BLD: 10 % (ref 5–13)
NEUTS SEG # BLD: 3.4 K/UL (ref 1.8–8)
NEUTS SEG NFR BLD: 66 % (ref 32–75)
NRBC # BLD: 0 K/UL (ref 0–0.01)
NRBC BLD-RTO: 0 PER 100 WBC
PLATELET # BLD AUTO: 212 K/UL (ref 150–400)
PMV BLD AUTO: 11.6 FL (ref 8.9–12.9)
POTASSIUM SERPL-SCNC: 3.1 MMOL/L (ref 3.5–5.1)
RBC # BLD AUTO: 3.41 M/UL (ref 4.1–5.7)
SODIUM SERPL-SCNC: 141 MMOL/L (ref 136–145)
WBC # BLD AUTO: 5.2 K/UL (ref 4.1–11.1)

## 2024-01-09 PROCEDURE — 36415 COLL VENOUS BLD VENIPUNCTURE: CPT

## 2024-01-09 PROCEDURE — 6360000002 HC RX W HCPCS: Performed by: INTERNAL MEDICINE

## 2024-01-09 PROCEDURE — 2060000000 HC ICU INTERMEDIATE R&B

## 2024-01-09 PROCEDURE — 85025 COMPLETE CBC W/AUTO DIFF WBC: CPT

## 2024-01-09 PROCEDURE — 2580000003 HC RX 258: Performed by: INTERNAL MEDICINE

## 2024-01-09 PROCEDURE — 6370000000 HC RX 637 (ALT 250 FOR IP): Performed by: INTERNAL MEDICINE

## 2024-01-09 PROCEDURE — 92526 ORAL FUNCTION THERAPY: CPT

## 2024-01-09 PROCEDURE — 80048 BASIC METABOLIC PNL TOTAL CA: CPT

## 2024-01-09 RX ORDER — SODIUM CHLORIDE 9 MG/ML
INJECTION, SOLUTION INTRAVENOUS CONTINUOUS
Status: DISCONTINUED | OUTPATIENT
Start: 2024-01-09 | End: 2024-01-09

## 2024-01-09 RX ORDER — SODIUM CHLORIDE 9 MG/ML
INJECTION, SOLUTION INTRAVENOUS CONTINUOUS
Status: ACTIVE | OUTPATIENT
Start: 2024-01-09 | End: 2024-01-10

## 2024-01-09 RX ADMIN — DOXYCYCLINE HYCLATE 100 MG: 100 TABLET, COATED ORAL at 21:38

## 2024-01-09 RX ADMIN — ATORVASTATIN CALCIUM 80 MG: 40 TABLET, FILM COATED ORAL at 09:07

## 2024-01-09 RX ADMIN — LABETALOL HYDROCHLORIDE 400 MG: 200 TABLET, FILM COATED ORAL at 09:07

## 2024-01-09 RX ADMIN — CEFTRIAXONE 1000 MG: 1 INJECTION, POWDER, FOR SOLUTION INTRAMUSCULAR; INTRAVENOUS at 09:16

## 2024-01-09 RX ADMIN — MELATONIN 3 MG: at 21:38

## 2024-01-09 RX ADMIN — OSELTAMIVIR PHOSPHATE 30 MG: 30 CAPSULE ORAL at 09:10

## 2024-01-09 RX ADMIN — ENOXAPARIN SODIUM 30 MG: 100 INJECTION SUBCUTANEOUS at 09:07

## 2024-01-09 RX ADMIN — SODIUM CHLORIDE, PRESERVATIVE FREE 10 ML: 5 INJECTION INTRAVENOUS at 09:13

## 2024-01-09 RX ADMIN — ACETAMINOPHEN 650 MG: 325 TABLET ORAL at 09:07

## 2024-01-09 RX ADMIN — POTASSIUM BICARBONATE 50 MEQ: 782 TABLET, EFFERVESCENT ORAL at 09:07

## 2024-01-09 RX ADMIN — ASPIRIN 162 MG: 81 TABLET, COATED ORAL at 09:07

## 2024-01-09 RX ADMIN — AMLODIPINE BESYLATE 5 MG: 5 TABLET ORAL at 09:07

## 2024-01-09 RX ADMIN — LABETALOL HYDROCHLORIDE 400 MG: 200 TABLET, FILM COATED ORAL at 21:38

## 2024-01-09 RX ADMIN — SODIUM CHLORIDE: 9 INJECTION, SOLUTION INTRAVENOUS at 15:41

## 2024-01-09 RX ADMIN — FERROUS SULFATE TAB 325 MG (65 MG ELEMENTAL FE) 325 MG: 325 (65 FE) TAB at 15:41

## 2024-01-09 RX ADMIN — PANTOPRAZOLE SODIUM 40 MG: 40 TABLET, DELAYED RELEASE ORAL at 09:07

## 2024-01-09 RX ADMIN — SODIUM CHLORIDE, PRESERVATIVE FREE 10 ML: 5 INJECTION INTRAVENOUS at 21:38

## 2024-01-09 RX ADMIN — DOXYCYCLINE HYCLATE 100 MG: 100 TABLET, COATED ORAL at 09:07

## 2024-01-09 ASSESSMENT — PAIN SCALES - GENERAL: PAINLEVEL_OUTOF10: 2

## 2024-01-09 ASSESSMENT — PAIN DESCRIPTION - DESCRIPTORS: DESCRIPTORS: ACHING

## 2024-01-09 ASSESSMENT — PAIN DESCRIPTION - ORIENTATION: ORIENTATION: RIGHT

## 2024-01-09 ASSESSMENT — PAIN DESCRIPTION - LOCATION: LOCATION: LEG

## 2024-01-10 ENCOUNTER — APPOINTMENT (OUTPATIENT)
Facility: HOSPITAL | Age: 69
DRG: 871 | End: 2024-01-10
Attending: INTERNAL MEDICINE
Payer: MEDICARE

## 2024-01-10 VITALS
SYSTOLIC BLOOD PRESSURE: 130 MMHG | BODY MASS INDEX: 24.86 KG/M2 | OXYGEN SATURATION: 94 % | HEART RATE: 64 BPM | WEIGHT: 164.02 LBS | RESPIRATION RATE: 18 BRPM | HEIGHT: 68 IN | DIASTOLIC BLOOD PRESSURE: 70 MMHG | TEMPERATURE: 97.6 F

## 2024-01-10 LAB
ANION GAP SERPL CALC-SCNC: 5 MMOL/L (ref 5–15)
BUN SERPL-MCNC: 31 MG/DL (ref 6–20)
BUN/CREAT SERPL: 11 (ref 12–20)
CALCIUM SERPL-MCNC: 7.8 MG/DL (ref 8.5–10.1)
CHLORIDE SERPL-SCNC: 116 MMOL/L (ref 97–108)
CO2 SERPL-SCNC: 22 MMOL/L (ref 21–32)
CREAT SERPL-MCNC: 2.93 MG/DL (ref 0.7–1.3)
ECHO AO ASC DIAM: 3.2 CM
ECHO AO ASCENDING AORTA INDEX: 1.7 CM/M2
ECHO AO ROOT DIAM: 3.3 CM
ECHO AO ROOT INDEX: 1.76 CM/M2
ECHO AR MAX VEL PISA: 3.5 M/S
ECHO AV AREA PEAK VELOCITY: 1.9 CM2
ECHO AV AREA/BSA PEAK VELOCITY: 1 CM2/M2
ECHO AV MEAN GRADIENT: 6 MMHG
ECHO AV MEAN VELOCITY: 1.1 M/S
ECHO AV PEAK GRADIENT: 9 MMHG
ECHO AV PEAK VELOCITY: 1.5 M/S
ECHO AV REGURGITANT PHT: 379.3 MILLISECOND
ECHO AV VELOCITY RATIO: 0.73
ECHO AV VTI: 27.1 CM
ECHO BSA: 1.89 M2
ECHO LA DIAMETER INDEX: 2.23 CM/M2
ECHO LA DIAMETER: 4.2 CM
ECHO LA TO AORTIC ROOT RATIO: 1.27
ECHO LV E' LATERAL VELOCITY: 8 CM/S
ECHO LV E' SEPTAL VELOCITY: 5 CM/S
ECHO LV FRACTIONAL SHORTENING: 23 % (ref 28–44)
ECHO LV INTERNAL DIMENSION DIASTOLE INDEX: 2.5 CM/M2
ECHO LV INTERNAL DIMENSION DIASTOLIC: 4.7 CM (ref 4.2–5.9)
ECHO LV INTERNAL DIMENSION SYSTOLIC INDEX: 1.91 CM/M2
ECHO LV INTERNAL DIMENSION SYSTOLIC: 3.6 CM
ECHO LV IVSD: 1.3 CM (ref 0.6–1)
ECHO LV MASS 2D: 251.4 G (ref 88–224)
ECHO LV MASS INDEX 2D: 133.7 G/M2 (ref 49–115)
ECHO LV POSTERIOR WALL DIASTOLIC: 1.4 CM (ref 0.6–1)
ECHO LV RELATIVE WALL THICKNESS RATIO: 0.6
ECHO LVOT AREA: 2.5 CM2
ECHO LVOT DIAM: 1.8 CM
ECHO LVOT PEAK GRADIENT: 5 MMHG
ECHO LVOT PEAK VELOCITY: 1.1 M/S
ECHO MV EROA PISA: 0.3 CM2
ECHO MV REGURGITANT ALIASING (NYQUIST) VELOCITY: 39 CM/S
ECHO MV REGURGITANT RADIUS PISA: 0.76 CM
ECHO MV REGURGITANT VELOCITY PISA: 4.8 M/S
ECHO MV REGURGITANT VOLUME PISA: 52.14 ML
ECHO MV REGURGITANT VTIA: 176.9 CM
ECHO PV MAX VELOCITY: 0.9 M/S
ECHO PV PEAK GRADIENT: 4 MMHG
ECHO RV FREE WALL PEAK S': 7 CM/S
ECHO RV TAPSE: 1.6 CM (ref 1.7–?)
ECHO TV REGURGITANT MAX VELOCITY: 2.9 M/S
ECHO TV REGURGITANT PEAK GRADIENT: 34 MMHG
GLUCOSE SERPL-MCNC: 88 MG/DL (ref 65–100)
POTASSIUM SERPL-SCNC: 3.2 MMOL/L (ref 3.5–5.1)
SODIUM SERPL-SCNC: 143 MMOL/L (ref 136–145)

## 2024-01-10 PROCEDURE — 6370000000 HC RX 637 (ALT 250 FOR IP): Performed by: INTERNAL MEDICINE

## 2024-01-10 PROCEDURE — 93306 TTE W/DOPPLER COMPLETE: CPT

## 2024-01-10 PROCEDURE — 80048 BASIC METABOLIC PNL TOTAL CA: CPT

## 2024-01-10 PROCEDURE — 36415 COLL VENOUS BLD VENIPUNCTURE: CPT

## 2024-01-10 PROCEDURE — 6360000002 HC RX W HCPCS: Performed by: INTERNAL MEDICINE

## 2024-01-10 PROCEDURE — 2580000003 HC RX 258: Performed by: INTERNAL MEDICINE

## 2024-01-10 RX ORDER — POTASSIUM CHLORIDE 20 MEQ/1
20 TABLET, EXTENDED RELEASE ORAL 2 TIMES DAILY
Qty: 60 TABLET | Refills: 0 | Status: SHIPPED | OUTPATIENT
Start: 2024-01-10

## 2024-01-10 RX ORDER — POTASSIUM CHLORIDE 7.45 MG/ML
10 INJECTION INTRAVENOUS
Status: COMPLETED | OUTPATIENT
Start: 2024-01-10 | End: 2024-01-10

## 2024-01-10 RX ORDER — DOXYCYCLINE HYCLATE 100 MG
100 TABLET ORAL EVERY 12 HOURS SCHEDULED
Qty: 6 TABLET | Refills: 0 | Status: SHIPPED | OUTPATIENT
Start: 2024-01-10 | End: 2024-01-13

## 2024-01-10 RX ORDER — OSELTAMIVIR PHOSPHATE 30 MG/1
30 CAPSULE ORAL DAILY
Qty: 1 CAPSULE | Refills: 0 | Status: SHIPPED | OUTPATIENT
Start: 2024-01-11 | End: 2024-01-12

## 2024-01-10 RX ORDER — POTASSIUM CHLORIDE 750 MG/1
40 TABLET, FILM COATED, EXTENDED RELEASE ORAL ONCE
Status: COMPLETED | OUTPATIENT
Start: 2024-01-10 | End: 2024-01-10

## 2024-01-10 RX ADMIN — OSELTAMIVIR PHOSPHATE 30 MG: 30 CAPSULE ORAL at 08:57

## 2024-01-10 RX ADMIN — POTASSIUM CHLORIDE 10 MEQ: 7.46 INJECTION, SOLUTION INTRAVENOUS at 08:05

## 2024-01-10 RX ADMIN — POTASSIUM CHLORIDE 10 MEQ: 7.46 INJECTION, SOLUTION INTRAVENOUS at 08:59

## 2024-01-10 RX ADMIN — SODIUM CHLORIDE: 9 INJECTION, SOLUTION INTRAVENOUS at 07:09

## 2024-01-10 RX ADMIN — DOXYCYCLINE HYCLATE 100 MG: 100 TABLET, COATED ORAL at 08:33

## 2024-01-10 RX ADMIN — POTASSIUM CHLORIDE 10 MEQ: 7.46 INJECTION, SOLUTION INTRAVENOUS at 07:10

## 2024-01-10 RX ADMIN — ATORVASTATIN CALCIUM 80 MG: 40 TABLET, FILM COATED ORAL at 08:33

## 2024-01-10 RX ADMIN — POTASSIUM CHLORIDE 10 MEQ: 7.46 INJECTION, SOLUTION INTRAVENOUS at 10:06

## 2024-01-10 RX ADMIN — SODIUM CHLORIDE, PRESERVATIVE FREE 10 ML: 5 INJECTION INTRAVENOUS at 08:34

## 2024-01-10 RX ADMIN — AMLODIPINE BESYLATE 5 MG: 5 TABLET ORAL at 08:33

## 2024-01-10 RX ADMIN — ASPIRIN 162 MG: 81 TABLET, COATED ORAL at 08:33

## 2024-01-10 RX ADMIN — LABETALOL HYDROCHLORIDE 400 MG: 200 TABLET, FILM COATED ORAL at 08:33

## 2024-01-10 RX ADMIN — SODIUM CHLORIDE 30 ML: 9 INJECTION, SOLUTION INTRAVENOUS at 11:01

## 2024-01-10 RX ADMIN — PANTOPRAZOLE SODIUM 40 MG: 40 TABLET, DELAYED RELEASE ORAL at 08:35

## 2024-01-10 RX ADMIN — POTASSIUM CHLORIDE 40 MEQ: 750 TABLET, FILM COATED, EXTENDED RELEASE ORAL at 06:27

## 2024-01-10 RX ADMIN — CEFTRIAXONE 1000 MG: 1 INJECTION, POWDER, FOR SOLUTION INTRAMUSCULAR; INTRAVENOUS at 11:02

## 2024-01-10 RX ADMIN — BUMETANIDE 1 MG: 1 TABLET ORAL at 13:00

## 2024-01-10 RX ADMIN — ENOXAPARIN SODIUM 30 MG: 100 INJECTION SUBCUTANEOUS at 08:34

## 2024-01-10 ASSESSMENT — PAIN SCALES - GENERAL
PAINLEVEL_OUTOF10: 0

## 2024-01-10 NOTE — DISCHARGE SUMMARY
100 MG tablet  oseltamivir 30 MG capsule  potassium chloride 20 MEQ extended release tablet             DISPOSITION:    Home with Family:    x   Home with HH/PT/OT/RN:    SNF/LTC:    SARA:    OTHER:          Follow up with:   PCP : Lili Ramon APRN - NP Nakoneczny, Alysha Z, APRN - NP  [address] [address] [phone]    Follow up      Lili Ramon APRN - NP  [address] [address] [phone]          Linwood King MD  [address]  [id number]  [facility] [address] [phone]    Follow up in 1 week(s)            Total time in minutes spent coordinating this discharge (includes going over instructions, follow-up, prescriptions, and preparing report for sign off to her PCP) :  35 minutes

## 2024-01-10 NOTE — PLAN OF CARE
Problem: Safety - Adult  Goal: Free from fall injury  1/6/2024 0205 by Carmela Edwards RN  Outcome: Progressing  1/6/2024 0132 by Carmela Edwards RN  Outcome: Progressing     Problem: Skin/Tissue Integrity  Goal: Absence of new skin breakdown  Description: 1.  Monitor for areas of redness and/or skin breakdown  2.  Assess vascular access sites hourly  3.  Every 4-6 hours minimum:  Change oxygen saturation probe site  4.  Every 4-6 hours:  If on nasal continuous positive airway pressure, respiratory therapy assess nares and determine need for appliance change or resting period.  1/6/2024 0205 by Carmela Edwards RN  Outcome: Progressing  1/6/2024 0132 by Carmela Edwards RN  Outcome: Progressing     Problem: Respiratory - Adult  Goal: Achieves optimal ventilation and oxygenation  1/6/2024 0205 by Carmela Edwards RN  Outcome: Progressing  1/6/2024 0133 by Carmela Edwards RN  Outcome: Progressing     Problem: Cardiovascular - Adult  Goal: Maintains optimal cardiac output and hemodynamic stability  1/6/2024 0205 by Carmela Edwards RN  Outcome: Progressing  1/6/2024 0133 by Carmela Edwards RN  Outcome: Progressing     Problem: Skin/Tissue Integrity - Adult  Goal: Skin integrity remains intact  1/6/2024 0205 by Carmela Edwards RN  Outcome: Progressing  1/6/2024 0133 by Carmela Edwards RN  Outcome: Progressing  Goal: Incisions, wounds, or drain sites healing without S/S of infection  Outcome: Progressing  Goal: Oral mucous membranes remain intact  Outcome: Progressing     Problem: Musculoskeletal - Adult  Goal: Return mobility to safest level of function  1/6/2024 0205 by Carmela Edwards RN  Outcome: Progressing  1/6/2024 0133 by Carmela Edwards RN  Outcome: Progressing     Problem: Genitourinary - Adult  Goal: Absence of urinary retention  Outcome: Progressing     Problem: Metabolic/Fluid and Electrolytes - Adult  Goal: Electrolytes maintained within normal limits  1/6/2024 0205 by Carmela Edwards RN  Outcome: Progressing  1/6/2024 0133 by Jerry 
  Problem: Safety - Adult  Goal: Free from fall injury  1/9/2024 2256 by Earnest Cagle RN  Outcome: Progressing     Problem: Discharge Planning  Goal: Discharge to home or other facility with appropriate resources  1/9/2024 2256 by Earnest Cagle RN  Outcome: Progressing     Problem: Skin/Tissue Integrity  Goal: Absence of new skin breakdown  Description: 1.  Monitor for areas of redness and/or skin breakdown  2.  Assess vascular access sites hourly  3.  Every 4-6 hours minimum:  Change oxygen saturation probe site  4.  Every 4-6 hours:  If on nasal continuous positive airway pressure, respiratory therapy assess nares and determine need for appliance change or resting period.  1/10/2024 1023 by Kush Coburn RN  Outcome: Progressing  1/9/2024 2256 by Earnest Cagle RN  Outcome: Progressing     Problem: Respiratory - Adult  Goal: Achieves optimal ventilation and oxygenation  1/10/2024 1023 by Kush Coburn RN  Outcome: Progressing  1/9/2024 2256 by Earnest Cagle RN  Outcome: Progressing     Problem: Cardiovascular - Adult  Goal: Maintains optimal cardiac output and hemodynamic stability  1/10/2024 1023 by Kush Coburn RN  Outcome: Progressing  Flowsheets (Taken 1/10/2024 0800)  Maintains optimal cardiac output and hemodynamic stability: Monitor blood pressure and heart rate  1/9/2024 2256 by Earnest Cagle RN  Outcome: Progressing     Problem: Skin/Tissue Integrity - Adult  Goal: Skin integrity remains intact  1/10/2024 1023 by Kush Coburn RN  Outcome: Progressing  Flowsheets  Taken 1/10/2024 1022  Skin Integrity Remains Intact: Monitor for areas of redness and/or skin breakdown  Taken 1/10/2024 0800  Skin Integrity Remains Intact: Monitor for areas of redness and/or skin breakdown  1/9/2024 2256 by Earnest Cagle RN  Outcome: Progressing  Goal: Incisions, wounds, or drain sites healing without S/S of infection  1/9/2024 2256 by Earnest Cagle RN  Outcome: 
  Problem: Safety - Adult  Goal: Free from fall injury  Outcome: Progressing     Problem: Discharge Planning  Goal: Discharge to home or other facility with appropriate resources  Outcome: Progressing     Problem: Skin/Tissue Integrity  Goal: Absence of new skin breakdown  Description: 1.  Monitor for areas of redness and/or skin breakdown  2.  Assess vascular access sites hourly  3.  Every 4-6 hours minimum:  Change oxygen saturation probe site  4.  Every 4-6 hours:  If on nasal continuous positive airway pressure, respiratory therapy assess nares and determine need for appliance change or resting period.  Outcome: Progressing     Problem: Respiratory - Adult  Goal: Achieves optimal ventilation and oxygenation  Outcome: Progressing     Problem: Cardiovascular - Adult  Goal: Maintains optimal cardiac output and hemodynamic stability  Outcome: Progressing     Problem: Skin/Tissue Integrity - Adult  Goal: Skin integrity remains intact  Outcome: Progressing  Goal: Incisions, wounds, or drain sites healing without S/S of infection  Outcome: Progressing  Goal: Oral mucous membranes remain intact  Outcome: Progressing     Problem: Musculoskeletal - Adult  Goal: Return mobility to safest level of function  Outcome: Progressing     Problem: Genitourinary - Adult  Goal: Absence of urinary retention  Outcome: Progressing     Problem: Metabolic/Fluid and Electrolytes - Adult  Goal: Electrolytes maintained within normal limits  Outcome: Progressing     Problem: Chronic Conditions and Co-morbidities  Goal: Patient's chronic conditions and co-morbidity symptoms are monitored and maintained or improved  Outcome: Progressing     
  Problem: Safety - Adult  Goal: Free from fall injury  Outcome: Progressing     Problem: Discharge Planning  Goal: Discharge to home or other facility with appropriate resources  Outcome: Progressing     Problem: Skin/Tissue Integrity  Goal: Absence of new skin breakdown  Description: 1.  Monitor for areas of redness and/or skin breakdown  2.  Assess vascular access sites hourly  3.  Every 4-6 hours minimum:  Change oxygen saturation probe site  4.  Every 4-6 hours:  If on nasal continuous positive airway pressure, respiratory therapy assess nares and determine need for appliance change or resting period.  Outcome: Progressing     Problem: Respiratory - Adult  Goal: Achieves optimal ventilation and oxygenation  Outcome: Progressing     Problem: Cardiovascular - Adult  Goal: Maintains optimal cardiac output and hemodynamic stability  Outcome: Progressing     Problem: Skin/Tissue Integrity - Adult  Goal: Skin integrity remains intact  Outcome: Progressing  Goal: Incisions, wounds, or drain sites healing without S/S of infection  Outcome: Progressing  Goal: Oral mucous membranes remain intact  Outcome: Progressing     Problem: Musculoskeletal - Adult  Goal: Return mobility to safest level of function  Outcome: Progressing     Problem: Genitourinary - Adult  Goal: Absence of urinary retention  Outcome: Progressing     Problem: Metabolic/Fluid and Electrolytes - Adult  Goal: Electrolytes maintained within normal limits  Outcome: Progressing     Problem: Chronic Conditions and Co-morbidities  Goal: Patient's chronic conditions and co-morbidity symptoms are monitored and maintained or improved  Outcome: Progressing     Problem: Pain  Goal: Verbalizes/displays adequate comfort level or baseline comfort level  Outcome: Progressing     
  Problem: Safety - Adult  Goal: Free from fall injury  Outcome: Progressing     Problem: Discharge Planning  Goal: Discharge to home or other facility with appropriate resources  Outcome: Progressing     Problem: Skin/Tissue Integrity  Goal: Absence of new skin breakdown  Description: 1.  Monitor for areas of redness and/or skin breakdown  2.  Assess vascular access sites hourly  3.  Every 4-6 hours minimum:  Change oxygen saturation probe site  4.  Every 4-6 hours:  If on nasal continuous positive airway pressure, respiratory therapy assess nares and determine need for appliance change or resting period.  Outcome: Progressing     Problem: Respiratory - Adult  Goal: Achieves optimal ventilation and oxygenation  Outcome: Progressing     Problem: Cardiovascular - Adult  Goal: Maintains optimal cardiac output and hemodynamic stability  Outcome: Progressing     Problem: Skin/Tissue Integrity - Adult  Goal: Skin integrity remains intact  Outcome: Progressing  Goal: Incisions, wounds, or drain sites healing without S/S of infection  Outcome: Progressing  Goal: Oral mucous membranes remain intact  Outcome: Progressing     Problem: Musculoskeletal - Adult  Goal: Return mobility to safest level of function  Outcome: Progressing     Problem: Genitourinary - Adult  Goal: Absence of urinary retention  Outcome: Progressing     Problem: Metabolic/Fluid and Electrolytes - Adult  Goal: Electrolytes maintained within normal limits  Outcome: Progressing     Problem: Chronic Conditions and Co-morbidities  Goal: Patient's chronic conditions and co-morbidity symptoms are monitored and maintained or improved  Outcome: Progressing     Problem: Pain  Goal: Verbalizes/displays adequate comfort level or baseline comfort level  Outcome: Progressing     Problem: SLP Adult - Impaired Swallowing  Goal: By Discharge: Advance to least restrictive diet without signs or symptoms of aspiration for planned discharge setting.  See evaluation for 
  Problem: Safety - Adult  Goal: Free from fall injury  Outcome: Progressing     Problem: Skin/Tissue Integrity  Goal: Absence of new skin breakdown  Description: 1.  Monitor for areas of redness and/or skin breakdown  2.  Assess vascular access sites hourly  3.  Every 4-6 hours minimum:  Change oxygen saturation probe site  4.  Every 4-6 hours:  If on nasal continuous positive airway pressure, respiratory therapy assess nares and determine need for appliance change or resting period.  Outcome: Progressing     
  Problem: Safety - Adult  Goal: Free from fall injury  Outcome: Progressing     Problem: Skin/Tissue Integrity  Goal: Absence of new skin breakdown  Description: 1.  Monitor for areas of redness and/or skin breakdown  2.  Assess vascular access sites hourly  3.  Every 4-6 hours minimum:  Change oxygen saturation probe site  4.  Every 4-6 hours:  If on nasal continuous positive airway pressure, respiratory therapy assess nares and determine need for appliance change or resting period.  Outcome: Progressing     
  Problem: Safety - Adult  Goal: Free from fall injury  Outcome: Progressing     Problem: Skin/Tissue Integrity  Goal: Absence of new skin breakdown  Description: 1.  Monitor for areas of redness and/or skin breakdown  2.  Assess vascular access sites hourly  3.  Every 4-6 hours minimum:  Change oxygen saturation probe site  4.  Every 4-6 hours:  If on nasal continuous positive airway pressure, respiratory therapy assess nares and determine need for appliance change or resting period.  Outcome: Progressing     Problem: Respiratory - Adult  Goal: Achieves optimal ventilation and oxygenation  Outcome: Progressing     Problem: Cardiovascular - Adult  Goal: Maintains optimal cardiac output and hemodynamic stability  Outcome: Progressing     Problem: Skin/Tissue Integrity - Adult  Goal: Skin integrity remains intact  Outcome: Progressing     Problem: Musculoskeletal - Adult  Goal: Return mobility to safest level of function  Outcome: Progressing     Problem: Genitourinary - Adult  Goal: Absence of urinary retention  Outcome: Progressing     Problem: Metabolic/Fluid and Electrolytes - Adult  Goal: Electrolytes maintained within normal limits  Outcome: Progressing     Problem: Chronic Conditions and Co-morbidities  Goal: Patient's chronic conditions and co-morbidity symptoms are monitored and maintained or improved  Outcome: Progressing     
  Problem: Safety - Adult  Goal: Free from fall injury  Outcome: Progressing     Problem: Skin/Tissue Integrity  Goal: Absence of new skin breakdown  Description: 1.  Monitor for areas of redness and/or skin breakdown  2.  Assess vascular access sites hourly  3.  Every 4-6 hours minimum:  Change oxygen saturation probe site  4.  Every 4-6 hours:  If on nasal continuous positive airway pressure, respiratory therapy assess nares and determine need for appliance change or resting period.  Outcome: Progressing     Problem: Respiratory - Adult  Goal: Achieves optimal ventilation and oxygenation  Outcome: Progressing     Problem: Cardiovascular - Adult  Goal: Maintains optimal cardiac output and hemodynamic stability  Outcome: Progressing     Problem: Skin/Tissue Integrity - Adult  Goal: Skin integrity remains intact  Outcome: Progressing     Problem: Musculoskeletal - Adult  Goal: Return mobility to safest level of function  Outcome: Progressing     Problem: Metabolic/Fluid and Electrolytes - Adult  Goal: Electrolytes maintained within normal limits  Outcome: Progressing     
Date    COLONOSCOPY N/A 9/27/2022    COLONOSCOPY performed by Zane Longoria MD at [facility] MAIN OR    CORONARY ARTERY BYPASS GRAFT  11/04/2022    UPPER GASTROINTESTINAL ENDOSCOPY      per pt on 5/11/2021     Prior Level of Function/Home Situation:   Social/Functional History  Lives With: Spouse (Pt resides in an one level home with ramp.)  Type of Home: House  Home Layout: One level  Home Access: Ramped entrance  Home Equipment: Rollator, Cane  Active : No    Baseline Assessment:                Cognitive and Communication Status:  Neurologic State: Alert  Orientation Level: Oriented to person, Oriented to place, and Disoriented to situation  Cognition: Follows commands    Dysphagia:  P.O. Trials:  PO Trials  Assessment Method(s): Observation  Patient Position: Upright in bed  Vocal Quality: No Impairment  Consistency Presented: Thin;Regular  How Presented: Self-fed/presented;Cup/sip;Successive Swallows  Bolus Formation/Control:  (Suspect baseline given dental status)  Propulsion: Delayed (# of seconds)  Oral Residue: None  Laryngeal Elevation: Functional  Aspiration Signs/Symptoms: None  Pharyngeal Phase Characteristics: No impairment, issues, or problems  Effective Modifications: Alternate liquids/solids            Respiratory Status/Airway:  Room air                         After treatment:   Patient left in no apparent distress in bed, Call bell left within reach, and Nursing notified    COMMUNICATION/EDUCATION:   The patient's plan of care including recommendations, planned interventions, and recommended diet changes were discussed with: Registered nurse and Patient.    Patient/family have participated as able in goal setting and plan of care and Patient/family agree to work toward stated goals and plan of care    Thank you,  FLACA Rios  Minutes: 13     Problem: SLP Adult - Impaired Swallowing  Goal: By Discharge: Advance to least restrictive diet without signs or symptoms of aspiration for planned 
cardiology, may be getting CABG    CKD (chronic kidney disease) stage 3, GFR 30-59 ml/min ([facility])     COPD (chronic obstructive pulmonary disease) ([facility])     Fractured rib 03/2021    from a fall per pt on 5/11/2021    High cholesterol     Hypertension     per pt on 5/11/2021    MI (myocardial infarction) ([facility])     Recurrent pleural effusion on left     Stroke ([facility]) 2022     Past Surgical History:   Procedure Laterality Date    COLONOSCOPY N/A 9/27/2022    COLONOSCOPY performed by Zane Longoria MD at [facility] MAIN OR    CORONARY ARTERY BYPASS GRAFT  11/04/2022    UPPER GASTROINTESTINAL ENDOSCOPY      per pt on 5/11/2021     Prior Level of Function/Home Situation:   Social/Functional History  Lives With: Spouse (Pt resides in an one level home with ramp.)  Type of Home: House  Home Layout: One level  Home Access: Ramped entrance  Home Equipment: Rollator, Cane  Active : No    Baseline Assessment:  Current Diet : NPO  Current Liquid Diet : NPO  Prior Dysphagia History: RN reported patient's wife stated patient with increased difficulty with PO.  Patient Complaint: None stated.    Cognitive and Communication Status:  Neurologic State: Alert  Orientation Level: Oriented to person, Oriented to place, Disoriented to situation, and Disoriented to time  Cognition: Follows commands    Dysphagia:  Oral Assessment:  Oral Motor   Labial: No impairment  Dentition: Edentulous  Oral Hygiene: Moist  Lingual: No impairment  Velum: No Impairment  Mandible: No impairment  P.O. Trials:  PO Trials  Assessment Method(s): Observation  Patient Position: Upright in bed  Vocal Quality: No Impairment  Consistency Presented: Ice Chips;Thin;Pureed;Regular  How Presented: Self-fed/presented;Spoon;Straw;Successive Swallows  Bolus Acceptance: No impairment  Bolus Formation/Control:  (Suspect baseline given edentulism)  Type of Impairment: Incomplete;Mastication  Propulsion: Delayed (# of seconds)  Oral Residue: None  Laryngeal Elevation:

## 2024-01-10 NOTE — PROGRESS NOTES
Hospitalist Progress Note    NAME:   Shawn Gross Jr.   : 1955   MRN: [id number]     Date/Time: 2024 1:20 PM  Patient PCP: Lili Ramon APRN - [place]    Estimated discharge date:   Barriers: Improvement of shortness of breath, creatinine      Assessment / Plan:      Acute non-ST elevation myocardial infarction  History of coronary artery disease s/p CABG  -Troponin peaked at 528.  -s/p heparin drip.  Continue PTA aspirin, statin and labetalol.  -Pending hemoglobin A1c and lipid panel  -Cardiology recommended medical management  -He is currently hemodynamically stable     Sepsis  Suspected pneumonia  Influenza A  -Patient presented with a fever of 102.9 was also tachycardic  -He reported chest tightness and also generalized weakness but no other symptoms  -Chest x-ray shows very mild left basilar atelectasis and blunting of left costophrenic angle but otherwise no acute process  -Influenza A was positive  -Start Tamiflu at discharge to renal dose  -De-escalate antibiotics to ceftriaxone and Zithromax.  -He is currently on room air  -Check CBC and BMP in a.m. tomorrow       Hypokalemia  Hypomagnesemia  -Potassium replaced.  Recheck in a.m. tomorrow  -Magnesium is normal     Acute on chronic kidney disease stage IV  -Baseline creatinine is around 2.5.  Creatinine is now trending up and is 3.2  -Hold home Bumex and also Jardiance  -Monitor urine output  -Check BMP in a.m. tomorrow  -Kidneys were normal on CT abdomen     Hypertension  GERD  Dyslipidemia  Diastolic congestive heart failure  -Continue PTA Norvasc, PPI, Lipitor  -Holding PTA Bumex and also Jardiance                 Medical Decision Making:   I personally reviewed labs: cbc and bmp  I personally reviewed imaging:   I personally reviewed EKG:  Toxic drug monitoring: Hb while on lovenox  Discussed case with: Pharmacy,         Code Status:   DVT Prophylaxis: Lovenox  GI Prophylaxis:    Subjective:     Chief Complaint / 
      Hospitalist Progress Note    NAME:   Shawn Gross Jr.   : 1955   MRN: [id number]     Date/Time: 2024 1:42 PM  Patient PCP: Lili Ramon APRN - NP    Estimated discharge date: 1/10-11 pending Echo, cardio clearance, improvement of renal fxn      Assessment / Plan:  Acute non-ST elevation myocardial infarction  History of coronary artery disease s/p CABG  -Troponin peaked at 528.  -s/p heparin drip.  Continue PTA aspirin, statin and labetalol.  -Pending hemoglobin A1c and lipid panel  -Cardiology recommended medical management  -Echo is pending  -He is currently hemodynamically stable     Sepsis  Suspected pneumonia  Influenza A  -Patient presented with a fever of 102.9 was also tachycardic  -He reported chest tightness and also generalized weakness but no other symptoms  -Chest x-ray shows very mild left basilar atelectasis and blunting of left costophrenic angle but otherwise no acute process  -Influenza A was positive  -cont' Tamiflu   -cont' empiric ceftriaxone and doxycycline   -He is currently on room air     Hypokalemia  K improved, at 3.4 today, addition Kcl given   Recheck in a.m. tomorrow     Acute on chronic kidney disease stage IV  -Baseline creatinine is around 2.5.  Creatinine is now trending up and is 3.4  -Hold home Bumex and also Jardiance  -Kidneys were normal on CT abdomen  -start NS 100cc/hr x12 hrs.  Hold jardiance as above. Repeat bmp tomorrow     Hypertension  GERD  Dyslipidemia  Diastolic congestive heart failure  -Continue PTA Norvasc, PPI, Lipitor  -Holding PTA Bumex and also Jardiance                 Medical Decision Making:   I personally reviewed labs: cbc and bmp  I personally reviewed imaging:   I personally reviewed EKG:  Toxic drug monitoring: Hb while on lovenox  Discussed case with: Pharmacy,         Code Status: full  DVT Prophylaxis: Lovenox    Subjective:     Chief Complaint / Reason for Physician Visit  No new complaint.  Denies 
      Hospitalist Progress Note    NAME:   Shawn Gross Jr.   : 1955   MRN: [id number]     Date/Time: 2024 8:14 PM  Patient PCP: Lili Ramon APRN - [place]    Estimated discharge date:  Barriers:       Assessment / Plan:      Acute non-ST elevation myocardial infarction  History of coronary artery disease s/p CABG  -Troponin is trending up and is 528.  -s/p heparin drip.  Continue PTA aspirin, statin and labetalol.  -Check hemoglobin A1c and lipid panel  -cards following   -He is currently hemodynamically stable     Sepsis  Suspected pneumonia   -Patient presented with a fever of 102.9 was also tachycardic  -He reported chest tightness and also generalized weakness but no other symptoms  -Chest x-ray shows very mild left basilar atelectasis and blunting of left costophrenic angle but otherwise no acute process  -Urine analysis is normal  -CT chest shows possible pneumonia  -will start emp vancomycin and cefepime  -monitor blood cx and deescalate soon  -Check respiratory viral panel        Hypokalemia  Hypomagnesemia  -Potassium and magnesium replaced taking his CKD into consideration.  Check electrolytes in a.m. tomorrow     History of CKD stage III-IV  -Creatinine is close to baseline of around 2.5     Hypertension  GERD  Dyslipidemia  Diastolic congestive heart failure  -Continue PTA Norvasc, PPI, Lipitor  -Continue PTA Bumex and also Jardiance                 Medical Decision Making:   I personally reviewed labs: cbc and bmp  I personally reviewed imaging: ct chest   I personally reviewed EKG:  Toxic drug monitoring: Hb while on lovenox  Discussed case with:         Code Status:   DVT Prophylaxis: Lovenox  GI Prophylaxis:    Subjective:     Chief Complaint / Reason for Physician Visit  Reports sob and also chest pain       Objective:     VITALS:   Last 24hrs VS reviewed since prior progress note. Most recent are:  Patient Vitals for the past 24 hrs:   BP Temp Temp src Pulse Resp SpO2 Weight 
     Nutrition Note    Chart reviewed for [facility] noting \"unsure\" of weight loss, but no decreased appetite PTA. Review of weight hx shows wt trending up. Nutrition assessment not indicated at this time.     Electronically signed by Nicki Deleon RD on 1/6/24 at 12:19 PM EST    Contact: [phone]    
Cardiology Progress Note      1/10/2024 11:26 AM    Admit Date: 1/5/2024          Subjective: Echo shows EF 45-50%, mod MR, mild pulm htn          BP (!) 147/69   Pulse 67   Temp 97.9 °F (36.6 °C) (Oral)   Resp 18   Ht 1.727 m (5' 7.99\")   Wt 74.4 kg (164 lb 0.4 oz)   SpO2 91%   BMI 24.95 kg/m²   01/08 1901 - 01/10 0700  In: 2766.4 [P.O.:1260; I.V.:1506.4]  Out: 4000 [Urine:4000]        Objective:      Physical Exam:  VS as above     Data Review:   Labs:    BUN 31  Creat 2.9   K 3.2     Telemetry: SR       Assessment:     1.  Small non-ST segment elevation myocardial infarction type 2.  2.  Coronary artery disease with prior coronary bypass grafting, prior normal ejection fraction. Ef 45-50% by echo   3.  Sepsis of uncertain source.  4.  Hypertension.  5.  Dyslipidemia.  6.  Chronic kidney disease stage IV.  7.  Chronic diastolic heart failure.  8.  Chronic obstructive pulmonary disease.  9.  Prior cerebrovascular accident.  10.  Recurrent left-sided pleural effusion.  11. Influenza A pos   12. Hypokalemia - improved   13. Moderate mitral reg by echo     Plan: Cont current medical Rx. OK for d/c from my standpoint              
Cardiology Progress Note      1/7/2024 4:41 PM    Admit Date: 1/5/2024          Subjective: Serology pos for Influenza A. Denies chest pain          /60   Pulse 76   Temp 99 °F (37.2 °C) (Oral)   Resp 14   Ht 1.727 m (5' 8\")   Wt 74.4 kg (164 lb)   SpO2 94%   BMI 24.94 kg/m²   01/05 1901 - 01/07 0700  In: 1446.4 [P.O.:1270; I.V.:176.4]  Out: 800 [Urine:800]        Objective:      Physical Exam:  VS as above  Chest scattered coarse BS  Card RRR no gallop heard     Data Review:   Labs:    BUN 34  Creat 3.3   Hgb 11.0     Telemetry: SR       Assessment:       1.  Small non-ST segment elevation myocardial infarction type 2.  2.  Coronary artery disease with prior coronary bypass grafting, prior normal ejection fraction.  3.  Sepsis of uncertain source.  4.  Hypertension.  5.  Dyslipidemia.  6.  Chronic kidney disease stage IV.  7.  Chronic diastolic heart failure.  8.  Chronic obstructive pulmonary disease.  9.  Prior cerebrovascular accident.  10.  Recurrent left-sided pleural effusion.  11. Influenza A pos     Plan:  Cont medical Rx. Will order echo               
Cardiology Progress Note      1/8/2024 2:09 PM    Admit Date: 1/5/2024          Subjective: Feeling better. Denies chest pain, other new c/o          BP (!) 106/56   Pulse 69   Temp 98 °F (36.7 °C) (Oral)   Resp 24   Ht 1.727 m (5' 8\")   Wt 74.4 kg (164 lb)   SpO2 96%   BMI 24.94 kg/m²   01/06 1901 - 01/08 0700  In: 800 [P.O.:800]  Out: 1550 [Urine:1550]        Objective:      Physical Exam:  VS as above  Chest scattered crackles  Card RRR no gallop     Data Review:   Labs:    BUN 40  Creat 3.4  K 2.5  Hgb 10.4    Telemetry: SR R 66      Assessment:     1.  Small non-ST segment elevation myocardial infarction type 2.  2.  Coronary artery disease with prior coronary bypass grafting, prior normal ejection fraction.  3.  Sepsis of uncertain source.  4.  Hypertension.  5.  Dyslipidemia.  6.  Chronic kidney disease stage IV.  7.  Chronic diastolic heart failure.  8.  Chronic obstructive pulmonary disease.  9.  Prior cerebrovascular accident.  10.  Recurrent left-sided pleural effusion.  11. Influenza A pos   12. Hypokalemia     Plan:  Cont current Rx. Replace K+. Await echo             
Cardiology Progress Note      1/9/2024 9:29 AM    Admit Date: 1/5/2024          Subjective:  Up in chair eating breakfast. No chest pain. Feels better          /68   Pulse 66   Temp 98.3 °F (36.8 °C) (Oral)   Resp 16   Ht 1.727 m (5' 8\")   Wt 74.4 kg (164 lb)   SpO2 97%   BMI 24.94 kg/m²   01/07 1901 - 01/09 0700  In: 1285.7 [P.O.:825; I.V.:460.7]  Out: 3450 [Urine:3450]        Objective:      Physical Exam:  VS as above  Chest diffuse rhonchi and wheezes  Card RRR dist ht sounds     Data Review:   Labs:    Hgb 10.3  BUN 38  Creat 3.4   K 3.1     Telemetry: SR R 70      Assessment:     1.  Small non-ST segment elevation myocardial infarction type 2.  2.  Coronary artery disease with prior coronary bypass grafting, prior normal ejection fraction.  3.  Sepsis of uncertain source.  4.  Hypertension.  5.  Dyslipidemia.  6.  Chronic kidney disease stage IV.  7.  Chronic diastolic heart failure.  8.  Chronic obstructive pulmonary disease.  9.  Prior cerebrovascular accident.  10.  Recurrent left-sided pleural effusion.  11. Influenza A pos   12. Hypokalemia - improved     Plan:  Cont current Rx Await echo               
End of Shift Note    Bedside shift change report given to  (oncoming nurse) by Carmela Edwards RN (offgoing nurse).  Report included the following information     Shift worked:  Pt incontinent of urine 3 times ,I did place a condom cath ,but he removed ,also incontinent of formed stool 2 times the patient did have his call bell and was able to use but did not call for bathroom assist .   0600 Denies chest pain SOB ,but is febrile again 100.6 orally Tylenol 650 mg po given .   Skin intact except for scrotal excoriation washed with  soap and water zinc applied      Shift summary and any significant changes:     0030 Anti X therapeutic pharmacy called no changes   Next due 0630       Concerns for physician to address:  Unable to complete med rec ,we will ask family to bring list      Zone phone for oncoming shift:          Activity:     Number times ambulated in hallways past shift: 0  Number of times OOB to chair past shift: 0    Cardiac:   Cardiac Monitoring: Yes           Access:  Current line(s): PIV     Genitourinary:   Urinary status: voiding and external catheter    Respiratory:      Chronic home O2 use?: NO  Incentive spirometer at bedside: YES       GI:     Current diet:  Diet NPO  Passing flatus: YES  Tolerating current diet: YES       Pain Management:   Patient states pain is manageable on current regimen: N/A    Skin:     Interventions: specialty bed, float heels, and increase time out of bed    Patient Safety:  Fall Score:    Interventions: bed/chair alarm, assistive device (walker, cane. etc), gripper socks, pt to call before getting OOB, and stay with me (per policy)       Length of Stay:  Expected LOS: 2  Actual LOS: 1      Carmela Edwards RN                            
End of Shift Note    Bedside shift change report given to  (oncoming nurse) by Carmela Edwards RN (offgoing nurse).  Report included the following information SBAR    Shift worked:  1955 Febrile oral 102  Tylenol 650 po   2329  oral temp 99.4      Shift summary and any significant changes:     0030 Resp panel Positive Influenza A ,already on droplet precautions NP [place] perfect served.      Concerns for physician to address:       Zone phone for oncoming shift:          Activity:     Number times ambulated in hallways past shift: 0  Number of times OOB to chair past shift: 0    Cardiac:   Cardiac Monitoring: Yes           Access:  Current line(s): PIV     Genitourinary:   Urinary status: incontinent and external catheter    Respiratory:      Chronic home O2 use?: NO  Incentive spirometer at bedside: YES       GI:     Current diet:  ADULT DIET; Regular  Passing flatus: YES  Tolerating current diet: YES       Pain Management:   Patient states pain is manageable on current regimen: YES    Skin:     Interventions: specialty bed, float heels, increase time out of bed, internal/external urinary devices, and nutritional support    Patient Safety:  Fall Score:    Interventions: bed/chair alarm, assistive device (walker, cane. etc), gripper socks, pt to call before getting OOB, and stay with me (per policy)       Length of Stay:  Expected LOS: 2  Actual LOS: 2      Carmela Edwards RN                          
End of Shift Note    Bedside shift change report given to ETHAN Casey (oncoming nurse) by La Gray RN (offgoing nurse).  Report included the following information SBAR    Shift worked:  [id number]     Shift summary and any significant changes:    Patient continued to run low grade tem throughout shift.  No complaints of pain.  Heparin drip discontinued at 1850 per order.  Nasal swab sent sent for respiratory panel.  Patient on a regular diet but order for NPO at midnight tonight.  He will not leave on a condom cath or [place].     Concerns for physician to address:    Zone phone for oncoming shift:       Activity:     Number times ambulated in hallways past shift: 0  Number of times OOB to chair past shift: 0    Cardiac:   Cardiac Monitoring: Yes           Access:  Current line(s): PIV     Genitourinary:   Urinary status: voiding and incontinent    Respiratory:      Chronic home O2 use?: NO  Incentive spirometer at bedside: NO       GI:     Current diet:  ADULT DIET; Regular  Passing flatus: YES  Tolerating current diet: YES       Pain Management:   Patient states pain is manageable on current regimen: N/A    Skin:     Interventions: float heels, PT/OT consult, limit briefs, internal/external urinary devices, and nutritional support    Patient Safety:  Fall Score:    Interventions: bed/chair alarm and gripper socks       Length of Stay:  Expected LOS: 2  Actual LOS: 1      La Gray RN                            
End of Shift Note    Bedside shift change report given to ETHAN Tinoco (oncoming nurse) by La Gray RN (offgoing nurse).  Report included the following information SBAR    Shift worked:  [id number]     Shift summary and any significant changes:    Patient did not have any fevers today.  IVABX given, two loose BM's.  Patient had an episode while eating with his food getting stuck in his throat and mouth.  Patient went weak and HR dropped to the 30's.  Patient able to get food up and HR recovered.  Wife stated \"that has been happening more often lately when he eats\".  Patient changed to NPO pending formal swallow eval.       Concerns for physician to address:    Zone phone for oncoming shift:       Activity:     Number times ambulated in hallways past shift: 0  Number of times OOB to chair past shift: 1    Cardiac:   Cardiac Monitoring: Yes           Access:  Current line(s): PIV     Genitourinary:   Urinary status: voiding and incontinent    Respiratory:      Chronic home O2 use?: NO  Incentive spirometer at bedside: YES       GI:     Current diet:  Diet NPO Exceptions are: Sips of Water with Meds  Passing flatus: YES  Tolerating current diet: NO       Pain Management:   Patient states pain is manageable on current regimen: YES    Skin:     Interventions: float heels, PT/OT consult, limit briefs, and internal/external urinary devices    Patient Safety:  Fall Score:    Interventions: bed/chair alarm and gripper socks       Length of Stay:  Expected LOS: 2  Actual LOS: 2      La Gray RN                            
End of Shift Note    Bedside shift change report given to ETHAN Tinoco (oncoming nurse) by La Gray RN (offgoing nurse).  Report included the following information SBAR    Shift worked:  6330-3864     Shift summary and any significant changes:    Patient sat in the chair for about 6 hours today.  Good intake and output.  Waiting on ECHO.  Patient did have some slight wheezing however, VS remained stable.     Concerns for physician to address:    Zone phone for oncoming shift:       Activity:     Number times ambulated in hallways past shift: 0  Number of times OOB to chair past shift: 2    Cardiac:   Cardiac Monitoring: Yes           Access:  Current line(s): PIV     Genitourinary:   Urinary status: voiding    Respiratory:      Chronic home O2 use?: NO  Incentive spirometer at bedside: NO       GI:     Current diet:  ADULT DIET; Easy to Chew  DIET ONE TIME MESSAGE;  Passing flatus: YES  Tolerating current diet: YES       Pain Management:   Patient states pain is manageable on current regimen: YES    Skin:     Interventions: float heels and increase time out of bed    Patient Safety:  Fall Score:    Interventions: bed/chair alarm and gripper socks       Length of Stay:  Expected LOS: 5  Actual LOS: 4      La Gray RN                            
End of Shift Note    Bedside shift change report given to ETHAN Tovar (oncoming nurse) by Earnest Cagle RN (offgoing nurse).  Report included the following information SBAR    Shift worked:  5797-4088     Shift summary and any significant changes:    Uneventful shift.   Patient complained of leg pain, PRN tylenol given x1.  AM labs drawn.       Concerns for physician to address:       Zone phone for oncoming shift:          Activity:     Number times ambulated in hallways past shift: 0  Number of times OOB to chair past shift: 0    Cardiac:   Cardiac Monitoring: Yes           Access:  Current line(s): PIV     Genitourinary:   Urinary status: voiding and external catheter    Respiratory:      Chronic home O2 use?: NO  Incentive spirometer at bedside: YES       GI:     Current diet:  ADULT DIET; Easy to Chew  DIET ONE TIME MESSAGE;  Passing flatus: YES  Tolerating current diet: YES       Pain Management:   Patient states pain is manageable on current regimen: YES    Skin:     Interventions: specialty bed, float heels, increase time out of bed, PT/OT consult, internal/external urinary devices, and nutritional support    Patient Safety:  Fall Score:    Interventions: bed/chair alarm, assistive device (walker, cane. etc), gripper socks, and pt to call before getting OOB       Length of Stay:  Expected LOS: 6  Actual LOS: 4      Earnest Cagle RN                            
End of Shift Note    Bedside shift change report given to ETHAN Tovar (oncoming nurse) by Earnest Cagle RN (offgoing nurse).  Report included the following information SBAR    Shift worked:  8040-3101     Shift summary and any significant changes:    Uneventful shift. Patient rested quietly in bed with no complaints of pain.    Potassium 2.5 with AM labs; NP notified and 40mEq given PO and ordered q4h.     Remained NPO, sips with meds tolerated well.     Concerns for physician to address:       Zone phone for oncoming shift:          Activity:     Number times ambulated in hallways past shift: 0  Number of times OOB to chair past shift: 0    Cardiac:   Cardiac Monitoring: Yes           Access:  Current line(s): PIV     Genitourinary:   Urinary status: voiding and external catheter    Respiratory:      Chronic home O2 use?: NO  Incentive spirometer at bedside: N/A       GI:     Current diet:  Diet NPO Exceptions are: Sips of Water with Meds  Passing flatus: YES  Tolerating current diet: NPO        Pain Management:   Patient states pain is manageable on current regimen: YES    Skin:     Interventions: specialty bed, float heels, increase time out of bed, PT/OT consult, internal/external urinary devices, and nutritional support    Patient Safety:  Fall Score:    Interventions: bed/chair alarm, assistive device (walker, cane. etc), gripper socks, and pt to call before getting OOB       Length of Stay:  Expected LOS: 2  Actual LOS: 3      Earnest Cagle RN                            
PCP hospital follow-up transitional care appointment has been scheduled with LORENA Ramon on 1/24/24 at 1100. Pending patient discharge. Kylah Herman, Care Management Assistant   
Patient discharged home with family members.Discharge instruction given to patient and family members. IV line removed and pressure dressing applied   
Pharmacist note:   [employer]-Approved DVT / VTE Prophylaxis Dosing    Per [facility] -approved protocol enoxaparin 40mg/day has been adjusted to Enoxaparin 30mg/day   based on weight and renal function as shown in the table below.           Wt Readings from Last 1 Encounters:   01/06/24 74.4 kg (164 lb)       Est. Creatinine Clearance:  mL/min (based on Ideal Body Weight)      ENOXAPARIN/Heparin  ROUTINE PROPHYLAXIS DOSING (Medically ill, routine surgery)                                                                                                                                                                                                                                Estimated CrCl (mL/min) Patient Weight (Kg)     <= 50.9 Kg .9 Kg 101-150.9 Kg 151-174.9 Kg >= 175 Kg    30 or greater 30 mg  SC  Daily 40mg  SC   Daily  (Or 30mg BID for orthopedic cases) 30mg   SC  Q12h 40 mg  SC  Q12h 60mg   SC  Q12h    15-29 UFH 5000 units SC  Q12h 30 mg   SC  Daily 30 mg   SC  Daily 40 mg   SC  Daily 60 mg  SC   Daily    < 15 or Dialysis UHF 5000 units SC  Q12h UFH 5000 units SC   Q8h UFH 7500 units SC Q8h        SC = Subcutaneously                          UFH= Unfractionated Heparin     Anticoag Policy-Protocol-Dosing -Taryn ANDRES, [facility]    
Pharmacy Antimicrobial Kinetic Dosing    Indication for Antimicrobials: Sepsis     Current Regimen of Each Antimicrobial:  Vancomycin- pharmacy to dose; Start Date ; Day 1  Cefepime 1 g IV q12h; Start Date ; Day 1    Previous Antimicrobial Therapy:      Goal Level: Vancomycin -600    Date Dose & Interval Measured (mcg/mL) Predicted AUC                       Significant Cultures:    blood: NGTD    Labs:  Recent Labs     Units 24  0650 24  1403 24  1304   CREATININE MG/DL 2.83* 1.6* 2.52*   BUN MG/DL 23*  --  16   PROCAL ng/mL 0.56  --   --    WBC K/uL 10.2  --  12.5*     Temp (24hrs), Av.8 °F (38.2 °C), Min:98.2 °F (36.8 °C), Max:103.1 °F (39.5 °C)      Conditions for Dosing Consideration: None    Creatinine Clearance (mL/min): Estimated Creatinine Clearance: 24 mL/min (A) (based on SCr of 2.83 mg/dL (H)).       Impression/Plan:   Give vancomycin 1750 mg loading dose, then dose by level due to poor/worsening renal function  Vancomycin level  with AM labs  Continue cefepime  Antimicrobial stop date      Pharmacy will follow daily and adjust medications as appropriate for renal function and/or serum levels.    Thank you,  Ned Sierra [facility]    
Pharmacy Antimicrobial Kinetic Dosing    Indication for Antimicrobials: Sepsis     Current Regimen of Each Antimicrobial:  Vancomycin- pharmacy to dose; Start Date ; Day 2  Cefepime 1 g IV q12h; Start Date ; Day 2    Previous Antimicrobial Therapy:      Goal Level: Vancomycin -600    Date Dose & Interval Measured (mcg/mL) Predicted AUC    1750 mg x1 18.4                  Significant Cultures:    blood: NGTD    Labs:  Recent Labs     Units 24  0428 24  0650 24  1403 24  1304   CREATININE MG/DL 3.29* 2.83* 1.6* 2.52*   BUN MG/DL 34* 23*  --  16   PROCAL ng/mL  --  0.56  --   --    WBC K/uL 8.5 10.2  --  12.5*     Temp (24hrs), Av.9 °F (37.7 °C), Min:99 °F (37.2 °C), Max:102 °F (38.9 °C)      Conditions for Dosing Consideration: None    Creatinine Clearance (mL/min): Estimated Creatinine Clearance: 21 mL/min (A) (based on SCr of 3.29 mg/dL (H)).       Impression/Plan:   Vancomycin level was 18.4 this AM after loading dose yesterday. Will redose today with 1000 mg x1  Vanc level  with AM labs  Renal function worsening  Continue cefepime  Antimicrobial stop date      Pharmacy will follow daily and adjust medications as appropriate for renal function and/or serum levels.    Thank you,  [facility], located within [facility]      
Speech Pathology Note    Initial clinical swallow evaluation complete. Full note to follow. Recommend Easy to Chew/Thin Liquid diet. Discussed with patient and RN.    Doron Mena M.S., CCC-SLP  
TRANSFER - IN REPORT:    Verbal report received from ED   on Shawn Gross   being received from [facility] for routine progression of patient care      Report consisted of patient's Situation, Background, Assessment and   Recommendations(SBAR).     Information from the following report(s) Nurse Handoff Report was reviewed with the receiving nurse.    Opportunity for questions and clarification was provided.      Assessment completed upon patient's arrival to unit and care assumed.    
10.4*   HCT 37.4 32.6* 32.5*    215 196       Recent Labs     01/05/24  1833 01/06/24  0650 01/07/24  0428 01/08/24  0211   NA  --  141 140 140   K  --  2.9* 3.4* 2.5*   CL  --  108 110* 112*   CO2  --  24 25 23   GLUCOSE  --  134* 119* 104*   BUN  --  23* 34* 40*   CREATININE  --  2.83* 3.29* 3.42*   CALCIUM  --  8.0* 7.3* 7.4*   MG  --  2.4  --  1.8   LABALBU  --   --  2.3*  --    BILITOT  --   --  0.6  --    AST  --   --  132*  --    ALT  --   --  36  --    INR 1.1  --   --   --          Signed: Jessica Rodriguez MD

## 2024-01-12 LAB
BACTERIA SPEC CULT: NORMAL
BACTERIA SPEC CULT: NORMAL
SERVICE CMNT-IMP: NORMAL
SERVICE CMNT-IMP: NORMAL

## 2024-01-28 NOTE — PROGRESS NOTES
dyspnea with exertion which seemed pretty consistent with his baseline functional status.  He was noted to be bradycardic and borderline hypotensive, and due to his overall frailty I recommended reducing his carvedilol from 25 mg twice daily to 12.5 mg twice daily.  He was also given a prescription for topical analgesics for his atraumatic right hip (quadriceps location) pain (had a normal RLE RANDY just weeks prior).    Unfortunately, it looks like Mr. Gross started to feel quite fatigued / dizzy and was subsequently admitted to the hospital for RODNEY (baseline creatinine around 1.7; creatinine on admission was around 2.35).  Since then, his medical regimen was adjusted, which included removing loop diuretics, HCTZ, losartan, and Aldactone.  He was discharged with instructions to continue his carvedilol and amlodipine and follow-up closely with nephrology for repeat labs.    He presents the clinic today with elevated BP readings at 180/80 and 160/80.  He has not had any recurrent dizzy spells and has been taking his medications as prescribed.  He tells me that he does not have follow-up with nephrology until next month.    He continues to have a very sedentary lifestyle and spends most of his time sitting/laying in bed.  He does seem to have some activity with home PT, but aside from that, he does not move around very much.  His appetite is good and he is drinking plenty of water throughout the day.  He continues to endorse right thigh pain (quadriceps area) that has not improved much with topical Voltaren or lidocaine cream.    He denies any shortness of breath, abdominal distention, peripheral edema, palpitations, or recurrent episodes of syncope/presyncope.    Note from previous visit on 7.28.23:  It appears that Mr. Gross was recently admitted to [facility] from 7/8 to 7/12 for acute shortness of breath consistent with his recurrent pleural effusions.  He was found to have a mildly elevated

## 2024-01-29 ENCOUNTER — OFFICE VISIT (OUTPATIENT)
Age: 69
End: 2024-01-29
Payer: MEDICARE

## 2024-01-29 VITALS
WEIGHT: 149 LBS | DIASTOLIC BLOOD PRESSURE: 82 MMHG | SYSTOLIC BLOOD PRESSURE: 124 MMHG | OXYGEN SATURATION: 98 % | HEART RATE: 78 BPM | BODY MASS INDEX: 23.39 KG/M2 | HEIGHT: 67 IN

## 2024-01-29 DIAGNOSIS — I50.20 HFREF (HEART FAILURE WITH REDUCED EJECTION FRACTION) (HCC): Primary | ICD-10-CM

## 2024-01-29 DIAGNOSIS — Z95.1 S/P CABG X 1: ICD-10-CM

## 2024-01-29 DIAGNOSIS — E87.6 HYPOKALEMIA: ICD-10-CM

## 2024-01-29 DIAGNOSIS — R54 FRAILTY SYNDROME IN GERIATRIC PATIENT: ICD-10-CM

## 2024-01-29 DIAGNOSIS — N18.4 CKD (CHRONIC KIDNEY DISEASE), STAGE IV (HCC): ICD-10-CM

## 2024-01-29 DIAGNOSIS — R54 FRAILTY: ICD-10-CM

## 2024-01-29 DIAGNOSIS — J44.9 CHRONIC OBSTRUCTIVE PULMONARY DISEASE, UNSPECIFIED COPD TYPE (HCC): ICD-10-CM

## 2024-01-29 DIAGNOSIS — E78.00 HYPERCHOLESTEROLEMIA: ICD-10-CM

## 2024-01-29 DIAGNOSIS — I50.20 HFREF (HEART FAILURE WITH REDUCED EJECTION FRACTION) (HCC): ICD-10-CM

## 2024-01-29 DIAGNOSIS — I21.4 NSTEMI (NON-ST ELEVATED MYOCARDIAL INFARCTION) (HCC): ICD-10-CM

## 2024-01-29 DIAGNOSIS — Z86.73 HISTORY OF CVA (CEREBROVASCULAR ACCIDENT): ICD-10-CM

## 2024-01-29 DIAGNOSIS — I25.10 CAD IN NATIVE ARTERY: ICD-10-CM

## 2024-01-29 DIAGNOSIS — I10 PRIMARY HYPERTENSION: ICD-10-CM

## 2024-01-29 PROCEDURE — 3079F DIAST BP 80-89 MM HG: CPT | Performed by: NURSE PRACTITIONER

## 2024-01-29 PROCEDURE — G8420 CALC BMI NORM PARAMETERS: HCPCS | Performed by: NURSE PRACTITIONER

## 2024-01-29 PROCEDURE — G8427 DOCREV CUR MEDS BY ELIG CLIN: HCPCS | Performed by: NURSE PRACTITIONER

## 2024-01-29 PROCEDURE — 3023F SPIROM DOC REV: CPT | Performed by: NURSE PRACTITIONER

## 2024-01-29 PROCEDURE — 3074F SYST BP LT 130 MM HG: CPT | Performed by: NURSE PRACTITIONER

## 2024-01-29 PROCEDURE — 1036F TOBACCO NON-USER: CPT | Performed by: NURSE PRACTITIONER

## 2024-01-29 PROCEDURE — G8484 FLU IMMUNIZE NO ADMIN: HCPCS | Performed by: NURSE PRACTITIONER

## 2024-01-29 PROCEDURE — 1111F DSCHRG MED/CURRENT MED MERGE: CPT | Performed by: NURSE PRACTITIONER

## 2024-01-29 PROCEDURE — 1123F ACP DISCUSS/DSCN MKR DOCD: CPT | Performed by: NURSE PRACTITIONER

## 2024-01-29 PROCEDURE — 3017F COLORECTAL CA SCREEN DOC REV: CPT | Performed by: NURSE PRACTITIONER

## 2024-01-29 PROCEDURE — 99215 OFFICE O/P EST HI 40 MIN: CPT | Performed by: NURSE PRACTITIONER

## 2024-01-29 RX ORDER — CARVEDILOL 25 MG/1
TABLET ORAL DAILY
COMMUNITY
Start: 2023-12-27

## 2024-01-29 RX ORDER — GABAPENTIN 100 MG/1
100 CAPSULE ORAL 2 TIMES DAILY
COMMUNITY
Start: 2024-01-21

## 2024-01-29 RX ORDER — ERGOCALCIFEROL 1.25 MG/1
50000 CAPSULE ORAL WEEKLY
COMMUNITY
Start: 2024-01-04 | End: 2024-03-28

## 2024-01-29 ASSESSMENT — PATIENT HEALTH QUESTIONNAIRE - PHQ9
1. LITTLE INTEREST OR PLEASURE IN DOING THINGS: 0
SUM OF ALL RESPONSES TO PHQ QUESTIONS 1-9: 0
SUM OF ALL RESPONSES TO PHQ QUESTIONS 1-9: 0
2. FEELING DOWN, DEPRESSED OR HOPELESS: 0
SUM OF ALL RESPONSES TO PHQ QUESTIONS 1-9: 0
SUM OF ALL RESPONSES TO PHQ QUESTIONS 1-9: 0
SUM OF ALL RESPONSES TO PHQ9 QUESTIONS 1 & 2: 0

## 2024-01-29 NOTE — PATIENT INSTRUCTIONS
It was very nice to see you again.    It looks like you have recovered from the flu quite well.  Your blood pressure is perfect and your oxygen levels are perfect.    Continue all of your medications like you are and try to get out of the bed is much as possible to walk around and be as active as your body lets you.    Please call us right away if you start to have any symptoms like abnormal weakness/dizziness, heart palpitations, chest pains, or shortness of breath.  If the symptoms are severe you should call 911 and be checked out immediately.

## 2024-01-30 ENCOUNTER — TELEPHONE (OUTPATIENT)
Age: 69
End: 2024-01-30

## 2024-01-30 LAB
ANION GAP SERPL CALC-SCNC: 6 MMOL/L (ref 5–15)
BUN SERPL-MCNC: 20 MG/DL (ref 6–20)
BUN/CREAT SERPL: 8 (ref 12–20)
CALCIUM SERPL-MCNC: 9.3 MG/DL (ref 8.5–10.1)
CHLORIDE SERPL-SCNC: 113 MMOL/L (ref 97–108)
CO2 SERPL-SCNC: 26 MMOL/L (ref 21–32)
CREAT SERPL-MCNC: 2.55 MG/DL (ref 0.7–1.3)
GLUCOSE SERPL-MCNC: 110 MG/DL (ref 65–100)
MAGNESIUM SERPL-MCNC: 1.8 MG/DL (ref 1.6–2.4)
POTASSIUM SERPL-SCNC: 5.1 MMOL/L (ref 3.5–5.1)
SODIUM SERPL-SCNC: 145 MMOL/L (ref 136–145)

## 2024-03-27 ENCOUNTER — HOSPITAL ENCOUNTER (EMERGENCY)
Facility: HOSPITAL | Age: 69
Discharge: HOME | End: 2024-03-27
Attending: EMERGENCY MEDICINE
Payer: MEDICARE

## 2024-03-27 ENCOUNTER — APPOINTMENT (OUTPATIENT)
Facility: HOSPITAL | Age: 69
End: 2024-03-27
Payer: MEDICARE

## 2024-03-27 VITALS
TEMPERATURE: 97.7 F | WEIGHT: 164.46 LBS | RESPIRATION RATE: 14 BRPM | HEIGHT: 67 IN | BODY MASS INDEX: 25.81 KG/M2 | HEART RATE: 64 BPM | OXYGEN SATURATION: 100 % | DIASTOLIC BLOOD PRESSURE: 72 MMHG | SYSTOLIC BLOOD PRESSURE: 156 MMHG

## 2024-03-27 DIAGNOSIS — R55 CONVULSIVE SYNCOPE: Primary | ICD-10-CM

## 2024-03-27 DIAGNOSIS — E87.6 HYPOKALEMIA: ICD-10-CM

## 2024-03-27 DIAGNOSIS — I10 ESSENTIAL HYPERTENSION: ICD-10-CM

## 2024-03-27 DIAGNOSIS — R56.9 SEIZURE-LIKE ACTIVITY (HCC): ICD-10-CM

## 2024-03-27 LAB
ALBUMIN SERPL-MCNC: 3.1 G/DL (ref 3.5–5)
ALBUMIN/GLOB SERPL: 0.9 (ref 1.1–2.2)
ALP SERPL-CCNC: 115 U/L (ref 45–117)
ALT SERPL-CCNC: 21 U/L (ref 12–78)
ANION GAP SERPL CALC-SCNC: 9 MMOL/L (ref 5–15)
AST SERPL-CCNC: 25 U/L (ref 15–37)
BASOPHILS # BLD: 0.1 K/UL (ref 0–0.1)
BASOPHILS NFR BLD: 1 % (ref 0–1)
BILIRUB SERPL-MCNC: 0.5 MG/DL (ref 0.2–1)
BUN SERPL-MCNC: 17 MG/DL (ref 6–20)
BUN/CREAT SERPL: 7 (ref 12–20)
CALCIUM SERPL-MCNC: 7.5 MG/DL (ref 8.5–10.1)
CHLORIDE SERPL-SCNC: 108 MMOL/L (ref 97–108)
CO2 SERPL-SCNC: 30 MMOL/L (ref 21–32)
CREAT SERPL-MCNC: 2.49 MG/DL (ref 0.7–1.3)
DIFFERENTIAL METHOD BLD: NORMAL
EOSINOPHIL # BLD: 0.4 K/UL (ref 0–0.4)
EOSINOPHIL NFR BLD: 5 % (ref 0–7)
ERYTHROCYTE [DISTWIDTH] IN BLOOD BY AUTOMATED COUNT: 14.1 % (ref 11.5–14.5)
GLOBULIN SER CALC-MCNC: 3.6 G/DL (ref 2–4)
GLUCOSE BLD STRIP.AUTO-MCNC: 131 MG/DL (ref 65–117)
GLUCOSE SERPL-MCNC: 147 MG/DL (ref 65–100)
HCT VFR BLD AUTO: 42.2 % (ref 36.6–50.3)
HGB BLD-MCNC: 13.7 G/DL (ref 12.1–17)
IMM GRANULOCYTES # BLD AUTO: 0 K/UL (ref 0–0.04)
IMM GRANULOCYTES NFR BLD AUTO: 0 % (ref 0–0.5)
LYMPHOCYTES # BLD: 3.2 K/UL (ref 0.8–3.5)
LYMPHOCYTES NFR BLD: 46 % (ref 12–49)
MCH RBC QN AUTO: 30.1 PG (ref 26–34)
MCHC RBC AUTO-ENTMCNC: 32.5 G/DL (ref 30–36.5)
MCV RBC AUTO: 92.7 FL (ref 80–99)
MONOCYTES # BLD: 0.5 K/UL (ref 0–1)
MONOCYTES NFR BLD: 7 % (ref 5–13)
NEUTS SEG # BLD: 3 K/UL (ref 1.8–8)
NEUTS SEG NFR BLD: 41 % (ref 32–75)
NRBC # BLD: 0 K/UL (ref 0–0.01)
NRBC BLD-RTO: 0 PER 100 WBC
PLATELET # BLD AUTO: 261 K/UL (ref 150–400)
PMV BLD AUTO: 10.8 FL (ref 8.9–12.9)
POTASSIUM SERPL-SCNC: 2.8 MMOL/L (ref 3.5–5.1)
PROT SERPL-MCNC: 6.7 G/DL (ref 6.4–8.2)
RBC # BLD AUTO: 4.55 M/UL (ref 4.1–5.7)
SERVICE CMNT-IMP: ABNORMAL
SODIUM SERPL-SCNC: 147 MMOL/L (ref 136–145)
TROPONIN I SERPL HS-MCNC: 121 NG/L (ref 0–76)
TROPONIN I SERPL HS-MCNC: 123 NG/L (ref 0–76)
WBC # BLD AUTO: 7.1 K/UL (ref 4.1–11.1)

## 2024-03-27 PROCEDURE — 36415 COLL VENOUS BLD VENIPUNCTURE: CPT

## 2024-03-27 PROCEDURE — 6370000000 HC RX 637 (ALT 250 FOR IP): Performed by: EMERGENCY MEDICINE

## 2024-03-27 PROCEDURE — 82962 GLUCOSE BLOOD TEST: CPT

## 2024-03-27 PROCEDURE — 85025 COMPLETE CBC W/AUTO DIFF WBC: CPT

## 2024-03-27 PROCEDURE — 93005 ELECTROCARDIOGRAM TRACING: CPT | Performed by: EMERGENCY MEDICINE

## 2024-03-27 PROCEDURE — 70450 CT HEAD/BRAIN W/O DYE: CPT

## 2024-03-27 PROCEDURE — 80053 COMPREHEN METABOLIC PANEL: CPT

## 2024-03-27 PROCEDURE — 84484 ASSAY OF TROPONIN QUANT: CPT

## 2024-03-27 PROCEDURE — 99285 EMERGENCY DEPT VISIT HI MDM: CPT

## 2024-03-27 RX ORDER — POTASSIUM CHLORIDE 750 MG/1
40 TABLET, FILM COATED, EXTENDED RELEASE ORAL ONCE
Status: COMPLETED | OUTPATIENT
Start: 2024-03-27 | End: 2024-03-27

## 2024-03-27 RX ADMIN — POTASSIUM CHLORIDE 40 MEQ: 750 TABLET, EXTENDED RELEASE ORAL at 11:07

## 2024-03-27 ASSESSMENT — PAIN SCALES - GENERAL: PAINLEVEL_OUTOF10: 0

## 2024-03-27 NOTE — ED NOTES
Discharge instructions were given to the patient by Casandra Metzger RN. The patient left the Emergency Department ambulatory, alert and oriented and in no acute distress with 0 prescriptions. The patient was encouraged to call or return to the ED for worsening issues or problems and was encouraged to schedule a follow up appointment for continuing care. The patient verbalized understanding of discharge instructions and prescriptions, all questions were answered. The patient has no further concerns at this time.

## 2024-03-27 NOTE — ED PROVIDER NOTES
Alert  LOC Questions (1b): Answers both correctly  LOC Commands (1c): Performs both tasks correctly  Best Gaze (2): Normal  Visual (3): No visual loss  Facial Palsy (4): Normal symmetrical movement  Motor Arm, Left (5a): No drift  Motor Arm, Right (5b): No drift  Motor Leg, Left (6a): No drift  Motor Leg, Right (6b): No drift  Limb Ataxia (7): Absent  Sensory (8): Normal  Best Language (9): No aphasia  Dysarthria (10): Normal  Extinction and Inattention (11): No abnormality  Total: 0            ED Course and Differential Diagnosis/MDM     12:32 PM DDx, ED Course, and Reassessment:    DDX: Vasovagal syncope, seizure, arrhythmia, electrolyte abnormality, low suspicion for TIA    Vitals: Patient Vitals for the past 24 hrs:   BP Temp Temp src Pulse Resp SpO2 Height Weight   03/27/24 0930 (!) 151/82 -- -- 61 17 100 % -- --   03/27/24 0916 (!) 150/73 -- -- 63 18 100 % 1.702 m (5' 7\") 74.6 kg (164 lb 7.4 oz)   03/27/24 0915 -- 97.7 °F (36.5 °C) Oral -- -- -- -- --     ED COURSE/Records Reviewed with summary (prior medical records and Nursing notes)  Nursing Notes, Old Medical Records, Clinical Records from a Different Specialty (PCP note dated 12/27/2023, cardiology office note dated 1/29/2024, and nephrology note dated 2/12/2024), Previous EKGs, and Previous Laboratory Studies    ED Course as of 03/27/24 1232   Wed Mar 27, 2024   0902 Received a call from patient's PCP, Dr. Kwon.  Patient was in his office for a routine physical and blood work this morning when he had a brief syncopal episode.  Apparently he had just gotten some blood work done and had either a syncopal event or a small seizure.  The symptoms lasted for approximately 10 seconds.  His vitals were normal following the event. [MS]   0954 Patient's CMP is remarkable for potassium of 2.8 and creatinine of 2.49.  The creatinine is baseline. [MS]   1001 Received a critical result from the lab of a troponin of 123. [MS]   1110 Jerel Aguirre MD spoke

## 2024-03-27 NOTE — ED NOTES
Verbal shift change report given to Casandra (oncoming nurse) by Mercedez (offgoing nurse). Report included the following information ED Encounter Summary.

## 2024-03-27 NOTE — ED NOTES
Pt comes in today from pcp office where his wife stated he started shaking and jerking in the office and stated he was confused. Pt denies any symptoms at this present time. Pt A0x3. Pt in a gown, swallow screen assessed and passed and provided blankets.

## 2024-03-28 LAB
EKG ATRIAL RATE: 57 BPM
EKG DIAGNOSIS: NORMAL
EKG P AXIS: 59 DEGREES
EKG P-R INTERVAL: 206 MS
EKG Q-T INTERVAL: 468 MS
EKG QRS DURATION: 88 MS
EKG QTC CALCULATION (BAZETT): 455 MS
EKG R AXIS: 32 DEGREES
EKG T AXIS: 202 DEGREES
EKG VENTRICULAR RATE: 57 BPM

## 2024-04-18 ENCOUNTER — TELEPHONE (OUTPATIENT)
Age: 69
End: 2024-04-18

## 2024-04-18 DIAGNOSIS — I50.32 CHRONIC HEART FAILURE WITH PRESERVED EJECTION FRACTION (HCC): ICD-10-CM

## 2024-04-18 RX ORDER — BUMETANIDE 1 MG/1
1 TABLET ORAL 2 TIMES DAILY
Qty: 180 TABLET | Refills: 1 | Status: SHIPPED | OUTPATIENT
Start: 2024-04-18

## 2024-04-18 NOTE — TELEPHONE ENCOUNTER
Requested Prescriptions     Signed Prescriptions Disp Refills    bumetanide (BUMEX) 1 MG tablet 180 tablet 1     Sig: Take 1 tablet by mouth 2 times daily     Authorizing Provider: CARLOS DONOVAN     Ordering User: FOX LINDA      Future Appointments   Date Time Provider Department Center   4/29/2024  9:20 AM Carlos Donovan APRN - NP [facility]      Per Verbal Order by MD/NP

## 2024-04-28 NOTE — PROGRESS NOTES
full code, and I did politely tell him that if he were to go into cardiac arrest his likelihood of having a good outcome from resuscitation is relatively low given his numerous medical comorbidities, severe deconditioning, and overall poor state of health.  -Recently saw palliative care during his hospitalization and will remain a full code.  -He has had 5+ hospitalizations in 2023. Hospitalized January 2024.  -Discussed overall poor prognosis given progressive renal disease, difficult to control blood pressure and heart failure, and multiple readmissions.  -This conversation went well and patient / wife will readdress this topic with me if they decide they would like to take more of a comfort approach as opposed to curative approach with his care moving forward.  -I emphasized the importance of increased time spent out of bed to slow further functional decline.      Overall, as per Renato appears to be doing very well today.  Will plan on follow-up in 3 months or sooner as needed for any new or worsening concerns.  I will reach out to him and his wife once I obtain the results of his heart monitor.      Prior to this visit I have reviewed key aspects of the patient's medical record to optimize medical decision making. This includes, but is not limited to, prior office visits and emergency room encounters (if applicable).   I have reviewed pertinent diagnostic test results (when available), vital signs, weights & and ambulatory blood pressure readings (when available), as well as personal and family medical history to develop an individualized care plan.    I have spent time discussing both pharmacological and nonpharmacological treatment and preventative care measures for optimizing cardiovascular health and wellness. This includes, but is not limited to: obtaining regular physical activity as tolerated, achieving ideal weight and blood pressure, healthy eating habits, smoking cessation, limiting or eliminating

## 2024-04-29 ENCOUNTER — OFFICE VISIT (OUTPATIENT)
Age: 69
End: 2024-04-29
Payer: MEDICARE

## 2024-04-29 VITALS
OXYGEN SATURATION: 97 % | BODY MASS INDEX: 23.37 KG/M2 | HEIGHT: 69 IN | WEIGHT: 157.8 LBS | SYSTOLIC BLOOD PRESSURE: 138 MMHG | DIASTOLIC BLOOD PRESSURE: 82 MMHG | HEART RATE: 74 BPM

## 2024-04-29 DIAGNOSIS — I50.32 CHRONIC HEART FAILURE WITH PRESERVED EJECTION FRACTION (HCC): Primary | ICD-10-CM

## 2024-04-29 DIAGNOSIS — I25.10 CAD IN NATIVE ARTERY: ICD-10-CM

## 2024-04-29 DIAGNOSIS — N18.4 CKD (CHRONIC KIDNEY DISEASE), STAGE IV (HCC): ICD-10-CM

## 2024-04-29 DIAGNOSIS — Z95.1 S/P CABG X 1: ICD-10-CM

## 2024-04-29 DIAGNOSIS — R54 FRAILTY: ICD-10-CM

## 2024-04-29 DIAGNOSIS — Z86.73 HISTORY OF CVA (CEREBROVASCULAR ACCIDENT): ICD-10-CM

## 2024-04-29 DIAGNOSIS — J44.9 CHRONIC OBSTRUCTIVE PULMONARY DISEASE, UNSPECIFIED COPD TYPE (HCC): ICD-10-CM

## 2024-04-29 DIAGNOSIS — I10 PRIMARY HYPERTENSION: ICD-10-CM

## 2024-04-29 DIAGNOSIS — R55 SYNCOPE, UNSPECIFIED SYNCOPE TYPE: ICD-10-CM

## 2024-04-29 DIAGNOSIS — E78.00 HYPERCHOLESTEROLEMIA: ICD-10-CM

## 2024-04-29 PROCEDURE — 99214 OFFICE O/P EST MOD 30 MIN: CPT | Performed by: NURSE PRACTITIONER

## 2024-04-29 PROCEDURE — 1036F TOBACCO NON-USER: CPT | Performed by: NURSE PRACTITIONER

## 2024-04-29 PROCEDURE — G8428 CUR MEDS NOT DOCUMENT: HCPCS | Performed by: NURSE PRACTITIONER

## 2024-04-29 PROCEDURE — 3075F SYST BP GE 130 - 139MM HG: CPT | Performed by: NURSE PRACTITIONER

## 2024-04-29 PROCEDURE — 3023F SPIROM DOC REV: CPT | Performed by: NURSE PRACTITIONER

## 2024-04-29 PROCEDURE — 1123F ACP DISCUSS/DSCN MKR DOCD: CPT | Performed by: NURSE PRACTITIONER

## 2024-04-29 PROCEDURE — G8420 CALC BMI NORM PARAMETERS: HCPCS | Performed by: NURSE PRACTITIONER

## 2024-04-29 PROCEDURE — 3079F DIAST BP 80-89 MM HG: CPT | Performed by: NURSE PRACTITIONER

## 2024-04-29 PROCEDURE — 3017F COLORECTAL CA SCREEN DOC REV: CPT | Performed by: NURSE PRACTITIONER

## 2024-04-29 ASSESSMENT — PATIENT HEALTH QUESTIONNAIRE - PHQ9
SUM OF ALL RESPONSES TO PHQ QUESTIONS 1-9: 0
SUM OF ALL RESPONSES TO PHQ9 QUESTIONS 1 & 2: 0
SUM OF ALL RESPONSES TO PHQ QUESTIONS 1-9: 0
1. LITTLE INTEREST OR PLEASURE IN DOING THINGS: NOT AT ALL
2. FEELING DOWN, DEPRESSED OR HOPELESS: NOT AT ALL

## 2024-07-12 ENCOUNTER — APPOINTMENT (OUTPATIENT)
Facility: HOSPITAL | Age: 69
End: 2024-07-12
Payer: MEDICARE

## 2024-07-12 ENCOUNTER — HOSPITAL ENCOUNTER (INPATIENT)
Facility: HOSPITAL | Age: 69
LOS: 7 days | Discharge: HOME | End: 2024-07-19
Attending: EMERGENCY MEDICINE | Admitting: FAMILY MEDICINE
Payer: MEDICARE

## 2024-07-12 DIAGNOSIS — N17.9 ACUTE RENAL FAILURE SUPERIMPOSED ON CHRONIC KIDNEY DISEASE, UNSPECIFIED ACUTE RENAL FAILURE TYPE, UNSPECIFIED CKD STAGE (HCC): ICD-10-CM

## 2024-07-12 DIAGNOSIS — E87.5 HYPERKALEMIA: ICD-10-CM

## 2024-07-12 DIAGNOSIS — R53.1 GENERAL WEAKNESS: ICD-10-CM

## 2024-07-12 DIAGNOSIS — N18.9 ACUTE RENAL FAILURE SUPERIMPOSED ON CHRONIC KIDNEY DISEASE, UNSPECIFIED ACUTE RENAL FAILURE TYPE, UNSPECIFIED CKD STAGE (HCC): ICD-10-CM

## 2024-07-12 DIAGNOSIS — R53.83 OTHER FATIGUE: Primary | ICD-10-CM

## 2024-07-12 LAB
ALBUMIN SERPL-MCNC: 3.8 G/DL (ref 3.5–5)
ALBUMIN/GLOB SERPL: 0.9 (ref 1.1–2.2)
ALP SERPL-CCNC: 146 U/L (ref 45–117)
ALT SERPL-CCNC: 30 U/L (ref 12–78)
ANION GAP BLD CALC-SCNC: 8 (ref 10–20)
ANION GAP BLD CALC-SCNC: 8 (ref 10–20)
ANION GAP SERPL CALC-SCNC: 7 MMOL/L (ref 5–15)
APPEARANCE UR: CLEAR
AST SERPL-CCNC: 15 U/L (ref 15–37)
BACTERIA URNS QL MICRO: NEGATIVE /HPF
BASE DEFICIT BLD-SCNC: 4 MMOL/L
BASE DEFICIT BLD-SCNC: 4 MMOL/L
BASOPHILS # BLD: 0 K/UL (ref 0–0.1)
BASOPHILS NFR BLD: 0 % (ref 0–1)
BILIRUB SERPL-MCNC: 0.4 MG/DL (ref 0.2–1)
BILIRUB UR QL: NEGATIVE
BUN SERPL-MCNC: 43 MG/DL (ref 6–20)
BUN/CREAT SERPL: 11 (ref 12–20)
CA-I BLD-MCNC: 1.14 MMOL/L (ref 1.12–1.32)
CA-I BLD-MCNC: 1.18 MMOL/L (ref 1.12–1.32)
CALCIUM SERPL-MCNC: 9.4 MG/DL (ref 8.5–10.1)
CHLORIDE BLD-SCNC: 108 MMOL/L (ref 100–108)
CHLORIDE BLD-SCNC: 109 MMOL/L (ref 100–108)
CHLORIDE SERPL-SCNC: 108 MMOL/L (ref 97–108)
CO2 BLD-SCNC: 23 MMOL/L (ref 19–24)
CO2 BLD-SCNC: 24 MMOL/L (ref 19–24)
CO2 SERPL-SCNC: 22 MMOL/L (ref 21–32)
COLOR UR: ABNORMAL
COMMENT:: NORMAL
CREAT SERPL-MCNC: 3.84 MG/DL (ref 0.7–1.3)
CREAT UR-MCNC: 3.8 MG/DL (ref 0.6–1.3)
CREAT UR-MCNC: 4.2 MG/DL (ref 0.6–1.3)
DIFFERENTIAL METHOD BLD: ABNORMAL
EKG ATRIAL RATE: 112 BPM
EKG DIAGNOSIS: NORMAL
EKG P AXIS: 62 DEGREES
EKG P-R INTERVAL: 200 MS
EKG Q-T INTERVAL: 312 MS
EKG QRS DURATION: 82 MS
EKG QTC CALCULATION (BAZETT): 425 MS
EKG R AXIS: 8 DEGREES
EKG T AXIS: 168 DEGREES
EKG VENTRICULAR RATE: 112 BPM
EOSINOPHIL # BLD: 0.1 K/UL (ref 0–0.4)
EOSINOPHIL NFR BLD: 1 % (ref 0–7)
EPITH CASTS URNS QL MICRO: ABNORMAL /LPF
ERYTHROCYTE [DISTWIDTH] IN BLOOD BY AUTOMATED COUNT: 14 % (ref 11.5–14.5)
GLOBULIN SER CALC-MCNC: 4.4 G/DL (ref 2–4)
GLUCOSE BLD STRIP.AUTO-MCNC: 150 MG/DL (ref 65–117)
GLUCOSE BLD STRIP.AUTO-MCNC: 190 MG/DL (ref 65–117)
GLUCOSE BLD STRIP.AUTO-MCNC: 197 MG/DL (ref 65–117)
GLUCOSE BLD STRIP.AUTO-MCNC: 207 MG/DL (ref 74–99)
GLUCOSE BLD STRIP.AUTO-MCNC: 208 MG/DL (ref 74–99)
GLUCOSE SERPL-MCNC: 211 MG/DL (ref 65–100)
GLUCOSE UR STRIP.AUTO-MCNC: >1000 MG/DL
HCO3 BLDA-SCNC: 23 MMOL/L
HCO3 BLDA-SCNC: 24 MMOL/L
HCT VFR BLD AUTO: 48.4 % (ref 36.6–50.3)
HGB BLD-MCNC: 15.9 G/DL (ref 12.1–17)
HGB UR QL STRIP: ABNORMAL
HYALINE CASTS URNS QL MICRO: ABNORMAL /LPF (ref 0–5)
IMM GRANULOCYTES # BLD AUTO: 0 K/UL (ref 0–0.04)
IMM GRANULOCYTES NFR BLD AUTO: 0 % (ref 0–0.5)
KETONES UR QL STRIP.AUTO: NEGATIVE MG/DL
LACTATE BLD-SCNC: 1.96 MMOL/L (ref 0.4–2)
LACTATE BLD-SCNC: 2.07 MMOL/L (ref 0.4–2)
LEUKOCYTE ESTERASE UR QL STRIP.AUTO: NEGATIVE
LYMPHOCYTES # BLD: 0.4 K/UL (ref 0.8–3.5)
LYMPHOCYTES NFR BLD: 3 % (ref 12–49)
MCH RBC QN AUTO: 30.8 PG (ref 26–34)
MCHC RBC AUTO-ENTMCNC: 32.9 G/DL (ref 30–36.5)
MCV RBC AUTO: 93.8 FL (ref 80–99)
MONOCYTES # BLD: 1.1 K/UL (ref 0–1)
MONOCYTES NFR BLD: 8 % (ref 5–13)
NEUTS SEG # BLD: 12.4 K/UL (ref 1.8–8)
NEUTS SEG NFR BLD: 88 % (ref 32–75)
NITRITE UR QL STRIP.AUTO: NEGATIVE
NRBC # BLD: 0 K/UL (ref 0–0.01)
NRBC BLD-RTO: 0 PER 100 WBC
PCO2 BLDV: 48.2 MMHG (ref 41–51)
PCO2 BLDV: 50.3 MMHG (ref 41–51)
PH BLDV: 7.28 (ref 7.32–7.42)
PH BLDV: 7.29 (ref 7.32–7.42)
PH UR STRIP: 5 (ref 5–8)
PLATELET # BLD AUTO: 261 K/UL (ref 150–400)
PMV BLD AUTO: 10.7 FL (ref 8.9–12.9)
PO2 BLDV: <27 MMHG (ref 25–40)
PO2 BLDV: <27 MMHG (ref 25–40)
POTASSIUM BLD-SCNC: 6.7 MMOL/L (ref 3.5–5.5)
POTASSIUM BLD-SCNC: 7 MMOL/L (ref 3.5–5.5)
POTASSIUM SERPL-SCNC: 4 MMOL/L (ref 3.5–5.1)
POTASSIUM SERPL-SCNC: 6.4 MMOL/L (ref 3.5–5.1)
PROT SERPL-MCNC: 8.2 G/DL (ref 6.4–8.2)
PROT UR STRIP-MCNC: 100 MG/DL
RBC # BLD AUTO: 5.16 M/UL (ref 4.1–5.7)
RBC #/AREA URNS HPF: ABNORMAL /HPF (ref 0–5)
RBC MORPH BLD: ABNORMAL
SERVICE CMNT-IMP: ABNORMAL
SODIUM BLD-SCNC: 140 MMOL/L (ref 136–145)
SODIUM BLD-SCNC: 140 MMOL/L (ref 136–145)
SODIUM SERPL-SCNC: 137 MMOL/L (ref 136–145)
SP GR UR REFRACTOMETRY: 1.02 (ref 1–1.03)
SPECIMEN HOLD: NORMAL
SPECIMEN HOLD: NORMAL
SPECIMEN SITE: ABNORMAL
SPECIMEN SITE: ABNORMAL
TROPONIN I SERPL HS-MCNC: 66 NG/L (ref 0–76)
TROPONIN I SERPL HS-MCNC: 92 NG/L (ref 0–76)
UROBILINOGEN UR QL STRIP.AUTO: 0.2 EU/DL (ref 0.2–1)
WBC # BLD AUTO: 14 K/UL (ref 4.1–11.1)
WBC URNS QL MICRO: ABNORMAL /HPF (ref 0–4)

## 2024-07-12 PROCEDURE — 81001 URINALYSIS AUTO W/SCOPE: CPT

## 2024-07-12 PROCEDURE — 2580000003 HC RX 258: Performed by: EMERGENCY MEDICINE

## 2024-07-12 PROCEDURE — 6370000000 HC RX 637 (ALT 250 FOR IP): Performed by: STUDENT IN AN ORGANIZED HEALTH CARE EDUCATION/TRAINING PROGRAM

## 2024-07-12 PROCEDURE — 6370000000 HC RX 637 (ALT 250 FOR IP): Performed by: EMERGENCY MEDICINE

## 2024-07-12 PROCEDURE — 84132 ASSAY OF SERUM POTASSIUM: CPT

## 2024-07-12 PROCEDURE — 2580000003 HC RX 258: Performed by: FAMILY MEDICINE

## 2024-07-12 PROCEDURE — 93005 ELECTROCARDIOGRAM TRACING: CPT | Performed by: EMERGENCY MEDICINE

## 2024-07-12 PROCEDURE — 96375 TX/PRO/DX INJ NEW DRUG ADDON: CPT

## 2024-07-12 PROCEDURE — 82803 BLOOD GASES ANY COMBINATION: CPT

## 2024-07-12 PROCEDURE — 93005 ELECTROCARDIOGRAM TRACING: CPT | Performed by: STUDENT IN AN ORGANIZED HEALTH CARE EDUCATION/TRAINING PROGRAM

## 2024-07-12 PROCEDURE — 6360000002 HC RX W HCPCS: Performed by: FAMILY MEDICINE

## 2024-07-12 PROCEDURE — 2500000003 HC RX 250 WO HCPCS: Performed by: EMERGENCY MEDICINE

## 2024-07-12 PROCEDURE — 82330 ASSAY OF CALCIUM: CPT

## 2024-07-12 PROCEDURE — 82947 ASSAY GLUCOSE BLOOD QUANT: CPT

## 2024-07-12 PROCEDURE — 93010 ELECTROCARDIOGRAM REPORT: CPT | Performed by: SPECIALIST

## 2024-07-12 PROCEDURE — 2580000003 HC RX 258: Performed by: INTERNAL MEDICINE

## 2024-07-12 PROCEDURE — 36415 COLL VENOUS BLD VENIPUNCTURE: CPT

## 2024-07-12 PROCEDURE — 96365 THER/PROPH/DIAG IV INF INIT: CPT

## 2024-07-12 PROCEDURE — 84295 ASSAY OF SERUM SODIUM: CPT

## 2024-07-12 PROCEDURE — 82962 GLUCOSE BLOOD TEST: CPT

## 2024-07-12 PROCEDURE — 84484 ASSAY OF TROPONIN QUANT: CPT

## 2024-07-12 PROCEDURE — 71045 X-RAY EXAM CHEST 1 VIEW: CPT

## 2024-07-12 PROCEDURE — 80053 COMPREHEN METABOLIC PANEL: CPT

## 2024-07-12 PROCEDURE — 2060000000 HC ICU INTERMEDIATE R&B

## 2024-07-12 PROCEDURE — 6370000000 HC RX 637 (ALT 250 FOR IP): Performed by: FAMILY MEDICINE

## 2024-07-12 PROCEDURE — 6360000002 HC RX W HCPCS: Performed by: EMERGENCY MEDICINE

## 2024-07-12 PROCEDURE — 99285 EMERGENCY DEPT VISIT HI MDM: CPT

## 2024-07-12 PROCEDURE — 85025 COMPLETE CBC W/AUTO DIFF WBC: CPT

## 2024-07-12 PROCEDURE — 97116 GAIT TRAINING THERAPY: CPT

## 2024-07-12 PROCEDURE — 76770 US EXAM ABDO BACK WALL COMP: CPT

## 2024-07-12 PROCEDURE — 97161 PT EVAL LOW COMPLEX 20 MIN: CPT

## 2024-07-12 RX ORDER — ASPIRIN 81 MG/1
162 TABLET ORAL DAILY
Status: DISCONTINUED | OUTPATIENT
Start: 2024-07-12 | End: 2024-07-12 | Stop reason: DRUGHIGH

## 2024-07-12 RX ORDER — CALCIUM GLUCONATE 20 MG/ML
1000 INJECTION, SOLUTION INTRAVENOUS
Status: COMPLETED | OUTPATIENT
Start: 2024-07-12 | End: 2024-07-12

## 2024-07-12 RX ORDER — ALBUTEROL SULFATE 2.5 MG/3ML
10 SOLUTION RESPIRATORY (INHALATION) ONCE
Status: COMPLETED | OUTPATIENT
Start: 2024-07-12 | End: 2024-07-12

## 2024-07-12 RX ORDER — AMLODIPINE BESYLATE 5 MG/1
5 TABLET ORAL DAILY
Status: DISCONTINUED | OUTPATIENT
Start: 2024-07-12 | End: 2024-07-19 | Stop reason: HOSPADM

## 2024-07-12 RX ORDER — FERROUS SULFATE 325(65) MG
325 TABLET ORAL EVERY OTHER DAY
Status: DISCONTINUED | OUTPATIENT
Start: 2024-07-12 | End: 2024-07-19 | Stop reason: HOSPADM

## 2024-07-12 RX ORDER — ASPIRIN 81 MG/1
81 TABLET ORAL DAILY
Status: DISCONTINUED | OUTPATIENT
Start: 2024-07-12 | End: 2024-07-19 | Stop reason: HOSPADM

## 2024-07-12 RX ORDER — SODIUM CHLORIDE 0.9 % (FLUSH) 0.9 %
5-40 SYRINGE (ML) INJECTION EVERY 12 HOURS SCHEDULED
Status: DISCONTINUED | OUTPATIENT
Start: 2024-07-12 | End: 2024-07-19 | Stop reason: HOSPADM

## 2024-07-12 RX ORDER — ATORVASTATIN CALCIUM 40 MG/1
80 TABLET, FILM COATED ORAL DAILY
Status: DISCONTINUED | OUTPATIENT
Start: 2024-07-12 | End: 2024-07-19 | Stop reason: HOSPADM

## 2024-07-12 RX ORDER — 0.9 % SODIUM CHLORIDE 0.9 %
1000 INTRAVENOUS SOLUTION INTRAVENOUS ONCE
Status: COMPLETED | OUTPATIENT
Start: 2024-07-12 | End: 2024-07-12

## 2024-07-12 RX ORDER — ONDANSETRON 2 MG/ML
4 INJECTION INTRAMUSCULAR; INTRAVENOUS EVERY 6 HOURS PRN
Status: DISCONTINUED | OUTPATIENT
Start: 2024-07-12 | End: 2024-07-19 | Stop reason: HOSPADM

## 2024-07-12 RX ORDER — SODIUM CHLORIDE 0.9 % (FLUSH) 0.9 %
5-40 SYRINGE (ML) INJECTION PRN
Status: DISCONTINUED | OUTPATIENT
Start: 2024-07-12 | End: 2024-07-19 | Stop reason: HOSPADM

## 2024-07-12 RX ORDER — SODIUM CHLORIDE 9 MG/ML
INJECTION, SOLUTION INTRAVENOUS CONTINUOUS
Status: DISPENSED | OUTPATIENT
Start: 2024-07-12 | End: 2024-07-12

## 2024-07-12 RX ORDER — ONDANSETRON 4 MG/1
4 TABLET, ORALLY DISINTEGRATING ORAL EVERY 8 HOURS PRN
Status: DISCONTINUED | OUTPATIENT
Start: 2024-07-12 | End: 2024-07-19 | Stop reason: HOSPADM

## 2024-07-12 RX ORDER — CARVEDILOL 12.5 MG/1
25 TABLET ORAL 2 TIMES DAILY
Status: DISCONTINUED | OUTPATIENT
Start: 2024-07-12 | End: 2024-07-19 | Stop reason: HOSPADM

## 2024-07-12 RX ORDER — SODIUM CHLORIDE 9 MG/ML
INJECTION, SOLUTION INTRAVENOUS PRN
Status: DISCONTINUED | OUTPATIENT
Start: 2024-07-12 | End: 2024-07-19 | Stop reason: HOSPADM

## 2024-07-12 RX ORDER — DEXTROSE MONOHYDRATE 100 MG/ML
INJECTION, SOLUTION INTRAVENOUS CONTINUOUS PRN
Status: DISCONTINUED | OUTPATIENT
Start: 2024-07-12 | End: 2024-07-19 | Stop reason: HOSPADM

## 2024-07-12 RX ORDER — POLYETHYLENE GLYCOL 3350 17 G/17G
17 POWDER, FOR SOLUTION ORAL DAILY PRN
Status: DISCONTINUED | OUTPATIENT
Start: 2024-07-12 | End: 2024-07-19 | Stop reason: HOSPADM

## 2024-07-12 RX ORDER — HEPARIN SODIUM 5000 [USP'U]/ML
5000 INJECTION, SOLUTION INTRAVENOUS; SUBCUTANEOUS EVERY 8 HOURS SCHEDULED
Status: DISCONTINUED | OUTPATIENT
Start: 2024-07-12 | End: 2024-07-19 | Stop reason: HOSPADM

## 2024-07-12 RX ORDER — GABAPENTIN 100 MG/1
100 CAPSULE ORAL 2 TIMES DAILY
Status: DISCONTINUED | OUTPATIENT
Start: 2024-07-12 | End: 2024-07-19 | Stop reason: HOSPADM

## 2024-07-12 RX ORDER — ACETAMINOPHEN 650 MG/1
650 SUPPOSITORY RECTAL EVERY 6 HOURS PRN
Status: DISCONTINUED | OUTPATIENT
Start: 2024-07-12 | End: 2024-07-19 | Stop reason: HOSPADM

## 2024-07-12 RX ORDER — ACETAMINOPHEN 325 MG/1
650 TABLET ORAL EVERY 6 HOURS PRN
Status: DISCONTINUED | OUTPATIENT
Start: 2024-07-12 | End: 2024-07-19 | Stop reason: HOSPADM

## 2024-07-12 RX ORDER — SODIUM CHLORIDE 9 MG/ML
INJECTION, SOLUTION INTRAVENOUS CONTINUOUS
Status: DISCONTINUED | OUTPATIENT
Start: 2024-07-12 | End: 2024-07-13

## 2024-07-12 RX ORDER — ACETAMINOPHEN 325 MG/1
650 TABLET ORAL
Status: COMPLETED | OUTPATIENT
Start: 2024-07-12 | End: 2024-07-12

## 2024-07-12 RX ORDER — IPRATROPIUM BROMIDE AND ALBUTEROL SULFATE 2.5; .5 MG/3ML; MG/3ML
1 SOLUTION RESPIRATORY (INHALATION)
Status: DISCONTINUED | OUTPATIENT
Start: 2024-07-12 | End: 2024-07-12

## 2024-07-12 RX ADMIN — CARVEDILOL 25 MG: 12.5 TABLET, FILM COATED ORAL at 20:46

## 2024-07-12 RX ADMIN — Medication 10 ML: at 20:46

## 2024-07-12 RX ADMIN — SODIUM ZIRCONIUM CYCLOSILICATE 10 G: 10 POWDER, FOR SUSPENSION ORAL at 07:42

## 2024-07-12 RX ADMIN — SODIUM CHLORIDE: 9 INJECTION, SOLUTION INTRAVENOUS at 21:20

## 2024-07-12 RX ADMIN — ACETAMINOPHEN 650 MG: 325 TABLET ORAL at 08:13

## 2024-07-12 RX ADMIN — GABAPENTIN 100 MG: 100 CAPSULE ORAL at 12:51

## 2024-07-12 RX ADMIN — ATORVASTATIN CALCIUM 80 MG: 40 TABLET, FILM COATED ORAL at 12:27

## 2024-07-12 RX ADMIN — AMLODIPINE BESYLATE 5 MG: 5 TABLET ORAL at 12:28

## 2024-07-12 RX ADMIN — SODIUM CHLORIDE 1000 ML: 9 INJECTION, SOLUTION INTRAVENOUS at 06:18

## 2024-07-12 RX ADMIN — SODIUM CHLORIDE: 9 INJECTION, SOLUTION INTRAVENOUS at 11:52

## 2024-07-12 RX ADMIN — INSULIN HUMAN 5 UNITS: 100 INJECTION, SOLUTION PARENTERAL at 06:44

## 2024-07-12 RX ADMIN — FERROUS SULFATE TAB 325 MG (65 MG ELEMENTAL FE) 325 MG: 325 (65 FE) TAB at 12:28

## 2024-07-12 RX ADMIN — GABAPENTIN 100 MG: 100 CAPSULE ORAL at 20:46

## 2024-07-12 RX ADMIN — Medication 10 ML: at 12:22

## 2024-07-12 RX ADMIN — HEPARIN SODIUM 5000 UNITS: 5000 INJECTION INTRAVENOUS; SUBCUTANEOUS at 20:48

## 2024-07-12 RX ADMIN — ASPIRIN 81 MG: 81 TABLET, COATED ORAL at 12:51

## 2024-07-12 RX ADMIN — CALCIUM GLUCONATE 1000 MG: 20 INJECTION, SOLUTION INTRAVENOUS at 06:53

## 2024-07-12 RX ADMIN — ALBUTEROL SULFATE 10 MG: 2.5 SOLUTION RESPIRATORY (INHALATION) at 06:46

## 2024-07-12 RX ADMIN — DEXTROSE MONOHYDRATE 250 ML: 100 INJECTION, SOLUTION INTRAVENOUS at 06:43

## 2024-07-12 ASSESSMENT — PAIN SCALES - GENERAL
PAINLEVEL_OUTOF10: 0
PAINLEVEL_OUTOF10: 5

## 2024-07-12 ASSESSMENT — PAIN DESCRIPTION - ORIENTATION: ORIENTATION: OTHER (COMMENT)

## 2024-07-12 ASSESSMENT — PAIN DESCRIPTION - LOCATION: LOCATION: TEETH

## 2024-07-12 ASSESSMENT — PAIN DESCRIPTION - DESCRIPTORS: DESCRIPTORS: ACHING

## 2024-07-12 ASSESSMENT — PAIN - FUNCTIONAL ASSESSMENT
PAIN_FUNCTIONAL_ASSESSMENT: NONE - DENIES PAIN
PAIN_FUNCTIONAL_ASSESSMENT: ACTIVITIES ARE NOT PREVENTED

## 2024-07-12 ASSESSMENT — PAIN DESCRIPTION - FREQUENCY: FREQUENCY: CONTINUOUS

## 2024-07-12 ASSESSMENT — PAIN DESCRIPTION - ONSET: ONSET: ON-GOING

## 2024-07-12 ASSESSMENT — PAIN DESCRIPTION - PAIN TYPE: TYPE: ACUTE PAIN

## 2024-07-12 NOTE — CONSULTS
infarction) (HCC)     Recurrent pleural effusion on left     Stroke (HCC) 2022        Past Surgical Hx:     Past Surgical History:   Procedure Laterality Date    COLONOSCOPY N/A 9/27/2022    COLONOSCOPY performed by Zane Longoria MD at [facility] MAIN OR    CORONARY ARTERY BYPASS GRAFT  11/04/2022    UPPER GASTROINTESTINAL ENDOSCOPY      per pt on 5/11/2021       Medications:  Prior to Admission medications    Medication Sig Start Date End Date Taking? Authorizing Provider   bumetanide (BUMEX) 1 MG tablet Take 1 tablet by mouth 2 times daily 4/18/24   Carlos Donovan, APRN - NP   carvedilol (COREG) 25 MG tablet Take by mouth daily 12/27/23   Jah Reynolds MD   gabapentin (NEURONTIN) 100 MG capsule Take 1 capsule by mouth 2 times daily. 1/21/24   Jah Reynolds MD   atorvastatin (LIPITOR) 80 MG tablet Take 1 tablet by mouth daily    Jah Reynolds MD   empagliflozin (JARDIANCE) 10 MG tablet Take 1 tablet by mouth daily 7/13/23   Marilee Dawn MD   amLODIPine (NORVASC) 5 MG tablet Take 1 tablet by mouth daily 10/18/22   Automatic Reconciliation, Ar   aspirin 81 MG EC tablet Take 2 tablets by mouth daily    Automatic Reconciliation, Ar   ferrous sulfate (IRON 325) 325 (65 Fe) MG tablet Take 1 tablet by mouth every other day    Automatic Reconciliation, Ar       Allergies   Allergen Reactions    Clopidogrel      Other reaction(s): Not Reported This Time; OP cardiology notes patient \"Intolerant to clopidogrel\" but unknown reason       Social Hx:  reports that he quit smoking about 21 months ago. His smoking use included cigarettes. He has never used smokeless tobacco. He reports that he does not currently use alcohol after a past usage of about 1.0 standard drink of alcohol per week. He reports that he does not currently use drugs.     No family history on file.    Review of Systems:  A twelve point review of system was performed today. Pertinent positives and negatives are mentioned in the HPI. The reminder  14.0 07/12/2024     07/12/2024       Lab Results   Component Value Date    CALCIUM 9.4 07/12/2024    PHOS 3.7 08/11/2023       Urine dipstick:   No results found for: \"COLOR\", \"CASTS\"        Thank you for allowing us to participate in the care of this patient.   We will follow patient. Please don’t hesitate to call with any questions    Mohsen Marquez MD  7/12/2024    [facility] [address] [address]  Phone - [phone]   Fax - [phone]  [email or web address]

## 2024-07-12 NOTE — ED NOTES
Bedside shift change report given to Pamela RN (oncoming nurse) by Louis RN (offgoing nurse). Report included the following information Nurse Handoff Report, Index, ED Encounter Summary, ED SBAR, Adult Overview, and Neuro Assessment.

## 2024-07-12 NOTE — PLAN OF CARE
Problem: Physical Therapy - Adult  Goal: By Discharge: Performs mobility at highest level of function for planned discharge setting.  See evaluation for individualized goals.  Description: FUNCTIONAL STATUS PRIOR TO ADMISSION: Patient was modified independent using a rollator with forearm attachments for functional mobility when outside the home. Furniture surfs inside the home. Wife denied falls.     HOME SUPPORT PRIOR TO ADMISSION: The patient lived with his wife. Wife works part time and daughter provides assist at times. Patient is alone within the home at times.     Physical Therapy Goals  Initiated 7/12/2024  1.  Patient will move from supine to sit and sit to supine, scoot up and down, and roll side to side in bed with modified independence within 7 day(s).    2.  Patient will perform sit to stand with modified independence within 7 day(s).  3.  Patient will transfer from bed to chair and chair to bed with modified independence using the least restrictive device within 7 day(s).  4.  Patient will ambulate with modified independence for 100 feet with the least restrictive device within 7 day(s).       Outcome: Progressing   PHYSICAL THERAPY EVALUATION    Patient: Shawn Gross Jr. (68 y.o. male)  Date: 7/12/2024  Primary Diagnosis: RODNEY (acute kidney injury) ([facility]) [N17.9]       Precautions: Restrictions/Precautions: Fall Risk, Bed Alarm                      ASSESSMENT :   DEFICITS/IMPAIRMENTS:   The patient is limited by decreased functional mobility, ROM, strength, sensation, activity tolerance, endurance, safety awareness, cognition, coordination, balance following admission for RODNEY and dehydration. Patient received on stretcher in ED, wife present, and patient agreeable to therapy. He was AxO x3 (unable to state date or name of hospital but knew he was in the hospital). Overall CGA-min A, requiring most assist to steady when turning. As he fatigued with ambulation, his R foot step clearance suffered and  [id number]  2. Jonatan HENDERSON, Camille LEONARDO, Flory LEONARDO, Sinai J. Association of AM-PAC \"6-Clicks\" Basic Mobility and Daily Activity Scores With Discharge Destination. Phys Ther. 2021 Apr 4;101(4):ezdv128. doi: 10.1093/ptj/[id number]. PMID: 86159247.  3. Shelbi LEONARDO, Ziggy D, Maite S, Michael DEAN, Alexis MIRANDA. Activity Measure for Post-Acute Care \"6-Clicks\" Basic Mobility Scores Predict Discharge Destination After Acute Care Hospitalization in Select Patient Groups: A Retrospective, Observational Study. Arch Rehabil Res Clin Transl. 2022 Jul 16;4(3):973420. doi: 10.1016/j.arrct.2022.758376. PMID: 51332587; PMCID: BUS5711458.  4. Reema MICHAEL, Lainey S, Denver W, Leni P. AM-PAC Short Forms Manual 4.0. Revised 2/2020.                                                                                                                                                                                                                                Activity Tolerance:   Good and SpO2 stable on room air    After treatment:   Patient left in no apparent distress in bed, Call bell within reach, Caregiver / family present, and Side rails x3    COMMUNICATION/EDUCATION:   The patient's plan of care was discussed with: registered nurse         Thank you for this referral.  Chantal Serrano, PT, DPT, CCS  Minutes: 20      Physical Therapy Evaluation Charge Determination   History Examination Presentation Decision-Making   HIGH Complexity :3+ comorbidities / personal factors will impact the outcome/ POC  HIGH Complexity : 4+ Standardized tests and measures addressing body structure, function, activity limitation and / or participation in recreation  LOW Complexity : Stable, uncomplicated  AM-PAC  LOW    Based on the above components, the patient evaluation is determined to be of the following complexity level: Low

## 2024-07-12 NOTE — CARE COORDINATION
Care Management Initial Assessment       RUR:n/a  Readmission? No  1st IM letter given? Yes today   1st  letter given: No     07/12/24 1147   Service Assessment   Patient Orientation Alert and Oriented   Cognition Alert   History Provided By Patient;Significant Other   Primary Caregiver Self   Support Systems Spouse/Significant Other;Children   Patient's Healthcare Decision Maker is: Legal Next of Kin   PCP Verified by CM Yes   Last Visit to PCP Within last 6 months   Prior Functional Level Independent in ADLs/IADLs   Current Functional Level Independent in ADLs/IADLs   Can patient return to prior living arrangement Yes   Ability to make needs known: Good   Family able to assist with home care needs: Yes   Would you like for me to discuss the discharge plan with any other family members/significant others, and if so, who? Yes  (wife)   Financial Resources Medicare   Social/Functional History   Lives With Spouse;Daughter   Home Equipment None   Receives Help From Family   ADL Assistance Independent   Homemaking Assistance Independent   Homemaking Responsibilities Yes   Ambulation Assistance Independent   Transfer Assistance Independent   Active  No   Mode of Transportation Car   Occupation Retired   Discharge Planning   Living Arrangements Spouse/Significant Other;Children   Current Services Prior To Admission None   Potential Assistance Needed N/A   DME Ordered? No   Potential Assistance Purchasing Medications No   Type of Home Care Services None   Services At/After Discharge   [place] Resource Information Provided? No   Mode of Transport at Discharge Other (see comment)  (wife)   Confirm Follow Up Transport Family     CM met with patient and patient's wife bedside and verified patient's demographics, insurance, and PCP. Patient lives with his wife and adult daughter. Patient is independent in his ADLs/IADLs with no DME. Patient uses [facility] on [address] for prescriptions. Patient was previously open to

## 2024-07-12 NOTE — ED TRIAGE NOTES
[place] c/o generalized weakness bilaterally with difficulty walking. EMS reports blood sugar in the 200s and pt denies hx of diabetes. Pt unable to provide full medical hx. Able to ambulate with assistance per EMS, at neuro baseline per family. Pt able to answer name and date of birth but unable to state year, reason for coming, or medical hx in triage.

## 2024-07-12 NOTE — H&P
History and Physical    Date of Service:  7/12/2024  Primary Care Provider: John Kwon MD  Source of information: The patient and Chart review    Chief Complaint: Fatigue      History of Presenting Illness:   Shawn Gross Jr. is a 68 y.o. male who presents with complaint of not feeling well.  Patient cannot really elaborate on the history other than not feeling well and fatigue.  Also noted some difficulty walking due to weakness.  Patient presented with stable vital signs other than mild elevated blood pressure and heart rate.  Blood work revealed elevated potassium of 6.4 and creatinine elevated at 3.84.  Last known creatinine 4 months ago was 2.49, patient appears to have CKD of unclear stage.  Patient also was noted to have leukocytosis with white count of 14,000.  Chest x-ray shows atelectasis, urinalysis pending at this time.  In the emergency room, patient was started hyperkalemia treatment with albuterol, calcium gluconate, insulin and D10 as well as Lokelma.  Also IV fluid bolus was given.  Hospitalist service requested admit the patient for further evaluation management.    The patient denies any headache, blurry vision, sore throat, trouble swallowing, trouble with speech, chest pain, SOB, cough, fever, chills, N/V/D, abd pain, urinary symptoms, constipation, recent travels, sick contacts, focal or generalized neurological symptoms, falls, injuries, rashes, contact with COVID-19 diagnosed patients, hematemesis, melena, hemoptysis, hematuria, rashes, denies starting any new medications and denies any other concerns or problems besides as mentioned above.         REVIEW OF SYSTEMS:  A comprehensive review of systems was negative except for that written in the History of Present Illness.     Past Medical History:   Diagnosis Date    Anemia     CAD (coronary artery disease)     10/31/22: NSTEMI sees [facility] cardiology, may be getting CABG    CKD (chronic kidney disease) stage 3, GFR 30-59  6.7 (*)     POC Chloride 109 (*)     Anion Gap, POC 8 (*)     POC Glucose 208 (*)     POC Creatinine 3.8 (*)     eGFR, POC 16 (*)     All other components within normal limits   POCT GLUCOSE - Abnormal; Notable for the following components:    POC Glucose 150 (*)     All other components within normal limits   POCT GLUCOSE - Abnormal; Notable for the following components:    POC Glucose 190 (*)     All other components within normal limits       [unfilled]    IMAGING:   XR CHEST PORTABLE   Final Result      Mild left basilar atelectasis         Electronically signed by Adama ENGLISH RETROPERITONEAL COMPLETE    (Results Pending)        ECG/ECHO:  [unfilled]       Notes reviewed from all clinical/nonclinical/nursing services involved in patient's clinical care. Care coordination discussions were held with appropriate clinical/nonclinical/ nursing providers based on care coordination needs.     Assessment:   Given the patient's current clinical presentation, there is a high level of concern for decompensation if discharged from the emergency department. Complex decision making was performed, which includes reviewing the patient's available past medical records, laboratory results, and imaging studies.    Active Problems:    * No active hospital problems. *  Resolved Problems:    * No resolved hospital problems. *      Plan:     RODNEY  -RODNEY probable prerenal, rule out postobstructive  -Appears to have CKD at baseline, unknown stage  -Start IV fluid, check renal ultrasound, monitor intake and output, hold Bumex  -Nephrology to further evaluate    Hyperkalemia  -Probably due to RODNEY, no medications on his home med list that we will elevate potassium  -Patient treated with albuterol, calcium gluconate, insulin and D10, Lokelma  -Repeat potassium level, monitor on telemetry    SIRS  -Patient presented with tachycardia and leukocytosis  -No infectious etiology so far, chest x-ray shows atelectasis, urinalysis

## 2024-07-12 NOTE — PROGRESS NOTES
Patient arrived to [facility]. Continuous heart monitor applied, belongings at bedside and documented in chart. Bed in low, locked position, call light placed within reach & bed alarm on and activated.

## 2024-07-12 NOTE — ED PROVIDER NOTES
AMLODIPINE (NORVASC) 5 MG TABLET    Take 1 tablet by mouth daily    ASPIRIN 81 MG EC TABLET    Take 2 tablets by mouth daily    ATORVASTATIN (LIPITOR) 80 MG TABLET    Take 1 tablet by mouth daily    BUMETANIDE (BUMEX) 1 MG TABLET    Take 1 tablet by mouth 2 times daily    CARVEDILOL (COREG) 25 MG TABLET    Take by mouth daily    EMPAGLIFLOZIN (JARDIANCE) 10 MG TABLET    Take 1 tablet by mouth daily    FERROUS SULFATE (IRON 325) 325 (65 FE) MG TABLET    Take 1 tablet by mouth every other day    GABAPENTIN (NEURONTIN) 100 MG CAPSULE    Take 1 capsule by mouth 2 times daily.       ALLERGIES     Clopidogrel    FAMILY HISTORY     No family history on file.       SOCIAL HISTORY       Social History     Socioeconomic History    Marital status:    Tobacco Use    Smoking status: Former     Current packs/day: 0.00     Types: Cigarettes     Quit date: 10/2022     Years since quittin.7    Smokeless tobacco: Never   Substance and Sexual Activity    Alcohol use: Not Currently     Alcohol/week: 1.0 standard drink of alcohol    Drug use: Not Currently     Social Determinants of Health     Food Insecurity: No Food Insecurity (2024)    Hunger Vital Sign     Worried About Running Out of Food in the Last Year: Never true     Ran Out of Food in the Last Year: Never true   Transportation Needs: No Transportation Needs (2024)    PRAPARE - Transportation     Lack of Transportation (Medical): No     Lack of Transportation (Non-Medical): No   Housing Stability: Low Risk  (2024)    Housing Stability Vital Sign     Unable to Pay for Housing in the Last Year: No     Number of Places Lived in the Last Year: 1     Unstable Housing in the Last Year: No           PHYSICAL EXAM    (up to 7 for level 4, 8 or more for level 5)     ED Triage Vitals [24 0341]   BP Temp Temp Source Pulse Respirations SpO2 Height Weight - Scale   (!) 152/80 98.7 °F (37.1 °C) Oral 98 17 95 % 1.727 m (5' 8\") 55.9 kg (123 lb 3.8 oz)       Body     Alk Phosphatase 146 (*)     Globulin 4.4 (*)     Albumin/Globulin Ratio 0.9 (*)     All other components within normal limits   POCT BLOOD GAS & ELECTROLYTES - Abnormal; Notable for the following components:    PH, VENOUS (POC) 7.28 (*)     POC Potassium 7.0 (*)     Anion Gap, POC 8 (*)     POC Glucose 207 (*)     POC Creatinine 4.2 (*)     eGFR, POC 15 (*)     POC Lactic Acid 2.07 (*)     All other components within normal limits   POCT BLOOD GAS & ELECTROLYTES - Abnormal; Notable for the following components:    PH, VENOUS (POC) 7.29 (*)     POC Potassium 6.7 (*)     POC Chloride 109 (*)     Anion Gap, POC 8 (*)     POC Glucose 208 (*)     POC Creatinine 3.8 (*)     eGFR, POC 16 (*)     All other components within normal limits   TROPONIN   EXTRA TUBES HOLD   URINALYSIS WITH MICROSCOPIC   VENOUS BLOOD GAS, POINT OF CARE   POCT GLUCOSE   POCT GLUCOSE   POCT GLUCOSE   POCT GLUCOSE   POCT GLUCOSE   POCT GLUCOSE   POCT GLUCOSE       All other labs were within normal range or not returned as of this dictation.    EMERGENCY DEPARTMENT COURSE and DIFFERENTIAL DIAGNOSIS/MDM:   Vitals:    Vitals:    07/12/24 0341   BP: (!) 152/80   Pulse: 98   Resp: 17   Temp: 98.7 °F (37.1 °C)   TempSrc: Oral   SpO2: 95%   Weight: 55.9 kg (123 lb 3.8 oz)   Height: 1.727 m (5' 8\")           Medical Decision Making  68-year-old male comes in as above.  Patient cannot explain what is wrong, he just states that he does not feel good.  He appears dehydrated on arrival.  Patient found to have acute on chronic renal insufficiency with potassium that is elevated to 6.4 without EKG changes.  Hyperkalemia treatment pathway initiated.  IV fluid provided.  Will admit patient.    Total critical care time (not including time spent performing separately reportable procedures): 40 minutes      Amount and/or Complexity of Data Reviewed  Labs: ordered. Decision-making details documented in ED Course.  Radiology: ordered. Decision-making details

## 2024-07-12 NOTE — ED NOTES
TRANSFER - OUT REPORT:    Verbal report given to ETHAN Lou on Shawn Gross Jr.  being transferred to [facility] for routine progression of patient care       Report consisted of patient's Situation, Background, Assessment and   Recommendations(SBAR).     Information from the following report(s) Nurse Handoff Report was reviewed with the receiving nurse.    Arminto Fall Assessment:                           Lines:   Peripheral IV 07/12/24 Left Antecubital (Active)        Opportunity for questions and clarification was provided.      Patient transported with:  Registered Nurse

## 2024-07-13 LAB
ANION GAP SERPL CALC-SCNC: 5 MMOL/L (ref 5–15)
BASOPHILS # BLD: 0.1 K/UL (ref 0–0.1)
BASOPHILS NFR BLD: 1 % (ref 0–1)
BUN SERPL-MCNC: 37 MG/DL (ref 6–20)
BUN/CREAT SERPL: 13 (ref 12–20)
CALCIUM SERPL-MCNC: 8.4 MG/DL (ref 8.5–10.1)
CHLORIDE SERPL-SCNC: 115 MMOL/L (ref 97–108)
CO2 SERPL-SCNC: 15 MMOL/L (ref 21–32)
CREAT SERPL-MCNC: 2.79 MG/DL (ref 0.7–1.3)
DIFFERENTIAL METHOD BLD: ABNORMAL
EOSINOPHIL # BLD: 0.1 K/UL (ref 0–0.4)
EOSINOPHIL NFR BLD: 2 % (ref 0–7)
ERYTHROCYTE [DISTWIDTH] IN BLOOD BY AUTOMATED COUNT: 14.2 % (ref 11.5–14.5)
GLUCOSE BLD STRIP.AUTO-MCNC: 113 MG/DL (ref 65–117)
GLUCOSE BLD STRIP.AUTO-MCNC: 119 MG/DL (ref 65–117)
GLUCOSE BLD STRIP.AUTO-MCNC: 150 MG/DL (ref 65–117)
GLUCOSE BLD STRIP.AUTO-MCNC: 153 MG/DL (ref 65–117)
GLUCOSE SERPL-MCNC: 109 MG/DL (ref 65–100)
HCT VFR BLD AUTO: 42.6 % (ref 36.6–50.3)
HGB BLD-MCNC: 13.5 G/DL (ref 12.1–17)
IMM GRANULOCYTES # BLD AUTO: 0 K/UL (ref 0–0.04)
IMM GRANULOCYTES NFR BLD AUTO: 0 % (ref 0–0.5)
LYMPHOCYTES # BLD: 0.7 K/UL (ref 0.8–3.5)
LYMPHOCYTES NFR BLD: 9 % (ref 12–49)
MCH RBC QN AUTO: 31.2 PG (ref 26–34)
MCHC RBC AUTO-ENTMCNC: 31.7 G/DL (ref 30–36.5)
MCV RBC AUTO: 98.4 FL (ref 80–99)
MONOCYTES # BLD: 1.2 K/UL (ref 0–1)
MONOCYTES NFR BLD: 16 % (ref 5–13)
NEUTS SEG # BLD: 5.5 K/UL (ref 1.8–8)
NEUTS SEG NFR BLD: 73 % (ref 32–75)
NRBC # BLD: 0 K/UL (ref 0–0.01)
NRBC BLD-RTO: 0 PER 100 WBC
PLATELET # BLD AUTO: 145 K/UL (ref 150–400)
PMV BLD AUTO: 11 FL (ref 8.9–12.9)
POTASSIUM SERPL-SCNC: 5 MMOL/L (ref 3.5–5.1)
RBC # BLD AUTO: 4.33 M/UL (ref 4.1–5.7)
SERVICE CMNT-IMP: ABNORMAL
SERVICE CMNT-IMP: NORMAL
SODIUM SERPL-SCNC: 135 MMOL/L (ref 136–145)
WBC # BLD AUTO: 7.6 K/UL (ref 4.1–11.1)

## 2024-07-13 PROCEDURE — 85025 COMPLETE CBC W/AUTO DIFF WBC: CPT

## 2024-07-13 PROCEDURE — 6370000000 HC RX 637 (ALT 250 FOR IP): Performed by: INTERNAL MEDICINE

## 2024-07-13 PROCEDURE — 6360000002 HC RX W HCPCS: Performed by: FAMILY MEDICINE

## 2024-07-13 PROCEDURE — 6370000000 HC RX 637 (ALT 250 FOR IP): Performed by: FAMILY MEDICINE

## 2024-07-13 PROCEDURE — 2580000003 HC RX 258: Performed by: INTERNAL MEDICINE

## 2024-07-13 PROCEDURE — 2500000003 HC RX 250 WO HCPCS: Performed by: INTERNAL MEDICINE

## 2024-07-13 PROCEDURE — 80048 BASIC METABOLIC PNL TOTAL CA: CPT

## 2024-07-13 PROCEDURE — 82962 GLUCOSE BLOOD TEST: CPT

## 2024-07-13 PROCEDURE — 2580000003 HC RX 258: Performed by: FAMILY MEDICINE

## 2024-07-13 PROCEDURE — 2060000000 HC ICU INTERMEDIATE R&B

## 2024-07-13 PROCEDURE — 36415 COLL VENOUS BLD VENIPUNCTURE: CPT

## 2024-07-13 RX ORDER — SODIUM BICARBONATE 650 MG/1
1300 TABLET ORAL 3 TIMES DAILY
Status: DISCONTINUED | OUTPATIENT
Start: 2024-07-13 | End: 2024-07-19 | Stop reason: HOSPADM

## 2024-07-13 RX ADMIN — SODIUM BICARBONATE: 84 INJECTION, SOLUTION INTRAVENOUS at 09:39

## 2024-07-13 RX ADMIN — HEPARIN SODIUM 5000 UNITS: 5000 INJECTION INTRAVENOUS; SUBCUTANEOUS at 05:09

## 2024-07-13 RX ADMIN — SODIUM BICARBONATE 1300 MG: 650 TABLET ORAL at 09:33

## 2024-07-13 RX ADMIN — SODIUM BICARBONATE 1300 MG: 650 TABLET ORAL at 15:33

## 2024-07-13 RX ADMIN — AMLODIPINE BESYLATE 5 MG: 5 TABLET ORAL at 09:34

## 2024-07-13 RX ADMIN — SODIUM BICARBONATE 1300 MG: 650 TABLET ORAL at 20:41

## 2024-07-13 RX ADMIN — HEPARIN SODIUM 5000 UNITS: 5000 INJECTION INTRAVENOUS; SUBCUTANEOUS at 20:42

## 2024-07-13 RX ADMIN — ATORVASTATIN CALCIUM 80 MG: 40 TABLET, FILM COATED ORAL at 09:34

## 2024-07-13 RX ADMIN — ASPIRIN 81 MG: 81 TABLET, COATED ORAL at 09:33

## 2024-07-13 RX ADMIN — HEPARIN SODIUM 5000 UNITS: 5000 INJECTION INTRAVENOUS; SUBCUTANEOUS at 15:33

## 2024-07-13 RX ADMIN — SODIUM CHLORIDE: 9 INJECTION, SOLUTION INTRAVENOUS at 08:29

## 2024-07-13 RX ADMIN — GABAPENTIN 100 MG: 100 CAPSULE ORAL at 20:42

## 2024-07-13 RX ADMIN — CARVEDILOL 25 MG: 12.5 TABLET, FILM COATED ORAL at 09:33

## 2024-07-13 RX ADMIN — CARVEDILOL 25 MG: 12.5 TABLET, FILM COATED ORAL at 20:41

## 2024-07-13 RX ADMIN — GABAPENTIN 100 MG: 100 CAPSULE ORAL at 09:33

## 2024-07-13 RX ADMIN — Medication 10 ML: at 20:41

## 2024-07-13 ASSESSMENT — PAIN SCALES - GENERAL
PAINLEVEL_OUTOF10: 0

## 2024-07-13 NOTE — PROGRESS NOTES
[address], [place] [address]  Phone: [phone]   [phone]    [email or web address]     Nephrology Progress Note    Patient Name : Shawn Gross Jr.      : 1955     MRN : [id number]  Date of Admission : 2024  Date of Servive : 24    CC:  Follow up for RODNEY       Assessment and Plan   RODNEY on CKD:  -Prerenal azotemia resolving  - U/S : no obstruction  -UA shows glycosuria  - changed IVF to bicarb drip x 24 hours  - hold diuretics and SGLT2i for now  - daily labs, I/Os, daily wts     CKD IV:  - from DM2, baseline Cr around 2.5     Volume depletion:  - IVF as above, hold diuretics     Hyperkalemia:  - resolved post medical tx in ER  - hold RAASi     HFrEF:  - EF 45-50%     Leukocytosis:  - infections w/up per primary team     CAD s/p CABG  HTN  DM2     Interval History:  Patient seen and examined.  He reports being unwell for the last 2 days.  Serum creatinine improved to 2.8 mg/dL.  Has metabolic acidosis from hyperchloremia.  Normal anion gap.  Patient is well-known to me and followed as an outpatient    Review of Systems: A comprehensive review of systems was negative.    Current Medications:   Current Facility-Administered Medications   Medication Dose Route Frequency    sodium bicarbonate tablet 1,300 mg  1,300 mg Oral TID    sodium bicarbonate 150 mEq in sterile water 1,000 mL infusion   IntraVENous Continuous    glucose chewable tablet 16 g  4 tablet Oral PRN    dextrose bolus 10% 125 mL  125 mL IntraVENous PRN    Or    dextrose bolus 10% 250 mL  250 mL IntraVENous PRN    dextrose 10 % infusion   IntraVENous Continuous PRN    glucagon injection 1 mg  1 mg SubCUTAneous PRN    amLODIPine (NORVASC) tablet 5 mg  5 mg Oral Daily    atorvastatin (LIPITOR) tablet 80 mg  80 mg Oral Daily    carvedilol (COREG) tablet 25 mg  25 mg Oral BID    ferrous sulfate (IRON 325) tablet 325 mg  325 mg Oral Every Other Day    gabapentin  07/12/24  0455 07/12/24  0831 07/13/24  0108     --   --   --  135*   K 6.4*  --   --  4.0 5.0     --   --   --  115*   CO2 22  --   --   --  15*   GLUCOSE 211*  --   --   --  109*   BUN 43*  --   --   --  37*   CREATININE 3.84* 4.2* 3.8*  --  2.79*   CALCIUM 9.4  --   --   --  8.4*       Recent Labs     07/12/24  0430 07/13/24  0108   WBC 14.0* 7.6   RBC 5.16 4.33   HGB 15.9 13.5   HCT 48.4 42.6   MCV 93.8 98.4   MCH 30.8 31.2   MCHC 32.9 31.7   RDW 14.0 14.2    145*   MPV 10.7 11.0     Recent Labs     07/12/24  0430   GLOB 4.4*     No results for input(s): \"INR\", \"APTT\" in the last 72 hours.    Invalid input(s): \"PTP\"   No results for input(s): \"CPK\", \"CKMB\", \"TROPONINI\" in the last 72 hours.    Invalid input(s): \"B-NP\"  Invalid input(s): \"PHI\", \"PCO2I\", \"PO2I\", \"FIO2I\"     Ventilator:       Microbiology:  No results found for: \"SDES\"  No components found for: \"CULT\"      I have reviewed the flowsheets.  Chart and Pertinent Notes have been reviewed.   No change in PMH ,family and social history from Consult note.      Cameron Coto MD  [facility]

## 2024-07-13 NOTE — PROGRESS NOTES
[facility]  Hospitalist Group                                                                                          Hospitalist Progress Note  Kathryn Neff MD  Office Phone: [phone]        Date of Service:  2024  NAME:  Shawn Gross Jr.  :  1955  MRN:  [id number]       Admission Summary:   Quoted: \"Shawn Gross Jr. is a 68 y.o. male who presents with complaint of not feeling well.  Patient cannot really elaborate on the history other than not feeling well and fatigue.  Also noted some difficulty walking due to weakness.  Patient presented with stable vital signs other than mild elevated blood pressure and heart rate.  Blood work revealed elevated potassium of 6.4 and creatinine elevated at 3.84.  Last known creatinine 4 months ago was 2.49, patient appears to have CKD of unclear stage.  Patient also was noted to have leukocytosis with white count of 14,000.  Chest x-ray shows atelectasis, urinalysis pending at this time.  In the emergency room, patient was started hyperkalemia treatment with albuterol, calcium gluconate, insulin and D10 as well as Lokelma.  Also IV fluid bolus was given.  Hospitalist service requested admit the patient for further evaluation management.     The patient denies any headache, blurry vision, sore throat, trouble swallowing, trouble with speech, chest pain, SOB, cough, fever, chills, N/V/D, abd pain, urinary symptoms, constipation, recent travels, sick contacts, focal or generalized neurological symptoms, falls, injuries, rashes, contact with COVID-19 diagnosed patients, hematemesis, melena, hemoptysis, hematuria, rashes, denies starting any new medications and denies any other concerns or problems besides as mentioned above.\"         Interval history / Subjective:   Awake, alert, no specific complaints     Assessment & Plan:     RODNEY, prerenal azotemia  Imaging negative for hydronephrosis  Nephrology consulted  IVF     Hyperkalemia,  Results   Component Value Date/Time    GLUCPOC 142 08/01/2022 11:56 AM    GLUCPOC 112 03/16/2022 08:46 AM    GLUCPOC 129 09/15/2021 08:52 AM    GLUCPOC 154 04/20/2021 10:39 AM    GLUCPOC 160 03/18/2021 10:25 AM     [unfilled]    Notes reviewed from all clinical/nonclinical/nursing services involved in patient's clinical care. Care coordination discussions were held with appropriate clinical/nonclinical/ nursing providers based on care coordination needs.         Patients current active Medications were reviewed, considered, added and adjusted based on the clinical condition today.      Home Medications were reconciled to the best of my ability given all available resources at the time of admission. Route is PO if not otherwise noted.      Admission Status:[id number]:::1}      Medications Reviewed:     Current Facility-Administered Medications   Medication Dose Route Frequency    sodium bicarbonate tablet 1,300 mg  1,300 mg Oral TID    sodium bicarbonate 150 mEq in sterile water 1,000 mL infusion   IntraVENous Continuous    glucose chewable tablet 16 g  4 tablet Oral PRN    dextrose bolus 10% 125 mL  125 mL IntraVENous PRN    Or    dextrose bolus 10% 250 mL  250 mL IntraVENous PRN    dextrose 10 % infusion   IntraVENous Continuous PRN    glucagon injection 1 mg  1 mg SubCUTAneous PRN    amLODIPine (NORVASC) tablet 5 mg  5 mg Oral Daily    atorvastatin (LIPITOR) tablet 80 mg  80 mg Oral Daily    carvedilol (COREG) tablet 25 mg  25 mg Oral BID    ferrous sulfate (IRON 325) tablet 325 mg  325 mg Oral Every Other Day    gabapentin (NEURONTIN) capsule 100 mg  100 mg Oral BID    sodium chloride flush 0.9 % injection 5-40 mL  5-40 mL IntraVENous 2 times per day    sodium chloride flush 0.9 % injection 5-40 mL  5-40 mL IntraVENous PRN    0.9 % sodium chloride infusion   IntraVENous PRN    ondansetron (ZOFRAN-ODT) disintegrating tablet 4 mg  4 mg Oral Q8H PRN    Or    ondansetron (ZOFRAN) injection 4 mg  4 mg IntraVENous Q6H PRN

## 2024-07-14 LAB
ALBUMIN SERPL-MCNC: 2.7 G/DL (ref 3.5–5)
ANION GAP SERPL CALC-SCNC: 7 MMOL/L (ref 5–15)
BUN SERPL-MCNC: 36 MG/DL (ref 6–20)
BUN/CREAT SERPL: 13 (ref 12–20)
CALCIUM SERPL-MCNC: 8.6 MG/DL (ref 8.5–10.1)
CHLORIDE SERPL-SCNC: 109 MMOL/L (ref 97–108)
CO2 SERPL-SCNC: 22 MMOL/L (ref 21–32)
CREAT SERPL-MCNC: 2.68 MG/DL (ref 0.7–1.3)
EKG ATRIAL RATE: 99 BPM
EKG DIAGNOSIS: NORMAL
EKG P AXIS: 65 DEGREES
EKG P-R INTERVAL: 194 MS
EKG Q-T INTERVAL: 324 MS
EKG QRS DURATION: 72 MS
EKG QTC CALCULATION (BAZETT): 415 MS
EKG R AXIS: 5 DEGREES
EKG T AXIS: 145 DEGREES
EKG VENTRICULAR RATE: 99 BPM
GLUCOSE BLD STRIP.AUTO-MCNC: 112 MG/DL (ref 65–117)
GLUCOSE BLD STRIP.AUTO-MCNC: 223 MG/DL (ref 65–117)
GLUCOSE BLD STRIP.AUTO-MCNC: 97 MG/DL (ref 65–117)
GLUCOSE SERPL-MCNC: 88 MG/DL (ref 65–100)
PHOSPHATE SERPL-MCNC: 2.7 MG/DL (ref 2.6–4.7)
POTASSIUM SERPL-SCNC: 4 MMOL/L (ref 3.5–5.1)
SERVICE CMNT-IMP: ABNORMAL
SERVICE CMNT-IMP: NORMAL
SERVICE CMNT-IMP: NORMAL
SODIUM SERPL-SCNC: 138 MMOL/L (ref 136–145)

## 2024-07-14 PROCEDURE — 6360000002 HC RX W HCPCS: Performed by: FAMILY MEDICINE

## 2024-07-14 PROCEDURE — 6370000000 HC RX 637 (ALT 250 FOR IP): Performed by: FAMILY MEDICINE

## 2024-07-14 PROCEDURE — 80069 RENAL FUNCTION PANEL: CPT

## 2024-07-14 PROCEDURE — 36415 COLL VENOUS BLD VENIPUNCTURE: CPT

## 2024-07-14 PROCEDURE — 6370000000 HC RX 637 (ALT 250 FOR IP): Performed by: INTERNAL MEDICINE

## 2024-07-14 PROCEDURE — 82962 GLUCOSE BLOOD TEST: CPT

## 2024-07-14 PROCEDURE — 2060000000 HC ICU INTERMEDIATE R&B

## 2024-07-14 PROCEDURE — 2580000003 HC RX 258: Performed by: FAMILY MEDICINE

## 2024-07-14 PROCEDURE — 93010 ELECTROCARDIOGRAM REPORT: CPT | Performed by: INTERNAL MEDICINE

## 2024-07-14 RX ADMIN — SODIUM BICARBONATE 1300 MG: 650 TABLET ORAL at 21:02

## 2024-07-14 RX ADMIN — AMLODIPINE BESYLATE 5 MG: 5 TABLET ORAL at 10:15

## 2024-07-14 RX ADMIN — HEPARIN SODIUM 5000 UNITS: 5000 INJECTION INTRAVENOUS; SUBCUTANEOUS at 14:31

## 2024-07-14 RX ADMIN — HEPARIN SODIUM 5000 UNITS: 5000 INJECTION INTRAVENOUS; SUBCUTANEOUS at 21:04

## 2024-07-14 RX ADMIN — CARVEDILOL 25 MG: 12.5 TABLET, FILM COATED ORAL at 21:02

## 2024-07-14 RX ADMIN — ASPIRIN 81 MG: 81 TABLET, COATED ORAL at 10:15

## 2024-07-14 RX ADMIN — GABAPENTIN 100 MG: 100 CAPSULE ORAL at 10:15

## 2024-07-14 RX ADMIN — ATORVASTATIN CALCIUM 80 MG: 40 TABLET, FILM COATED ORAL at 10:15

## 2024-07-14 RX ADMIN — SODIUM BICARBONATE 1300 MG: 650 TABLET ORAL at 10:14

## 2024-07-14 RX ADMIN — Medication 10 ML: at 21:54

## 2024-07-14 RX ADMIN — GABAPENTIN 100 MG: 100 CAPSULE ORAL at 21:02

## 2024-07-14 RX ADMIN — SODIUM BICARBONATE 1300 MG: 650 TABLET ORAL at 14:31

## 2024-07-14 RX ADMIN — CARVEDILOL 25 MG: 12.5 TABLET, FILM COATED ORAL at 10:15

## 2024-07-14 RX ADMIN — HEPARIN SODIUM 5000 UNITS: 5000 INJECTION INTRAVENOUS; SUBCUTANEOUS at 05:43

## 2024-07-14 ASSESSMENT — PAIN SCALES - GENERAL
PAINLEVEL_OUTOF10: 0

## 2024-07-14 NOTE — PROGRESS NOTES
[address], [place] [address]  Phone: [phone]   [phone]    [email or web address]     Nephrology Progress Note    Patient Name : Shawn Gross Jr.      : 1955     MRN : [id number]  Date of Admission : 2024  Date of Servive : 24    CC:  Follow up for RODNEY       Assessment and Plan   RODNEY on CKD:  -Prerenal azotemia resolving  - U/S : no obstruction  - UA shows glycosuria  - stopped IVF   - continue to hold diuretics and SGLT2i on d/c   - will see him in office in 2 -3 weeks   - daily labs, I/Os, daily wts    Met acidosis   - continue oral Bicarb on d/c      CKD IV:  - from DM2, baseline Cr around 2.5    Hyperkalemia:  - resolved post medical tx in ER     HFrEF:  - EF 45-50%     Leukocytosis:  - infections w/up per primary team     CAD s/p CABG  HTN  DM2     Interval History:  Patient seen and examined. Doing well. Voiding well. BP stable   No complaints     Review of Systems: A comprehensive review of systems was negative.    Current Medications:   Current Facility-Administered Medications   Medication Dose Route Frequency    sodium bicarbonate tablet 1,300 mg  1,300 mg Oral TID    glucose chewable tablet 16 g  4 tablet Oral PRN    dextrose bolus 10% 125 mL  125 mL IntraVENous PRN    Or    dextrose bolus 10% 250 mL  250 mL IntraVENous PRN    dextrose 10 % infusion   IntraVENous Continuous PRN    glucagon injection 1 mg  1 mg SubCUTAneous PRN    amLODIPine (NORVASC) tablet 5 mg  5 mg Oral Daily    atorvastatin (LIPITOR) tablet 80 mg  80 mg Oral Daily    carvedilol (COREG) tablet 25 mg  25 mg Oral BID    ferrous sulfate (IRON 325) tablet 325 mg  325 mg Oral Every Other Day    gabapentin (NEURONTIN) capsule 100 mg  100 mg Oral BID    sodium chloride flush 0.9 % injection 5-40 mL  5-40 mL IntraVENous 2 times per day    sodium chloride flush 0.9 % injection 5-40 mL  5-40 mL IntraVENous PRN    0.9 % sodium chloride infusion

## 2024-07-14 NOTE — PROGRESS NOTES
[facility]  Hospitalist Group                                                                                          Hospitalist Progress Note  Kathryn Neff MD  Office Phone: [phone]        Date of Service:  2024  NAME:  Shawn Gross Jr.  :  1955  MRN:  [id number]       Admission Summary:   Quoted: \"Shawn Gross Jr. is a 68 y.o. male who presents with complaint of not feeling well.  Patient cannot really elaborate on the history other than not feeling well and fatigue.  Also noted some difficulty walking due to weakness.  Patient presented with stable vital signs other than mild elevated blood pressure and heart rate.  Blood work revealed elevated potassium of 6.4 and creatinine elevated at 3.84.  Last known creatinine 4 months ago was 2.49, patient appears to have CKD of unclear stage.  Patient also was noted to have leukocytosis with white count of 14,000.  Chest x-ray shows atelectasis, urinalysis pending at this time.  In the emergency room, patient was started hyperkalemia treatment with albuterol, calcium gluconate, insulin and D10 as well as Lokelma.  Also IV fluid bolus was given.  Hospitalist service requested admit the patient for further evaluation management.     The patient denies any headache, blurry vision, sore throat, trouble swallowing, trouble with speech, chest pain, SOB, cough, fever, chills, N/V/D, abd pain, urinary symptoms, constipation, recent travels, sick contacts, focal or generalized neurological symptoms, falls, injuries, rashes, contact with COVID-19 diagnosed patients, hematemesis, melena, hemoptysis, hematuria, rashes, denies starting any new medications and denies any other concerns or problems besides as mentioned above.\"         Interval history / Subjective:   Awake, alert, no specific complaints, he was asked about his conversation with the nephrologist today. When asked, he feels he is not ready to be discharged today but plans

## 2024-07-15 LAB
ALBUMIN SERPL-MCNC: 2.4 G/DL (ref 3.5–5)
ANION GAP SERPL CALC-SCNC: 4 MMOL/L (ref 5–15)
BUN SERPL-MCNC: 42 MG/DL (ref 6–20)
BUN/CREAT SERPL: 15 (ref 12–20)
CALCIUM SERPL-MCNC: 8.2 MG/DL (ref 8.5–10.1)
CHLORIDE SERPL-SCNC: 112 MMOL/L (ref 97–108)
CO2 SERPL-SCNC: 24 MMOL/L (ref 21–32)
CREAT SERPL-MCNC: 2.77 MG/DL (ref 0.7–1.3)
ERYTHROCYTE [DISTWIDTH] IN BLOOD BY AUTOMATED COUNT: 13.9 % (ref 11.5–14.5)
GLUCOSE BLD STRIP.AUTO-MCNC: 125 MG/DL (ref 65–117)
GLUCOSE BLD STRIP.AUTO-MCNC: 173 MG/DL (ref 65–117)
GLUCOSE BLD STRIP.AUTO-MCNC: 175 MG/DL (ref 65–117)
GLUCOSE BLD STRIP.AUTO-MCNC: 98 MG/DL (ref 65–117)
GLUCOSE SERPL-MCNC: 88 MG/DL (ref 65–100)
HCT VFR BLD AUTO: 38.2 % (ref 36.6–50.3)
HGB BLD-MCNC: 12.2 G/DL (ref 12.1–17)
MCH RBC QN AUTO: 30.2 PG (ref 26–34)
MCHC RBC AUTO-ENTMCNC: 31.9 G/DL (ref 30–36.5)
MCV RBC AUTO: 94.6 FL (ref 80–99)
NRBC # BLD: 0 K/UL (ref 0–0.01)
NRBC BLD-RTO: 0 PER 100 WBC
PHOSPHATE SERPL-MCNC: 3.2 MG/DL (ref 2.6–4.7)
PLATELET # BLD AUTO: 205 K/UL (ref 150–400)
PMV BLD AUTO: 10.6 FL (ref 8.9–12.9)
POTASSIUM SERPL-SCNC: 3.7 MMOL/L (ref 3.5–5.1)
RBC # BLD AUTO: 4.04 M/UL (ref 4.1–5.7)
SERVICE CMNT-IMP: ABNORMAL
SERVICE CMNT-IMP: NORMAL
SODIUM SERPL-SCNC: 140 MMOL/L (ref 136–145)
WBC # BLD AUTO: 5.7 K/UL (ref 4.1–11.1)

## 2024-07-15 PROCEDURE — 2580000003 HC RX 258: Performed by: FAMILY MEDICINE

## 2024-07-15 PROCEDURE — 97116 GAIT TRAINING THERAPY: CPT

## 2024-07-15 PROCEDURE — 82962 GLUCOSE BLOOD TEST: CPT

## 2024-07-15 PROCEDURE — 6370000000 HC RX 637 (ALT 250 FOR IP): Performed by: FAMILY MEDICINE

## 2024-07-15 PROCEDURE — 1100000000 HC RM PRIVATE

## 2024-07-15 PROCEDURE — 6360000002 HC RX W HCPCS: Performed by: FAMILY MEDICINE

## 2024-07-15 PROCEDURE — 97530 THERAPEUTIC ACTIVITIES: CPT

## 2024-07-15 PROCEDURE — 6370000000 HC RX 637 (ALT 250 FOR IP): Performed by: INTERNAL MEDICINE

## 2024-07-15 PROCEDURE — 80069 RENAL FUNCTION PANEL: CPT

## 2024-07-15 PROCEDURE — 85027 COMPLETE CBC AUTOMATED: CPT

## 2024-07-15 PROCEDURE — 36415 COLL VENOUS BLD VENIPUNCTURE: CPT

## 2024-07-15 RX ADMIN — CARVEDILOL 25 MG: 12.5 TABLET, FILM COATED ORAL at 21:16

## 2024-07-15 RX ADMIN — SODIUM BICARBONATE 1300 MG: 650 TABLET ORAL at 09:10

## 2024-07-15 RX ADMIN — Medication 10 ML: at 21:17

## 2024-07-15 RX ADMIN — ATORVASTATIN CALCIUM 80 MG: 40 TABLET, FILM COATED ORAL at 09:11

## 2024-07-15 RX ADMIN — GABAPENTIN 100 MG: 100 CAPSULE ORAL at 09:11

## 2024-07-15 RX ADMIN — FERROUS SULFATE TAB 325 MG (65 MG ELEMENTAL FE) 325 MG: 325 (65 FE) TAB at 09:11

## 2024-07-15 RX ADMIN — CARVEDILOL 25 MG: 12.5 TABLET, FILM COATED ORAL at 09:11

## 2024-07-15 RX ADMIN — Medication 10 ML: at 09:12

## 2024-07-15 RX ADMIN — HEPARIN SODIUM 5000 UNITS: 5000 INJECTION INTRAVENOUS; SUBCUTANEOUS at 06:33

## 2024-07-15 RX ADMIN — HEPARIN SODIUM 5000 UNITS: 5000 INJECTION INTRAVENOUS; SUBCUTANEOUS at 15:15

## 2024-07-15 RX ADMIN — HEPARIN SODIUM 5000 UNITS: 5000 INJECTION INTRAVENOUS; SUBCUTANEOUS at 21:16

## 2024-07-15 RX ADMIN — GABAPENTIN 100 MG: 100 CAPSULE ORAL at 21:16

## 2024-07-15 RX ADMIN — AMLODIPINE BESYLATE 5 MG: 5 TABLET ORAL at 09:11

## 2024-07-15 RX ADMIN — ASPIRIN 81 MG: 81 TABLET, COATED ORAL at 09:11

## 2024-07-15 RX ADMIN — SODIUM BICARBONATE 1300 MG: 650 TABLET ORAL at 15:15

## 2024-07-15 RX ADMIN — SODIUM BICARBONATE 1300 MG: 650 TABLET ORAL at 21:16

## 2024-07-15 ASSESSMENT — PAIN SCALES - GENERAL
PAINLEVEL_OUTOF10: 0
PAINLEVEL_OUTOF10: 0

## 2024-07-15 NOTE — PROGRESS NOTES
\"TBIL\", \"TBILI\", \"ALB\", \"AML\", \"AMYP\", \"LPSE\", \"HLPSE\"    No results for input(s): \"INR\", \"APTT\" in the last 72 hours.    Invalid input(s): \"PTP\"   No results for input(s): \"TIBC\" in the last 72 hours.    Invalid input(s): \"FE\", \"PSAT\", \"FERR\"   No results found for: \"RBCF\"   No results for input(s): \"PH\", \"PCO2\", \"PO2\" in the last 72 hours.  No results for input(s): \"CPK\" in the last 72 hours.    Invalid input(s): \"CPKMB\", \"CKNDX\", \"TROIQ\"  Lab Results   Component Value Date/Time    CHOL 101 09/16/2023 04:11 AM    HDL 36 09/16/2023 04:11 AM    LDL 48.2 09/16/2023 04:11 AM     Lab Results   Component Value Date/Time    GLUCPOC 142 08/01/2022 11:56 AM    GLUCPOC 112 03/16/2022 08:46 AM    GLUCPOC 129 09/15/2021 08:52 AM    GLUCPOC 154 04/20/2021 10:39 AM    GLUCPOC 160 03/18/2021 10:25 AM     [unfilled]    Notes reviewed from all clinical/nonclinical/nursing services involved in patient's clinical care. Care coordination discussions were held with appropriate clinical/nonclinical/ nursing providers based on care coordination needs.         Patients current active Medications were reviewed, considered, added and adjusted based on the clinical condition today.      Home Medications were reconciled to the best of my ability given all available resources at the time of admission. Route is PO if not otherwise noted.      Admission Status:[id number]:::1}      Medications Reviewed:     Current Facility-Administered Medications   Medication Dose Route Frequency    sodium bicarbonate tablet 1,300 mg  1,300 mg Oral TID    glucose chewable tablet 16 g  4 tablet Oral PRN    dextrose bolus 10% 125 mL  125 mL IntraVENous PRN    Or    dextrose bolus 10% 250 mL  250 mL IntraVENous PRN    dextrose 10 % infusion   IntraVENous Continuous PRN    glucagon injection 1 mg  1 mg SubCUTAneous PRN    amLODIPine (NORVASC) tablet 5 mg  5 mg Oral Daily    atorvastatin (LIPITOR) tablet 80 mg  80 mg Oral Daily    carvedilol (COREG) tablet 25 mg  25 mg  Oral BID    ferrous sulfate (IRON 325) tablet 325 mg  325 mg Oral Every Other Day    gabapentin (NEURONTIN) capsule 100 mg  100 mg Oral BID    sodium chloride flush 0.9 % injection 5-40 mL  5-40 mL IntraVENous 2 times per day    sodium chloride flush 0.9 % injection 5-40 mL  5-40 mL IntraVENous PRN    0.9 % sodium chloride infusion   IntraVENous PRN    ondansetron (ZOFRAN-ODT) disintegrating tablet 4 mg  4 mg Oral Q8H PRN    Or    ondansetron (ZOFRAN) injection 4 mg  4 mg IntraVENous Q6H PRN    polyethylene glycol (GLYCOLAX) packet 17 g  17 g Oral Daily PRN    acetaminophen (TYLENOL) tablet 650 mg  650 mg Oral Q6H PRN    Or    acetaminophen (TYLENOL) suppository 650 mg  650 mg Rectal Q6H PRN    heparin (porcine) injection 5,000 Units  5,000 Units SubCUTAneous 3 times per day    aspirin EC tablet 81 mg  81 mg Oral Daily     ______________________________________________________________________  EXPECTED LENGTH OF STAY: 3  ACTUAL LENGTH OF STAY:          3                 Brijesh Taylor MD

## 2024-07-15 NOTE — CARE COORDINATION
1309 - CM received permission to initiate auth with [facility]. CM requested OT eval for auth. Pending OT notes. Will need OT notes prior to sending auth to [facility]; MD aware.     1227 - Spouse of pt provided choices for SNF. Auth initiated with [facility].   [facility] - pending  [facility] - pending  Auth with [facility] - pending    1200 - CM spoke with MD whom stated the pt is slightly confused due to dementia and to refer to family for discharge disposition. Pt reached out to Martha Gross, spouse of pt, and inquired if she would want pt to return home or go to SNF. She stated she would prefer SNF to increase strength. Spouse stated pt has been to [facility] before and would like referral sent there. She would also like to send to other facilities as well. CM sent SNF list via email to [email or web address] and advised 4 choices.CM will continue to follow. MD made aware of family plan.    Steffanie Patel RN CM    [facility]

## 2024-07-15 NOTE — PLAN OF CARE
Problem: Physical Therapy - Adult  Goal: By Discharge: Performs mobility at highest level of function for planned discharge setting.  See evaluation for individualized goals.  Description: FUNCTIONAL STATUS PRIOR TO ADMISSION: Patient was modified independent using a rollator with forearm attachments for functional mobility when outside the home. Furniture surfs inside the home. Wife denied falls.     HOME SUPPORT PRIOR TO ADMISSION: The patient lived with his wife. Wife works part time and daughter provides assist at times. Patient is alone within the home at times.     Physical Therapy Goals  Initiated 7/12/2024  1.  Patient will move from supine to sit and sit to supine, scoot up and down, and roll side to side in bed with modified independence within 7 day(s).    2.  Patient will perform sit to stand with modified independence within 7 day(s).  3.  Patient will transfer from bed to chair and chair to bed with modified independence using the least restrictive device within 7 day(s).  4.  Patient will ambulate with modified independence for 100 feet with the least restrictive device within 7 day(s).       Outcome: Progressing   PHYSICAL THERAPY TREATMENT    Patient: Shawn Gross JrCatarino (68 y.o. male)  Date: 7/15/2024  Diagnosis: Hyperkalemia [E87.5]  General weakness [R53.1]  RODNEY (acute kidney injury) (HCC) [N17.9]  Other fatigue [R53.83]  Acute renal failure superimposed on chronic kidney disease, unspecified acute renal failure type, unspecified CKD stage (HCC) [N17.9, N18.9] RODNEY (acute kidney injury) (HCC)      Precautions: Fall Risk, Bed Alarm                      ASSESSMENT:  Patient continues to benefit from skilled PT services and is progressing towards goals. Pt remains limited by some confusion (partially oriented but able to follow commands and participate in session). He was received soiled in BM (copious amount dark and tarry and green in color) and he had additional BM during our session that was  watery and of the same color, notified his nurse and hospitalist. Pt participated in some prolonged standing for BM clean up and was then returned to supine secondary to fatigue and imminent need for additional BM. After clean up he participated in amb using a rolling walker, completed 60 feet with contact guard. He remains not at reported baseline and continue to recommend short term rehab.           PLAN:  Patient continues to benefit from skilled intervention to address the above impairments.  Continue treatment per established plan of care.    Recommend with staff: therapy recommendations for staff: use rolling walker, up to the chair for meals, use bedside commode for urgent needs    Recommend for next PT session: further progression of gait with existing device    Recommendation for discharge: (in order for the patient to meet his/her long term goals): Therapy up to 5 days/week in Skilled nursing facility    Other factors to consider for discharge: high risk for falls, not safe to be alone, and unable to safely manage his bowels and clean up today.    IF patient discharges home will need the following DME: patient owns DME required for discharge       SUBJECTIVE:   Patient stated, \"Sometimes I don't know,\" as to when he needs to have a BM.    OBJECTIVE DATA SUMMARY:   Chart checked, pt cleared by nursing.  Critical Behavior:  Orientation  Orientation Level: Oriented to place;Oriented to person;Disoriented to time (partially oriented to situation)  Cognition  Overall Cognitive Status: Exceptions  Arousal/Alertness: Appropriate responses to stimuli  Following Commands: Follows multistep commands with increased time;Follows one step commands consistently  Attention Span: Attends with cues to redirect  Memory: Decreased recall of recent events;Decreased short term memory  Safety Judgement: Impaired;Decreased awareness of need for assistance  Problem Solving: Impaired  Insights: Decreased awareness of

## 2024-07-15 NOTE — CARE COORDINATION
Care Management Initial Assessment       RUR: 19%  Readmission? No  1st IM letter given? Yes - 7/13/24  1st  letter given: No    Pt resides in an one level home with ramp along with spouse. Pt does not drive. Pt is independent with ADL's and IADL's. Pt has a rollator and cane. Pt has had home health with [facility] and been in [facility]. No IPR in the past. P Pt's wife will transport at d/c or pt will need medical transport at d/c. Pt uses [facility] on [address] for Rx. PCP verified. CM will continue to follow.    Steffanie Patel RN CM    [facility]     07/15/24 4671   Service Assessment   Patient Orientation Alert and Oriented;Other (see comment)  (Pt slightly confused)   Cognition Dementia / Early Alzheimer's   History Provided By Spouse   Primary Caregiver Spouse   Support Systems Spouse/Significant Other   PCP Verified by CM Yes   Last Visit to PCP Within last 3 months   Prior Functional Level Independent in ADLs/IADLs   Current Functional Level Independent in ADLs/IADLs   Can patient return to prior living arrangement Unknown at present   Ability to make needs known: Fair   Family able to assist with home care needs: Yes   Would you like for me to discuss the discharge plan with any other family members/significant others, and if so, who? Yes   Financial Resources Medicare   Community Resources None   CM/SW Referral ADLs/IADLs   Social/Functional History   Lives With Spouse   Type of Home House   Home Layout One level   Home Access Ramped entrance   Home Equipment Cane;Rollator   Receives Help From Family   ADL Assistance Independent   Homemaking Assistance Independent   Ambulation Assistance Independent   Transfer Assistance Independent   Active  No   Mode of Transportation Car   Occupation Retired   Discharge Planning   Type of Residence House   Living Arrangements Spouse/Significant Other   Current Services Prior To Admission Durable Medical Equipment    Current DME Prior to Arrival Cane   Potential Assistance Needed Durable Medical Equipment;Skilled Nursing Facility;Transportation   DME Ordered? No   Type of Home Care Services OT;PT;Skilled Therapy   Patient expects to be discharged to: Skilled nursing facility   Services At/After Discharge   Transition of Care Consult (CM Consult) Home Health;SNF;Discharge Planning;Transportation Assistance   Services At/After Discharge Home Health;Skilled Nursing Facility (SNF);PT;OT;Nursing services;In ambulance   Mode of Transport at Discharge BLS   Confirm Follow Up Transport Family   Condition of Participation: Discharge Planning   The Plan for Transition of Care is related to the following treatment goals: Home with HH vs SNF   The Patient and/or Patient Representative was provided with a Choice of Provider? Patient Representative   Name of the Patient Representative who was provided with the Choice of Provider and agrees with the Discharge Plan?  Martha Gross   The Patient and/Or Patient Representative agree with the Discharge Plan? Yes   Freedom of Choice list was provided with basic dialogue that supports the patient's individualized plan of care/goals, treatment preferences, and shares the quality data associated with the providers?  Yes

## 2024-07-15 NOTE — PROGRESS NOTES
[id number] [facility], [place] [address]  Phone: [phone]   [phone]    [email or web address]     Nephrology Progress Note    Patient Name : Shawn Gross Jr.      : 1955     MRN : [id number]  Date of Admission : 2024  Date of Servive : 07/15/24    CC:  Follow up for RODNEY       Assessment and Plan   RODNEY on CKD:  - Prerenal azotemia resolving  - continue to hold diuretics and SGLT2i on d/c   - will see him in office in 2 -3 weeks   - daily labs, I/Os, daily wts    Met acidosis   - continue oral Bicarb on d/c      CKD IV:  - from DM2, baseline Cr around 2.5    Hyperkalemia:  - resolved post medical tx in ER     HFrEF:  - EF 45-50%     Leukocytosis:  - infections w/up per primary team     CAD s/p CABG  HTN  DM2     Interval History:  Patient seen and examined. Doing well. Voiding well. BP stable   No complaints     Review of Systems: A comprehensive review of systems was negative.    Current Medications:   Current Facility-Administered Medications   Medication Dose Route Frequency    sodium bicarbonate tablet 1,300 mg  1,300 mg Oral TID    glucose chewable tablet 16 g  4 tablet Oral PRN    dextrose bolus 10% 125 mL  125 mL IntraVENous PRN    Or    dextrose bolus 10% 250 mL  250 mL IntraVENous PRN    dextrose 10 % infusion   IntraVENous Continuous PRN    glucagon injection 1 mg  1 mg SubCUTAneous PRN    amLODIPine (NORVASC) tablet 5 mg  5 mg Oral Daily    atorvastatin (LIPITOR) tablet 80 mg  80 mg Oral Daily    carvedilol (COREG) tablet 25 mg  25 mg Oral BID    ferrous sulfate (IRON 325) tablet 325 mg  325 mg Oral Every Other Day    gabapentin (NEURONTIN) capsule 100 mg  100 mg Oral BID    sodium chloride flush 0.9 % injection 5-40 mL  5-40 mL IntraVENous 2 times per day    sodium chloride flush 0.9 % injection 5-40 mL  5-40 mL IntraVENous PRN    0.9 % sodium chloride infusion   IntraVENous PRN    ondansetron (ZOFRAN-ODT) disintegrating tablet    RDW 14.2 13.9   * 205   MPV 11.0 10.6       No results for input(s): \"TP\", \"GLOB\" in the last 72 hours.    Invalid input(s): \"SGOT\", \"GPT\", \"AP\", \"TBIL\", \"ALB\", \"AML\", \"AMYP\", \"LPSE\", \"LAC\"    No results for input(s): \"INR\", \"APTT\" in the last 72 hours.    Invalid input(s): \"PTP\"   No results for input(s): \"CPK\", \"CKMB\", \"TROPONINI\" in the last 72 hours.    Invalid input(s): \"B-NP\"  Invalid input(s): \"PHI\", \"PCO2I\", \"PO2I\", \"FIO2I\"     Ventilator:       Microbiology:  No results found for: \"SDES\"  No components found for: \"CULT\"      I have reviewed the flowsheets.  Chart and Pertinent Notes have been reviewed.   No change in PMH ,family and social history from Consult note.      Cameron Coto MD  [facility]

## 2024-07-16 LAB
ALBUMIN SERPL-MCNC: 2.6 G/DL (ref 3.5–5)
ANION GAP SERPL CALC-SCNC: 9 MMOL/L (ref 5–15)
BUN SERPL-MCNC: 44 MG/DL (ref 6–20)
BUN/CREAT SERPL: 16 (ref 12–20)
CALCIUM SERPL-MCNC: 8.4 MG/DL (ref 8.5–10.1)
CHLORIDE SERPL-SCNC: 108 MMOL/L (ref 97–108)
CO2 SERPL-SCNC: 25 MMOL/L (ref 21–32)
CREAT SERPL-MCNC: 2.7 MG/DL (ref 0.7–1.3)
GLUCOSE BLD STRIP.AUTO-MCNC: 111 MG/DL (ref 65–117)
GLUCOSE BLD STRIP.AUTO-MCNC: 127 MG/DL (ref 65–117)
GLUCOSE BLD STRIP.AUTO-MCNC: 130 MG/DL (ref 65–117)
GLUCOSE BLD STRIP.AUTO-MCNC: 134 MG/DL (ref 65–117)
GLUCOSE SERPL-MCNC: 139 MG/DL (ref 65–100)
PHOSPHATE SERPL-MCNC: 2.8 MG/DL (ref 2.6–4.7)
POTASSIUM SERPL-SCNC: 3.7 MMOL/L (ref 3.5–5.1)
SERVICE CMNT-IMP: ABNORMAL
SERVICE CMNT-IMP: NORMAL
SODIUM SERPL-SCNC: 142 MMOL/L (ref 136–145)

## 2024-07-16 PROCEDURE — 6360000002 HC RX W HCPCS: Performed by: FAMILY MEDICINE

## 2024-07-16 PROCEDURE — 82962 GLUCOSE BLOOD TEST: CPT

## 2024-07-16 PROCEDURE — 97535 SELF CARE MNGMENT TRAINING: CPT

## 2024-07-16 PROCEDURE — 2580000003 HC RX 258: Performed by: FAMILY MEDICINE

## 2024-07-16 PROCEDURE — 80069 RENAL FUNCTION PANEL: CPT

## 2024-07-16 PROCEDURE — 97530 THERAPEUTIC ACTIVITIES: CPT

## 2024-07-16 PROCEDURE — 6370000000 HC RX 637 (ALT 250 FOR IP): Performed by: INTERNAL MEDICINE

## 2024-07-16 PROCEDURE — 97116 GAIT TRAINING THERAPY: CPT

## 2024-07-16 PROCEDURE — 6370000000 HC RX 637 (ALT 250 FOR IP): Performed by: FAMILY MEDICINE

## 2024-07-16 PROCEDURE — 36415 COLL VENOUS BLD VENIPUNCTURE: CPT

## 2024-07-16 PROCEDURE — 1100000000 HC RM PRIVATE

## 2024-07-16 PROCEDURE — 97165 OT EVAL LOW COMPLEX 30 MIN: CPT

## 2024-07-16 RX ADMIN — FERROUS SULFATE TAB 325 MG (65 MG ELEMENTAL FE) 325 MG: 325 (65 FE) TAB at 08:33

## 2024-07-16 RX ADMIN — SODIUM BICARBONATE 1300 MG: 650 TABLET ORAL at 08:32

## 2024-07-16 RX ADMIN — CARVEDILOL 25 MG: 12.5 TABLET, FILM COATED ORAL at 20:37

## 2024-07-16 RX ADMIN — CARVEDILOL 25 MG: 12.5 TABLET, FILM COATED ORAL at 08:32

## 2024-07-16 RX ADMIN — Medication 10 ML: at 20:38

## 2024-07-16 RX ADMIN — Medication 10 ML: at 08:32

## 2024-07-16 RX ADMIN — ASPIRIN 81 MG: 81 TABLET, COATED ORAL at 08:32

## 2024-07-16 RX ADMIN — GABAPENTIN 100 MG: 100 CAPSULE ORAL at 20:38

## 2024-07-16 RX ADMIN — AMLODIPINE BESYLATE 5 MG: 5 TABLET ORAL at 08:31

## 2024-07-16 RX ADMIN — SODIUM BICARBONATE 1300 MG: 650 TABLET ORAL at 14:03

## 2024-07-16 RX ADMIN — HEPARIN SODIUM 5000 UNITS: 5000 INJECTION INTRAVENOUS; SUBCUTANEOUS at 20:38

## 2024-07-16 RX ADMIN — HEPARIN SODIUM 5000 UNITS: 5000 INJECTION INTRAVENOUS; SUBCUTANEOUS at 14:04

## 2024-07-16 RX ADMIN — GABAPENTIN 100 MG: 100 CAPSULE ORAL at 08:32

## 2024-07-16 RX ADMIN — SODIUM BICARBONATE 1300 MG: 650 TABLET ORAL at 20:38

## 2024-07-16 RX ADMIN — ATORVASTATIN CALCIUM 80 MG: 40 TABLET, FILM COATED ORAL at 08:32

## 2024-07-16 RX ADMIN — HEPARIN SODIUM 5000 UNITS: 5000 INJECTION INTRAVENOUS; SUBCUTANEOUS at 05:49

## 2024-07-16 ASSESSMENT — PAIN SCALES - GENERAL: PAINLEVEL_OUTOF10: 0

## 2024-07-16 NOTE — CARE COORDINATION
Transition of Care Plan:    RUR: 19%  Prior Level of Functioning: Patient was modified independent using a rollator with forearm attachments for functional mobility when outside the home. Furniture surfs inside the home. Wife denied falls   Disposition: SNF  If SNF or IPR: Date FOC offered: 7/15/14  Date FOC received: 7/15/24  Accepting facility: [facility]  Date authorization started with reference number: 7/16, reference # [id number]  Date authorization received and expires: pending  Follow up appointments: pcp/specialist  DME needed: defer to SNF  Transportation at discharge: wheelchair vs stretcher  IM/IMM Medicare/ letter given: will need prior to d/c  Caregiver Contact:   Primary Decision Maker: Martha Gross - Spouse - [phone]    Discharge Caregiver contacted prior to discharge? yes  Care Conference needed? no  Barriers to discharge: insurance authorization     Cm noted patient will d/c to SNF, referrals sent through [email or web address] to [facility] and [place](patient choice). Auth started.     The following information was submitted to [employer] for initial authorization:  Face Sheet/Demographics    (Include: Usual living situation)  History and Physical  Last 24 hours of MD and RN notes   (Include: Current Level of Functioning; cognitive status)  PT/OT/ST Evaluations and/or notes within the last 48 hours  MAR  MD orders  (Include: Attending or ordering MD’s name and phone #; skilled nursing needs;   Wound care/etc)  Projected Date of Transfer to SNF  Requested SNF  SNF Point of Contact and Phone #  CM Point of Contact and Phone #  NPI # for SNF      KELLI Guerra  Care Management Department  Ph:[phone]

## 2024-07-16 NOTE — PLAN OF CARE
limitations and/or participation restrictions LOW Complexity: No comorbidities that affect functional and  no verbal  or physical assist needed to complete eval tasks   Based on the above components, the patient evaluation is determined to be of the following complexity level: Low

## 2024-07-16 NOTE — PROGRESS NOTES
[address], [place] [address]  Phone: [phone]   [phone]    [email or web address]     Nephrology Progress Note    Patient Name : Shawn Gross Jr.      : 1955     MRN : [id number]  Date of Admission : 2024  Date of Servive : 24    CC:  Follow up for RODNEY       Assessment and Plan   RODNEY on CKD:  - Prerenal azotemia resolving  - continue to hold diuretics and SGLT2i on d/c   - will see him in office in 2 -3 weeks   - daily labs, I/Os, daily wts    Met acidosis   - continue oral Bicarb on d/c      CKD IV:  - from DM2, baseline Cr around 2.5    Hyperkalemia:  - resolved post medical tx in ER     HFrEF:  - EF 45-50%     Leukocytosis:  - infections w/up per primary team     CAD s/p CABG  HTN  DM2     Interval History:  Patient seen and examined. Doing well. Voiding well. BP stable   No complaints   Awaiting placement     Review of Systems: A comprehensive review of systems was negative.    Current Medications:   Current Facility-Administered Medications   Medication Dose Route Frequency    sodium bicarbonate tablet 1,300 mg  1,300 mg Oral TID    glucose chewable tablet 16 g  4 tablet Oral PRN    dextrose bolus 10% 125 mL  125 mL IntraVENous PRN    Or    dextrose bolus 10% 250 mL  250 mL IntraVENous PRN    dextrose 10 % infusion   IntraVENous Continuous PRN    glucagon injection 1 mg  1 mg SubCUTAneous PRN    amLODIPine (NORVASC) tablet 5 mg  5 mg Oral Daily    atorvastatin (LIPITOR) tablet 80 mg  80 mg Oral Daily    carvedilol (COREG) tablet 25 mg  25 mg Oral BID    ferrous sulfate (IRON 325) tablet 325 mg  325 mg Oral Every Other Day    gabapentin (NEURONTIN) capsule 100 mg  100 mg Oral BID    sodium chloride flush 0.9 % injection 5-40 mL  5-40 mL IntraVENous 2 times per day    sodium chloride flush 0.9 % injection 5-40 mL  5-40 mL IntraVENous PRN    0.9 % sodium chloride infusion   IntraVENous PRN    ondansetron (ZOFRAN-ODT)  5.7   RBC 4.04*   HGB 12.2   HCT 38.2   MCV 94.6   MCH 30.2   MCHC 31.9   RDW 13.9      MPV 10.6       No results for input(s): \"TP\", \"GLOB\" in the last 72 hours.    Invalid input(s): \"SGOT\", \"GPT\", \"AP\", \"TBIL\", \"ALB\", \"AML\", \"AMYP\", \"LPSE\", \"LAC\"    No results for input(s): \"INR\", \"APTT\" in the last 72 hours.    Invalid input(s): \"PTP\"   No results for input(s): \"CPK\", \"CKMB\", \"TROPONINI\" in the last 72 hours.    Invalid input(s): \"B-NP\"  Invalid input(s): \"PHI\", \"PCO2I\", \"PO2I\", \"FIO2I\"     Ventilator:       Microbiology:  No results found for: \"SDES\"  No components found for: \"CULT\"      I have reviewed the flowsheets.  Chart and Pertinent Notes have been reviewed.   No change in PMH ,family and social history from Consult note.      Cameron Coto MD  [facility]

## 2024-07-16 NOTE — PLAN OF CARE
Problem: Occupational Therapy - Adult  Goal: By Discharge: Performs self-care activities at highest level of function for planned discharge setting.  See evaluation for individualized goals.  Description: FUNCTIONAL STATUS PRIOR TO ADMISSION: Pt reported he lives with wife and was MOD IND w/ ADLs PTA. Wife assists with IADLs.     HOME SUPPORT: Patient lived with wife.    Occupational Therapy Goals:  Initiated 7/16/2024  1.  Patient will perform grooming seated with Supervision within 7 day(s).  2.  Patient will perform upper body dressing seated with Supervision within 7 day(s).  3.  Patient will perform lower body bathing seated with Stand by Assist within 7 day(s).  4.  Patient will perform toilet transfers with Stand by Assist and DME PRN within 7 day(s).  5.  Patient will perform all aspects of toileting with Stand by Assist within 7 day(s).  6.  Patient will participate in upper extremity therapeutic exercise/activities with Modified [place] for 5 minutes within 7 day(s).    7.  Patient will utilize energy conservation techniques during functional activities with verbal cues within 7 day(s).    7/16/2024 1105 by Jennifer Maldonado OT  Outcome: Progressing     Problem: Safety - Adult  Goal: Free from fall injury  7/16/2024 1135 by Kavitha Qureshi RN  Outcome: Progressing  7/16/2024 0303 by Heaven Goodwin LPN  Outcome: Progressing     Problem: Discharge Planning  Goal: Discharge to home or other facility with appropriate resources  7/16/2024 1135 by Kavitha Qureshi RN  Outcome: Progressing  7/16/2024 0303 by Heaven Goodwin LPN  Outcome: Progressing     Problem: Pain  Goal: Verbalizes/displays adequate comfort level or baseline comfort level  7/16/2024 1135 by Kavitha Qureshi RN  Outcome: Progressing  7/16/2024 0303 by Heaven Goodwin LPN  Outcome: Progressing  Flowsheets (Taken 7/15/2024 1535 by Lizet Jolly, RN)  Verbalizes/displays adequate comfort level or baseline comfort level: Encourage patient  to monitor pain and request assistance     Problem: Skin/Tissue Integrity  Goal: Absence of new skin breakdown  Description: 1.  Monitor for areas of redness and/or skin breakdown  2.  Assess vascular access sites hourly  3.  Every 4-6 hours minimum:  Change oxygen saturation probe site  4.  Every 4-6 hours:  If on nasal continuous positive airway pressure, respiratory therapy assess nares and determine need for appliance change or resting period.  7/16/2024 1135 by Kavitha Qureshi RN  Outcome: Progressing  7/16/2024 0303 by Heaven Goodwin LPN  Outcome: Progressing     Problem: ABCDS Injury Assessment  Goal: Absence of physical injury  7/16/2024 1135 by Kavitha Qureshi RN  Outcome: Progressing  7/16/2024 0303 by Heaven Goodwin LPN  Outcome: Progressing     Problem: Chronic Conditions and Co-morbidities  Goal: Patient's chronic conditions and co-morbidity symptoms are monitored and maintained or improved  7/16/2024 1135 by Kavitha Qureshi RN  Outcome: Progressing  7/16/2024 0303 by Heaven Goodwin LPN  Outcome: Progressing

## 2024-07-16 NOTE — PLAN OF CARE
shuffled.  His endurance is also improving and expect that he will be able to achieve baseline level of mobility and safety with a short rehab stay.    If he were to return home at this point, he would be very high risk for falls , especially when he is alone at home.         PLAN:  Patient continues to benefit from skilled intervention to address the above impairments.  Continue treatment per established plan of care.        Recommendation for discharge: (in order for the patient to meet his/her long term goals): Therapy up to 5 days/week in Skilled nursing facility    Other factors to consider for discharge: available support system works or is unable to provide adequate supervision and the patient would be alone, impaired cognition, and high risk for falls    IF patient discharges home will need the following DME: patient owns DME required for discharge       SUBJECTIVE:   Patient stated, \"I feel good.\"    OBJECTIVE DATA SUMMARY:   Critical Behavior:  Orientation  Overall Orientation Status: Within Normal Limits  Orientation Level: Oriented X4  Cognition  Overall Cognitive Status: Exceptions  Arousal/Alertness: Appropriate responses to stimuli  Following Commands: Follows multistep commands with increased time;Follows one step commands consistently  Attention Span: Attends with cues to redirect  Memory: Decreased recall of recent events;Decreased short term memory  Safety Judgement: Decreased awareness of need for assistance;Decreased awareness of need for safety  Problem Solving: Decreased awareness of errors  Insights: Decreased awareness of deficits  Initiation: Requires cues for some  Sequencing: Requires cues for some    Functional Mobility Training:  Bed Mobility:  Bed Mobility Training  Bed Mobility Training: Yes  Supine to Sit: Stand-by assistance  Sit to Supine: Stand-by assistance  Scooting: Contact-guard assistance  Transfers:  Transfer Training  Transfer Training: Yes  Sit to Stand: Contact-guard  assistance;Additional time;Adaptive equipment  Stand to Sit: Contact-guard assistance;Additional time;Adaptive equipment  Bed to Chair: Contact-guard assistance  Balance:  Balance  Sitting: Intact  Standing: Impaired (more impaired without use of walker for ambulation)  Standing - Static: Constant support;Good  Standing - Dynamic: Constant support;Fair   Ambulation/Gait Training:     Gait  Overall Level of Assistance: Additional time;Adaptive equipment;Contact-guard assistance  Distance (ft): 100 Feet (three times with seated rest between, once with RW and twice without)  Assistive Device: Gait belt;Walker, rolling  Base of Support: Narrowed  Speed/Prisca: Shuffled;Slow (more noticeable when walking without walker)  Step Length: Left shortened;Right shortened  Gait Abnormalities: Decreased step clearance;Shuffling gait        Neuro Re-Education:                    Pain Ratin/10   Pain Intervention(s):       Activity Tolerance:   Good and requires rest breaks    After treatment:   Patient left in no apparent distress in bed, Call bell within reach, Bed/ chair alarm activated, and Side rails x3      COMMUNICATION/EDUCATION:   The patient's plan of care was discussed with: occupational therapist           Mercedez Loya, PT  Minutes: 26

## 2024-07-16 NOTE — PROGRESS NOTES
[facility]                                                                                          Hospitalist Progress Note  Brijesh Taylor MD  Office Phone: [phone]        Date of Service:  2024  NAME:  Shawn Gross Jr.  :  1955  MRN:  [id number]       Admission Summary:   Quoted: \"Shawn Gross Jr. is a 68 y.o. male who presents with complaint of not feeling well.  Patient cannot really elaborate on the history other than not feeling well and fatigue.  Also noted some difficulty walking due to weakness.  Patient presented with stable vital signs other than mild elevated blood pressure and heart rate.  Blood work revealed elevated potassium of 6.4 and creatinine elevated at 3.84.  Last known creatinine 4 months ago was 2.49, patient appears to have CKD of unclear stage.  Patient also was noted to have leukocytosis with white count of 14,000.  Chest x-ray shows atelectasis, urinalysis pending at this time.  In the emergency room, patient was started hyperkalemia treatment with albuterol, calcium gluconate, insulin and D10 as well as Lokelma.  Also IV fluid bolus was given.  Hospitalist service requested admit the patient for further evaluation management.     The patient denies any headache, blurry vision, sore throat, trouble swallowing, trouble with speech, chest pain, SOB, cough, fever, chills, N/V/D, abd pain, urinary symptoms, constipation, recent travels, sick contacts, focal or generalized neurological symptoms, falls, injuries, rashes, contact with COVID-19 diagnosed patients, hematemesis, melena, hemoptysis, hematuria, rashes, denies starting any new medications and denies any other concerns or problems besides as mentioned above.\"         Interval history / Subjective:   Follow up RODNEY   No new issues  Assessment & Plan:     RODNEY on CKD stage IV, prerenal azotemia  Imaging negative for hydronephrosis  Appreciate discussion with Nephrology, ok  gabapentin (NEURONTIN) capsule 100 mg  100 mg Oral BID    sodium chloride flush 0.9 % injection 5-40 mL  5-40 mL IntraVENous 2 times per day    sodium chloride flush 0.9 % injection 5-40 mL  5-40 mL IntraVENous PRN    0.9 % sodium chloride infusion   IntraVENous PRN    ondansetron (ZOFRAN-ODT) disintegrating tablet 4 mg  4 mg Oral Q8H PRN    Or    ondansetron (ZOFRAN) injection 4 mg  4 mg IntraVENous Q6H PRN    polyethylene glycol (GLYCOLAX) packet 17 g  17 g Oral Daily PRN    acetaminophen (TYLENOL) tablet 650 mg  650 mg Oral Q6H PRN    Or    acetaminophen (TYLENOL) suppository 650 mg  650 mg Rectal Q6H PRN    heparin (porcine) injection 5,000 Units  5,000 Units SubCUTAneous 3 times per day    aspirin EC tablet 81 mg  81 mg Oral Daily     ______________________________________________________________________  EXPECTED LENGTH OF STAY: 4  ACTUAL LENGTH OF STAY:          4                 Brijesh Taylor MD

## 2024-07-17 LAB
ALBUMIN SERPL-MCNC: 2.6 G/DL (ref 3.5–5)
ANION GAP SERPL CALC-SCNC: 7 MMOL/L (ref 5–15)
BUN SERPL-MCNC: 37 MG/DL (ref 6–20)
BUN/CREAT SERPL: 15 (ref 12–20)
CALCIUM SERPL-MCNC: 8.2 MG/DL (ref 8.5–10.1)
CHLORIDE SERPL-SCNC: 111 MMOL/L (ref 97–108)
CO2 SERPL-SCNC: 24 MMOL/L (ref 21–32)
CREAT SERPL-MCNC: 2.42 MG/DL (ref 0.7–1.3)
GLUCOSE BLD STRIP.AUTO-MCNC: 118 MG/DL (ref 65–117)
GLUCOSE BLD STRIP.AUTO-MCNC: 138 MG/DL (ref 65–117)
GLUCOSE BLD STRIP.AUTO-MCNC: 151 MG/DL (ref 65–117)
GLUCOSE BLD STRIP.AUTO-MCNC: 86 MG/DL (ref 65–117)
GLUCOSE SERPL-MCNC: 133 MG/DL (ref 65–100)
PHOSPHATE SERPL-MCNC: 2.7 MG/DL (ref 2.6–4.7)
POTASSIUM SERPL-SCNC: 3.4 MMOL/L (ref 3.5–5.1)
SERVICE CMNT-IMP: ABNORMAL
SERVICE CMNT-IMP: NORMAL
SODIUM SERPL-SCNC: 142 MMOL/L (ref 136–145)

## 2024-07-17 PROCEDURE — 82962 GLUCOSE BLOOD TEST: CPT

## 2024-07-17 PROCEDURE — 36415 COLL VENOUS BLD VENIPUNCTURE: CPT

## 2024-07-17 PROCEDURE — 6370000000 HC RX 637 (ALT 250 FOR IP): Performed by: INTERNAL MEDICINE

## 2024-07-17 PROCEDURE — 6360000002 HC RX W HCPCS: Performed by: FAMILY MEDICINE

## 2024-07-17 PROCEDURE — 2580000003 HC RX 258: Performed by: FAMILY MEDICINE

## 2024-07-17 PROCEDURE — 97535 SELF CARE MNGMENT TRAINING: CPT

## 2024-07-17 PROCEDURE — 80069 RENAL FUNCTION PANEL: CPT

## 2024-07-17 PROCEDURE — 97116 GAIT TRAINING THERAPY: CPT

## 2024-07-17 PROCEDURE — 1100000000 HC RM PRIVATE

## 2024-07-17 PROCEDURE — 6370000000 HC RX 637 (ALT 250 FOR IP): Performed by: FAMILY MEDICINE

## 2024-07-17 PROCEDURE — 97110 THERAPEUTIC EXERCISES: CPT

## 2024-07-17 RX ADMIN — ASPIRIN 81 MG: 81 TABLET, COATED ORAL at 09:15

## 2024-07-17 RX ADMIN — CARVEDILOL 25 MG: 12.5 TABLET, FILM COATED ORAL at 09:15

## 2024-07-17 RX ADMIN — ATORVASTATIN CALCIUM 80 MG: 40 TABLET, FILM COATED ORAL at 09:15

## 2024-07-17 RX ADMIN — SODIUM BICARBONATE 1300 MG: 650 TABLET ORAL at 20:25

## 2024-07-17 RX ADMIN — Medication 10 ML: at 20:26

## 2024-07-17 RX ADMIN — HEPARIN SODIUM 5000 UNITS: 5000 INJECTION INTRAVENOUS; SUBCUTANEOUS at 06:14

## 2024-07-17 RX ADMIN — HEPARIN SODIUM 5000 UNITS: 5000 INJECTION INTRAVENOUS; SUBCUTANEOUS at 13:43

## 2024-07-17 RX ADMIN — Medication 10 ML: at 09:16

## 2024-07-17 RX ADMIN — HEPARIN SODIUM 5000 UNITS: 5000 INJECTION INTRAVENOUS; SUBCUTANEOUS at 20:25

## 2024-07-17 RX ADMIN — GABAPENTIN 100 MG: 100 CAPSULE ORAL at 09:15

## 2024-07-17 RX ADMIN — SODIUM BICARBONATE 1300 MG: 650 TABLET ORAL at 13:42

## 2024-07-17 RX ADMIN — AMLODIPINE BESYLATE 5 MG: 5 TABLET ORAL at 09:15

## 2024-07-17 RX ADMIN — CARVEDILOL 25 MG: 12.5 TABLET, FILM COATED ORAL at 20:25

## 2024-07-17 RX ADMIN — GABAPENTIN 100 MG: 100 CAPSULE ORAL at 20:25

## 2024-07-17 RX ADMIN — SODIUM BICARBONATE 1300 MG: 650 TABLET ORAL at 09:15

## 2024-07-17 ASSESSMENT — PAIN SCALES - GENERAL: PAINLEVEL_OUTOF10: 0

## 2024-07-17 NOTE — PLAN OF CARE
Problem: Physical Therapy - Adult  Goal: By Discharge: Performs mobility at highest level of function for planned discharge setting.  See evaluation for individualized goals.  Description: FUNCTIONAL STATUS PRIOR TO ADMISSION: Patient was modified independent using a rollator with forearm attachments for functional mobility when outside the home. Furniture surfs inside the home. Wife denied falls.     HOME SUPPORT PRIOR TO ADMISSION: The patient lived with his wife. Wife works part time and daughter provides assist at times. Patient is alone within the home at times.     Physical Therapy Goals  Initiated 7/12/2024  1.  Patient will move from supine to sit and sit to supine, scoot up and down, and roll side to side in bed with modified independence within 7 day(s).    2.  Patient will perform sit to stand with modified independence within 7 day(s).  3.  Patient will transfer from bed to chair and chair to bed with modified independence using the least restrictive device within 7 day(s).  4.  Patient will ambulate with modified independence for 100 feet with the least restrictive device within 7 day(s).       Outcome: Progressing   PHYSICAL THERAPY TREATMENT    Patient: Shawn Gross JrCatarino (68 y.o. male)  Date: 7/17/2024  Diagnosis: Hyperkalemia [E87.5]  General weakness [R53.1]  RODNEY (acute kidney injury) ([facility]) [N17.9]  Other fatigue [R53.83]  Acute renal failure superimposed on chronic kidney disease, unspecified acute renal failure type, unspecified CKD stage (HCC) [N17.9, N18.9] RODNEY (acute kidney injury) (HCC)      Precautions: Fall Risk, Bed Alarm                      ASSESSMENT:  Patient continues to benefit from skilled PT services and is progressing towards goals. Patient eager to get OOB and work with therapist. Overall SBA for bed mobility, good sitting balance once fully to floor with feet.  Trialed patient walking in room without AD, this after initial stand with mild post LOB requiring patient to grab  assistance  Balance:  Balance  Sitting: Intact  Standing: With support (impaired without support, mild post LOB with initial stand)   Ambulation/Gait Training:     Gait  Overall Level of Assistance: Additional time;Adaptive equipment;Contact-guard assistance  Distance (ft): 160 Feet (plus 40 feet without RW)  Assistive Device: Gait belt;Walker, rolling  Interventions: Safety awareness training;Verbal cues  Base of Support: Narrowed  Speed/Prisca: Shuffled;Slow  Step Length: Left shortened;Right shortened  Gait Abnormalities: Decreased step clearance;Shuffling gait                                                                                                                                                                                                                                                    Therapeutic Exercises:     EXERCISE   Sets   Reps   Active Active Assist   Passive Self ROM   Comments   Ankle Pumps   []                                        []                                        []                                        []                                           Quad Sets   []                                        []                                        []                                        []                                           Hamstring Sets   []                                        []                                        []                                        []                                           Short Arc Quads   []                                        []                                        []                                        []                                           Shoulder flex without trunk support 1 10   [x]                                          []                                          []                                          []                                           Hip flex/marches 1 10 [x]

## 2024-07-17 NOTE — PLAN OF CARE
Problem: Occupational Therapy - Adult  Goal: By Discharge: Performs self-care activities at highest level of function for planned discharge setting.  See evaluation for individualized goals.  Description: FUNCTIONAL STATUS PRIOR TO ADMISSION: Pt reported he lives with wife and was MOD IND w/ ADLs PTA. Wife assists with IADLs.     HOME SUPPORT: Patient lived with wife.    Occupational Therapy Goals:  Initiated 7/16/2024  1.  Patient will perform grooming seated with Supervision within 7 day(s).  2.  Patient will perform upper body dressing seated with Supervision within 7 day(s).  3.  Patient will perform lower body bathing seated with Stand by Assist within 7 day(s).  4.  Patient will perform toilet transfers with Stand by Assist and DME PRN within 7 day(s).  5.  Patient will perform all aspects of toileting with Stand by Assist within 7 day(s).  6.  Patient will participate in upper extremity therapeutic exercise/activities with Modified [place] for 5 minutes within 7 day(s).    7.  Patient will utilize energy conservation techniques during functional activities with verbal cues within 7 day(s).    Outcome: Progressing     OCCUPATIONAL THERAPY TREATMENT  Patient: Shawn Gross Jr. (68 y.o. male)  Date: 7/17/2024  Primary Diagnosis: Hyperkalemia [E87.5]  General weakness [R53.1]  RODNEY (acute kidney injury) (HCC) [N17.9]  Other fatigue [R53.83]  Acute renal failure superimposed on chronic kidney disease, unspecified acute renal failure type, unspecified CKD stage (HCC) [N17.9, N18.9]       Precautions: Fall Risk, Bed Alarm                Chart, occupational therapy assessment, plan of care, and goals were reviewed.    ASSESSMENT  Patient continues to benefit from skilled OT services and is progressing towards goals. Pt received seated in chair, amendable to session, oriented x 4, however, continues to be tangential with conversation. Required CGA for all functional mobility for dynamic standing balance and min

## 2024-07-17 NOTE — PROGRESS NOTES
4 mg Oral Q8H PRN    Or    ondansetron (ZOFRAN) injection 4 mg  4 mg IntraVENous Q6H PRN    polyethylene glycol (GLYCOLAX) packet 17 g  17 g Oral Daily PRN    acetaminophen (TYLENOL) tablet 650 mg  650 mg Oral Q6H PRN    Or    acetaminophen (TYLENOL) suppository 650 mg  650 mg Rectal Q6H PRN    heparin (porcine) injection 5,000 Units  5,000 Units SubCUTAneous 3 times per day    aspirin EC tablet 81 mg  81 mg Oral Daily      Allergies   Allergen Reactions    Clopidogrel      Other reaction(s): Not Reported This Time; OP cardiology notes patient \"Intolerant to clopidogrel\" but unknown reason       Objective:  Vitals:    Vitals:    07/16/24 1829 07/16/24 2037 07/17/24 0613 07/17/24 0659   BP: (!) 169/75 (!) 168/79 (!) 133/59    Pulse: 66 81 63    Resp: 16  22    Temp: 98.8 °F (37.1 °C)  99 °F (37.2 °C)    TempSrc: Oral  Oral    SpO2: 95%  96%    Weight:    75.8 kg (167 lb 1.7 oz)   Height:         Intake and Output:  No intake/output data recorded.  07/15 1901 - 07/17 0700  In: 610 [P.O.:600; I.V.:10]  Out: 1100 [Urine:1100]    Physical Examination:  General: NAD,Conversant   Neck:  Supple, no mass  Resp:  Lungs CTA B/L, no wheezing , normal respiratory effort  CV:  RRR,  no murmur or rub, LE edema  GI:  Soft, NT, + BS, no HS megaly  Neurologic:  Non focal  Psych:             AAO x 3 appropriate affect       []    High complexity decision making was performed  []    Patient is at high-risk of decompensation with multiple organ involvement    Lab Data Personally Reviewed: I have reviewed all the pertinent labs, microbiology data and radiology studies during assessment.    Labs:  Recent Labs     07/15/24  0101 07/16/24  0452 07/17/24  0525    142 142   K 3.7 3.7 3.4*   * 108 111*   CO2 24 25 24   GLUCOSE 88 139* 133*   BUN 42* 44* 37*   CREATININE 2.77* 2.70* 2.42*   CALCIUM 8.2* 8.4* 8.2*         Recent Labs     07/15/24  0101   WBC 5.7   RBC 4.04*   HGB 12.2   HCT 38.2   MCV 94.6   MCH 30.2   MCHC 31.9    RDW 13.9      MPV 10.6       No results for input(s): \"TP\", \"GLOB\" in the last 72 hours.    Invalid input(s): \"SGOT\", \"GPT\", \"AP\", \"TBIL\", \"ALB\", \"AML\", \"AMYP\", \"LPSE\", \"LAC\"    No results for input(s): \"INR\", \"APTT\" in the last 72 hours.    Invalid input(s): \"PTP\"   No results for input(s): \"CPK\", \"CKMB\", \"TROPONINI\" in the last 72 hours.    Invalid input(s): \"B-NP\"  Invalid input(s): \"PHI\", \"PCO2I\", \"PO2I\", \"FIO2I\"     Ventilator:       Microbiology:  No results found for: \"SDES\"  No components found for: \"CULT\"      I have reviewed the flowsheets.  Chart and Pertinent Notes have been reviewed.   No change in PMH ,family and social history from Consult note.      Mohsen Marquez MD  [facility]

## 2024-07-17 NOTE — CARE COORDINATION
Transition of Care Plan:     RUR: 19%  Prior Level of Functioning: Patient was modified independent using a rollator with forearm attachments for functional mobility when outside the home. Furniture surfs inside the home. Wife denied falls   Disposition: SNF  If SNF or IPR: Date FOC offered: 7/15/14  Date FOC received: 7/15/24  Accepting facility: [facility]  Date authorization started with reference number: 7/16, reference # [id number]  Date authorization received and expires: pending  Follow up appointments: pcp/specialist  DME needed: defer to SNF  Transportation at discharge: wheelchair vs stretcher  IM/IMM Medicare/ letter given: will need prior to d/c  Caregiver Contact:   Primary Decision Maker: Martha Gross - Spouse - [phone]     Discharge Caregiver contacted prior to discharge? yes  Care Conference needed? no  Barriers to discharge: insurance authorization          [facility]. Cm updated patient and his wife.      KELLI Guerra  Care Management Department  Ph:[phone]

## 2024-07-17 NOTE — PROGRESS NOTES
[facility]  Hospitalist Group                                                                                          Hospitalist Progress Note  Brijesh Taylor MD  Office Phone: [phone]        Date of Service:  2024  NAME:  Shawn Gross Jr.  :  1955  MRN:  [id number]       Admission Summary:   Quoted: \"Shawn Gross Jr. is a 68 y.o. male who presents with complaint of not feeling well.  Patient cannot really elaborate on the history other than not feeling well and fatigue.  Also noted some difficulty walking due to weakness.  Patient presented with stable vital signs other than mild elevated blood pressure and heart rate.  Blood work revealed elevated potassium of 6.4 and creatinine elevated at 3.84.  Last known creatinine 4 months ago was 2.49, patient appears to have CKD of unclear stage.  Patient also was noted to have leukocytosis with white count of 14,000.  Chest x-ray shows atelectasis, urinalysis pending at this time.  In the emergency room, patient was started hyperkalemia treatment with albuterol, calcium gluconate, insulin and D10 as well as Lokelma.  Also IV fluid bolus was given.  Hospitalist service requested admit the patient for further evaluation management.     The patient denies any headache, blurry vision, sore throat, trouble swallowing, trouble with speech, chest pain, SOB, cough, fever, chills, N/V/D, abd pain, urinary symptoms, constipation, recent travels, sick contacts, focal or generalized neurological symptoms, falls, injuries, rashes, contact with COVID-19 diagnosed patients, hematemesis, melena, hemoptysis, hematuria, rashes, denies starting any new medications and denies any other concerns or problems besides as mentioned above.\"         Interval history / Subjective:   Follow up RODNEY   No new issues  Assessment & Plan:     RODNEY on CKD stage IV, prerenal azotemia  Imaging negative for hydronephrosis  Appreciate Nephrology, ok to discharge.  (NEURONTIN) capsule 100 mg  100 mg Oral BID    sodium chloride flush 0.9 % injection 5-40 mL  5-40 mL IntraVENous 2 times per day    sodium chloride flush 0.9 % injection 5-40 mL  5-40 mL IntraVENous PRN    0.9 % sodium chloride infusion   IntraVENous PRN    ondansetron (ZOFRAN-ODT) disintegrating tablet 4 mg  4 mg Oral Q8H PRN    Or    ondansetron (ZOFRAN) injection 4 mg  4 mg IntraVENous Q6H PRN    polyethylene glycol (GLYCOLAX) packet 17 g  17 g Oral Daily PRN    acetaminophen (TYLENOL) tablet 650 mg  650 mg Oral Q6H PRN    Or    acetaminophen (TYLENOL) suppository 650 mg  650 mg Rectal Q6H PRN    heparin (porcine) injection 5,000 Units  5,000 Units SubCUTAneous 3 times per day    aspirin EC tablet 81 mg  81 mg Oral Daily     ______________________________________________________________________  EXPECTED LENGTH OF STAY: 5  ACTUAL LENGTH OF STAY:          5                 Brijesh Taylor MD

## 2024-07-17 NOTE — PROGRESS NOTES
Spiritual Care Assessment/Progress Note  [facility]    Name: Shawn Gross Jr. MRN: [id number]    Age: 68 y.o.     Sex: male   Language: English     Date: 7/17/2024            Total Time Calculated: 7 min              Spiritual Assessment begun in [facility] 6E MED ONCOLOGY  Service Provided For: Patient  Referral/Consult From: Rounding  Encounter Overview/Reason: Initial Encounter    Spiritual beliefs:      [] Involved in a [place] tradition/spiritual practice:      [] Supported by a [place] community:      [] Claims no spiritual orientation:      [] Seeking spiritual identity:           [] Adheres to an individual form of spirituality:      [] Not able to assess:                Identified resources for coping and support system:   Support System: Spouse       [] Prayer                  [] Devotional reading               [] Music                  [] Guided Imagery     [] Pet visits                                        [] Other: (COMMENT)     Specific area/focus of visit   Encounter:    Crisis:    Spiritual/Emotional needs: Type: Other (comment) (Patient was asleep.)  Ritual, Rites and Sacraments:    Grief, Loss, and Adjustments:    Ethics/Mediation:    Behavioral Health:    Palliative Care:    Advance Care Planning:      Plan/Referrals: Developed Care Plan (see consult note)    Narrative: I reviewed the medical record of Shawn, a patient in 6E,  prior to this encounter. The patient was sound asleep.  support is available if needed or requested.     Clemente ybarra  [phone]

## 2024-07-18 LAB
ALBUMIN SERPL-MCNC: 2.4 G/DL (ref 3.5–5)
ANION GAP SERPL CALC-SCNC: 6 MMOL/L (ref 5–15)
BUN SERPL-MCNC: 34 MG/DL (ref 6–20)
BUN/CREAT SERPL: 16 (ref 12–20)
CALCIUM SERPL-MCNC: 8.2 MG/DL (ref 8.5–10.1)
CHLORIDE SERPL-SCNC: 111 MMOL/L (ref 97–108)
CO2 SERPL-SCNC: 26 MMOL/L (ref 21–32)
CREAT SERPL-MCNC: 2.18 MG/DL (ref 0.7–1.3)
GLUCOSE BLD STRIP.AUTO-MCNC: 86 MG/DL (ref 65–117)
GLUCOSE BLD STRIP.AUTO-MCNC: 88 MG/DL (ref 65–117)
GLUCOSE BLD STRIP.AUTO-MCNC: 89 MG/DL (ref 65–117)
GLUCOSE SERPL-MCNC: 85 MG/DL (ref 65–100)
PHOSPHATE SERPL-MCNC: 2.8 MG/DL (ref 2.6–4.7)
POTASSIUM SERPL-SCNC: 3.5 MMOL/L (ref 3.5–5.1)
SERVICE CMNT-IMP: NORMAL
SODIUM SERPL-SCNC: 143 MMOL/L (ref 136–145)

## 2024-07-18 PROCEDURE — 36415 COLL VENOUS BLD VENIPUNCTURE: CPT

## 2024-07-18 PROCEDURE — 97116 GAIT TRAINING THERAPY: CPT

## 2024-07-18 PROCEDURE — 1100000000 HC RM PRIVATE

## 2024-07-18 PROCEDURE — 97535 SELF CARE MNGMENT TRAINING: CPT

## 2024-07-18 PROCEDURE — 6370000000 HC RX 637 (ALT 250 FOR IP): Performed by: INTERNAL MEDICINE

## 2024-07-18 PROCEDURE — 82962 GLUCOSE BLOOD TEST: CPT

## 2024-07-18 PROCEDURE — 80069 RENAL FUNCTION PANEL: CPT

## 2024-07-18 PROCEDURE — 6370000000 HC RX 637 (ALT 250 FOR IP): Performed by: FAMILY MEDICINE

## 2024-07-18 PROCEDURE — 2580000003 HC RX 258: Performed by: FAMILY MEDICINE

## 2024-07-18 PROCEDURE — 6360000002 HC RX W HCPCS: Performed by: FAMILY MEDICINE

## 2024-07-18 RX ADMIN — SODIUM BICARBONATE 1300 MG: 650 TABLET ORAL at 10:41

## 2024-07-18 RX ADMIN — SODIUM BICARBONATE 1300 MG: 650 TABLET ORAL at 16:53

## 2024-07-18 RX ADMIN — HEPARIN SODIUM 5000 UNITS: 5000 INJECTION INTRAVENOUS; SUBCUTANEOUS at 16:53

## 2024-07-18 RX ADMIN — GABAPENTIN 100 MG: 100 CAPSULE ORAL at 21:11

## 2024-07-18 RX ADMIN — ATORVASTATIN CALCIUM 80 MG: 40 TABLET, FILM COATED ORAL at 10:40

## 2024-07-18 RX ADMIN — FERROUS SULFATE TAB 325 MG (65 MG ELEMENTAL FE) 325 MG: 325 (65 FE) TAB at 21:21

## 2024-07-18 RX ADMIN — Medication 10 ML: at 21:21

## 2024-07-18 RX ADMIN — ASPIRIN 81 MG: 81 TABLET, COATED ORAL at 10:40

## 2024-07-18 RX ADMIN — AMLODIPINE BESYLATE 5 MG: 5 TABLET ORAL at 10:44

## 2024-07-18 RX ADMIN — HEPARIN SODIUM 5000 UNITS: 5000 INJECTION INTRAVENOUS; SUBCUTANEOUS at 06:49

## 2024-07-18 RX ADMIN — GABAPENTIN 100 MG: 100 CAPSULE ORAL at 10:41

## 2024-07-18 RX ADMIN — Medication 10 ML: at 10:48

## 2024-07-18 RX ADMIN — HEPARIN SODIUM 5000 UNITS: 5000 INJECTION INTRAVENOUS; SUBCUTANEOUS at 21:11

## 2024-07-18 RX ADMIN — CARVEDILOL 25 MG: 12.5 TABLET, FILM COATED ORAL at 21:41

## 2024-07-18 RX ADMIN — SODIUM BICARBONATE 1300 MG: 650 TABLET ORAL at 21:11

## 2024-07-18 RX ADMIN — CARVEDILOL 25 MG: 12.5 TABLET, FILM COATED ORAL at 10:44

## 2024-07-18 ASSESSMENT — PAIN SCALES - GENERAL: PAINLEVEL_OUTOF10: 0

## 2024-07-18 NOTE — PROGRESS NOTES
\"PSAT\", \"FERR\"   No results found for: \"RBCF\"   No results for input(s): \"PH\", \"PCO2\", \"PO2\" in the last 72 hours.  No results for input(s): \"CPK\" in the last 72 hours.    Invalid input(s): \"CPKMB\", \"CKNDX\", \"TROIQ\"  Lab Results   Component Value Date/Time    CHOL 101 09/16/2023 04:11 AM    HDL 36 09/16/2023 04:11 AM    LDL 48.2 09/16/2023 04:11 AM     Lab Results   Component Value Date/Time    GLUCPOC 142 08/01/2022 11:56 AM    GLUCPOC 112 03/16/2022 08:46 AM    GLUCPOC 129 09/15/2021 08:52 AM    GLUCPOC 154 04/20/2021 10:39 AM    GLUCPOC 160 03/18/2021 10:25 AM     [unfilled]    Notes reviewed from all clinical/nonclinical/nursing services involved in patient's clinical care. Care coordination discussions were held with appropriate clinical/nonclinical/ nursing providers based on care coordination needs.         Patients current active Medications were reviewed, considered, added and adjusted based on the clinical condition today.      Home Medications were reconciled to the best of my ability given all available resources at the time of admission. Route is PO if not otherwise noted.      Admission Status:[id number]:::1}      Medications Reviewed:     Current Facility-Administered Medications   Medication Dose Route Frequency    sodium bicarbonate tablet 1,300 mg  1,300 mg Oral TID    glucose chewable tablet 16 g  4 tablet Oral PRN    dextrose bolus 10% 125 mL  125 mL IntraVENous PRN    Or    dextrose bolus 10% 250 mL  250 mL IntraVENous PRN    dextrose 10 % infusion   IntraVENous Continuous PRN    glucagon injection 1 mg  1 mg SubCUTAneous PRN    amLODIPine (NORVASC) tablet 5 mg  5 mg Oral Daily    atorvastatin (LIPITOR) tablet 80 mg  80 mg Oral Daily    carvedilol (COREG) tablet 25 mg  25 mg Oral BID    ferrous sulfate (IRON 325) tablet 325 mg  325 mg Oral Every Other Day    gabapentin (NEURONTIN) capsule 100 mg  100 mg Oral BID    sodium chloride flush 0.9 % injection 5-40 mL  5-40 mL IntraVENous 2 times per day     sodium chloride flush 0.9 % injection 5-40 mL  5-40 mL IntraVENous PRN    0.9 % sodium chloride infusion   IntraVENous PRN    ondansetron (ZOFRAN-ODT) disintegrating tablet 4 mg  4 mg Oral Q8H PRN    Or    ondansetron (ZOFRAN) injection 4 mg  4 mg IntraVENous Q6H PRN    polyethylene glycol (GLYCOLAX) packet 17 g  17 g Oral Daily PRN    acetaminophen (TYLENOL) tablet 650 mg  650 mg Oral Q6H PRN    Or    acetaminophen (TYLENOL) suppository 650 mg  650 mg Rectal Q6H PRN    heparin (porcine) injection 5,000 Units  5,000 Units SubCUTAneous 3 times per day    aspirin EC tablet 81 mg  81 mg Oral Daily     ______________________________________________________________________  EXPECTED LENGTH OF STAY: 6  ACTUAL LENGTH OF STAY:          6                 Marilee Dawn MD

## 2024-07-18 NOTE — ADT AUTH CERT
Notes  Patient: Shawn Gross Jr. MRN: [id number]     Note Information    Author Author Type Note Type New Service Filed     Brijesh Taylor MD Physician Progress Notes [facility] 24 7321       Note Text                 [facility]an [facility]  Hospitalist Group                                                                                                                                                  Hospitalist Progress Note  Brijesh Taylor MD  Office Phone: [phone]         Date of Service:  2024  NAME:  Shawn Gross Jr.  :  1955  MRN:  [id number]        Admission Summary:   Quoted: \"Shawn Gross Jr. is a 68 y.o. male who presents with complaint of not feeling well.  Patient cannot really elaborate on the history other than not feeling well and fatigue.  Also noted some difficulty walking due to weakness.  Patient presented with stable vital signs other than mild elevated blood pressure and heart rate.  Blood work revealed elevated potassium of 6.4 and creatinine elevated at 3.84.  Last known creatinine 4 months ago was 2.49, patient appears to have CKD of unclear stage.  Patient also was noted to have leukocytosis with white count of 14,000.  Chest x-ray shows atelectasis, urinalysis pending at this time.  In the emergency room, patient was started hyperkalemia treatment with albuterol, calcium gluconate, insulin and D10 as well as Lokelma.  Also IV fluid bolus was given.  Hospitalist service requested admit the patient for further evaluation management.     The patient denies any headache, blurry vision, sore throat, trouble swallowing, trouble with speech, chest pain, SOB, cough, fever, chills, N/V/D, abd pain, urinary symptoms, constipation, recent travels, sick contacts, focal or generalized neurological symptoms, falls, injuries, rashes, contact with COVID-19 diagnosed patients, hematemesis, melena, hemoptysis, hematuria, rashes, denies starting any new medications

## 2024-07-18 NOTE — CARE COORDINATION
Transition of Care Plan:     RUR: 19%  Prior Level of Functioning: Patient was modified independent using a rollator with forearm attachments for functional mobility when outside the home. Furniture surfs inside the home. Wife denied falls.   Disposition: SNF  If SNF or IPR: Date FOC offered: 7/15/14  Date FOC received: 7/15/24  Accepting facility: [facility]  Date authorization started with reference number: 7/16, reference # [id number]  Date authorization received and expires: pending  Follow up appointments: Defer to SNF  DME needed: Defer to SNF  Transportation at discharge: Wheelchair vs stretcher  IM/IMM Medicare/ letter given: 2nd IM given on 7/18  Caregiver Contact: Primary Decision Maker: Martha Gross - Spouse - [phone]  Discharge Caregiver contacted prior to discharge? Yes  Care Conference needed? Not at this time  Barriers to discharge: Insurance authorization    MICHAEL called [facility] ([phone]) to inquire on the status of pt's auth for [facility]. MICHAEL spoke with [facility] representative, who confirmed that auth is still pending.     CM called pt's spouse, Martha Gross [phone], to provide her with an update on the status of pt's auth. Ms. Gross's phone went straight to voicemail. CM left a HIPAA compliant VM and provided an update on pt's auth status.     MICHAEL called Guille at [facility] ([phone]) to provide him with an update on pt's auth status. Guille told CM that if auth is received today, [facility] has a male bed available. Guille also anticipates having male beds available tomorrow, 7/19. CM to inform Guille when auth is received.    1619: CM met with pt at bedside to discuss discharge plan and 2nd IM. 2nd IM signed by pt. Copy to pt and copy in chart.    CM informed pt that the hospital is still waiting on receiving authorization from his insurance for SNF placement at [facility]. Pt told CM that he no longer wants to go to

## 2024-07-18 NOTE — PLAN OF CARE
Problem: Occupational Therapy - Adult  Goal: By Discharge: Performs self-care activities at highest level of function for planned discharge setting.  See evaluation for individualized goals.  Description: FUNCTIONAL STATUS PRIOR TO ADMISSION: Pt reported he lives with wife and was MOD IND w/ ADLs PTA. Wife assists with IADLs.     HOME SUPPORT: Patient lived with wife.    Occupational Therapy Goals:  Initiated 7/16/2024  1.  Patient will perform grooming seated with Supervision within 7 day(s).  2.  Patient will perform upper body dressing seated with Supervision within 7 day(s).  3.  Patient will perform lower body bathing seated with Stand by Assist within 7 day(s).  4.  Patient will perform toilet transfers with Stand by Assist and DME PRN within 7 day(s).  5.  Patient will perform all aspects of toileting with Stand by Assist within 7 day(s).  6.  Patient will participate in upper extremity therapeutic exercise/activities with Modified [place] for 5 minutes within 7 day(s).    7.  Patient will utilize energy conservation techniques during functional activities with verbal cues within 7 day(s).    Outcome: Progressing     OCCUPATIONAL THERAPY TREATMENT  Patient: Shawn Gross Jr. (68 y.o. male)  Date: 7/18/2024  Primary Diagnosis: Hyperkalemia [E87.5]  General weakness [R53.1]  RODNEY (acute kidney injury) (HCC) [N17.9]  Other fatigue [R53.83]  Acute renal failure superimposed on chronic kidney disease, unspecified acute renal failure type, unspecified CKD stage (HCC) [N17.9, N18.9]       Precautions: Fall Risk, Bed Alarm                Chart, occupational therapy assessment, plan of care, and goals were reviewed.    ASSESSMENT  Patient continues to benefit from skilled OT services and is progressing towards goals. Received semi-reclined in bed, amendable to session, on RA, oriented x 4. Required CGA for all bed/functional mobility for trunk/LE management, boost to stand, dynamic standing balance, and mod VCs  consistently  Attention Span: Attends with cues to redirect  Memory: Decreased recall of recent events;Decreased short term memory  Safety Judgement: Decreased awareness of need for assistance;Decreased awareness of need for safety  Problem Solving: Decreased awareness of errors  Insights: Decreased awareness of deficits  Initiation: Requires cues for some  Sequencing: Requires cues for some    Functional Mobility and Transfers for ADLs:  Bed Mobility:  Bed Mobility Training  Bed Mobility Training: Yes  Supine to Sit: Stand-by assistance  Sit to Supine: Stand-by assistance  Scooting: Stand-by assistance     Transfers:   Transfer Training  Transfer Training: Yes  Sit to Stand: Contact-guard assistance;Assist X1  Stand to Sit: Contact-guard assistance;Assist X1  Bed to Chair: Contact-guard assistance;Assist X1;Additional time           Balance:     Balance  Sitting: Impaired  Sitting - Static: Good (unsupported)  Sitting - Dynamic: Good (unsupported)  Standing: Impaired  Standing - Static: Constant support;Good  Standing - Dynamic: Constant support;Good      ADL Intervention:     Feeding: .  - Container Management : Set-up Assistance and Seated in Chair  - Utensil Management : Set-up Assistance and Seated in Chair  - Food to Mouth: Set-up Assistance and Seated in chair    Lower Extremity Dressing: .  - Socks : Minimal Assistance and Seated EOB    Pain Rating:  None reported    Activity Tolerance:   Good and requires rest breaks  Please refer to the flowsheet for vital signs taken during this treatment.    After treatment:   Patient left in no apparent distress sitting up in chair, Call bell within reach, and Bed/ chair alarm activated    COMMUNICATION/EDUCATION:   The patient's plan of care was discussed with: registered nurse    Patient Education  Education Given To: Patient  Education Provided: Plan of Care;Fall Prevention Strategies;Mobility Training;ADL Adaptive Strategies;Transfer Training;Equipment  Education

## 2024-07-18 NOTE — PLAN OF CARE
Problem: Physical Therapy - Adult  Goal: By Discharge: Performs mobility at highest level of function for planned discharge setting.  See evaluation for individualized goals.  Description: FUNCTIONAL STATUS PRIOR TO ADMISSION: Patient was modified independent using a rollator with forearm attachments for functional mobility when outside the home. Furniture surfs inside the home. Wife denied falls.     HOME SUPPORT PRIOR TO ADMISSION: The patient lived with his wife. Wife works part time and daughter provides assist at times. Patient is alone within the home at times.     Physical Therapy Goals  Initiated 7/12/2024  1.  Patient will move from supine to sit and sit to supine, scoot up and down, and roll side to side in bed with modified independence within 7 day(s).    2.  Patient will perform sit to stand with modified independence within 7 day(s).  3.  Patient will transfer from bed to chair and chair to bed with modified independence using the least restrictive device within 7 day(s).  4.  Patient will ambulate with modified independence for 100 feet with the least restrictive device within 7 day(s).       Outcome: Progressing   PHYSICAL THERAPY TREATMENT    Patient: Shawn Gross JrCatarino (68 y.o. male)  Date: 7/18/2024  Diagnosis: Hyperkalemia [E87.5]  General weakness [R53.1]  RODNEY (acute kidney injury) ([facility]) [N17.9]  Other fatigue [R53.83]  Acute renal failure superimposed on chronic kidney disease, unspecified acute renal failure type, unspecified CKD stage (HCC) [N17.9, N18.9] RODNEY (acute kidney injury) (HCC)      Precautions: Fall Risk, Bed Alarm                      ASSESSMENT:  Patient continues to benefit from skilled PT services and is progressing towards goals. Patient received in bed and agreeable to therapy.  Moving well getting up to EOB and sit to stand.  Gait with RW today with slow and steady gait.  Assessed short distance without RW and overall did well.  Reviewed exercises and encouraged to add UE

## 2024-07-18 NOTE — PROGRESS NOTES
[address], [address] [place] [address]  Phone: [phone]   [phone]    [email or web address]     Nephrology Progress Note    Patient Name : Shawn Gross Jr.      : 1955     MRN : [id number]  Date of Admission : 2024  Date of Servive : 24    CC:  Follow up for RODNEY       Assessment and Plan   RODNEY on CKD:  - Prerenal azotemia resolving  - continue to hold diuretics and SGLT2i on d/c   - ok for d/c whenever SNF can accept  - f/up with Dr. Coto in 2 weeks    Met acidosis   - continue oral Bicarb on d/c      CKD IV:  - from DM2, baseline Cr around 2.5    Hyperkalemia:  - resolved post medical tx in ER     HFrEF:  - EF 45-50%     Leukocytosis:  - infections w/up per primary team     CAD s/p CABG  HTN  DM2     Interval History:  Patient seen and examined.  Cr stable, no complaints.  No cp, sob, n/v/d.  Awaiting dispo to SNF    Review of Systems: A comprehensive review of systems was negative.    Current Medications:   Current Facility-Administered Medications   Medication Dose Route Frequency    sodium bicarbonate tablet 1,300 mg  1,300 mg Oral TID    glucose chewable tablet 16 g  4 tablet Oral PRN    dextrose bolus 10% 125 mL  125 mL IntraVENous PRN    Or    dextrose bolus 10% 250 mL  250 mL IntraVENous PRN    dextrose 10 % infusion   IntraVENous Continuous PRN    glucagon injection 1 mg  1 mg SubCUTAneous PRN    amLODIPine (NORVASC) tablet 5 mg  5 mg Oral Daily    atorvastatin (LIPITOR) tablet 80 mg  80 mg Oral Daily    carvedilol (COREG) tablet 25 mg  25 mg Oral BID    ferrous sulfate (IRON 325) tablet 325 mg  325 mg Oral Every Other Day    gabapentin (NEURONTIN) capsule 100 mg  100 mg Oral BID    sodium chloride flush 0.9 % injection 5-40 mL  5-40 mL IntraVENous 2 times per day    sodium chloride flush 0.9 % injection 5-40 mL  5-40 mL IntraVENous PRN    0.9 % sodium chloride infusion   IntraVENous PRN    ondansetron (ZOFRAN-ODT)

## 2024-07-18 NOTE — ADT AUTH CERT
Notes  Patient: Shawn Gross Jr. MRN: [id number]     Note Information    Author Author Type Note Type New Service Filed     Mohsen Marquez MD Physician Progress Notes New Nephrology 24 0950       Note Text    Expand All Collapse All         [facility]   [address] [facility], [place] [address]  Phone: [phone]   [phone]    [email or web address]     Nephrology Progress Note     Patient Name : Shawn Gross Jr.      : 1955     MRN : [id number]  Date of Admission : 2024         Date of Servive : 24     CC:  Follow up for RODNEY            Assessment and Plan    RODNEY on CKD:  - Prerenal azotemia resolving  - continue to hold diuretics and SGLT2i on d/c   - ok for d/c whenever SNF can accept  - f/up with Dr. Coto in 2 weeks     Met acidosis   - continue oral Bicarb on d/c      CKD IV:  - from DM2, baseline Cr around 2.5     Hyperkalemia:  - resolved post medical tx in ER     HFrEF:  - EF 45-50%     Leukocytosis:  - infections w/up per primary team     CAD s/p CABG  HTN  DM2      Interval History:  Patient seen and examined.  Cr stable, no complaints.  No cp, sob, n/v/d.  Awaiting dispo to SNF     Review of Systems: A comprehensive review of systems was negative.     Current Medications:          Current Facility-Administered Medications   Medication Dose Route Frequency    sodium bicarbonate tablet 1,300 mg  1,300 mg Oral TID    glucose chewable tablet 16 g  4 tablet Oral PRN    dextrose bolus 10% 125 mL  125 mL IntraVENous PRN     Or    dextrose bolus 10% 250 mL  250 mL IntraVENous PRN    dextrose 10 % infusion   IntraVENous Continuous PRN    glucagon injection 1 mg  1 mg SubCUTAneous PRN    amLODIPine (NORVASC) tablet 5 mg  5 mg Oral Daily    atorvastatin (LIPITOR) tablet 80 mg  80 mg Oral Daily    carvedilol (COREG) tablet 25 mg  25 mg Oral BID    ferrous sulfate (IRON 325) tablet 325 mg  325 mg Oral Every Other Day    gabapentin

## 2024-07-19 VITALS
DIASTOLIC BLOOD PRESSURE: 69 MMHG | SYSTOLIC BLOOD PRESSURE: 148 MMHG | RESPIRATION RATE: 18 BRPM | BODY MASS INDEX: 25.46 KG/M2 | TEMPERATURE: 98.8 F | WEIGHT: 167.99 LBS | HEIGHT: 68 IN | OXYGEN SATURATION: 95 % | HEART RATE: 56 BPM

## 2024-07-19 LAB
ALBUMIN SERPL-MCNC: 2.4 G/DL (ref 3.5–5)
ANION GAP SERPL CALC-SCNC: 7 MMOL/L (ref 5–15)
BUN SERPL-MCNC: 34 MG/DL (ref 6–20)
BUN/CREAT SERPL: 16 (ref 12–20)
CALCIUM SERPL-MCNC: 8.1 MG/DL (ref 8.5–10.1)
CHLORIDE SERPL-SCNC: 108 MMOL/L (ref 97–108)
CO2 SERPL-SCNC: 27 MMOL/L (ref 21–32)
CREAT SERPL-MCNC: 2.11 MG/DL (ref 0.7–1.3)
GLUCOSE BLD STRIP.AUTO-MCNC: 117 MG/DL (ref 65–117)
GLUCOSE BLD STRIP.AUTO-MCNC: 120 MG/DL (ref 65–117)
GLUCOSE SERPL-MCNC: 83 MG/DL (ref 65–100)
PHOSPHATE SERPL-MCNC: 2.8 MG/DL (ref 2.6–4.7)
POTASSIUM SERPL-SCNC: 3.3 MMOL/L (ref 3.5–5.1)
SERVICE CMNT-IMP: ABNORMAL
SERVICE CMNT-IMP: NORMAL
SODIUM SERPL-SCNC: 142 MMOL/L (ref 136–145)

## 2024-07-19 PROCEDURE — 6370000000 HC RX 637 (ALT 250 FOR IP): Performed by: FAMILY MEDICINE

## 2024-07-19 PROCEDURE — 80069 RENAL FUNCTION PANEL: CPT

## 2024-07-19 PROCEDURE — 82962 GLUCOSE BLOOD TEST: CPT

## 2024-07-19 PROCEDURE — 36415 COLL VENOUS BLD VENIPUNCTURE: CPT

## 2024-07-19 PROCEDURE — 97116 GAIT TRAINING THERAPY: CPT

## 2024-07-19 PROCEDURE — 6370000000 HC RX 637 (ALT 250 FOR IP): Performed by: INTERNAL MEDICINE

## 2024-07-19 PROCEDURE — 6360000002 HC RX W HCPCS: Performed by: FAMILY MEDICINE

## 2024-07-19 PROCEDURE — 2580000003 HC RX 258: Performed by: FAMILY MEDICINE

## 2024-07-19 PROCEDURE — 97535 SELF CARE MNGMENT TRAINING: CPT

## 2024-07-19 RX ORDER — SODIUM BICARBONATE 650 MG/1
1300 TABLET ORAL 3 TIMES DAILY
Qty: 180 TABLET | Refills: 1 | Status: SHIPPED | OUTPATIENT
Start: 2024-07-19

## 2024-07-19 RX ADMIN — Medication 10 ML: at 08:18

## 2024-07-19 RX ADMIN — ACETAMINOPHEN 650 MG: 325 TABLET ORAL at 07:00

## 2024-07-19 RX ADMIN — ATORVASTATIN CALCIUM 80 MG: 40 TABLET, FILM COATED ORAL at 08:17

## 2024-07-19 RX ADMIN — ASPIRIN 81 MG: 81 TABLET, COATED ORAL at 08:18

## 2024-07-19 RX ADMIN — GABAPENTIN 100 MG: 100 CAPSULE ORAL at 08:18

## 2024-07-19 RX ADMIN — SODIUM BICARBONATE 1300 MG: 650 TABLET ORAL at 08:18

## 2024-07-19 RX ADMIN — CARVEDILOL 25 MG: 12.5 TABLET, FILM COATED ORAL at 08:17

## 2024-07-19 RX ADMIN — AMLODIPINE BESYLATE 5 MG: 5 TABLET ORAL at 08:18

## 2024-07-19 RX ADMIN — HEPARIN SODIUM 5000 UNITS: 5000 INJECTION INTRAVENOUS; SUBCUTANEOUS at 05:49

## 2024-07-19 NOTE — DISCHARGE INSTRUCTIONS
Discharge Instructions       PATIENT ID: Shawn Gross Jr.  MRN: [id number]   YOB: 1955    DATE OF ADMISSION: [unfilled]    DATE OF DISCHARGE: 7/19/2024    PRIMARY CARE PROVIDER: @PCP@     ATTENDING PHYSICIAN: [unfilled]  DISCHARGING PROVIDER: Marilee Dawn MD    To contact this individual call [phone] and ask the  to page.   If unavailable ask to be transferred the Adult Hospitalist Department.    DISCHARGE DIAGNOSES RODNEY     CONSULTATIONS: [unfilled]    PROCEDURES/SURGERIES: * No surgery found *    PENDING TEST RESULTS:   At the time of discharge the following test results are still pending: none     FOLLOW UP APPOINTMENTS:   [unfilled]     ADDITIONAL CARE RECOMMENDATIONS:   Your home Bumetanide and Empagliflozin were stopped, should follow up your kidney doctor in 1 week.  will further adjust your medication based on your kidney function   Should stop potasium tablet   Fall precaution     DIET: cardiac diet      ACTIVITY: activity as tolerated          Radiology      DISCHARGE MEDICATIONS:   See Medication Reconciliation Form    It is important that you take the medication exactly as they are prescribed.   Keep your medication in the bottles provided by the pharmacist and keep a list of the medication names, dosages, and times to be taken in your wallet.   Do not take other medications without consulting your doctor.       NOTIFY YOUR PHYSICIAN FOR ANY OF THE FOLLOWING:   Fever over 101 degrees for 24 hours.   Chest pain, shortness of breath, fever, chills, nausea, vomiting, diarrhea, change in mentation, falling, weakness, bleeding. Severe pain or pain not relieved by medications.  Or, any other signs or symptoms that you may have questions about.      DISPOSITION:    Home With:   OT x PT x HH x RNx       SNF/Inpatient Rehab/LTAC    Independent/assisted living    Hospice    Other:     CDMP Checked:   Yes x     PROBLEM LIST Updated:  Yes x       Signed:   Marilee Dawn

## 2024-07-19 NOTE — PLAN OF CARE
Problem: Physical Therapy - Adult  Goal: By Discharge: Performs mobility at highest level of function for planned discharge setting.  See evaluation for individualized goals.  Description: FUNCTIONAL STATUS PRIOR TO ADMISSION: Patient was modified independent using a rollator with forearm attachments for functional mobility when outside the home. Furniture surfs inside the home. Wife denied falls.     HOME SUPPORT PRIOR TO ADMISSION: The patient lived with his wife. Wife works part time and daughter provides assist at times. Patient is alone within the home at times.     Physical Therapy Goals  Weekly re-assessment -goals remain appropriate.  Initiated 7/12/2024  1.  Patient will move from supine to sit and sit to supine, scoot up and down, and roll side to side in bed with modified independence within 7 day(s).    2.  Patient will perform sit to stand with modified independence within 7 day(s).  3.  Patient will transfer from bed to chair and chair to bed with modified independence using the least restrictive device within 7 day(s).  4.  Patient will ambulate with modified independence for 100 feet with the least restrictive device within 7 day(s).       7/19/2024 1144 by Rohini Bradshaw, PT  Outcome: Progressing  7/19/2024 1143 by Rohini Bradshaw, PT  Outcome: Progressing     Problem: Occupational Therapy - Adult  Goal: By Discharge: Performs self-care activities at highest level of function for planned discharge setting.  See evaluation for individualized goals.  Description: FUNCTIONAL STATUS PRIOR TO ADMISSION: Pt reported he lives with wife and was MOD IND w/ ADLs PTA. Wife assists with IADLs.     HOME SUPPORT: Patient lived with wife.    Occupational Therapy Goals:  Initiated 7/16/2024  1.  Patient will perform grooming seated with Supervision within 7 day(s).  2.  Patient will perform upper body dressing seated with Supervision within 7 day(s).  3.  Patient will perform lower body bathing seated with Stand

## 2024-07-19 NOTE — PLAN OF CARE
Problem: Occupational Therapy - Adult  Goal: By Discharge: Performs self-care activities at highest level of function for planned discharge setting.  See evaluation for individualized goals.  Description: FUNCTIONAL STATUS PRIOR TO ADMISSION: Pt reported he lives with wife and was MOD IND w/ ADLs PTA. Wife assists with IADLs.     HOME SUPPORT: Patient lived with wife.    Occupational Therapy Goals:  Initiated 7/16/2024  1.  Patient will perform grooming seated with Supervision within 7 day(s).  2.  Patient will perform upper body dressing seated with Supervision within 7 day(s).  3.  Patient will perform lower body bathing seated with Stand by Assist within 7 day(s).  4.  Patient will perform toilet transfers with Stand by Assist and DME PRN within 7 day(s).  5.  Patient will perform all aspects of toileting with Stand by Assist within 7 day(s).  6.  Patient will participate in upper extremity therapeutic exercise/activities with Modified [place] for 5 minutes within 7 day(s).    7.  Patient will utilize energy conservation techniques during functional activities with verbal cues within 7 day(s).    Outcome: Progressing     OCCUPATIONAL THERAPY TREATMENT  Patient: Shawn Gross Jr. (68 y.o. male)  Date: 7/19/2024  Primary Diagnosis: Hyperkalemia [E87.5]  General weakness [R53.1]  RODNEY (acute kidney injury) (HCC) [N17.9]  Other fatigue [R53.83]  Acute renal failure superimposed on chronic kidney disease, unspecified acute renal failure type, unspecified CKD stage (HCC) [N17.9, N18.9]       Precautions: Fall Risk, Bed Alarm                Chart, occupational therapy assessment, plan of care, and goals were reviewed.    ASSESSMENT  Patient continues to benefit from skilled OT services and is progressing towards goals. Pt noted with progressive mobility/balance and activity tolerance, functionally evidenced by completing hands-free toileting and standing grooming with Supervision.  Pt continues to demonstrate

## 2024-07-19 NOTE — PLAN OF CARE
Problem: Safety - Adult  Goal: Free from fall injury  Outcome: Progressing     Problem: Pain  Goal: Verbalizes/displays adequate comfort level or baseline comfort level  Outcome: Progressing     Problem: Skin/Tissue Integrity  Goal: Absence of new skin breakdown  Description: 1.  Monitor for areas of redness and/or skin breakdown  2.  Assess vascular access sites hourly  3.  Every 4-6 hours minimum:  Change oxygen saturation probe site  4.  Every 4-6 hours:  If on nasal continuous positive airway pressure, respiratory therapy assess nares and determine need for appliance change or resting period.  Outcome: Progressing     Problem: ABCDS Injury Assessment  Goal: Absence of physical injury  Outcome: Progressing     Problem: Chronic Conditions and Co-morbidities  Goal: Patient's chronic conditions and co-morbidity symptoms are monitored and maintained or improved  Outcome: Progressing     Problem: Discharge Planning  Goal: Discharge to home or other facility with appropriate resources  Outcome: Progressing

## 2024-07-19 NOTE — PROGRESS NOTES
[address], [place] [address]  Phone: [phone]   [phone]    [email or web address]     Nephrology Progress Note    Patient Name : Shawn Gross Jr.      : 1955     MRN : [id number]  Date of Admission : 2024  Date of Servive : 24    CC:  Follow up for RODNEY       Assessment and Plan   RODNEY on CKD:  - Prerenal azotemia resolving  - continue to hold diuretics and SGLT2i on d/c   - Cr stable at baseline  - ok for d/c whenever SNF can accept  - f/up with Dr. Coto in 2 weeks    Met acidosis   - continue oral Bicarb on d/c     Hypokalemia:  - replete PRN     CKD IV:  - from DM2, baseline Cr around 2.5    Hyperkalemia:  - resolved post medical tx in ER     HFrEF:  - EF 45-50%     Leukocytosis:  - infections w/up per primary team     CAD s/p CABG  HTN  DM2     Interval History:  Patient seen and examined.  Resting in bed.  No distress. Cr stable, K low.  No complaints.  Awaiting SNF placement    Review of Systems: A comprehensive review of systems was negative.    Current Medications:   Current Facility-Administered Medications   Medication Dose Route Frequency    sodium bicarbonate tablet 1,300 mg  1,300 mg Oral TID    glucose chewable tablet 16 g  4 tablet Oral PRN    dextrose bolus 10% 125 mL  125 mL IntraVENous PRN    Or    dextrose bolus 10% 250 mL  250 mL IntraVENous PRN    dextrose 10 % infusion   IntraVENous Continuous PRN    glucagon injection 1 mg  1 mg SubCUTAneous PRN    amLODIPine (NORVASC) tablet 5 mg  5 mg Oral Daily    atorvastatin (LIPITOR) tablet 80 mg  80 mg Oral Daily    carvedilol (COREG) tablet 25 mg  25 mg Oral BID    ferrous sulfate (IRON 325) tablet 325 mg  325 mg Oral Every Other Day    gabapentin (NEURONTIN) capsule 100 mg  100 mg Oral BID    sodium chloride flush 0.9 % injection 5-40 mL  5-40 mL IntraVENous 2 times per day    sodium chloride flush 0.9 % injection 5-40 mL  5-40 mL IntraVENous PRN

## 2024-07-19 NOTE — DISCHARGE SUMMARY
Discharge Summary       PATIENT ID: Shawn Gross Jr.  MRN: [id number]   YOB: 1955    DATE OF ADMISSION: 7/12/2024  3:33 AM    DATE OF DISCHARGE: 7/19/2024   PRIMARY CARE PROVIDER: John Kwon MD     ATTENDING PHYSICIAN: SAMAN CHAIREZ MD   DISCHARGING PROVIDER: Saman Chairez MD    To contact this individual call [phone] and ask the  to page.  If unavailable ask to be transferred the Adult Hospitalist Department.    CONSULTATIONS: IP CONSULT TO HOSPITALIST  IP CONSULT TO NEPHROLOGY  IP CONSULT TO CASE MANAGEMENT    PROCEDURES/SURGERIES: * No surgery found *    ADMITTING DIAGNOSES & HOSPITAL COURSE:   Quoted: \"Shawn Gross Jr. is a 68 y.o. male who presents with complaint of not feeling well.  Patient cannot really elaborate on the history other than not feeling well and fatigue.  Also noted some difficulty walking due to weakness.  Patient presented with stable vital signs other than mild elevated blood pressure and heart rate.  Blood work revealed elevated potassium of 6.4 and creatinine elevated at 3.84.  Last known creatinine 4 months ago was 2.49, patient appears to have CKD of unclear stage.  Patient also was noted to have leukocytosis with white count of 14,000.  Chest x-ray shows atelectasis, urinalysis pending at this time.  In the emergency room, patient was started hyperkalemia treatment with albuterol, calcium gluconate, insulin and D10 as well as Lokelma.  Also IV fluid bolus was given.  Hospitalist service requested admit the patient for further evaluation management.     The patient denies any headache, blurry vision, sore throat, trouble swallowing, trouble with speech, chest pain, SOB, cough, fever, chills, N/V/D, abd pain, urinary symptoms, constipation, recent travels, sick contacts, focal or generalized neurological symptoms, falls, injuries, rashes, contact with COVID-19 diagnosed patients, hematemesis, melena, hemoptysis, hematuria, rashes, denies starting any new  medications and denies any other concerns or problems besides as mentioned above.\"        DISCHARGE DIAGNOSES / PLAN:      RODNEY on CKD stage IV, prerenal azotemia  Imaging negative for hydronephrosis  Appreciate Nephrology, ok to discharge. Recommended to hold bumex, jardiance, acei/arb/arni at discharge  -Outpatient follow up with Dr Loera  Started on po bicarb per nephrologist.      Probable vascular dementia  -history of CVA     Hyperkalemia, resolved with treatment   Lactic acidosis related to above, now resolved with iv fluids  -stop home potasium tab      No fever, no cough, urine negative for bacteria  CXR reports atelectasis  He was never started on abx on admission     Hx of hypertension  Resume home meds as tolerated     Hx of cabg  No cp   Troponin on 7/12 wnl  -continue home meds     Dyslipidemia  Statin           PENDING TEST RESULTS:   At the time of discharge the following test results are still pending: NONE     FOLLOW UP APPOINTMENTS:    [unfilled]     ADDITIONAL CARE RECOMMENDATIONS:   Your home Bumetanide and Empagliflozin were stopped, should follow up your kidney doctor in 1 week.  will further adjust your medication based on your kidney function   Should stop potasium tablet   Fall precaution     DIET: cardiac diet      ACTIVITY: activity as tolerated          DISCHARGE MEDICATIONS:     Medication List        START taking these medications      sodium bicarbonate 650 MG tablet  Take 2 tablets by mouth 3 times daily            CONTINUE taking these medications      amLODIPine 5 MG tablet  Commonly known as: NORVASC     aspirin 81 MG EC tablet     atorvastatin 80 MG tablet  Commonly known as: LIPITOR     carvedilol 25 MG tablet  Commonly known as: COREG     ferrous sulfate 325 (65 Fe) MG tablet  Commonly known as: IRON 325     gabapentin 100 MG capsule  Commonly known as: NEURONTIN            STOP taking these medications      bumetanide 1 MG tablet  Commonly known as: BUMEX

## 2024-07-19 NOTE — CARE COORDINATION
Transition of Care Plan:     RUR: 19%  Prior Level of Functioning: Patient was modified independent using a rollator with forearm attachments for functional mobility when outside the home. Furniture surfs inside the home. Wife denied falls.   Disposition: Home health  If SNF or IPR: Date FOC offered: 7/15/14  Date FOC received: 7/15/24  Accepting facility: [facility]  Date authorization started with reference number: 7/16, reference # [id number]  Date authorization received and expires: referral denied due to patient at baseline  Follow up appointments: Defer to SNF  DME needed: Defer to SNF  Transportation at discharge: wife care  IM/IMM Medicare/ letter given: 2nd IM given on 7/18  Caregiver Contact: Primary Decision Maker: GrossMoisesMartha - Spouse - [phone]  Discharge Caregiver contacted prior to discharge? Yes  Care Conference needed? Not at this time  Barriers to discharge: wife to transport home.       CM received call from eri requesting a p2p, MD reviewed chart and patient is contact guard  assist and walking 200 feet based on therapy notes. Previous notes that patient used [facility] in the past, referral sent to [phone]. Cm informed [facility] that patient would be discharging home.    0151- [facility] accepted.       VINOD GuerraSANTIAGO  Care Management Department  Ph:[phone]

## 2024-07-19 NOTE — PLAN OF CARE
Problem: Physical Therapy - Adult  Goal: By Discharge: Performs mobility at highest level of function for planned discharge setting.  See evaluation for individualized goals.  Description: FUNCTIONAL STATUS PRIOR TO ADMISSION: Patient was modified independent using a rollator with forearm attachments for functional mobility when outside the home. Furniture surfs inside the home. Wife denied falls.     HOME SUPPORT PRIOR TO ADMISSION: The patient lived with his wife. Wife works part time and daughter provides assist at times. Patient is alone within the home at times.     Physical Therapy Goals  Weekly re-assessment -goals remain appropriate.  Initiated 7/12/2024  1.  Patient will move from supine to sit and sit to supine, scoot up and down, and roll side to side in bed with modified independence within 7 day(s).    2.  Patient will perform sit to stand with modified independence within 7 day(s).  3.  Patient will transfer from bed to chair and chair to bed with modified independence using the least restrictive device within 7 day(s).  4.  Patient will ambulate with modified independence for 100 feet with the least restrictive device within 7 day(s).       7/19/2024 1144 by Rohini Bradshaw, PT  Outcome: Progressing   PHYSICAL THERAPY TREATMENT: WEEKLY REASSESSMENT    Patient: Shawn Gross JrCatarino (68 y.o. male)  Date: 7/19/2024  Primary Diagnosis: Hyperkalemia [E87.5]  General weakness [R53.1]  RODNEY (acute kidney injury) (HCC) [N17.9]  Other fatigue [R53.83]  Acute renal failure superimposed on chronic kidney disease, unspecified acute renal failure type, unspecified CKD stage (HCC) [N17.9, N18.9]       Precautions: Fall Risk, Bed Alarm                      ASSESSMENT :  Patient continues to benefit from skilled PT services and is progressing towards goals. Patient mobility is overall supervision to contact guard .  Assessed gait without use of RW since he tends not to use it in home and he does have increased  Feet  Assistive Device: Gait belt  Interventions: Verbal cues  Speed/Prisca: Slow  Step Length: Right shortened;Left shortened  Gait Abnormalities: Decreased step clearance   Activity Tolerance:   requires frequent rest breaks and bradycardic (baseline?)    After treatment:   Patient left in no apparent distress sitting up in chair, Call bell within reach, and Bed/ chair alarm activated    COMMUNICATION/EDUCATION:   The patient's plan of care was discussed with: occupational therapist, registered nurse, and          Thank you for this referral.  MAGALI MENDEZ, PT  Minutes: 14

## 2024-07-23 ENCOUNTER — HOSPITAL ENCOUNTER (EMERGENCY)
Facility: HOSPITAL | Age: 69
Discharge: HOME | End: 2024-07-23
Attending: EMERGENCY MEDICINE
Payer: MEDICARE

## 2024-07-23 VITALS
SYSTOLIC BLOOD PRESSURE: 158 MMHG | TEMPERATURE: 97.9 F | HEART RATE: 62 BPM | DIASTOLIC BLOOD PRESSURE: 71 MMHG | OXYGEN SATURATION: 98 % | RESPIRATION RATE: 14 BRPM

## 2024-07-23 DIAGNOSIS — N18.9 CHRONIC KIDNEY DISEASE, UNSPECIFIED CKD STAGE: Primary | ICD-10-CM

## 2024-07-23 DIAGNOSIS — Z71.1 NO PROBLEM, FEARED COMPLAINT UNFOUNDED: ICD-10-CM

## 2024-07-23 LAB
ALBUMIN SERPL-MCNC: 2.6 G/DL (ref 3.5–5)
ALBUMIN/GLOB SERPL: 0.6 (ref 1.1–2.2)
ALP SERPL-CCNC: 110 U/L (ref 45–117)
ALT SERPL-CCNC: 34 U/L (ref 12–78)
ANION GAP SERPL CALC-SCNC: 5 MMOL/L (ref 5–15)
AST SERPL-CCNC: 30 U/L (ref 15–37)
BASOPHILS # BLD: 0.1 K/UL (ref 0–0.1)
BASOPHILS NFR BLD: 0 % (ref 0–1)
BILIRUB SERPL-MCNC: 0.4 MG/DL (ref 0.2–1)
BUN SERPL-MCNC: 21 MG/DL (ref 6–20)
BUN/CREAT SERPL: 9 (ref 12–20)
CALCIUM SERPL-MCNC: 8.9 MG/DL (ref 8.5–10.1)
CHLORIDE SERPL-SCNC: 113 MMOL/L (ref 97–108)
CO2 SERPL-SCNC: 26 MMOL/L (ref 21–32)
CREAT SERPL-MCNC: 2.25 MG/DL (ref 0.7–1.3)
DIFFERENTIAL METHOD BLD: ABNORMAL
EOSINOPHIL # BLD: 0.3 K/UL (ref 0–0.4)
EOSINOPHIL NFR BLD: 3 % (ref 0–7)
ERYTHROCYTE [DISTWIDTH] IN BLOOD BY AUTOMATED COUNT: 14.2 % (ref 11.5–14.5)
GLOBULIN SER CALC-MCNC: 4.2 G/DL (ref 2–4)
GLUCOSE SERPL-MCNC: 108 MG/DL (ref 65–100)
HCT VFR BLD AUTO: 37.6 % (ref 36.6–50.3)
HEMOCCULT STL QL: NEGATIVE
HGB BLD-MCNC: 12.3 G/DL (ref 12.1–17)
IMM GRANULOCYTES # BLD AUTO: 0.1 K/UL (ref 0–0.04)
IMM GRANULOCYTES NFR BLD AUTO: 1 % (ref 0–0.5)
LYMPHOCYTES # BLD: 2.3 K/UL (ref 0.8–3.5)
LYMPHOCYTES NFR BLD: 20 % (ref 12–49)
MCH RBC QN AUTO: 30.5 PG (ref 26–34)
MCHC RBC AUTO-ENTMCNC: 32.7 G/DL (ref 30–36.5)
MCV RBC AUTO: 93.3 FL (ref 80–99)
MONOCYTES # BLD: 1 K/UL (ref 0–1)
MONOCYTES NFR BLD: 8 % (ref 5–13)
NEUTS SEG # BLD: 7.7 K/UL (ref 1.8–8)
NEUTS SEG NFR BLD: 68 % (ref 32–75)
NRBC # BLD: 0 K/UL (ref 0–0.01)
NRBC BLD-RTO: 0 PER 100 WBC
PLATELET # BLD AUTO: 319 K/UL (ref 150–400)
PMV BLD AUTO: 11.2 FL (ref 8.9–12.9)
POTASSIUM SERPL-SCNC: 3.8 MMOL/L (ref 3.5–5.1)
PROT SERPL-MCNC: 6.8 G/DL (ref 6.4–8.2)
RBC # BLD AUTO: 4.03 M/UL (ref 4.1–5.7)
SODIUM SERPL-SCNC: 144 MMOL/L (ref 136–145)
WBC # BLD AUTO: 11.4 K/UL (ref 4.1–11.1)

## 2024-07-23 PROCEDURE — 99283 EMERGENCY DEPT VISIT LOW MDM: CPT

## 2024-07-23 PROCEDURE — 80053 COMPREHEN METABOLIC PANEL: CPT

## 2024-07-23 PROCEDURE — 82272 OCCULT BLD FECES 1-3 TESTS: CPT

## 2024-07-23 PROCEDURE — 85025 COMPLETE CBC W/AUTO DIFF WBC: CPT

## 2024-07-23 PROCEDURE — 36415 COLL VENOUS BLD VENIPUNCTURE: CPT

## 2024-07-23 NOTE — ED PROVIDER NOTES
[facility] EMERGENCY DEP  EMERGENCY DEPARTMENT ENCOUNTER      Pt Name: Shawn Gross Jr.  MRN: [id number]  Birthdate 1955  Date of evaluation: 7/23/2024  Provider: Lobo Hart DO    CHIEF COMPLAINT       Chief Complaint   Patient presents with    Rectal Bleeding         HISTORY OF PRESENT ILLNESS   (Location/Symptom, Timing/Onset, Context/Setting, Quality, Duration, Modifying Factors, Severity)  Note limiting factors.   Patient arrives with wife.  Patient denies complaints.  Patient states that he has no complaints.  Wife notes that she got home from work yesterday and noticed bright red blood on the toilet seat and noticed some bright red blood/maroon-colored blood on the bed cover/bed pad that patient sleeps on.  She was concern for bleeding.  Patient is unsure if he has been having any rectal bleeding.  No blood thinners.  No other complaints    Patient is a poor historian            Review of External Medical Records:     Nursing Notes were reviewed.    REVIEW OF SYSTEMS    (2-9 systems for level 4, 10 or more for level 5)     Review of Systems    Except as noted above the remainder of the review of systems was reviewed and negative.       PAST MEDICAL HISTORY     Past Medical History:   Diagnosis Date    Anemia     CAD (coronary artery disease)     10/31/22: NSTEMI sees [facility] cardiology, may be getting CABG    CKD (chronic kidney disease) stage 3, GFR 30-59 ml/min (HCC)     COPD (chronic obstructive pulmonary disease) ([facility])     Fractured rib 03/2021    from a fall per pt on 5/11/2021    High cholesterol     Hypertension     per pt on 5/11/2021    MI (myocardial infarction) ([facility])     Recurrent pleural effusion on left     Stroke ([facility]) 2022         SURGICAL HISTORY       Past Surgical History:   Procedure Laterality Date    COLONOSCOPY N/A 9/27/2022    COLONOSCOPY performed by Zane Longoria MD at [facility] MAIN OR    CORONARY ARTERY BYPASS GRAFT  11/04/2022    UPPER GASTROINTESTINAL ENDOSCOPY      per pt

## 2024-07-23 NOTE — ED TRIAGE NOTES
Pt presents to ED co rectal bleeding since last night. Pt states the blood is mixed in with his stool. Pt denies being on blood thinners, abdominal pain, or hx of hemorrhoids.

## 2024-07-28 NOTE — PROGRESS NOTES
Subjective:   Shawn Gross Jr. is a 68 y.o.  male with past medical history including CAD s/p CABG x1 at [facility] in November 2022, HFmrEF (45-50%), recurrent left pleural effusion (thought to be due to CABG surgery), hypertension, hypercholesterolemia, CVA (left parietal infarct in May 2021; remote right parietal lobe infarcts; moderate generalized cerebrovascular disease), CKD III, PAD, COPD, chronic tobacco use, and diabetes presents today for 3 month follow up.    Mr. Gross was hospitalized from 7/12/2024 to 7/19/2024 for generalized weakness/lethargy.  He was found to have significant RODNEY with a creatinine as high as 3.84 as well as hyperkalemia with a potassium of 6.4.  His diuretics and Jardiance were withheld and his creatinine lowered to 2.25 and potassium lowered to 3.8 by the time of discharge.    He presents today with a well-controlled blood pressure appearing euvolemic with a stable weight stating that he feels well and in his normal state of health.  He denies any concerns of edema/fluid buildup.  It sounds like he had 1 recurrent episode of presyncope since I last saw him which was associated with obtaining fasting labs.  He had a 7-day Holter completed by his primary care for the syncope back in April and I am waiting for the report to be faxed.    His wife, Martha, states that he is still very sedentary and spends most of his time laying in the bed.    Patient denies any anginal-like chest pains, dyspnea, abdominal distention, palpitations.        Previous visit on 4.29.24:  Since his last visit, Mr. Gross has been seen by his nephrologist again who anticipates needing dialysis in the future, which the patient seems agreeable to.  Mr. Gross was seen in the ED on 3/27/2024 for a brief syncopal episode shortly after obtaining blood work.  Apparently he went unresponsive for about 5 to 10 seconds and also had some arm twitching and drooling.  He had no focal deficits

## 2024-07-29 ENCOUNTER — OFFICE VISIT (OUTPATIENT)
Age: 69
End: 2024-07-29
Payer: MEDICARE

## 2024-07-29 VITALS
BODY MASS INDEX: 23.64 KG/M2 | HEART RATE: 75 BPM | SYSTOLIC BLOOD PRESSURE: 132 MMHG | DIASTOLIC BLOOD PRESSURE: 70 MMHG | HEIGHT: 69 IN | WEIGHT: 159.6 LBS | OXYGEN SATURATION: 100 %

## 2024-07-29 DIAGNOSIS — N18.9 CHRONIC KIDNEY DISEASE, UNSPECIFIED CKD STAGE: ICD-10-CM

## 2024-07-29 DIAGNOSIS — I50.22 HEART FAILURE WITH MID-RANGE EJECTION FRACTION (HCC): ICD-10-CM

## 2024-07-29 DIAGNOSIS — I50.22 HEART FAILURE WITH MID-RANGE EJECTION FRACTION (HCC): Primary | ICD-10-CM

## 2024-07-29 DIAGNOSIS — E87.5 HYPERKALEMIA: ICD-10-CM

## 2024-07-29 DIAGNOSIS — I10 PRIMARY HYPERTENSION: ICD-10-CM

## 2024-07-29 DIAGNOSIS — I25.10 CAD IN NATIVE ARTERY: ICD-10-CM

## 2024-07-29 DIAGNOSIS — E78.00 HYPERCHOLESTEROLEMIA: ICD-10-CM

## 2024-07-29 DIAGNOSIS — Z95.1 S/P CABG X 1: ICD-10-CM

## 2024-07-29 LAB
ANION GAP SERPL CALC-SCNC: 4 MMOL/L (ref 5–15)
BUN SERPL-MCNC: 21 MG/DL (ref 6–20)
BUN/CREAT SERPL: 9 (ref 12–20)
CALCIUM SERPL-MCNC: 8.6 MG/DL (ref 8.5–10.1)
CHLORIDE SERPL-SCNC: 114 MMOL/L (ref 97–108)
CO2 SERPL-SCNC: 27 MMOL/L (ref 21–32)
CREAT SERPL-MCNC: 2.4 MG/DL (ref 0.7–1.3)
GLUCOSE SERPL-MCNC: 124 MG/DL (ref 65–100)
MAGNESIUM SERPL-MCNC: 1.8 MG/DL (ref 1.6–2.4)
PHOSPHATE SERPL-MCNC: 3 MG/DL (ref 2.6–4.7)
POTASSIUM SERPL-SCNC: 4.4 MMOL/L (ref 3.5–5.1)
SODIUM SERPL-SCNC: 145 MMOL/L (ref 136–145)

## 2024-07-29 PROCEDURE — 3078F DIAST BP <80 MM HG: CPT | Performed by: NURSE PRACTITIONER

## 2024-07-29 PROCEDURE — G8420 CALC BMI NORM PARAMETERS: HCPCS | Performed by: NURSE PRACTITIONER

## 2024-07-29 PROCEDURE — G8427 DOCREV CUR MEDS BY ELIG CLIN: HCPCS | Performed by: NURSE PRACTITIONER

## 2024-07-29 PROCEDURE — 1111F DSCHRG MED/CURRENT MED MERGE: CPT | Performed by: NURSE PRACTITIONER

## 2024-07-29 PROCEDURE — 3075F SYST BP GE 130 - 139MM HG: CPT | Performed by: NURSE PRACTITIONER

## 2024-07-29 PROCEDURE — 3017F COLORECTAL CA SCREEN DOC REV: CPT | Performed by: NURSE PRACTITIONER

## 2024-07-29 PROCEDURE — 1123F ACP DISCUSS/DSCN MKR DOCD: CPT | Performed by: NURSE PRACTITIONER

## 2024-07-29 PROCEDURE — 99214 OFFICE O/P EST MOD 30 MIN: CPT | Performed by: NURSE PRACTITIONER

## 2024-07-29 PROCEDURE — 1036F TOBACCO NON-USER: CPT | Performed by: NURSE PRACTITIONER

## 2024-07-29 RX ORDER — ALLOPURINOL 100 MG/1
50 TABLET ORAL DAILY
COMMUNITY
Start: 2024-06-18 | End: 2025-06-18

## 2024-07-29 RX ORDER — POTASSIUM CHLORIDE 1500 MG/1
TABLET, EXTENDED RELEASE ORAL
COMMUNITY

## 2024-07-29 ASSESSMENT — PATIENT HEALTH QUESTIONNAIRE - PHQ9
SUM OF ALL RESPONSES TO PHQ QUESTIONS 1-9: 0
SUM OF ALL RESPONSES TO PHQ9 QUESTIONS 1 & 2: 0
2. FEELING DOWN, DEPRESSED OR HOPELESS: NOT AT ALL

## 2024-07-30 ENCOUNTER — TELEPHONE (OUTPATIENT)
Age: 69
End: 2024-07-30

## 2024-07-30 NOTE — TELEPHONE ENCOUNTER
Discussed labs with the patient's wife, Martha.  Potassium, magnesium, and phosphorus all within normal limits.  Renal function is about at baseline.  No changes to current therapy.

## 2024-10-20 NOTE — PROGRESS NOTES
January 2024: LVEF 45 to 50%.  No significant LV dilatation.  LVH with no WMA.  Moderate MR and TR.  -Continue treatment with lisinopril and Jardiance.  Patient and his wife are unsure if he is still taking carvedilol. Appears well compensated on his current regimen, but he was advised to increase his fluid intake since it sounds like he is only drinking about 500 mL of water daily and he appears dehydrated.  -Free of anginal and heart failure symptoms.  -Caution intensifying regimen due to history of symptomatic hypotension and significant prerenal RODNEY likely from overmedication.  -Written instructions provided regarding adequate food/fluid intake, daily weights, medication adherence, and changes in condition that would warrant emergent evaluation or calling the office for guidance.  -Does not qualify for cardiac rehab.    3. Primary hypertension  -BP today 118/82; continue current regimen with no changes.     4. Hypercholesterolemia with LDL goal less than 70  -LDL September 2023 < 50.  -Continue high intensity statin.    5. CAD in native artery  6. S/P CABG x 1  -Noted CABG x1 at [facility] in November 2022.  -Complicated by recurrent left pleural effusion that has required drainage on multiple occasions.  -Continue aspirin, statin, and antianginal regimen.  -Patient is not currently experiencing any anginal-like chest pains. Low threshold to pursue ischemic testing if LVEF lower significantly or patient develops any anginal-like symptoms.  -Patient would like take aggressive measures in the future if clinically indicated.    7. Chronic obstructive pulmonary disease, unspecified COPD type ([facility])  -Continue bronchodilators.    8. History of CVA (cerebrovascular accident)  -Noted on prior MRI scans.  -Intolerant to clopidogrel; continue aspirin therapy.    9. Stage 3-4 chronic kidney disease (HCC)  -Fluctuating Cr. With proteinuria.  -Admitted in September 2024 for acute on chronic kidney failure; initial

## 2024-10-21 ENCOUNTER — OFFICE VISIT (OUTPATIENT)
Age: 69
End: 2024-10-21
Payer: MEDICARE

## 2024-10-21 VITALS
BODY MASS INDEX: 22.39 KG/M2 | DIASTOLIC BLOOD PRESSURE: 82 MMHG | OXYGEN SATURATION: 99 % | SYSTOLIC BLOOD PRESSURE: 118 MMHG | WEIGHT: 151.2 LBS | HEIGHT: 69 IN | HEART RATE: 79 BPM

## 2024-10-21 DIAGNOSIS — R54 FRAILTY: ICD-10-CM

## 2024-10-21 DIAGNOSIS — I50.22 HEART FAILURE WITH MID-RANGE EJECTION FRACTION (HCC): Primary | ICD-10-CM

## 2024-10-21 DIAGNOSIS — N18.9 CHRONIC KIDNEY DISEASE, UNSPECIFIED CKD STAGE: ICD-10-CM

## 2024-10-21 DIAGNOSIS — I25.10 CAD IN NATIVE ARTERY: ICD-10-CM

## 2024-10-21 DIAGNOSIS — Z95.1 S/P CABG X 1: ICD-10-CM

## 2024-10-21 DIAGNOSIS — I10 PRIMARY HYPERTENSION: ICD-10-CM

## 2024-10-21 DIAGNOSIS — E78.00 HYPERCHOLESTEROLEMIA: ICD-10-CM

## 2024-10-21 PROCEDURE — 1036F TOBACCO NON-USER: CPT | Performed by: NURSE PRACTITIONER

## 2024-10-21 PROCEDURE — G8484 FLU IMMUNIZE NO ADMIN: HCPCS | Performed by: NURSE PRACTITIONER

## 2024-10-21 PROCEDURE — 3017F COLORECTAL CA SCREEN DOC REV: CPT | Performed by: NURSE PRACTITIONER

## 2024-10-21 PROCEDURE — 1123F ACP DISCUSS/DSCN MKR DOCD: CPT | Performed by: NURSE PRACTITIONER

## 2024-10-21 PROCEDURE — 3079F DIAST BP 80-89 MM HG: CPT | Performed by: NURSE PRACTITIONER

## 2024-10-21 PROCEDURE — G8427 DOCREV CUR MEDS BY ELIG CLIN: HCPCS | Performed by: NURSE PRACTITIONER

## 2024-10-21 PROCEDURE — G8420 CALC BMI NORM PARAMETERS: HCPCS | Performed by: NURSE PRACTITIONER

## 2024-10-21 PROCEDURE — 3074F SYST BP LT 130 MM HG: CPT | Performed by: NURSE PRACTITIONER

## 2024-10-21 PROCEDURE — 99214 OFFICE O/P EST MOD 30 MIN: CPT | Performed by: NURSE PRACTITIONER

## 2024-10-21 RX ORDER — LISINOPRIL 10 MG/1
10 TABLET ORAL DAILY
COMMUNITY
Start: 2024-09-23 | End: 2025-09-23

## 2024-10-21 RX ORDER — PANTOPRAZOLE SODIUM 40 MG/1
40 TABLET, DELAYED RELEASE ORAL DAILY
COMMUNITY
Start: 2024-09-12

## 2024-10-21 RX ORDER — ACETAMINOPHEN 160 MG
TABLET,DISINTEGRATING ORAL DAILY
COMMUNITY
Start: 2024-08-22

## 2024-10-21 RX ORDER — NICOTINE 21 MG/24HR
1 PATCH, TRANSDERMAL 24 HOURS TRANSDERMAL EVERY 24 HOURS
Qty: 30 PATCH | Refills: 2 | Status: SHIPPED | OUTPATIENT
Start: 2024-10-21

## 2024-10-21 RX ORDER — EMPAGLIFLOZIN 10 MG/1
TABLET, FILM COATED ORAL
COMMUNITY
Start: 2024-09-23

## 2024-10-21 ASSESSMENT — PATIENT HEALTH QUESTIONNAIRE - PHQ9
SUM OF ALL RESPONSES TO PHQ QUESTIONS 1-9: 0
SUM OF ALL RESPONSES TO PHQ9 QUESTIONS 1 & 2: 0
SUM OF ALL RESPONSES TO PHQ QUESTIONS 1-9: 0
1. LITTLE INTEREST OR PLEASURE IN DOING THINGS: NOT AT ALL
SUM OF ALL RESPONSES TO PHQ QUESTIONS 1-9: 0
SUM OF ALL RESPONSES TO PHQ QUESTIONS 1-9: 0
2. FEELING DOWN, DEPRESSED OR HOPELESS: NOT AT ALL

## 2025-01-23 ASSESSMENT — VISUAL ACUITY: OU: 1

## 2025-01-23 ASSESSMENT — ENCOUNTER SYMPTOMS: SHORTNESS OF BREATH: 0

## 2025-01-23 NOTE — PROGRESS NOTES
Subjective:   Shawn Gross Jr. is a 69 y.o.  male with past medical history including CAD s/p CABG x1 at [facility] in November 2022, HFmrEF (45-50%), recurrent left pleural effusion (thought to be due to CABG surgery), hypertension, hypercholesterolemia, CVA (left parietal infarct in May 2021; remote right parietal lobe infarcts; moderate generalized cerebrovascular disease), CKD III, PAD, COPD, chronic tobacco use, and diabetes presents today for 3 month follow up.    Mr. Gross presents today with a well-controlled blood pressure and a resting pulse in the 80s.  He has had no ED visits or hospitalizations since our last visit.  He has been followed by nephrology and it looks like his renal function has returned to baseline with his most recent creatinine being around 2.0 and potassium being 4.5.  He is back on Jardiance and has had his lisinopril increased to 20 mg.  He is not on any loop diuretics.  Weight is stable.  He did not qualify for cardiac rehabilitation and he is not currently getting any PT/OT so he has been very sedentary.  His wife states that he looks well overall and does not have any significant concerns with his health.  He ambulates with a rollator for the most part and does seem a little bit unsteady from time to time but has not had any falls.    Patient complains of chronic lower extremity weakness, but no pain.  He denies any issues with chest pain/discomfort, shortness of breath, heart palpitations, or feeling lightheaded or dizzy.    Orthostatic blood pressures today showed no significant drop with blood pressure, significant fluctuations with heart rate, and no subjective concerns for orthostatic intolerance.        Visit 10.21.24:  Mr. Gross presents today a little bit below his baseline weight with a well-controlled blood pressure of 118/82 and a resting pulse in the 80s on auscultation.  He has been following his nephrologist closely and has been restarted

## 2025-01-24 ENCOUNTER — OFFICE VISIT (OUTPATIENT)
Age: 70
End: 2025-01-24
Payer: MEDICARE

## 2025-01-24 VITALS
OXYGEN SATURATION: 96 % | DIASTOLIC BLOOD PRESSURE: 64 MMHG | BODY MASS INDEX: 22.66 KG/M2 | SYSTOLIC BLOOD PRESSURE: 118 MMHG | HEART RATE: 82 BPM | HEIGHT: 69 IN | WEIGHT: 153 LBS

## 2025-01-24 DIAGNOSIS — Z95.1 S/P CABG X 1: ICD-10-CM

## 2025-01-24 DIAGNOSIS — I10 PRIMARY HYPERTENSION: ICD-10-CM

## 2025-01-24 DIAGNOSIS — I25.10 CAD IN NATIVE ARTERY: ICD-10-CM

## 2025-01-24 DIAGNOSIS — N18.32 CKD STAGE 3B, GFR 30-44 ML/MIN (HCC): ICD-10-CM

## 2025-01-24 DIAGNOSIS — I50.22 HEART FAILURE WITH MID-RANGE EJECTION FRACTION (HCC): Primary | ICD-10-CM

## 2025-01-24 DIAGNOSIS — Z86.73 HISTORY OF CVA (CEREBROVASCULAR ACCIDENT): ICD-10-CM

## 2025-01-24 PROCEDURE — G8420 CALC BMI NORM PARAMETERS: HCPCS | Performed by: NURSE PRACTITIONER

## 2025-01-24 PROCEDURE — 3017F COLORECTAL CA SCREEN DOC REV: CPT | Performed by: NURSE PRACTITIONER

## 2025-01-24 PROCEDURE — 99214 OFFICE O/P EST MOD 30 MIN: CPT | Performed by: NURSE PRACTITIONER

## 2025-01-24 PROCEDURE — 4004F PT TOBACCO SCREEN RCVD TLK: CPT | Performed by: NURSE PRACTITIONER

## 2025-01-24 PROCEDURE — 3078F DIAST BP <80 MM HG: CPT | Performed by: NURSE PRACTITIONER

## 2025-01-24 PROCEDURE — G8427 DOCREV CUR MEDS BY ELIG CLIN: HCPCS | Performed by: NURSE PRACTITIONER

## 2025-01-24 PROCEDURE — 1126F AMNT PAIN NOTED NONE PRSNT: CPT | Performed by: NURSE PRACTITIONER

## 2025-01-24 PROCEDURE — 1123F ACP DISCUSS/DSCN MKR DOCD: CPT | Performed by: NURSE PRACTITIONER

## 2025-01-24 PROCEDURE — 1159F MED LIST DOCD IN RCRD: CPT | Performed by: NURSE PRACTITIONER

## 2025-01-24 PROCEDURE — 3074F SYST BP LT 130 MM HG: CPT | Performed by: NURSE PRACTITIONER

## 2025-01-24 ASSESSMENT — PATIENT HEALTH QUESTIONNAIRE - PHQ9
SUM OF ALL RESPONSES TO PHQ QUESTIONS 1-9: 0
SUM OF ALL RESPONSES TO PHQ9 QUESTIONS 1 & 2: 0
1. LITTLE INTEREST OR PLEASURE IN DOING THINGS: NOT AT ALL
2. FEELING DOWN, DEPRESSED OR HOPELESS: NOT AT ALL
SUM OF ALL RESPONSES TO PHQ QUESTIONS 1-9: 0

## 2025-01-24 NOTE — PROGRESS NOTES
1. Have you been to the ER, urgent care clinic since your last visit?  Hospitalized since your last visit?No    2. Have you seen or consulted any other health care providers outside of the [facility] since your last visit?  Include any pap smears or colon screening. No

## 2025-01-28 NOTE — DISCHARGE SUMMARY
Discharge Summary       PATIENT ID: Sophie Humphreys. MRN: [id number]   YOB: 1955    DATE OF ADMISSION: 3/14/2023  4:03 PM    DATE OF DISCHARGE: 3/17/2023   PRIMARY CARE PROVIDER: Pankaj Jones MD     ATTENDING PHYSICIAN: Dr Ondina Sandoval  DISCHARGING PROVIDER: Ondina Sandoval MD      CONSULTATIONS: IP CONSULT TO PULMONOLOGY    PROCEDURES/SURGERIES: * No surgery found *    ADMISSION SUMMARY AND HOSPITAL COURSE:   Dyspnea due to recurrent left pleural effusion. I suspect this is complication of post CABG. He had thoracentesis in January 2023, fluid analysis negative for infection. Status post thoracentesis today. Patient stable respiratory wise. I have discussed with patient and his wife to return to his cardiac surgery team at [facility] to discuss the situation.  -Last echo was on 1/16/2023 LVEF 50 to 55%, left ventricular size was normal  -CT chest shows hydropneumothorax  -Spoke to Pulmonary, the patient needs to be transferred to a facility where they have CT surgery  -Spoke to Dr Yanick Garcia at [facility] who kindly accepted the patient. Family is fine with transfer to      Hypernatremia likely due to due to poor oral intake, continue hypotonic fluid. Elevated troponin possible due to demand ischemia versus renal insufficiency  -Patient denied left-sided chest pain     Hx of CAD s/p CABG in November 2022. Recurrent left sided thoracentesis likely a complication of CABG  -Stable, continue home Coreg Lipitor, hold aspirin for now for thoracentesis     CKD stage III: stable  HTN poorly controlled: Continue Coreg, Norvasc and as needed hydralazine, monitor BP  History of CVA: stable, continue Lipitor, hold aspirin  HLD: Continue Lipitor       ADDITIONAL CARE RECOMMENDATIONS:   Follow up with Thoracic surgery  Transfer to [facility]    DIET: Cardiac Diet    ACTIVITY: Activity as tolerated    NOTIFY YOUR PHYSICIAN FOR ANY OF THE FOLLOWING:   Fever over 101 degrees for 24 hours.    Chest Patient comment: Dr. Snowden, [facility] insurance that I have now will pay for my Repatha prescription. Can you please call into the Pharmacy below. However, the injections were for 2 weeks at a time that I took before, and I and I believe was the 140mg.  Thanks so much, Luis Felipe Schrader     Name of Medication(s) Requested:  Requested Prescriptions     Pending Prescriptions Disp Refills    REPATHA SOSY syringe 2.1 mL 1     Sig: Inject 1 mL into the skin every 14 days       Medication is on current medication list Yes    Dosage and directions were verified? Yes    Quantity verified: 90 day supply     Pharmacy Verified?  Yes    Last Appointment:  7/18/2024    Future appts:  Future Appointments   Date Time Provider Department Center   4/22/2025  7:45 AM Joseph Galvan MD [facility]        (If no appt send self scheduling link. .REFILLAPPT)  Scheduling request sent?     [] Yes  [x] No    Does patient need updated?  [] Yes  [x] No   pain, shortness of breath, fever, chills, nausea, vomiting, diarrhea, change in mentation, falling, weakness, bleeding. Severe pain or pain not relieved by medications, as well as any other signs or symptoms that you may have questions about.     FOLLOW UP APPOINTMENTS:    Follow-up Information    None          DISCHARGE MEDICATIONS:  Current Discharge Medication List          DISPOSITION:    Home With:   OT  PT  HH  RN       Long term SNF/Inpatient Rehab    Independent/assisted living    Hospice   x Other: [facility]     PATIENT CONDITION AT DISCHARGE:     Functional status    Poor     Deconditioned    x Independent      Cognition    x Lucid     Forgetful     Dementia      Catheters/lines (plus indication)    Adame     PICC     PEG    x None      Code status    x Full code     DNR      PHYSICAL EXAMINATION AT DISCHARGE:    Please see progress note      CHRONIC MEDICAL DIAGNOSES:  Problem List as of 3/17/2023 Date Reviewed: 8/1/2022            Codes Class Noted - Resolved    Pleural effusion on left ICD-10-CM: J90  ICD-9-CM: 511.9  3/14/2023 - Present        Acute respiratory distress ICD-10-CM: R06.03  ICD-9-CM: 518.82  1/12/2023 - Present        Pleural effusion ICD-10-CM: J90  ICD-9-CM: 511.9  1/12/2023 - Present        COVID ICD-10-CM: U07.1  ICD-9-CM: 079.89  1/12/2023 - Present        CKD (chronic kidney disease) stage 4, GFR 15-29 ml/min ([facility]) ICD-10-CM: N18.4  ICD-9-CM: 585.4  8/1/2022 - Present        Chronic renal disease, stage III ICD-10-CM: N18.30  ICD-9-CM: 585.3  8/1/2022 - Present        Bilateral carotid artery stenosis ICD-10-CM: I65.23  ICD-9-CM: 433.10, 433.30  4/12/2022 - Present    Overview Signed 4/12/2022 11:13 AM by Brooklyn Choudhury NP     mild             Stroke ([facility] [address] ICD-10-CM: I63.9  ICD-9-CM: 434.91  5/16/2021 - Present        TIA (transient ischemic attack) ICD-10-CM: G45.9  ICD-9-CM: 435.9  5/13/2021 - Present        Positive colorectal cancer screening using Cologuard test ICD-10-CM: R19.5  ICD-9-CM: 787.7  3/11/2021 - Present    Overview Signed 3/11/2021 12:43 PM by Tariq Neil NP     3/11/21: Maude Araya referral             Type 2 diabetes mellitus, without long-term current use of insulin (HCC) ICD-10-CM: E11.9  ICD-9-CM: 250.00  2/26/2021 - Present        Essential hypertension ICD-10-CM: I10  ICD-9-CM: 401.9  2/26/2021 - Present           Greater than 39 minutes were spent with the patient on counseling and coordination of care    Signed:   Rosmery Santana MD  3/17/2023  1:43 PM    .

## 2025-02-14 ENCOUNTER — TRANSCRIBE ORDERS (OUTPATIENT)
Facility: HOSPITAL | Age: 70
End: 2025-02-14

## 2025-02-14 DIAGNOSIS — F17.210 CIGARETTE SMOKER: ICD-10-CM

## 2025-02-14 DIAGNOSIS — Z12.2 SCREENING FOR MALIGNANT NEOPLASM OF RESPIRATORY ORGAN: Primary | ICD-10-CM

## 2025-03-18 ENCOUNTER — TRANSCRIBE ORDERS (OUTPATIENT)
Facility: HOSPITAL | Age: 70
End: 2025-03-18

## 2025-03-18 DIAGNOSIS — Z87.891 PERSONAL HISTORY OF TOBACCO USE: Primary | ICD-10-CM

## 2025-03-18 DIAGNOSIS — F17.211 CIGARETTE NICOTINE DEPENDENCE IN REMISSION: ICD-10-CM

## 2025-04-22 ASSESSMENT — VISUAL ACUITY: OU: 1

## 2025-04-22 ASSESSMENT — ENCOUNTER SYMPTOMS: SHORTNESS OF BREATH: 0

## 2025-04-22 NOTE — PROGRESS NOTES
Subjective:   Shawn Gross Jr. is a 69 y.o.  male with past medical history including CAD s/p CABG x1 at [facility] in November 2022, HFmrEF (45-50%), recurrent left pleural effusion (thought to be due to CABG surgery), hypertension, hypercholesterolemia, CVA (left parietal infarct in May 2021; remote right parietal lobe infarcts; moderate generalized cerebrovascular disease), CKD III, PAD, COPD, chronic tobacco use, and diabetes presents today for 3 month follow up.    Mr. Gross presents today with a well-controlled blood pressure and a resting pulse in the 70s on auscultation in the sinus rhythm with frequent PVCs.  His weight is down approximately 10 pounds from his last visit but he states that he feels in his normal state of health and has been eating regularly.  He appears quite dehydrated.    He endorses some generalized fatigue but denies any chest discomfort, dyspnea, orthopnea, palpitations, presyncope/syncope, orthostatic dizziness, or claudication.    He states that his mood has been steadily improving since he lost his mother.  He still smoking about 8 cigarettes a day.    His only physical activity is when he is actively doing physical therapy.  He plans on asking his primary care provider to extend his order so he can continue to participate in PT.    His wife plans to have bilateral knee replacement surgery in June/July of this year for advanced osteoarthritis.        Visit 1.24.25:  Mr. Gross presents today with a well-controlled blood pressure and a resting pulse in the 80s.  He has had no ED visits or hospitalizations since our last visit.  He has been followed by nephrology and it looks like his renal function has returned to baseline with his most recent creatinine being around 2.0 and potassium being 4.5. He is back on Jardiance and has had his lisinopril increased to 20 mg.  He is not on any loop diuretics.  Weight is stable.  He did not qualify for cardiac

## 2025-04-23 ENCOUNTER — OFFICE VISIT (OUTPATIENT)
Age: 70
End: 2025-04-23
Payer: MEDICARE

## 2025-04-23 VITALS
SYSTOLIC BLOOD PRESSURE: 122 MMHG | WEIGHT: 142 LBS | HEIGHT: 69 IN | BODY MASS INDEX: 21.03 KG/M2 | OXYGEN SATURATION: 99 % | DIASTOLIC BLOOD PRESSURE: 60 MMHG | HEART RATE: 80 BPM

## 2025-04-23 DIAGNOSIS — E78.00 HYPERCHOLESTEROLEMIA: ICD-10-CM

## 2025-04-23 DIAGNOSIS — Z95.1 S/P CABG X 1: ICD-10-CM

## 2025-04-23 DIAGNOSIS — I50.22 HEART FAILURE WITH MID-RANGE EJECTION FRACTION (HCC): Primary | ICD-10-CM

## 2025-04-23 DIAGNOSIS — I10 PRIMARY HYPERTENSION: ICD-10-CM

## 2025-04-23 DIAGNOSIS — I25.10 CAD IN NATIVE ARTERY: ICD-10-CM

## 2025-04-23 DIAGNOSIS — I34.0 MODERATE MITRAL REGURGITATION: ICD-10-CM

## 2025-04-23 DIAGNOSIS — Z86.73 HISTORY OF CVA (CEREBROVASCULAR ACCIDENT): ICD-10-CM

## 2025-04-23 DIAGNOSIS — N18.32 CKD STAGE 3B, GFR 30-44 ML/MIN (HCC): ICD-10-CM

## 2025-04-23 PROCEDURE — 4004F PT TOBACCO SCREEN RCVD TLK: CPT | Performed by: NURSE PRACTITIONER

## 2025-04-23 PROCEDURE — 99214 OFFICE O/P EST MOD 30 MIN: CPT | Performed by: NURSE PRACTITIONER

## 2025-04-23 PROCEDURE — G8427 DOCREV CUR MEDS BY ELIG CLIN: HCPCS | Performed by: NURSE PRACTITIONER

## 2025-04-23 PROCEDURE — 1123F ACP DISCUSS/DSCN MKR DOCD: CPT | Performed by: NURSE PRACTITIONER

## 2025-04-23 PROCEDURE — 1159F MED LIST DOCD IN RCRD: CPT | Performed by: NURSE PRACTITIONER

## 2025-04-23 PROCEDURE — 3078F DIAST BP <80 MM HG: CPT | Performed by: NURSE PRACTITIONER

## 2025-04-23 PROCEDURE — 3017F COLORECTAL CA SCREEN DOC REV: CPT | Performed by: NURSE PRACTITIONER

## 2025-04-23 PROCEDURE — G8420 CALC BMI NORM PARAMETERS: HCPCS | Performed by: NURSE PRACTITIONER

## 2025-04-23 PROCEDURE — 3074F SYST BP LT 130 MM HG: CPT | Performed by: NURSE PRACTITIONER

## 2025-04-23 PROCEDURE — 1126F AMNT PAIN NOTED NONE PRSNT: CPT | Performed by: NURSE PRACTITIONER

## 2025-04-23 ASSESSMENT — PATIENT HEALTH QUESTIONNAIRE - PHQ9
SUM OF ALL RESPONSES TO PHQ QUESTIONS 1-9: 0
SUM OF ALL RESPONSES TO PHQ QUESTIONS 1-9: 0
2. FEELING DOWN, DEPRESSED OR HOPELESS: NOT AT ALL
1. LITTLE INTEREST OR PLEASURE IN DOING THINGS: NOT AT ALL
SUM OF ALL RESPONSES TO PHQ QUESTIONS 1-9: 0
SUM OF ALL RESPONSES TO PHQ QUESTIONS 1-9: 0

## 2025-04-23 NOTE — PROGRESS NOTES
1. Have you been to the ER, urgent care clinic since your last visit?  Hospitalized since your last visit?No    2. Have you seen or consulted any other health care providers outside of the [facility] since your last visit?  Include any pap smears or colon screening. No

## 2025-08-11 ENCOUNTER — OFFICE VISIT (OUTPATIENT)
Age: 70
End: 2025-08-11
Payer: MEDICARE

## 2025-08-11 VITALS
DIASTOLIC BLOOD PRESSURE: 78 MMHG | WEIGHT: 135 LBS | OXYGEN SATURATION: 99 % | HEART RATE: 68 BPM | BODY MASS INDEX: 20.23 KG/M2 | SYSTOLIC BLOOD PRESSURE: 142 MMHG

## 2025-08-11 DIAGNOSIS — R54 FRAILTY: ICD-10-CM

## 2025-08-11 DIAGNOSIS — I25.10 CAD IN NATIVE ARTERY: ICD-10-CM

## 2025-08-11 DIAGNOSIS — N18.9 CHRONIC KIDNEY DISEASE, UNSPECIFIED CKD STAGE: ICD-10-CM

## 2025-08-11 DIAGNOSIS — I34.0 MODERATE MITRAL REGURGITATION: ICD-10-CM

## 2025-08-11 DIAGNOSIS — I10 PRIMARY HYPERTENSION: ICD-10-CM

## 2025-08-11 DIAGNOSIS — I50.22 HEART FAILURE WITH MID-RANGE EJECTION FRACTION (HCC): Primary | ICD-10-CM

## 2025-08-11 DIAGNOSIS — Z95.1 S/P CABG X 1: ICD-10-CM

## 2025-08-11 PROCEDURE — G8427 DOCREV CUR MEDS BY ELIG CLIN: HCPCS | Performed by: NURSE PRACTITIONER

## 2025-08-11 PROCEDURE — 3078F DIAST BP <80 MM HG: CPT | Performed by: NURSE PRACTITIONER

## 2025-08-11 PROCEDURE — 3077F SYST BP >= 140 MM HG: CPT | Performed by: NURSE PRACTITIONER

## 2025-08-11 PROCEDURE — G8420 CALC BMI NORM PARAMETERS: HCPCS | Performed by: NURSE PRACTITIONER

## 2025-08-11 PROCEDURE — 4004F PT TOBACCO SCREEN RCVD TLK: CPT | Performed by: NURSE PRACTITIONER

## 2025-08-11 PROCEDURE — 1159F MED LIST DOCD IN RCRD: CPT | Performed by: NURSE PRACTITIONER

## 2025-08-11 PROCEDURE — 1123F ACP DISCUSS/DSCN MKR DOCD: CPT | Performed by: NURSE PRACTITIONER

## 2025-08-11 PROCEDURE — 99214 OFFICE O/P EST MOD 30 MIN: CPT | Performed by: NURSE PRACTITIONER

## 2025-08-11 PROCEDURE — 3017F COLORECTAL CA SCREEN DOC REV: CPT | Performed by: NURSE PRACTITIONER

## 2025-08-19 ENCOUNTER — HOSPITAL ENCOUNTER (OUTPATIENT)
Facility: HOSPITAL | Age: 70
Discharge: HOME | End: 2025-08-21
Payer: MEDICARE

## 2025-08-19 VITALS
DIASTOLIC BLOOD PRESSURE: 65 MMHG | WEIGHT: 141 LBS | SYSTOLIC BLOOD PRESSURE: 172 MMHG | HEIGHT: 68 IN | HEART RATE: 63 BPM | BODY MASS INDEX: 21.37 KG/M2

## 2025-08-19 DIAGNOSIS — I50.22 HEART FAILURE WITH MID-RANGE EJECTION FRACTION (HCC): ICD-10-CM

## 2025-08-19 DIAGNOSIS — I34.0 MODERATE MITRAL REGURGITATION: ICD-10-CM

## 2025-08-19 DIAGNOSIS — I25.10 CAD IN NATIVE ARTERY: ICD-10-CM

## 2025-08-19 LAB
ECHO AO ROOT DIAM: 2.6 CM
ECHO AO ROOT INDEX: 1.48 CM/M2
ECHO AR MAX VEL PISA: 4.9 M/S
ECHO AV AREA PEAK VELOCITY: 1.2 CM2
ECHO AV AREA VTI: 1.1 CM2
ECHO AV AREA/BSA PEAK VELOCITY: 0.7 CM2/M2
ECHO AV AREA/BSA VTI: 0.6 CM2/M2
ECHO AV MEAN GRADIENT: 5 MMHG
ECHO AV MEAN VELOCITY: 1 M/S
ECHO AV PEAK GRADIENT: 9 MMHG
ECHO AV PEAK VELOCITY: 1.5 M/S
ECHO AV REGURGITANT PHT: 518.4 MS
ECHO AV VELOCITY RATIO: 0.67
ECHO AV VTI: 31.4 CM
ECHO BSA: 1.75 M2
ECHO EST RA PRESSURE: 5 MMHG
ECHO LA DIAMETER INDEX: 2.05 CM/M2
ECHO LA DIAMETER: 3.6 CM
ECHO LA TO AORTIC ROOT RATIO: 1.38
ECHO LA VOL A-L A2C: 55 ML (ref 18–58)
ECHO LA VOL A-L A4C: 40 ML (ref 18–58)
ECHO LA VOL BP: 47 ML (ref 18–58)
ECHO LA VOL MOD A2C: 52 ML (ref 18–58)
ECHO LA VOL MOD A4C: 40 ML (ref 18–58)
ECHO LA VOL/BSA BIPLANE: 27 ML/M2 (ref 16–34)
ECHO LA VOLUME AREA LENGTH: 49 ML
ECHO LA VOLUME INDEX A-L A2C: 31 ML/M2 (ref 16–34)
ECHO LA VOLUME INDEX A-L A4C: 23 ML/M2 (ref 16–34)
ECHO LA VOLUME INDEX AREA LENGTH: 28 ML/M2 (ref 16–34)
ECHO LA VOLUME INDEX MOD A2C: 30 ML/M2 (ref 16–34)
ECHO LA VOLUME INDEX MOD A4C: 23 ML/M2 (ref 16–34)
ECHO LV EDV A4C: 75 ML
ECHO LV EDV INDEX A4C: 43 ML/M2
ECHO LV EF PHYSICIAN: 50 %
ECHO LV EJECTION FRACTION A4C: 55 %
ECHO LV ESV A4C: 34 ML
ECHO LV ESV INDEX A4C: 19 ML/M2
ECHO LV FRACTIONAL SHORTENING: 24 % (ref 28–44)
ECHO LV INTERNAL DIMENSION DIASTOLE INDEX: 2.33 CM/M2
ECHO LV INTERNAL DIMENSION DIASTOLIC: 4.1 CM (ref 4.2–5.9)
ECHO LV INTERNAL DIMENSION SYSTOLIC INDEX: 1.76 CM/M2
ECHO LV INTERNAL DIMENSION SYSTOLIC: 3.1 CM
ECHO LV IVSD: 1.4 CM (ref 0.6–1)
ECHO LV MASS 2D: 193.5 G (ref 88–224)
ECHO LV MASS INDEX 2D: 109.9 G/M2 (ref 49–115)
ECHO LV POSTERIOR WALL DIASTOLIC: 1.2 CM (ref 0.6–1)
ECHO LV RELATIVE WALL THICKNESS RATIO: 0.59
ECHO LVOT AREA: 1.8 CM2
ECHO LVOT AV VTI INDEX: 0.63
ECHO LVOT DIAM: 1.5 CM
ECHO LVOT MEAN GRADIENT: 2 MMHG
ECHO LVOT PEAK GRADIENT: 4 MMHG
ECHO LVOT PEAK VELOCITY: 1 M/S
ECHO LVOT STROKE VOLUME INDEX: 19.9 ML/M2
ECHO LVOT SV: 35 ML
ECHO LVOT VTI: 19.8 CM
ECHO MV A VELOCITY: 0.83 M/S
ECHO MV E DECELERATION TIME (DT): 284 MS
ECHO MV E VELOCITY: 0.52 M/S
ECHO MV E/A RATIO: 0.63
ECHO MV REGURGITANT ALIASING (NYQUIST) VELOCITY: 30 CM/S
ECHO MV REGURGITANT PEAK GRADIENT: 108 MMHG
ECHO MV REGURGITANT PEAK VELOCITY: 5.2 M/S
ECHO MV REGURGITANT RADIUS PISA: 0.69 CM
ECHO RIGHT VENTRICULAR SYSTOLIC PRESSURE (RVSP): 28 MMHG
ECHO RV INTERNAL DIMENSION: 2.7 CM
ECHO TV REGURGITANT MAX VELOCITY: 2.38 M/S
ECHO TV REGURGITANT PEAK GRADIENT: 23 MMHG

## 2025-08-19 PROCEDURE — 93306 TTE W/DOPPLER COMPLETE: CPT | Performed by: SPECIALIST

## 2025-08-19 PROCEDURE — 93306 TTE W/DOPPLER COMPLETE: CPT

## 2025-08-20 ENCOUNTER — RESULTS FOLLOW-UP (OUTPATIENT)
Age: 70
End: 2025-08-20

## (undated) DEVICE — SINGLE-USE BIOPSY FORCEPS: Brand: RADIAL JAW 4

## (undated) DEVICE — CONTAINER SPEC 20 ML LID NEUT BUFF FORMALIN 10 % POLYPR STS

## (undated) DEVICE — KIT,1200CC CANISTER,3/16"X6' TUBING: Brand: MEDLINE INDUSTRIES, INC.

## (undated) DEVICE — BAG SPEC BIOHZRD 10 X 10 IN --

## (undated) DEVICE — BLOCK BITE ENDOSCP AD 21 MM W/ DIL BLU LF DISP

## (undated) DEVICE — SOLIDIFIER FLD 2OZ 1500CC N DISINF IN BTL DISP SAFESORB

## (undated) DEVICE — CANNULA CUSH AD W/ 14FT TBG